# Patient Record
Sex: FEMALE | Race: WHITE | Employment: FULL TIME | URBAN - METROPOLITAN AREA
[De-identification: names, ages, dates, MRNs, and addresses within clinical notes are randomized per-mention and may not be internally consistent; named-entity substitution may affect disease eponyms.]

---

## 2017-02-24 ENCOUNTER — OFFICE VISIT (OUTPATIENT)
Dept: FAMILY MEDICINE | Facility: CLINIC | Age: 62
End: 2017-02-24
Payer: COMMERCIAL

## 2017-02-24 VITALS
HEART RATE: 98 BPM | DIASTOLIC BLOOD PRESSURE: 90 MMHG | HEIGHT: 66 IN | BODY MASS INDEX: 29.23 KG/M2 | OXYGEN SATURATION: 99 % | SYSTOLIC BLOOD PRESSURE: 156 MMHG | WEIGHT: 181.9 LBS | TEMPERATURE: 98.5 F

## 2017-02-24 DIAGNOSIS — I10 HYPERTENSION GOAL BP (BLOOD PRESSURE) < 140/90: ICD-10-CM

## 2017-02-24 DIAGNOSIS — M70.21 OLECRANON BURSITIS OF RIGHT ELBOW: Primary | ICD-10-CM

## 2017-02-24 PROCEDURE — 99214 OFFICE O/P EST MOD 30 MIN: CPT | Performed by: FAMILY MEDICINE

## 2017-02-24 RX ORDER — PREDNISONE 20 MG/1
TABLET ORAL
Qty: 15 TABLET | Refills: 0 | Status: SHIPPED
Start: 2017-02-24 | End: 2017-03-17

## 2017-02-24 NOTE — MR AVS SNAPSHOT
"              After Visit Summary   2/24/2017    Roslyn Palmer    MRN: [id number]           Patient Information     Date Of Birth          1955        Visit Information        Provider Department      2/24/2017 11:20 AM Nette Macias MD [facility]        Today's Diagnoses     Olecranon bursitis of right elbow    -  1       Follow-ups after your visit        Who to contact     If you have questions or need follow up information about today's clinic visit or your schedule please contact [facility] directly at [phone].  Normal or non-critical lab and imaging results will be communicated to you by [employer], letter or phone within 4 business days after the clinic has received the results. If you do not hear from us within 7 days, please contact the clinic through [employer] or phone. If you have a critical or abnormal lab result, we will notify you by phone as soon as possible.  Submit refill requests through [employer] or call your pharmacy and they will forward the refill request to us. Please allow 3 business days for your refill to be completed.          Additional Information About Your Visit        MyChart Information     [employer] gives you secure access to your electronic health record. If you see a primary care provider, you can also send messages to your care team and make appointments. If you have questions, please call your primary care clinic.  If you do not have a primary care provider, please call [phone] and they will assist you.        Care EveryWhere ID     This is your Care EveryWhere ID. This could be used by other organizations to access your [place] medical records  [id number]        Your Vitals Were     Pulse Temperature Height Pulse Oximetry BMI (Body Mass Index)       98 98.5  F (36.9  C) (Oral) 1.666 m (5' 5.59\") 99% 29.73 kg/m2        Blood Pressure from Last 3 Encounters:   02/24/17 156/90   04/14/16 132/78   04/11/16 146/88    Weight " from Last 3 Encounters:   02/24/17 82.5 kg (181 lb 14.4 oz)   04/14/16 84.2 kg (185 lb 11.2 oz)   04/11/16 83.9 kg (185 lb)              Today, you had the following     No orders found for display         Today's Medication Changes          These changes are accurate as of: 2/24/17 12:25 PM.  If you have any questions, ask your nurse or doctor.               Start taking these medicines.        Dose/Directions    predniSONE 20 MG tablet   Commonly known as:  DELTASONE   Used for:  Olecranon bursitis of right elbow   Started by:  Nette Macias MD        3 tabs daily for 3 days, 2 tabs x 2 days, 1 tab x 2 days   Quantity:  15 tablet   Refills:  0            Where to get your medicines      These medications were sent to [facility]/pharmacy #[id number] - [place], [place] - [address] [place] [address] [facility]  [address] [place] [address], [facility] [address]     Phone:  [phone]     predniSONE 20 MG tablet                Primary Care Provider Office Phone # Fax #    Maria Luz Bright PA-C [phone]       [facility] [address]  [facility] [address]        Thank you!     Thank you for choosing [facility]  for your care. Our goal is always to provide you with excellent care. Hearing back from our patients is one way we can continue to improve our services. Please take a few minutes to complete the written survey that you may receive in the mail after your visit with us. Thank you!             Your Updated Medication List - Protect others around you: Learn how to safely use, store and throw away your medicines at www.disposemymeds.org.          This list is accurate as of: 2/24/17 12:25 PM.  Always use your most recent med list.                   Brand Name Dispense Instructions for use    cephALEXin 500 MG capsule    KEFLEX    60 capsule    Take 1 capsule by mouth 2 times daily.       DEMADEX 20 MG tablet   Generic drug:  torsemide      Take 10 mg  by mouth 2 times daily       IMITREX 100 MG tablet   Generic drug:  SUMAtriptan      take 100 mg by mouth at onset of headache. May repeat in 2 hours if needed: max 2/day;       nadolol 20 MG tablet    CORGARD     Take 20 mg by mouth daily.       pantoprazole 40 MG EC tablet    PROTONIX     Take  by mouth daily.       predniSONE 20 MG tablet    DELTASONE    15 tablet    3 tabs daily for 3 days, 2 tabs x 2 days, 1 tab x 2 days

## 2017-02-24 NOTE — NURSING NOTE
"Chief Complaint   Patient presents with     Musculoskeletal Problem     right elbow       Initial /90 (BP Location: Left arm, Patient Position: Chair, Cuff Size: Adult Regular)  Pulse 98  Temp 98.5  F (36.9  C) (Oral)  Ht 1.666 m (5' 5.59\")  Wt 82.5 kg (181 lb 14.4 oz)  SpO2 99%  BMI 29.73 kg/m2 Estimated body mass index is 29.73 kg/(m^2) as calculated from the following:    Height as of this encounter: 1.666 m (5' 5.59\").    Weight as of this encounter: 82.5 kg (181 lb 14.4 oz).  Medication Reconciliation: complete   Kalli Stewart CMA      "

## 2017-03-05 ENCOUNTER — MYC MEDICAL ADVICE (OUTPATIENT)
Dept: FAMILY MEDICINE | Facility: CLINIC | Age: 62
End: 2017-03-05

## 2017-03-05 DIAGNOSIS — M70.21 OLECRANON BURSITIS OF RIGHT ELBOW: ICD-10-CM

## 2017-03-06 RX ORDER — PREDNISONE 20 MG/1
TABLET ORAL
Qty: 21 TABLET | Refills: 0 | Status: SHIPPED | OUTPATIENT
Start: 2017-03-06 | End: 2017-03-17

## 2017-03-17 ENCOUNTER — TELEPHONE (OUTPATIENT)
Dept: FAMILY MEDICINE | Facility: CLINIC | Age: 62
End: 2017-03-17

## 2017-03-17 ENCOUNTER — OFFICE VISIT (OUTPATIENT)
Dept: FAMILY MEDICINE | Facility: CLINIC | Age: 62
End: 2017-03-17
Payer: COMMERCIAL

## 2017-03-17 VITALS
HEIGHT: 66 IN | SYSTOLIC BLOOD PRESSURE: 148 MMHG | BODY MASS INDEX: 28.67 KG/M2 | WEIGHT: 178.4 LBS | TEMPERATURE: 98.5 F | HEART RATE: 98 BPM | DIASTOLIC BLOOD PRESSURE: 86 MMHG | OXYGEN SATURATION: 95 %

## 2017-03-17 DIAGNOSIS — Z00.01 ENCOUNTER FOR ROUTINE ADULT HEALTH EXAMINATION WITH ABNORMAL FINDINGS: Primary | ICD-10-CM

## 2017-03-17 DIAGNOSIS — M79.671 PAIN IN BOTH FEET: ICD-10-CM

## 2017-03-17 DIAGNOSIS — F10.10 ALCOHOL ABUSE: ICD-10-CM

## 2017-03-17 DIAGNOSIS — Z12.4 SCREENING FOR MALIGNANT NEOPLASM OF CERVIX: ICD-10-CM

## 2017-03-17 DIAGNOSIS — G43.109 MIGRAINE WITH AURA AND WITHOUT STATUS MIGRAINOSUS, NOT INTRACTABLE: ICD-10-CM

## 2017-03-17 DIAGNOSIS — F17.200 NEEDS SMOKING CESSATION EDUCATION: ICD-10-CM

## 2017-03-17 DIAGNOSIS — Z23 NEED FOR PROPHYLACTIC VACCINATION AND INOCULATION AGAINST INFLUENZA: ICD-10-CM

## 2017-03-17 DIAGNOSIS — D64.9 ANEMIA, UNSPECIFIED TYPE: ICD-10-CM

## 2017-03-17 DIAGNOSIS — Z78.0 MENOPAUSE: ICD-10-CM

## 2017-03-17 DIAGNOSIS — K70.9 ALCOHOLIC LIVER DISEASE (H): ICD-10-CM

## 2017-03-17 DIAGNOSIS — E87.6 HYPOKALEMIA: Primary | ICD-10-CM

## 2017-03-17 DIAGNOSIS — B19.20 HEPATITIS C VIRUS INFECTION, UNSPECIFIED CHRONICITY: ICD-10-CM

## 2017-03-17 DIAGNOSIS — I10 BENIGN ESSENTIAL HYPERTENSION: ICD-10-CM

## 2017-03-17 DIAGNOSIS — M79.672 PAIN IN BOTH FEET: ICD-10-CM

## 2017-03-17 LAB
ALBUMIN SERPL-MCNC: 3.3 G/DL (ref 3.4–5)
ALP SERPL-CCNC: 177 U/L (ref 40–150)
ALT SERPL W P-5'-P-CCNC: 30 U/L (ref 0–50)
ANION GAP SERPL CALCULATED.3IONS-SCNC: 8 MMOL/L (ref 3–14)
AST SERPL W P-5'-P-CCNC: 45 U/L (ref 0–45)
BASOPHILS # BLD AUTO: 0 10E9/L (ref 0–0.2)
BASOPHILS NFR BLD AUTO: 0.6 %
BILIRUB SERPL-MCNC: 0.8 MG/DL (ref 0.2–1.3)
BUN SERPL-MCNC: 11 MG/DL (ref 7–30)
CALCIUM SERPL-MCNC: 8.8 MG/DL (ref 8.5–10.1)
CHLORIDE SERPL-SCNC: 95 MMOL/L (ref 94–109)
CO2 SERPL-SCNC: 35 MMOL/L (ref 20–32)
CREAT SERPL-MCNC: 0.72 MG/DL (ref 0.52–1.04)
DIFFERENTIAL METHOD BLD: ABNORMAL
EOSINOPHIL # BLD AUTO: 0.2 10E9/L (ref 0–0.7)
EOSINOPHIL NFR BLD AUTO: 3.1 %
ERYTHROCYTE [DISTWIDTH] IN BLOOD BY AUTOMATED COUNT: 17.8 % (ref 10–15)
FOLATE SERPL-MCNC: 3.4 NG/ML
GFR SERPL CREATININE-BSD FRML MDRD: 83 ML/MIN/1.7M2
GLUCOSE SERPL-MCNC: 105 MG/DL (ref 70–99)
HCT VFR BLD AUTO: 37.9 % (ref 35–47)
HGB BLD-MCNC: 12 G/DL (ref 11.7–15.7)
INR PPP: 1.14 (ref 0.86–1.14)
LYMPHOCYTES # BLD AUTO: 1.9 10E9/L (ref 0.8–5.3)
LYMPHOCYTES NFR BLD AUTO: 38.5 %
MCH RBC QN AUTO: 27.7 PG (ref 26.5–33)
MCHC RBC AUTO-ENTMCNC: 31.7 G/DL (ref 31.5–36.5)
MCV RBC AUTO: 88 FL (ref 78–100)
MONOCYTES # BLD AUTO: 0.7 10E9/L (ref 0–1.3)
MONOCYTES NFR BLD AUTO: 14.5 %
NEUTROPHILS # BLD AUTO: 2.1 10E9/L (ref 1.6–8.3)
NEUTROPHILS NFR BLD AUTO: 43.3 %
PLATELET # BLD AUTO: 247 10E9/L (ref 150–450)
POTASSIUM SERPL-SCNC: 2.7 MMOL/L (ref 3.4–5.3)
PROT SERPL-MCNC: 8.6 G/DL (ref 6.8–8.8)
RBC # BLD AUTO: 4.33 10E12/L (ref 3.8–5.2)
SODIUM SERPL-SCNC: 138 MMOL/L (ref 133–144)
URATE SERPL-MCNC: 9.5 MG/DL (ref 2.6–6)
VIT B12 SERPL-MCNC: 1911 PG/ML (ref 193–986)
WBC # BLD AUTO: 4.9 10E9/L (ref 4–11)

## 2017-03-17 PROCEDURE — 87624 HPV HI-RISK TYP POOLED RSLT: CPT | Performed by: FAMILY MEDICINE

## 2017-03-17 PROCEDURE — 85025 COMPLETE CBC W/AUTO DIFF WBC: CPT | Performed by: FAMILY MEDICINE

## 2017-03-17 PROCEDURE — 82607 VITAMIN B-12: CPT | Performed by: FAMILY MEDICINE

## 2017-03-17 PROCEDURE — 90471 IMMUNIZATION ADMIN: CPT | Performed by: FAMILY MEDICINE

## 2017-03-17 PROCEDURE — 86706 HEP B SURFACE ANTIBODY: CPT | Performed by: FAMILY MEDICINE

## 2017-03-17 PROCEDURE — 85610 PROTHROMBIN TIME: CPT | Performed by: FAMILY MEDICINE

## 2017-03-17 PROCEDURE — 82746 ASSAY OF FOLIC ACID SERUM: CPT | Performed by: FAMILY MEDICINE

## 2017-03-17 PROCEDURE — 84550 ASSAY OF BLOOD/URIC ACID: CPT | Performed by: FAMILY MEDICINE

## 2017-03-17 PROCEDURE — 80053 COMPREHEN METABOLIC PANEL: CPT | Performed by: FAMILY MEDICINE

## 2017-03-17 PROCEDURE — 87522 HEPATITIS C REVRS TRNSCRPJ: CPT | Performed by: FAMILY MEDICINE

## 2017-03-17 PROCEDURE — 36415 COLL VENOUS BLD VENIPUNCTURE: CPT | Performed by: FAMILY MEDICINE

## 2017-03-17 PROCEDURE — 87340 HEPATITIS B SURFACE AG IA: CPT | Performed by: FAMILY MEDICINE

## 2017-03-17 PROCEDURE — 99213 OFFICE O/P EST LOW 20 MIN: CPT | Mod: 25 | Performed by: FAMILY MEDICINE

## 2017-03-17 PROCEDURE — 99396 PREV VISIT EST AGE 40-64: CPT | Mod: 25 | Performed by: FAMILY MEDICINE

## 2017-03-17 PROCEDURE — 86708 HEPATITIS A ANTIBODY: CPT | Performed by: FAMILY MEDICINE

## 2017-03-17 PROCEDURE — G0145 SCR C/V CYTO,THINLAYER,RESCR: HCPCS | Performed by: FAMILY MEDICINE

## 2017-03-17 PROCEDURE — 90686 IIV4 VACC NO PRSV 0.5 ML IM: CPT | Performed by: FAMILY MEDICINE

## 2017-03-17 RX ORDER — TORSEMIDE 20 MG/1
10 TABLET ORAL 2 TIMES DAILY
Qty: 30 TABLET | Status: CANCELLED | OUTPATIENT
Start: 2017-03-17

## 2017-03-17 RX ORDER — TORSEMIDE 10 MG/1
10 TABLET ORAL 2 TIMES DAILY
Qty: 180 TABLET | Refills: 1 | Status: SHIPPED | OUTPATIENT
Start: 2017-03-17 | End: 2017-12-14

## 2017-03-17 RX ORDER — PANTOPRAZOLE SODIUM 40 MG/1
40 TABLET, DELAYED RELEASE ORAL DAILY
Qty: 90 TABLET | Refills: 3 | Status: SHIPPED | OUTPATIENT
Start: 2017-03-17 | End: 2018-04-02

## 2017-03-17 RX ORDER — POTASSIUM CHLORIDE 1500 MG/1
20 TABLET, EXTENDED RELEASE ORAL
Qty: 37 TABLET | Refills: 0 | Status: SHIPPED | OUTPATIENT
Start: 2017-03-17 | End: 2017-10-27

## 2017-03-17 RX ORDER — NADOLOL 20 MG/1
20 TABLET ORAL DAILY
Qty: 30 TABLET | Refills: 0 | Status: CANCELLED | OUTPATIENT
Start: 2017-03-17

## 2017-03-17 RX ORDER — NADOLOL 40 MG/1
40 TABLET ORAL DAILY
Qty: 30 TABLET | Refills: 0 | Status: SHIPPED | OUTPATIENT
Start: 2017-03-17 | End: 2017-04-12

## 2017-03-17 NOTE — NURSING NOTE
"Chief Complaint   Patient presents with     Physical       Initial /86 (BP Location: Right arm, Patient Position: Chair, Cuff Size: Adult Large)  Pulse 98  Temp 98.5  F (36.9  C) (Oral)  Ht 1.664 m (5' 5.5\")  Wt 80.9 kg (178 lb 6.4 oz)  SpO2 95%  Breastfeeding? No  BMI 29.24 kg/m2 Estimated body mass index is 29.24 kg/(m^2) as calculated from the following:    Height as of this encounter: 1.664 m (5' 5.5\").    Weight as of this encounter: 80.9 kg (178 lb 6.4 oz).  Medication Reconciliation: complete     ANGEL Christian MA         "

## 2017-03-17 NOTE — PROGRESS NOTES
SUBJECTIVE:     CC: Roslyn Palmer is an 61 year old woman who presents for preventive health visit.     Healthy Habits:    Do you get at least three servings of calcium containing foods daily (dairy, green leafy vegetables, etc.)? no    Amount of exercise or daily activities, outside of work: 0 day(s) per week    Problems taking medications regularly No    Medication side effects: No    Have you had an eye exam in the past two years? no    Do you see a dentist twice per year? no    Do you have sleep apnea, excessive snoring or daytime drowsiness?no            Today's PHQ-2 Score:   PHQ-2 ( 1999 Pf[employer]) 3/16/2017 4/8/2016   Q1: Little interest or pleasure in doing things - 0   Q2: Feeling down, depressed or hopeless - 0   PHQ-2 Score - 0   Little interest or pleasure in doing things Not at all -   Feeling down, depressed or hopeless Not at all -   PHQ-2 Score 0 -       Abuse: Current or Past(Physical, Sexual or Emotional)- No  Do you feel safe in your environment - Yes    Social History   Substance Use Topics     Smoking status: Current Every Day Smoker     Packs/day: 0.50     Types: Cigarettes     Smokeless tobacco: Not on file     Alcohol use Yes      Comment: 3 to 4 drinks a day          Standardized Alcohol Screening Questionnaire  AUDIT   Questions 0 1 2 3 4 Score   1. How often do you have a drink  containing alcohol? Never Monthly or less 2 to 4  times a  month 2 to 3  times a  week 4 or more  times a  week  4   2. How many drinks containing alcohol  do you have on a typical day when you are drinking? 1 or 2 3 or 4 5 or 6 7 to 9 10 or more  1   3. How often do you have more than five  or more drinks on one occasion? Never Less  than  monthly Monthly Weekly Daily or  almost  daily  1   4. How often during the last year have  you found that you were not able to stop drinking once you had started? Never Less  than  monthly Monthly Weekly Daily or  almost  daily  0   5. How often during the last year  have  you failed to do what was normally expected of you because of drinking? Never Less  than  monthly Monthly Weekly Daily or  almost  daily  0   6. How often during the last year have  you needed a first drink in the morning to get yourself going after a heavy drinking session? Never Less  than  monthly Monthly Weekly Daily or  almost  daily  0   7. How often during the last year have you had a feeling of guilt or remorse after drinking? Never Less  than  monthly Monthly Weekly Daily or  almost  daily  0   8. How often during the last year have  you been unable to remember what happened the night before because of your drinking? Never Less  than  monthly Monthly Weekly Daily or  almost  daily  0   9. Have you or someone else been  injured because of your drinking? No  Yes, but not in the last year  Yes,  during the  last year  0   10. Has a relative, friend, doctor or other health care worker been concerned about your drinking or suggested you cut down? No  Yes, but not in the last year  Yes,  during the  last year  0   Total  6   Scoring: A score of 7 for adult men is an indication of hazardous drinking (risk for physical or physiological harm); a score of 8 or more is an indication of an alcohol use disorder. A score of 5 or more for adult women  is an indication of hazardous drinking or an alcohol use disorder.       Recent Labs   Lab Test 02/01/16 03/17/14   CHOL  134  125   HDL  46  42   LDL  65  62   TRIG  116  107       Reviewed orders with patient.  Reviewed health maintenance and updated orders accordingly - Yes    She was originally following with [facility] but has decided to transfer her primary care here.     -She went to see Dr. Canela for a staph infection in her hip following a hip replacement, had an ultrasound which was clear so he took off Penicillin. She however, is on chronic cephalexin. She see's Dr. Canela once yearly for this. He had also recommended that she be seen by GI for her  "hep c but she has not done this yet.   -She was started on Nadolol for bp and headaches in distant past. Notes her heart will flutter when she does not take Nadolol.   -The last time she saw a nephrologist was in 2010-Dr. Oliver. He recommended long-term demadex but ok to be rx'd by her pcp   -unsure when her last HepC level was  -had a mammogram in November 2016, she will provide records  - unsure when her last pap was, believes it was about 10 years ago.   - unsure if she has had full course of hepA and hepB vaccines.   -will have 6 month periods where she does not drink etoh and then will start again due to stressors. Currently having 3 drinks when she gets home. She has been in treatment for drugs and alcohol when she was 19. Declines treatment  -she is having approx. 2-3 migraines a month. She will have \"saran wrap in her vision as an aura. She is managing with nadolol and Imitrex. Migraines are largely unchanged in character but they are not as long-lasting as in the past.   -was ill last couple of weeks, fever lasted for 1 weeks, has not had fever for 2 days. She was congested and had rhinorrhea. This has largely resolved and her lungs have returned back to normal.   -reports multiple keratosis all over her skin  -she will have occasional instances where her feet are really painful and is unable to walk, she has to keep cane in the car. Pain has been present the past 2-3 days.   -she is not currently sexually active.     Mammo Decision Support:  Patient over age 50, mutual decision to screen reflected in health maintenance.    Pertinent mammograms are reviewed under the imaging tab.  History of abnormal Pap smear: Last 3 Pap Results: No results found for: PAP    Reviewed and updated as needed this visit by clinical staff  Tobacco  Allergies  Meds  Med Hx  Surg Hx  Fam Hx  Soc Hx        Reviewed and updated as needed this visit by Provider        Past Medical History   Diagnosis Date     DJD " (degenerative joint disease)      Essential hypertension, malignant      ETOH abuse      Hepatitis C       Obstetric History     No data available        Patient Active Problem List   Diagnosis     Joint Prosthesis Infection or Inflammation     Alcoholic liver disease (H)     Tobacco use disorder     CARDIOVASCULAR SCREENING; LDL GOAL LESS THAN 160     Anemia     Advanced directives, counseling/discussion     Benign essential hypertension     Hepatitis C virus infection, unspecified chronicity     Upper GI bleed     Alcohol abuse     Past Surgical History   Procedure Laterality Date     Orthopedic surgery  ,     left and right hip replacement     Gyn surgery              Social History   Substance Use Topics     Smoking status: Current Every Day Smoker     Packs/day: 0.50     Types: Cigarettes     Smokeless tobacco: Not on file     Alcohol use Yes      Comment: 3 to 4 drinks a day     Family History   Problem Relation Age of Onset     Breast Cancer Mother      CANCER Maternal Grandmother      CANCER Maternal Grandfather      CANCER Paternal Grandmother      CANCER Paternal Grandfather      Arthritis Sister          Current Outpatient Prescriptions   Medication Sig Dispense Refill     nadolol (CORGARD) 20 MG tablet Take 1 tablet (20 mg) by mouth daily 30 tablet 0     pantoprazole (PROTONIX) 40 MG EC tablet Take 1 tablet (40 mg) by mouth daily 30 tablet 0     cephALEXin (KEFLEX) 500 MG capsule Take 1 capsule by mouth 2 times daily. 60 capsule 6     torsemide (DEMADEX) 20 MG tablet Take 10 mg by mouth 2 times daily        sumatriptan (IMITREX) 100 MG tablet take 100 mg by mouth at onset of headache. May repeat in 2 hours if needed: max 2/day;       predniSONE (DELTASONE) 20 MG tablet 3 tabs daily for 4 days,  2 tabs x 3 days,  1 tab x 3 days (Patient not taking: Reported on 3/17/2017) 21 tablet 0     predniSONE (DELTASONE) 20 MG tablet 3 tabs daily for 3 days,  2 tabs x 2 days,  1 tab x 2 days  "(Patient not taking: Reported on 3/17/2017) 15 tablet 0     Allergies   Allergen Reactions     Sulfa Drugs        ROS:   ROS: 10 point ROS neg other than the symptoms noted above in the HPI.    OBJECTIVE:     /86 (BP Location: Right arm, Patient Position: Chair, Cuff Size: Adult Large)  Pulse 98  Temp 98.5  F (36.9  C) (Oral)  Ht 1.664 m (5' 5.5\")  Wt 80.9 kg (178 lb 6.4 oz)  SpO2 95%  Breastfeeding? No  BMI 29.24 kg/m2  EXAM:  GENERAL APPEARANCE: alert and no distress  EYES: Eyes grossly normal to inspection, PERRL and conjunctivae and sclerae normal  HENT: ear canals and TM's normal, nose and mouth without ulcers or lesions, oropharynx clear and oral mucous membranes moist  NECK: no adenopathy, no asymmetry, masses, or scars and thyroid normal to palpation  RESP: lungs clear to auscultation - no rales, rhonchi or wheezes  BREAST: normal without masses, tenderness or nipple discharge and no palpable axillary masses or adenopathy  CV: regular rate and rhythm, normal S1 S2, no S3 or S4, no murmur, click or rub, no peripheral edema and peripheral pulses strong  ABDOMEN: soft, nontender,  Hepatomegaly with liver edge palpable, approximately 2 finger breaths below ribs. no splenomegaly, no masses and bowel sounds normal   (female): normal female external genitalia, normal urethral meatus, vaginal mucosal atrophy noted, normal cervix, adnexae, and uterus without masses or abnormal discharge  MS: no musculoskeletal defects are noted and gait is age appropriate without ataxia. Multiple keratosis on thighs.   SKIN: no suspicious lesions or rashes  NEURO: Normal strength and tone, sensory exam grossly normal, mentation intact and speech normal  PSYCH: mentation appears normal and affect normal/bright        ASSESSMENT/PLAN:     1. Encounter for routine adult health examination with abnormal findings  Will get [facility] for Mammogram records.     2. Benign essential hypertension  Not at goal. Ongoing migraines. increase " Nadolol dose to 40 mg daily. MA only bp check in few weeks. If at goal, can send 3 month refill to mail order.   - nadolol (CORGARD) 40 MG tablet; Take 1 tablet (40 mg) by mouth daily  Dispense: 30 tablet; Refill: 0  - torsemide (DEMADEX) 10 MG tablet; Take 1 tablet (10 mg) by mouth 2 times daily  Dispense: 180 tablet; Refill: 1    3. Alcohol abuse  4. Alcoholic liver disease (H)  5. Hepatitis C virus infection, unspecified chronicity  Reviewed importance of alcohol cessation. Declines treatment. Will screen for Hep C levels. F/U for abdominal US for HCC screening and f/u with GI for consideration of hep C tx. .   - pantoprazole (PROTONIX) 40 MG EC tablet; Take 1 tablet (40 mg) by mouth daily  Dispense: 90 tablet; Refill: 3  - torsemide (DEMADEX) 10 MG tablet; Take 1 tablet (10 mg) by mouth 2 times daily  Dispense: 180 tablet; Refill: 1  - Comprehensive metabolic panel (BMP + Alb, Alk Phos, ALT, AST, Total. Bili, TP)  - Hepatitis C RNA, quantitative  - Folate  - Vitamin B12  - INR  - Hepatitis B Surface Antibody  - Hepatitis A Antibody IgG  - US Abdomen Complete; Future  - GASTROENTEROLOGY ADULT REF CONSULT ONLY    5. Screening for malignant neoplasm of cervix  - Pap imaged thin layer screen with HPV - recommended age 30 - 65 years (select HPV order below)  - HPV High Risk Types DNA Cervical       6. Anemia, unspecified type  Hx of, I suspect due to her liver disease. Will check with labs.   - Comprehensive metabolic panel (BMP + Alb, Alk Phos, ALT, AST, Total. Bili, TP)  - CBC with platelets differential    7. Migraine with aura and without status migrainosus, not intractable  Controlled. Continue current medications. Patient brings in FMLA forms for this. Reports her past pcp had filled this out for her, allowing absence of 4-8 hours, up to 2-3 days per month for migraines.     8. Pain in both feet  Unclear etiology, ? if due to gout due based on alcohol use. Continue to monitor and f/u when flared.   - Uric  acid    9. Menopause  F/U for Dexa scan.   - DX Hip/Pelvis/Spine; Future    10. Needs smoking cessation education  Pt would like to defer smoking cessation  and focus on alcohol cessation for now. Declines treatment.     12. Need for prophylactic vaccination and inoculation against influenza  Vaccine given today.   - FLU VAC, SPLIT VIRUS IM > 3 YO (QUADRIVALENT) [50544]        Patient Instructions   Please call [facility] ([facility] at [phone] to schedule ultrasound and bone density scan.     Start  Taking 40mg of Nadolol and after 2 weeks, schedule MA only blood pressure check.     Schedule follow up with GI.     Preventive Health Recommendations  Female Ages 50 - 64    Yearly exam: See your health care provider every year in order to  o Review health changes.   o Discuss preventive care.    o Review your medicines if your doctor has prescribed any.      Get a Pap test every three years (unless you have an abnormal result and your provider advises testing more often).    If you get Pap tests with HPV test, you only need to test every 5 years, unless you have an abnormal result.     You do not need a Pap test if your uterus was removed (hysterectomy) and you have not had cancer.    You should be tested each year for STDs (sexually transmitted diseases) if you're at risk.     Have a mammogram every 1 to 2 years.    Have a colonoscopy at age 50, or have a yearly FIT test (stool test). These exams screen for colon cancer.      Have a cholesterol test every 5 years, or more often if advised.    Have a diabetes test (fasting glucose) every three years. If you are at risk for diabetes, you should have this test more often.     If you are at risk for osteoporosis (brittle bone disease), think about having a bone density scan (DEXA).    Shots: Get a flu shot each year. Get a tetanus shot every 10 years.    Nutrition:     Eat at least 5 servings of fruits  "and vegetables each day.    Eat whole-grain bread, whole-wheat pasta and brown rice instead of white grains and rice.    Talk to your provider about Calcium and Vitamin D.     Lifestyle    Exercise at least 150 minutes a week (30 minutes a day, 5 days a week). This will help you control your weight and prevent disease.    Limit alcohol to one drink per day.    No smoking.     Wear sunscreen to prevent skin cancer.     See your dentist every six months for an exam and cleaning.    See your eye doctor every 1 to 2 years.          COUNSELING:   Reviewed preventive health counseling, as reflected in patient instructions       Vision screening       Hearing screening       Vaccinated for: Influenza       Alcohol Use       Osteoporosis Prevention/Bone Health       Advance Care Planning        reports that she has been smoking Cigarettes.  She has been smoking about 0.50 packs per day. She does not have any smokeless tobacco history on file.  Tobacco Cessation Action Plan: Information offered: Patient not interested at this time  She would like to work on alcohol cessation first.   Estimated body mass index is 29.24 kg/(m^2) as calculated from the following:    Height as of this encounter: 1.664 m (5' 5.5\").    Weight as of this encounter: 80.9 kg (178 lb 6.4 oz).   Weight management plan: Discussed alcohol cessation for weight management.     Counseling Resources:  ATP IV Guidelines  Pooled Cohorts Equation Calculator  Breast Cancer Risk Calculator  FRAX Risk Assessment  ICSI Preventive Guidelines  Dietary Guidelines for Americans, 2010  USDA's MyPlate  ASA Prophylaxis  Lung CA Screening    This document serves as a record of the services and decisions personally performed and made by Monica Tirado MD. It was created on her behalf by Carrie Blankenship,a trained medical scribe. The creation of this document is based the provider's statements to the medical scribe.  Carrie Blankenship March 17, 2017 2:05 PM     The " information in this document, created by a scribe for me, accurately reflects the services I personally performed and the decisions made by me. I have reviewed and approved this document for accuracy.    MD Monica Kulkarni MD  [facility]

## 2017-03-17 NOTE — PROGRESS NOTES
Injectable Influenza Immunization Documentation    1.  Is the person to be vaccinated sick today?  No    2. Does the person to be vaccinated have an allergy to eggs or to a component of the vaccine?  No    3. Has the person to be vaccinated today ever had a serious reaction to influenza vaccine in the past?  No    4. Has the person to be vaccinated ever had Guillain-[place] syndrome?  No     Form completed by Doris

## 2017-03-17 NOTE — TELEPHONE ENCOUNTER
Please let patient know her labs show fairly low potassium.   This is likely due to her demadex. She needs to start potassium supplement and recheck potassium in one week  rx sent to her pharmacy. i'll let her know of other lab results via [employer].

## 2017-03-17 NOTE — MR AVS SNAPSHOT
After Visit Summary   3/17/2017    Roslyn Palmer    MRN: [id number]           Patient Information     Date Of Birth          1955        Visit Information        Provider Department      3/17/2017 1:20 PM Monica Tirado MD [facility]        Today's Diagnoses     Encounter for routine adult health examination with abnormal findings    -  1    Benign essential hypertension        Alcohol abuse        Screening for malignant neoplasm of cervix        Alcoholic liver disease (H)        Anemia, unspecified type        Hepatitis C virus infection, unspecified chronicity        Migraine with aura and without status migrainosus, not intractable        Pain in both feet        Menopause        Needs smoking cessation education        Need for prophylactic vaccination and inoculation against influenza          Care Instructions    Please call [facility] at [phone] to schedule ultrasound and bone density scan.     Start  Taking 40mg of Nadolol and after 2 weeks, schedule MA only blood pressure check.     Schedule follow up with GI.     Preventive Health Recommendations  Female Ages 50 - 64    Yearly exam: See your health care provider every year in order to  o Review health changes.   o Discuss preventive care.    o Review your medicines if your doctor has prescribed any.      Get a Pap test every three years (unless you have an abnormal result and your provider advises testing more often).    If you get Pap tests with HPV test, you only need to test every 5 years, unless you have an abnormal result.     You do not need a Pap test if your uterus was removed (hysterectomy) and you have not had cancer.    You should be tested each year for STDs (sexually transmitted diseases) if you're at risk.     Have a mammogram every 1 to 2 years.    Have a colonoscopy at age 50, or have a yearly FIT test  (stool test). These exams screen for colon cancer.      Have a cholesterol test every 5 years, or more often if advised.    Have a diabetes test (fasting glucose) every three years. If you are at risk for diabetes, you should have this test more often.     If you are at risk for osteoporosis (brittle bone disease), think about having a bone density scan (DEXA).    Shots: Get a flu shot each year. Get a tetanus shot every 10 years.    Nutrition:     Eat at least 5 servings of fruits and vegetables each day.    Eat whole-grain bread, whole-wheat pasta and brown rice instead of white grains and rice.    Talk to your provider about Calcium and Vitamin D.     Lifestyle    Exercise at least 150 minutes a week (30 minutes a day, 5 days a week). This will help you control your weight and prevent disease.    Limit alcohol to one drink per day.    No smoking.     Wear sunscreen to prevent skin cancer.     See your dentist every six months for an exam and cleaning.    See your eye doctor every 1 to 2 years.          Follow-ups after your visit        Additional Services     GASTROENTEROLOGY ADULT REF CONSULT ONLY       Preferred Location: [facility] [phone]      Please be aware that coverage of these services is subject to the terms and limitations of your health insurance plan.  Call member services at your health plan with any benefit or coverage questions.  Any procedures must be performed at a [place] facility OR coordinated by your clinic's referral office.    Please bring the following with you to your appointment:    (1) Any X-Rays, CTs or MRIs which have been performed.  Contact the facility where they were done to arrange for  prior to your scheduled appointment.    (2) List of current medications   (3) This referral request   (4) Any documents/labs given to you for this referral            NOVU Referral Smoking Cessation       [place] online at [email or web address]                  Your next 10  appointments already scheduled     Mar 27, 2017  8:20 AM CDT   LAB with [facility] ONLY   [facility]    [address] [address] [phone]           Patient must bring picture ID.  Patient should be prepared to give a urine specimen  Please do not eat 10-12 hours before your appointment if you are coming in fasting for labs on lipids, cholesterol, or glucose (sugar).  Pregnant women should follow their Care Team instructions. Water with medications is okay. Do not drink coffee or other fluids.   If you have concerns about taking  your medications, please ask at office or if scheduling via orangutrans, send a message by clicking on Secure Messaging, Message Your Care Team.            Mar 27, 2017  8:40 AM CDT   Nurse Only with BA ANCILLARY   [facility]    [address] [id number] [phone]              Future tests that were ordered for you today     Open Future Orders        Priority Expected Expires Ordered    DX Hip/Pelvis/Spine Routine  3/17/2018 3/17/2017    US Abdomen Complete Routine 6/15/2017 3/17/2018 3/17/2017            Who to contact     If you have questions or need follow up information about today's clinic visit or your schedule please contact [facility] directly at [phone].  Normal or non-critical lab and imaging results will be communicated to you by Spireonhart, letter or phone within 4 business days after the clinic has received the results. If you do not hear from us within 7 days, please contact the clinic through Spireonhart or phone. If you have a critical or abnormal lab result, we will notify you by phone as soon as possible.  Submit refill requests through orangutrans or call your pharmacy and they will forward the refill request to us. Please allow 3 business days for your refill to be completed.          Additional Information About  "Your Visit        MyChart Information     [employer] gives you secure access to your electronic health record. If you see a primary care provider, you can also send messages to your care team and make appointments. If you have questions, please call your primary care clinic.  If you do not have a primary care provider, please call [phone] and they will assist you.        Care EveryWhere ID     This is your Care EveryWhere ID. This could be used by other organizations to access your [place] medical records  [id number]        Your Vitals Were     Pulse Temperature Height Pulse Oximetry Breastfeeding? BMI (Body Mass Index)    98 98.5  F (36.9  C) (Oral) 1.664 m (5' 5.5\") 95% No 29.24 kg/m2       Blood Pressure from Last 3 Encounters:   03/17/17 148/86   02/24/17 156/90   04/14/16 132/78    Weight from Last 3 Encounters:   03/17/17 80.9 kg (178 lb 6.4 oz)   02/24/17 82.5 kg (181 lb 14.4 oz)   04/14/16 84.2 kg (185 lb 11.2 oz)              We Performed the Following     CBC with platelets differential     Comprehensive metabolic panel (BMP + Alb, Alk Phos, ALT, AST, Total. Bili, TP)     FLU VAC, SPLIT VIRUS IM > 3 YO (QUADRIVALENT) [75679]     Folate     GASTROENTEROLOGY ADULT REF CONSULT ONLY     Hepatitis A Antibody IgG     Hepatitis B Surface Antibody     Hepatitis C RNA, quantitative     HPV High Risk Types DNA Cervical     INR     NOVU Referral Smoking Cessation     Pap imaged thin layer screen with HPV - recommended age 30 - 65 years (select HPV order below)     Uric acid     Vaccine Administration, Initial [99909]     Vitamin B12          Today's Medication Changes          These changes are accurate as of: 3/17/17  2:32 PM.  If you have any questions, ask your nurse or doctor.               These medicines have changed or have updated prescriptions.        Dose/Directions    * DEMADEX 20 MG tablet   This may have changed:  Another medication with the same name was added. Make sure you understand how and when " to take each.   Generic drug:  torsemide   Changed by:  Manpreet Canela MD        Dose:  10 mg   Take 10 mg by mouth 2 times daily   Refills:  0       * torsemide 10 MG tablet   Commonly known as:  DEMADEX   This may have changed:  You were already taking a medication with the same name, and this prescription was added. Make sure you understand how and when to take each.   Used for:  Benign essential hypertension, Alcoholic liver disease (H)   Changed by:  Monica Tirado MD        Dose:  10 mg   Take 1 tablet (10 mg) by mouth 2 times daily   Quantity:  180 tablet   Refills:  1       * nadolol 20 MG tablet   Commonly known as:  CORGARD   This may have changed:  Another medication with the same name was added. Make sure you understand how and when to take each.   Used for:  Benign essential hypertension   Changed by:  Maria Luz Bright PA-C        Dose:  20 mg   Take 1 tablet (20 mg) by mouth daily   Quantity:  30 tablet   Refills:  0       * nadolol 40 MG tablet   Commonly known as:  CORGARD   This may have changed:  You were already taking a medication with the same name, and this prescription was added. Make sure you understand how and when to take each.   Used for:  Benign essential hypertension   Changed by:  Monica Tirado MD        Dose:  40 mg   Take 1 tablet (40 mg) by mouth daily   Quantity:  30 tablet   Refills:  0       * Notice:  This list has 4 medication(s) that are the same as other medications prescribed for you. Read the directions carefully, and ask your doctor or other care provider to review them with you.      Stop taking these medicines if you haven't already. Please contact your care team if you have questions.     predniSONE 20 MG tablet   Commonly known as:  DELTASONE   Stopped by:  Monica Tirado MD                Where to get your medicines      These medications were sent to [facility] - [place], [place] - [address] [place] [address]     Phone:  [phone]     pantoprazole 40 MG EC tablet    torsemide 10 MG tablet         These medications were sent to [facility]/pharmacy #[id number] - [place], [place] - [address] [place] [facility]  [address] [facility] [address]     Phone:  [phone]     nadolol 40 MG tablet                Primary Care Provider Office Phone # Fax #    Maria Luz Bright PA-C [phone]       [facility] [address]  [facility] [address]        Thank you!     Thank you for choosing [facility]  for your care. Our goal is always to provide you with excellent care. Hearing back from our patients is one way we can continue to improve our services. Please take a few minutes to complete the written survey that you may receive in the mail after your visit with us. Thank you!             Your Updated Medication List - Protect others around you: Learn how to safely use, store and throw away your medicines at www.disposemymeds.org.          This list is accurate as of: 3/17/17  2:32 PM.  Always use your most recent med list.                   Brand Name Dispense Instructions for use    cephALEXin 500 MG capsule    KEFLEX    60 capsule    Take 1 capsule by mouth 2 times daily.       * DEMADEX 20 MG tablet   Generic drug:  torsemide      Take 10 mg by mouth 2 times daily       * torsemide 10 MG tablet    DEMADEX    180 tablet    Take 1 tablet (10 mg) by mouth 2 times daily       IMITREX 100 MG tablet   Generic drug:  SUMAtriptan      take 100 mg by mouth at onset of headache. May repeat in 2 hours if needed: max 2/day;       * nadolol 20 MG tablet    CORGARD    30 tablet    Take 1 tablet (20 mg) by mouth daily       * nadolol 40 MG tablet    CORGARD    30 tablet    Take 1 tablet (40 mg) by mouth daily       pantoprazole 40 MG EC tablet    PROTONIX    90 tablet    Take 1 tablet (40 mg) by mouth daily       * Notice:  This  list has 4 medication(s) that are the same as other medications prescribed for you. Read the directions carefully, and ask your doctor or other care provider to review them with you.

## 2017-03-17 NOTE — PATIENT INSTRUCTIONS
Please call [facility]) at [phone] to schedule ultrasound and bone density scan.     Start  Taking 40mg of Nadolol and after 2 weeks, schedule MA only blood pressure check.     Schedule follow up with GI.     Preventive Health Recommendations  Female Ages 50 - 64    Yearly exam: See your health care provider every year in order to  o Review health changes.   o Discuss preventive care.    o Review your medicines if your doctor has prescribed any.      Get a Pap test every three years (unless you have an abnormal result and your provider advises testing more often).    If you get Pap tests with HPV test, you only need to test every 5 years, unless you have an abnormal result.     You do not need a Pap test if your uterus was removed (hysterectomy) and you have not had cancer.    You should be tested each year for STDs (sexually transmitted diseases) if you're at risk.     Have a mammogram every 1 to 2 years.    Have a colonoscopy at age 50, or have a yearly FIT test (stool test). These exams screen for colon cancer.      Have a cholesterol test every 5 years, or more often if advised.    Have a diabetes test (fasting glucose) every three years. If you are at risk for diabetes, you should have this test more often.     If you are at risk for osteoporosis (brittle bone disease), think about having a bone density scan (DEXA).    Shots: Get a flu shot each year. Get a tetanus shot every 10 years.    Nutrition:     Eat at least 5 servings of fruits and vegetables each day.    Eat whole-grain bread, whole-wheat pasta and brown rice instead of white grains and rice.    Talk to your provider about Calcium and Vitamin D.     Lifestyle    Exercise at least 150 minutes a week (30 minutes a day, 5 days a week). This will help you control your weight and prevent disease.    Limit alcohol to one drink per day.    No smoking.     Wear sunscreen to  prevent skin cancer.     See your dentist every six months for an exam and cleaning.    See your eye doctor every 1 to 2 years.

## 2017-03-18 PROBLEM — E53.8 LOW FOLATE: Status: ACTIVE | Noted: 2017-03-18

## 2017-03-20 LAB
HAV IGG SER QL IA: ABNORMAL
HBV SURFACE AB SERPL IA-ACNC: 0.13 M[IU]/ML
HCV RNA SERPL NAA+PROBE-ACNC: ABNORMAL [IU]/ML
HCV RNA SERPL NAA+PROBE-LOG IU: 6.6 LOG IU/ML

## 2017-03-20 NOTE — TELEPHONE ENCOUNTER
Appears patient read [employer] message with this info over the weekend and has f/u lab appt scheduled.

## 2017-03-21 LAB
COPATH REPORT: NORMAL
PAP: NORMAL

## 2017-03-22 LAB — HBV SURFACE AG SERPL QL IA: NONREACTIVE

## 2017-03-23 ENCOUNTER — DOCUMENTATION ONLY (OUTPATIENT)
Dept: LAB | Facility: CLINIC | Age: 62
End: 2017-03-23

## 2017-03-23 DIAGNOSIS — Z00.01 ENCOUNTER FOR ROUTINE ADULT HEALTH EXAMINATION WITH ABNORMAL FINDINGS: ICD-10-CM

## 2017-03-23 DIAGNOSIS — B19.20 HEPATITIS C VIRUS INFECTION, UNSPECIFIED CHRONICITY: Primary | ICD-10-CM

## 2017-03-23 LAB
FINAL DIAGNOSIS: NORMAL
HPV HR 12 DNA CVX QL NAA+PROBE: NEGATIVE
HPV16 DNA SPEC QL NAA+PROBE: NEGATIVE
HPV18 DNA SPEC QL NAA+PROBE: NEGATIVE
SPECIMEN DESCRIPTION: NORMAL

## 2017-03-23 NOTE — PROGRESS NOTES
Please place or confirm orders for upcoming lab appointment on 03/27/2017 patient coming in for fasting labs    Thank You  Dari PAREDES CMA.

## 2017-03-24 PROBLEM — Z12.4 CERVICAL CANCER SCREENING: Status: ACTIVE | Noted: 2017-03-24

## 2017-03-27 ENCOUNTER — ALLIED HEALTH/NURSE VISIT (OUTPATIENT)
Dept: NURSING | Facility: CLINIC | Age: 62
End: 2017-03-27
Payer: COMMERCIAL

## 2017-03-27 VITALS — RESPIRATION RATE: 14 BRPM | DIASTOLIC BLOOD PRESSURE: 80 MMHG | SYSTOLIC BLOOD PRESSURE: 118 MMHG | HEART RATE: 84 BPM

## 2017-03-27 DIAGNOSIS — Z00.01 ENCOUNTER FOR ROUTINE ADULT HEALTH EXAMINATION WITH ABNORMAL FINDINGS: ICD-10-CM

## 2017-03-27 DIAGNOSIS — B19.20 HEPATITIS C VIRUS INFECTION, UNSPECIFIED CHRONICITY: ICD-10-CM

## 2017-03-27 DIAGNOSIS — I10 BENIGN ESSENTIAL HYPERTENSION: Primary | ICD-10-CM

## 2017-03-27 DIAGNOSIS — E87.6 HYPOKALEMIA: ICD-10-CM

## 2017-03-27 LAB
ANION GAP SERPL CALCULATED.3IONS-SCNC: 7 MMOL/L (ref 3–14)
BUN SERPL-MCNC: 8 MG/DL (ref 7–30)
CALCIUM SERPL-MCNC: 8.9 MG/DL (ref 8.5–10.1)
CHLORIDE SERPL-SCNC: 99 MMOL/L (ref 94–109)
CHOLEST SERPL-MCNC: 114 MG/DL
CO2 SERPL-SCNC: 33 MMOL/L (ref 20–32)
CREAT SERPL-MCNC: 0.64 MG/DL (ref 0.52–1.04)
GFR SERPL CREATININE-BSD FRML MDRD: ABNORMAL ML/MIN/1.7M2
GLUCOSE SERPL-MCNC: 121 MG/DL (ref 70–99)
HDLC SERPL-MCNC: 34 MG/DL
LDLC SERPL CALC-MCNC: 57 MG/DL
NONHDLC SERPL-MCNC: 80 MG/DL
POTASSIUM SERPL-SCNC: 3.7 MMOL/L (ref 3.4–5.3)
SODIUM SERPL-SCNC: 139 MMOL/L (ref 133–144)
TRIGL SERPL-MCNC: 117 MG/DL

## 2017-03-27 PROCEDURE — 87902 NFCT AGT GNTYP ALYS HEP C: CPT | Mod: 90 | Performed by: FAMILY MEDICINE

## 2017-03-27 PROCEDURE — 99207 ZZC NO CHARGE NURSE ONLY: CPT

## 2017-03-27 PROCEDURE — 99000 SPECIMEN HANDLING OFFICE-LAB: CPT | Performed by: FAMILY MEDICINE

## 2017-03-27 PROCEDURE — 80048 BASIC METABOLIC PNL TOTAL CA: CPT | Performed by: FAMILY MEDICINE

## 2017-03-27 PROCEDURE — 36415 COLL VENOUS BLD VENIPUNCTURE: CPT | Performed by: FAMILY MEDICINE

## 2017-03-27 PROCEDURE — 80061 LIPID PANEL: CPT | Performed by: FAMILY MEDICINE

## 2017-03-27 NOTE — MR AVS SNAPSHOT
After Visit Summary   3/27/2017    Roslyn Palmer    MRN: [id number]           Patient Information     Date Of Birth          1955        Visit Information        Provider Department      3/27/2017 8:40 AM BA ANCILLARY [facility]        Today's Diagnoses     Benign essential hypertension    -  1       Follow-ups after your visit        Who to contact     If you have questions or need follow up information about today's clinic visit or your schedule please contact [facility] directly at [phone].  Normal or non-critical lab and imaging results will be communicated to you by [employer], letter or phone within 4 business days after the clinic has received the results. If you do not hear from us within 7 days, please contact the clinic through Panacela Labshart or phone. If you have a critical or abnormal lab result, we will notify you by phone as soon as possible.  Submit refill requests through DirectPointe or call your pharmacy and they will forward the refill request to us. Please allow 3 business days for your refill to be completed.          Additional Information About Your Visit        MyChart Information     [employer] gives you secure access to your electronic health record. If you see a primary care provider, you can also send messages to your care team and make appointments. If you have questions, please call your primary care clinic.  If you do not have a primary care provider, please call [phone] and they will assist you.        Care EveryWhere ID     This is your Care EveryWhere ID. This could be used by other organizations to access your [place] medical records  [id number]        Your Vitals Were     Pulse Respirations                84 14           Blood Pressure from Last 3 Encounters:   03/27/17 118/80   03/17/17 148/86   02/24/17 156/90    Weight from Last 3 Encounters:   03/17/17 80.9 kg (178 lb 6.4 oz)   02/24/17 82.5 kg (181 lb 14.4 oz)   04/14/16  84.2 kg (185 lb 11.2 oz)              Today, you had the following     No orders found for display       Primary Care Provider Office Phone # Fax #    Maria Luz Bright PA-C [phone]       [facility] [address] [place] [address] N  [facility] [address]        Thank you!     Thank you for choosing [facility]  for your care. Our goal is always to provide you with excellent care. Hearing back from our patients is one way we can continue to improve our services. Please take a few minutes to complete the written survey that you may receive in the mail after your visit with us. Thank you!             Your Updated Medication List - Protect others around you: Learn how to safely use, store and throw away your medicines at www.disposemymeds.org.          This list is accurate as of: 3/27/17  8:40 AM.  Always use your most recent med list.                   Brand Name Dispense Instructions for use    cephALEXin 500 MG capsule    KEFLEX    60 capsule    Take 1 capsule by mouth 2 times daily.       * DEMADEX 20 MG tablet   Generic drug:  torsemide      Take 10 mg by mouth 2 times daily       * torsemide 10 MG tablet    DEMADEX    180 tablet    Take 1 tablet (10 mg) by mouth 2 times daily       IMITREX 100 MG tablet   Generic drug:  SUMAtriptan      take 100 mg by mouth at onset of headache. May repeat in 2 hours if needed: max 2/day;       * nadolol 20 MG tablet    CORGARD    30 tablet    Take 1 tablet (20 mg) by mouth daily       * nadolol 40 MG tablet    CORGARD    30 tablet    Take 1 tablet (40 mg) by mouth daily       pantoprazole 40 MG EC tablet    PROTONIX    90 tablet    Take 1 tablet (40 mg) by mouth daily       Potassium Chloride ER 20 MEQ Tbcr     37 tablet    Take 1 tablet (20 mEq) by mouth 2 times daily x7 days, then once daily       * Notice:  This list has 4 medication(s) that are the same as other medications prescribed for you. Read the directions carefully, and ask your  doctor or other care provider to review them with you.

## 2017-03-27 NOTE — NURSING NOTE
Roslyn Palmer is a 61 year old female who comes in today for a Blood Pressure check because of ongoing blood pressure monitoring.    *Document pulse and BP  *Use new set of vitals button for multiple readings.  *Use extended vitals for orthostatic    Vitals as recorded, a regular cuff was used.    Patient is taking medication as prescribed  Patient is tolerating medications well.  Patient is not monitoring Blood Pressure at home.  Average readings if yes are NA    Current complaints: none    Disposition: results routed to MD/OFELIA Newby MA

## 2017-04-01 LAB — HCV GENTYP SERPL NAA+PROBE: NORMAL

## 2017-04-06 ENCOUNTER — TELEPHONE (OUTPATIENT)
Dept: FAMILY MEDICINE | Facility: CLINIC | Age: 62
End: 2017-04-06

## 2017-04-06 NOTE — TELEPHONE ENCOUNTER
[facility] forms placed on Dr. Giovanna damon for completion.    Stephy PERES, Patient Care

## 2017-04-06 NOTE — TELEPHONE ENCOUNTER
What type of form? FMLA paperwork  What day did you drop off your forms? Thursday, Apri 6, 2017  Is there a due date? ASAP  How would you like to receive these forms? Please fax to [phone]    What is the best number to contact you? Cell [phone]  What time works best to contact you with in 4 hrs? any  Is it okay to leave a message? Yes    Sebas Avilez    Put in TC Bin.

## 2017-04-06 NOTE — TELEPHONE ENCOUNTER
Stephy Steiner contacted [facility] on 04/06/17 and left a message. If patient calls back please relay message below.    Stephy PERES, Patient Care

## 2017-04-06 NOTE — TELEPHONE ENCOUNTER
FMLA forms will need to be completed by Dr. Tirado since she saw patient last.  She is in the office next week.  Please call and advise and route back to Dr. Tirado

## 2017-04-07 NOTE — TELEPHONE ENCOUNTER
Stephy Steiner contacted [facility] on 04/07/17 and left a message. If patient calls back please really message below.        Stephy PERES, Patient Care

## 2017-04-10 NOTE — TELEPHONE ENCOUNTER
Forms re-faxed.  Spoke with Linda, they did not receive section A from original fax.  Re-faxed all info to Lisa.    Stephy PERES, Patient Care

## 2017-04-10 NOTE — TELEPHONE ENCOUNTER
LM for pt to call clinic.  3rd attempt, with no response.  Please advise.      Stephy PERES, Patient Care

## 2017-04-12 DIAGNOSIS — I10 BENIGN ESSENTIAL HYPERTENSION: ICD-10-CM

## 2017-04-13 RX ORDER — NADOLOL 40 MG/1
TABLET ORAL
Qty: 90 TABLET | Refills: 0 | Status: SHIPPED | OUTPATIENT
Start: 2017-04-13 | End: 2017-05-04

## 2017-04-13 NOTE — TELEPHONE ENCOUNTER
nadolol (CORGARD) 40 MG tablet      Last Written Prescription Date: 3/17/17  Last Fill Quantity: 30, # refills: 0    Last Office Visit with G, P or [facility] prescribing provider:  3/17/17 Dr. Tirado     Future Office Visit:        BP Readings from Last 3 Encounters:   03/27/17 118/80   03/17/17 148/86   02/24/17 156/90     Nereyda Corea

## 2017-04-28 ENCOUNTER — TELEPHONE (OUTPATIENT)
Dept: FAMILY MEDICINE | Facility: CLINIC | Age: 62
End: 2017-04-28

## 2017-04-28 NOTE — TELEPHONE ENCOUNTER
Call Regarding Preventive Health Screening Mammogram    Attempt 1    Message on voicemail     Comments:       Outreach   Madhuri Milner

## 2017-05-04 DIAGNOSIS — I10 BENIGN ESSENTIAL HYPERTENSION: ICD-10-CM

## 2017-05-04 NOTE — TELEPHONE ENCOUNTER
nadolol (CORGARD) 40 MG tablet      Last Written Prescription Date: 4/13/17  Last Fill Quantity: 90, # refills: 0    Last Office Visit with G, [facility] or [facility] prescribing provider:  3/17/17 Maria Luz Bright     Future Office Visit:        BP Readings from Last 3 Encounters:   03/27/17 118/80   03/17/17 148/86   02/24/17 156/90     Nereyda Corea

## 2017-05-08 RX ORDER — NADOLOL 40 MG/1
TABLET ORAL
Qty: 90 TABLET | Refills: 0 | Status: SHIPPED | OUTPATIENT
Start: 2017-05-08 | End: 2017-07-25

## 2017-06-17 NOTE — TELEPHONE ENCOUNTER
6/17/2017    Call Regarding Preventive Health Screening Mammogram    Attempt 2    Message on voicemail     Comments:       Outreach   KV

## 2017-07-15 NOTE — TELEPHONE ENCOUNTER
7/15/2017    Attempt 3    Contacted patient in regards to scheduling VIP mammogram  Message on voicemail     Patient is also due for - Preventive Health Screening Mammogram    Comments:       Outreach   Madhuri Milner

## 2017-07-25 ENCOUNTER — TELEPHONE (OUTPATIENT)
Dept: FAMILY MEDICINE | Facility: CLINIC | Age: 62
End: 2017-07-25

## 2017-07-25 DIAGNOSIS — I10 BENIGN ESSENTIAL HYPERTENSION: ICD-10-CM

## 2017-07-25 NOTE — TELEPHONE ENCOUNTER
nadolol (CORGARD) 40 MG tablet      Last Written Prescription Date: 05/08/17   Last Fill Quantity: 90, # refills: 0    Last Office Visit with G, P or [facility] prescribing provider:  03/17/17 Dr. Tirado   Future Office Visit:        BP Readings from Last 3 Encounters:   03/27/17 118/80   03/17/17 148/86   02/24/17 156/90

## 2017-07-25 NOTE — TELEPHONE ENCOUNTER
What type of form? FMLA   What day did you drop off your forms? Tuesday,   Is there a due date? ASAP (7-10 business days to compete forms)   How would you like to receive these forms? Please fax to: [phone]  What is the best number to contact you? [phone]   What time works best to contact you with in 4 hrs? ANYTIME  Is it okay to leave a message? Yes   Sherin Christopher (Auto signs name of person logged into Epic)

## 2017-07-25 NOTE — TELEPHONE ENCOUNTER
nadolol (CORGARD) 40 MG tablet      Last Written Prescription Date: 05/08/17  Last Fill Quantity: 90, # refills: 0    Last Office Visit with G, P or [facility] prescribing provider:  03/17/17 Dr. Tirado   Future Office Visit:        BP Readings from Last 3 Encounters:   03/27/17 118/80   03/17/17 148/86   02/24/17 156/90

## 2017-07-28 RX ORDER — NADOLOL 40 MG/1
TABLET ORAL
Qty: 90 TABLET | Refills: 0 | Status: SHIPPED | OUTPATIENT
Start: 2017-07-28 | End: 2017-10-09

## 2017-07-28 NOTE — TELEPHONE ENCOUNTER
Routing refill request to provider for review/approval because:  RN unsure of plan / when due for follow up.  Per 3/17/17 OV notes copied below    2. Benign essential hypertension  Not at goal. Ongoing migraines. increase Nadolol dose to 40 mg daily. MA only bp check in few weeks. If at goal, can send 3 month refill to mail order.   - nadolol (CORGARD) 40 MG tablet; Take 1 tablet (40 mg) by mouth daily  Dispense: 30 tablet; Refill: 0  - torsemide (DEMADEX) 10 MG tablet; Take 1 tablet (10 mg) by mouth 2 times daily  Dispense: 180 tablet; Refill: 1     Patient was seen for BP check 3/27, 118/80.  Pt was then given 90 day supply to mail away pharmacy 5/8/17.

## 2017-07-31 RX ORDER — NADOLOL 40 MG/1
TABLET ORAL
Qty: 90 TABLET | Refills: 0 | Status: SHIPPED | OUTPATIENT
Start: 2017-07-31 | End: 2017-08-02

## 2017-07-31 NOTE — TELEPHONE ENCOUNTER
Prescription approved per [facility] Refill Protocol.  Modesta Thurman RN

## 2017-08-02 RX ORDER — NADOLOL 40 MG/1
TABLET ORAL
Qty: 30 TABLET | Refills: 0 | Status: SHIPPED | OUTPATIENT
Start: 2017-08-02 | End: 2017-10-27

## 2017-08-02 NOTE — TELEPHONE ENCOUNTER
Reason for Call:  Other prescription    Detailed comments: please put a rush on this - been out 2 weeks and really need it - sending high priority message    Phone Number Patient can be reached at: Cell number on file:    Telephone Information:   Mobile [phone]       Best Time: any    Can we leave a detailed message on this number? YES    Call taken on 8/2/2017 at 10:12 AM by Naye Owen

## 2017-08-02 NOTE — TELEPHONE ENCOUNTER
Called patient and informed that RX was sent to [facility] home delivery pharmacy on 7/28/17 and to [facility] on 7/31/17, [facility] didn't receive. Patient requesting a weeks worth sent to [facility] until her RX from [facility] delivery comes.  Routing to provider to review and advise.  Shelley Paris RN.

## 2017-10-09 DIAGNOSIS — I10 BENIGN ESSENTIAL HYPERTENSION: ICD-10-CM

## 2017-10-09 NOTE — TELEPHONE ENCOUNTER
nadolol (CORGARD) 40 MG tablet      Last Written Prescription Date: 8/2/17  Last Fill Quantity: 30, # refills: 0    Last Office Visit with [facility], [facility] or [facility] prescribing provider:  3/17/17   Future Office Visit:        BP Readings from Last 3 Encounters:   03/27/17 118/80   03/17/17 148/86   02/24/17 156/90

## 2017-10-12 RX ORDER — NADOLOL 40 MG/1
TABLET ORAL
Qty: 30 TABLET | Refills: 0 | Status: SHIPPED | OUTPATIENT
Start: 2017-10-12 | End: 2017-11-08

## 2017-10-12 NOTE — TELEPHONE ENCOUNTER
Medication is being filled for 1 time refill only due to:  Patient needs to be seen because due to be seen for med check 9/2017 per last refill notes from provider..     Noted in pharmacy comments that patients needs office visit before further refills.

## 2017-10-27 ENCOUNTER — RADIANT APPOINTMENT (OUTPATIENT)
Dept: GENERAL RADIOLOGY | Facility: CLINIC | Age: 62
End: 2017-10-27
Attending: PHYSICIAN ASSISTANT
Payer: COMMERCIAL

## 2017-10-27 ENCOUNTER — OFFICE VISIT (OUTPATIENT)
Dept: FAMILY MEDICINE | Facility: CLINIC | Age: 62
End: 2017-10-27
Payer: COMMERCIAL

## 2017-10-27 VITALS
RESPIRATION RATE: 18 BRPM | OXYGEN SATURATION: 98 % | BODY MASS INDEX: 28.12 KG/M2 | WEIGHT: 175 LBS | DIASTOLIC BLOOD PRESSURE: 80 MMHG | HEART RATE: 74 BPM | TEMPERATURE: 98.3 F | SYSTOLIC BLOOD PRESSURE: 126 MMHG | HEIGHT: 66 IN

## 2017-10-27 DIAGNOSIS — B19.20 HEPATITIS C VIRUS INFECTION, UNSPECIFIED CHRONICITY: ICD-10-CM

## 2017-10-27 DIAGNOSIS — M25.561 ACUTE PAIN OF RIGHT KNEE: ICD-10-CM

## 2017-10-27 DIAGNOSIS — M10.9 ACUTE GOUTY ARTHRITIS: Primary | ICD-10-CM

## 2017-10-27 DIAGNOSIS — M79.605 PAIN IN BOTH LOWER EXTREMITIES: ICD-10-CM

## 2017-10-27 DIAGNOSIS — Z12.11 SCREENING FOR COLON CANCER: ICD-10-CM

## 2017-10-27 DIAGNOSIS — M79.604 PAIN IN BOTH LOWER EXTREMITIES: ICD-10-CM

## 2017-10-27 DIAGNOSIS — D64.9 ANEMIA, UNSPECIFIED TYPE: ICD-10-CM

## 2017-10-27 LAB
BASOPHILS # BLD AUTO: 0.1 10E9/L (ref 0–0.2)
BASOPHILS NFR BLD AUTO: 0.8 %
DIFFERENTIAL METHOD BLD: ABNORMAL
EOSINOPHIL # BLD AUTO: 0.3 10E9/L (ref 0–0.7)
EOSINOPHIL NFR BLD AUTO: 4.2 %
ERYTHROCYTE [DISTWIDTH] IN BLOOD BY AUTOMATED COUNT: 18 % (ref 10–15)
ERYTHROCYTE [SEDIMENTATION RATE] IN BLOOD BY WESTERGREN METHOD: 67 MM/H (ref 0–30)
FERRITIN SERPL-MCNC: 15 NG/ML (ref 8–252)
HCT VFR BLD AUTO: 34.2 % (ref 35–47)
HGB BLD-MCNC: 10.8 G/DL (ref 11.7–15.7)
IRON SATN MFR SERPL: 9 % (ref 15–46)
IRON SERPL-MCNC: 32 UG/DL (ref 35–180)
LYMPHOCYTES # BLD AUTO: 2.1 10E9/L (ref 0.8–5.3)
LYMPHOCYTES NFR BLD AUTO: 35.6 %
MCH RBC QN AUTO: 27.5 PG (ref 26.5–33)
MCHC RBC AUTO-ENTMCNC: 31.6 G/DL (ref 31.5–36.5)
MCV RBC AUTO: 87 FL (ref 78–100)
MONOCYTES # BLD AUTO: 1 10E9/L (ref 0–1.3)
MONOCYTES NFR BLD AUTO: 16.3 %
NEUTROPHILS # BLD AUTO: 2.6 10E9/L (ref 1.6–8.3)
NEUTROPHILS NFR BLD AUTO: 43.1 %
PLATELET # BLD AUTO: 195 10E9/L (ref 150–450)
RBC # BLD AUTO: 3.93 10E12/L (ref 3.8–5.2)
TIBC SERPL-MCNC: 370 UG/DL (ref 240–430)
URATE SERPL-MCNC: 10 MG/DL (ref 2.6–6)
WBC # BLD AUTO: 6 10E9/L (ref 4–11)

## 2017-10-27 PROCEDURE — 85025 COMPLETE CBC W/AUTO DIFF WBC: CPT | Performed by: PHYSICIAN ASSISTANT

## 2017-10-27 PROCEDURE — 84550 ASSAY OF BLOOD/URIC ACID: CPT | Performed by: PHYSICIAN ASSISTANT

## 2017-10-27 PROCEDURE — 83540 ASSAY OF IRON: CPT | Performed by: PHYSICIAN ASSISTANT

## 2017-10-27 PROCEDURE — 73562 X-RAY EXAM OF KNEE 3: CPT | Mod: RT

## 2017-10-27 PROCEDURE — 99214 OFFICE O/P EST MOD 30 MIN: CPT | Performed by: PHYSICIAN ASSISTANT

## 2017-10-27 PROCEDURE — 83550 IRON BINDING TEST: CPT | Performed by: PHYSICIAN ASSISTANT

## 2017-10-27 PROCEDURE — 73630 X-RAY EXAM OF FOOT: CPT

## 2017-10-27 PROCEDURE — 82728 ASSAY OF FERRITIN: CPT | Performed by: PHYSICIAN ASSISTANT

## 2017-10-27 PROCEDURE — 36415 COLL VENOUS BLD VENIPUNCTURE: CPT | Performed by: PHYSICIAN ASSISTANT

## 2017-10-27 PROCEDURE — 85652 RBC SED RATE AUTOMATED: CPT | Performed by: PHYSICIAN ASSISTANT

## 2017-10-27 RX ORDER — PREDNISONE 20 MG/1
TABLET ORAL
Qty: 20 TABLET | Refills: 0 | Status: SHIPPED | OUTPATIENT
Start: 2017-10-27 | End: 2017-11-27

## 2017-10-27 ASSESSMENT — PAIN SCALES - GENERAL: PAINLEVEL: MODERATE PAIN (5)

## 2017-10-27 NOTE — PATIENT INSTRUCTIONS
Bring in FIT card for colon cancer screening   Schedule mammogram 11/21/2017  Follow up with gastroenterology as recommended by Dr. Tirado  Take prednisone burst and taper.   Follow up with rheumatology to discuss medications to prevent gout and destruction of joints

## 2017-10-27 NOTE — MR AVS SNAPSHOT
After Visit Summary   10/27/2017    Roslyn Palmer    MRN: [id number]           Patient Information     Date Of Birth          1955        Visit Information        Provider Department      10/27/2017 1:20 PM Maria Luz Bright PA-C [facility]        Today's Diagnoses     Acute pain of right knee    -  1    Pain in both lower extremities        Anemia, unspecified type        Acute gouty arthritis          Care Instructions    Bring in FIT card for colon cancer screening   Schedule mammogram 11/21/2017  Follow up with gastroenterology as recommended by Dr. Tirado  Take prednisone burst and taper.   Follow up with rheumatology to discuss medications to prevent gout and destruction of joints            Follow-ups after your visit        Additional Services     RHEUMATOLOGY REFERRAL       Your provider has referred you to: FM: [facility] [place] [phone]   [email or web address]  UMP: [facility] [place] [phone]   [email or web address]    Please be aware that coverage of these services is subject to the terms and limitations of your health insurance plan.  Call member services at your health plan with any benefit or coverage questions.      Please bring the following with you to your appointment:    (1) Any X-Rays, CTs or MRIs which have been performed.  Contact the facility where they were done to arrange for  prior to your scheduled appointment.    (2) List of current medications   (3) This referral request   (4) Any documents/labs given to you for this referral                  Future tests that were ordered for you today     Open Future Orders        Priority Expected Expires Ordered    Fecal colorectal cancer screen (FIT) Routine 11/17/2017 1/19/2018 10/27/2017            Who to contact     If you have questions or need follow up information  "about today's clinic visit or your schedule please contact [facility] directly at [phone].  Normal or non-critical lab and imaging results will be communicated to you by MyChart, letter or phone within 4 business days after the clinic has received the results. If you do not hear from us within 7 days, please contact the clinic through [employer] or phone. If you have a critical or abnormal lab result, we will notify you by phone as soon as possible.  Submit refill requests through [employer] or call your pharmacy and they will forward the refill request to us. Please allow 3 business days for your refill to be completed.          Additional Information About Your Visit        [employer] Information     [employer] gives you secure access to your electronic health record. If you see a primary care provider, you can also send messages to your care team and make appointments. If you have questions, please call your primary care clinic.  If you do not have a primary care provider, please call [phone] and they will assist you.        Care EveryWhere ID     This is your Care EveryWhere ID. This could be used by other organizations to access your [place] medical records  [id number]        Your Vitals Were     Pulse Temperature Respirations Height Pulse Oximetry Breastfeeding?    74 98.3  F (36.8  C) (Oral) 18 1.664 m (5' 5.5\") 98% No    BMI (Body Mass Index)                   28.68 kg/m2            Blood Pressure from Last 3 Encounters:   10/27/17 126/80   03/27/17 118/80   03/17/17 148/86    Weight from Last 3 Encounters:   10/27/17 79.4 kg (175 lb)   03/17/17 80.9 kg (178 lb 6.4 oz)   02/24/17 82.5 kg (181 lb 14.4 oz)              We Performed the Following     CBC with platelets differential     ESR: Erythrocyte sedimentation rate     Ferritin     Iron and iron binding capacity     RHEUMATOLOGY REFERRAL     Uric acid          Today's Medication Changes          These changes are accurate as of: 10/27/17  " 2:18 PM.  If you have any questions, ask your nurse or doctor.               Start taking these medicines.        Dose/Directions    predniSONE 20 MG tablet   Commonly known as:  DELTASONE   Used for:  Acute gouty arthritis   Started by:  Maria Luz Bright PA-C        Take 3 tabs (60 mg) by mouth daily x 3 days, 2 tabs (40 mg) daily x 3 days, 1 tab (20 mg) daily x 3 days, then 1/2 tab (10 mg) x 3 days.   Quantity:  20 tablet   Refills:  0         These medicines have changed or have updated prescriptions.        Dose/Directions    nadolol 40 MG tablet   Commonly known as:  CORGARD   This may have changed:  Another medication with the same name was removed. Continue taking this medication, and follow the directions you see here.   Used for:  Benign essential hypertension   Changed by:  Monica Tirado MD        TAKE 1 TABLET BY MOUTH DAILY   Quantity:  30 tablet   Refills:  0            Where to get your medicines      These medications were sent to [facility]/pharmacy #[id number] - [facility] [address] [address]     Phone:  [phone]     predniSONE 20 MG tablet                Primary Care Provider Office Phone # Fax #    Monica Tirado [phone] [address] [facility] [address]        Equal Access to Services     [facility] AH: Hadii leslee frost hadasho Soomaali, waaxda luqadaha, qaybta kaalmada adeegyada, kim krause. So [facility] [phone].    ATENCIÓN: Si habla español, tiene a stokes disposición servicios gratuitos de asistencia lingüística. Flory al [phone].    We comply with applicable federal civil rights laws and [place] laws. We do not discriminate on the basis of race, color, national origin, age, disability, sex, sexual orientation, or gender identity.            Thank you!     Thank you for choosing [facility]  for your  care. Our goal is always to provide you with excellent care. Hearing back from our patients is one way we can continue to improve our services. Please take a few minutes to complete the written survey that you may receive in the mail after your visit with us. Thank you!             Your Updated Medication List - Protect others around you: Learn how to safely use, store and throw away your medicines at www.disposemymeds.org.          This list is accurate as of: 10/27/17  2:18 PM.  Always use your most recent med list.                   Brand Name Dispense Instructions for use Diagnosis    cephALEXin 500 MG capsule    KEFLEX    60 capsule    Take 1 capsule by mouth 2 times daily.    Joint prosthesis infection or inflammation (H)       IMITREX 100 MG tablet   Generic drug:  SUMAtriptan      take 100 mg by mouth at onset of headache. May repeat in 2 hours if needed: max 2/day;        nadolol 40 MG tablet    CORGARD    30 tablet    TAKE 1 TABLET BY MOUTH DAILY    Benign essential hypertension       pantoprazole 40 MG EC tablet    PROTONIX    90 tablet    Take 1 tablet (40 mg) by mouth daily    Alcohol abuse       predniSONE 20 MG tablet    DELTASONE    20 tablet    Take 3 tabs (60 mg) by mouth daily x 3 days, 2 tabs (40 mg) daily x 3 days, 1 tab (20 mg) daily x 3 days, then 1/2 tab (10 mg) x 3 days.    Acute gouty arthritis       torsemide 10 MG tablet    DEMADEX    180 tablet    Take 1 tablet (10 mg) by mouth 2 times daily    Benign essential hypertension, Alcoholic liver disease (H)

## 2017-10-27 NOTE — PROGRESS NOTES
SUBJECTIVE:   Roslyn Palmer is a 62 year old female who presents to clinic today for the following health issues:      Gout/ single inflamed joint   Onset: 1 month    Description:   Location: foot and knee - right  Joint Swelling: YES  Redness: no   Pain: YES    Intensity: severe    Progression of Symptoms:  improving    Accompanying Signs & Symptoms:  Fevers: no     History:   Trauma to the area: no , but did recently dislocate the hip and had to have it put back into place, wondering of they pulled on the leg too hard  Previous history of gout: YES   Recent illness:  no     Precipitating factors:   Diet-rich in purine: seafood, red meat and alcohol  Alcohol use: YES  Diuretic use: no     Alleviating factors:  none    Therapies Tried and outcome: aleve        Drinks 2-3 vodka drinks a day.  Occasional red meat and seafood.  Had physical with Dr. Tirado in March and advised had gout.  Reports getting an attack every 6-8 weeks.  Symptoms will improve next day or so but last 6 weeks getting attack weekly or every other week and lasting longer. Right knee swelling for 4 days.  Feet hurt.   Has quit alcohol for months in past and knows that she needs to quit drinking.   Has not followed up with gastroenterology regarding hepatitis C     Problem list and histories reviewed & adjusted, as indicated.  Additional history: as documented    Patient Active Problem List   Diagnosis     Joint Prosthesis Infection or Inflammation     Alcoholic liver disease (H)     Tobacco use disorder     CARDIOVASCULAR SCREENING; LDL GOAL LESS THAN 160     Anemia     Advanced directives, counseling/discussion     Benign essential hypertension     Hepatitis C virus infection, unspecified chronicity     Upper GI bleed     Alcohol abuse     Migraine with aura and without status migrainosus, not intractable     Low folate     Cervical cancer screening     Past Surgical History:   Procedure Laterality Date     GYN SURGERY  1992     "     ORTHOPEDIC SURGERY  ,    left and right hip replacement       Social History   Substance Use Topics     Smoking status: Current Every Day Smoker     Packs/day: 0.50     Types: Cigarettes     Smokeless tobacco: Never Used     Alcohol use Yes      Comment: 3 to 4 drinks a day     Family History   Problem Relation Age of Onset     Breast Cancer Mother      CANCER Maternal Grandmother      CEREBROVASCULAR DISEASE Maternal Grandmother      stroke     CANCER Maternal Grandfather      pancreatic cancer     CANCER Paternal Grandmother      CANCER Paternal Grandfather      brain cancer     Arthritis Sister          Current Outpatient Prescriptions   Medication Sig Dispense Refill            nadolol (CORGARD) 40 MG tablet TAKE 1 TABLET BY MOUTH DAILY 30 tablet 0     pantoprazole (PROTONIX) 40 MG EC tablet Take 1 tablet (40 mg) by mouth daily 90 tablet 3     torsemide (DEMADEX) 10 MG tablet Take 1 tablet (10 mg) by mouth 2 times daily 180 tablet 1     cephALEXin (KEFLEX) 500 MG capsule Take 1 capsule by mouth 2 times daily. 60 capsule 6     sumatriptan (IMITREX) 100 MG tablet take 100 mg by mouth at onset of headache. May repeat in 2 hours if needed: max 2/day;           Reviewed and updated as needed this visit by clinical staff       Reviewed and updated as needed this visit by Provider         ROS:  Constitutional, HEENT, cardiovascular, pulmonary, gi and gu systems are negative, except as otherwise noted.      OBJECTIVE:   /80 (BP Location: Right arm, Patient Position: Chair, Cuff Size: Adult Regular)  Pulse 74  Temp 98.3  F (36.8  C) (Oral)  Resp 18  Ht 1.664 m (5' 5.5\")  Wt 79.4 kg (175 lb)  SpO2 98%  Breastfeeding? No  BMI 28.68 kg/m2  Body mass index is 28.68 kg/(m^2).  GENERAL: healthy, alert and no distress  NECK: no adenopathy, no asymmetry, masses, or scars and thyroid normal to palpation  RESP: lungs clear to auscultation - no rales, rhonchi or wheezes  CV: regular rate and " rhythm, normal S1 S2, no S3 or S4, no murmur, click or rub, no peripheral edema and peripheral pulses strong  ABDOMEN: soft, nontender, no hepatosplenomegaly, no masses and bowel sounds normal  MS: right knee with effusion without erythema or warmth.  Tenderness of knee with any range of motion but normal range of motion.  Both feet tender at MTP of great toe without erythema or warmth    Diagnostic Test Results:  Results for orders placed or performed in visit on 10/27/17   Uric acid   Result Value Ref Range    Uric Acid 10.0 (H) 2.6 - 6.0 mg/dL   CBC with platelets differential   Result Value Ref Range    WBC 6.0 4.0 - 11.0 10e9/L    RBC Count 3.93 3.8 - 5.2 10e12/L    Hemoglobin 10.8 (L) 11.7 - 15.7 g/dL    Hematocrit 34.2 (L) 35.0 - 47.0 %    MCV 87 78 - 100 fl    MCH 27.5 26.5 - 33.0 pg    MCHC 31.6 31.5 - 36.5 g/dL    RDW 18.0 (H) 10.0 - 15.0 %    Platelet Count 195 150 - 450 10e9/L    Diff Method Automated Method     % Neutrophils 43.1 %    % Lymphocytes 35.6 %    % Monocytes 16.3 %    % Eosinophils 4.2 %    % Basophils 0.8 %    Absolute Neutrophil 2.6 1.6 - 8.3 10e9/L    Absolute Lymphocytes 2.1 0.8 - 5.3 10e9/L    Absolute Monocytes 1.0 0.0 - 1.3 10e9/L    Absolute Eosinophils 0.3 0.0 - 0.7 10e9/L    Absolute Basophils 0.1 0.0 - 0.2 10e9/L   ESR: Erythrocyte sedimentation rate   Result Value Ref Range    Sed Rate 67 (H) 0 - 30 mm/h   Iron and iron binding capacity   Result Value Ref Range    Iron 32 (L) 35 - 180 ug/dL    Iron Binding Cap 370 240 - 430 ug/dL    Iron Saturation Index 9 (L) 15 - 46 %   Ferritin   Result Value Ref Range    Ferritin 15 8 - 252 ng/mL       ASSESSMENT/PLAN:             1. Acute gouty arthritis  Will treat with burst and taper of prednisone.  Follow up with rheumatology for further treatment    - predniSONE (DELTASONE) 20 MG tablet; Take 3 tabs (60 mg) by mouth daily x 3 days, 2 tabs (40 mg) daily x 3 days, 1 tab (20 mg) daily x 3 days, then 1/2 tab (10 mg) x 3 days.  Dispense: 20  tablet; Refill: 0  - RHEUMATOLOGY REFERRAL    2. Acute pain of right knee  As above but also significant degenerative changes of knee   - Uric acid  - CBC with platelets differential  - ESR: Erythrocyte sedimentation rate  - XR Knee Right 3 Views; Future  - XR Foot Bilateral G/E 3 Views; Future    3. Pain in both lower extremities  As above   - Uric acid  - CBC with platelets differential  - ESR: Erythrocyte sedimentation rate  - XR Knee Right 3 Views; Future  - XR Foot Bilateral G/E 3 Views; Future    4. Anemia, unspecified type  Will rule out iron deficiency anemia  - Iron and iron binding capacity  - Ferritin    5. Screening for colon cancer    - Fecal colorectal cancer screen FIT; Future    6. Hepatitis C virus infection  Advised to follow up with gastroenterology     Patient Instructions   Bring in FIT card for colon cancer screening   Schedule mammogram 11/21/2017  Follow up with gastroenterology as recommended by Dr. Tirado  Take prednisone burst and taper.   Follow up with rheumatology to discuss medications to prevent gout and destruction of joints     CC chart to PCP for lindsay Bright PA-C  [facility]

## 2017-10-27 NOTE — NURSING NOTE
"Chief Complaint   Patient presents with     Arthritis       Initial /80 (BP Location: Right arm, Patient Position: Chair, Cuff Size: Adult Regular)  Pulse 74  Temp 98.3  F (36.8  C) (Oral)  Resp 18  Ht 1.664 m (5' 5.5\")  Wt 79.4 kg (175 lb)  SpO2 98%  Breastfeeding? No  BMI 28.68 kg/m2 Estimated body mass index is 28.68 kg/(m^2) as calculated from the following:    Height as of this encounter: 1.664 m (5' 5.5\").    Weight as of this encounter: 79.4 kg (175 lb).  Medication Reconciliation: complete     Barbara Taylor MA       "

## 2017-10-29 ENCOUNTER — TELEPHONE (OUTPATIENT)
Dept: FAMILY MEDICINE | Facility: CLINIC | Age: 62
End: 2017-10-29

## 2017-10-29 NOTE — TELEPHONE ENCOUNTER
Please abstract the following data from this visit with this patient into the appropriate field in Epic:    Mammogram done on this date: 11/20/2016 (approximately), by this group: [facility] , results were normal.

## 2017-10-31 ENCOUNTER — TELEPHONE (OUTPATIENT)
Dept: FAMILY MEDICINE | Facility: CLINIC | Age: 62
End: 2017-10-31

## 2017-10-31 NOTE — TELEPHONE ENCOUNTER
Notes Recorded by Maria Luz Bright PA-C on 10/31/2017 at 1:06 PM  Please call and advise that hemoglobin was quite low at office visit.  I advise follow up with Dr. Tirado in approximately 2-3 weeks to recheck and see where she is at.  Strongly encourage her to bring in FIT card to make sure not a colon cancer or other source of low hgb.   Also sent [employer] message.    This writer attempted to contact Roslyn on 10/31/17      Reason for call results and left detailed message and results released via [employer] per provider      If patient calls back:   Ok to leave a detailed message. No call back requested .        Shelley Paris, RN

## 2017-11-04 LAB — HEMOCCULT STL QL IA: POSITIVE

## 2017-11-04 PROCEDURE — 82274 ASSAY TEST FOR BLOOD FECAL: CPT | Performed by: PHYSICIAN ASSISTANT

## 2017-11-05 DIAGNOSIS — R19.5 POSITIVE FIT (FECAL IMMUNOCHEMICAL TEST): Primary | ICD-10-CM

## 2017-11-05 DIAGNOSIS — D64.9 ANEMIA, UNSPECIFIED TYPE: ICD-10-CM

## 2017-11-06 ENCOUNTER — TELEPHONE (OUTPATIENT)
Dept: FAMILY MEDICINE | Facility: CLINIC | Age: 62
End: 2017-11-06

## 2017-11-06 DIAGNOSIS — Z12.11 SCREENING FOR COLON CANCER: ICD-10-CM

## 2017-11-06 NOTE — TELEPHONE ENCOUNTER
Notes Recorded by Maria Luz Bright PA-C on 11/5/2017 at 8:43 AM  Please call and advise that screening test for colon cancer was positive.  I recommend scheduling colonoscopy at [facility]   As soon as possible. They have several locations.   [phone]      This writer attempted to contact Roslyn on 11/06/17      Reason for call results and left detailed message.      If patient calls back:   Left detailed message do not lync'd unless patient has a question.        Shelley Paris, RN

## 2017-11-08 ENCOUNTER — MYC MEDICAL ADVICE (OUTPATIENT)
Dept: FAMILY MEDICINE | Facility: CLINIC | Age: 62
End: 2017-11-08

## 2017-11-08 DIAGNOSIS — I10 BENIGN ESSENTIAL HYPERTENSION: ICD-10-CM

## 2017-11-08 RX ORDER — NADOLOL 40 MG/1
40 TABLET ORAL DAILY
Qty: 90 TABLET | Refills: 0 | Status: SHIPPED | OUTPATIENT
Start: 2017-11-08 | End: 2017-11-09

## 2017-11-09 RX ORDER — NADOLOL 40 MG/1
40 TABLET ORAL DAILY
Qty: 90 TABLET | Refills: 0 | Status: SHIPPED | OUTPATIENT
Start: 2017-11-09 | End: 2018-01-25

## 2017-11-27 ENCOUNTER — OFFICE VISIT (OUTPATIENT)
Dept: FAMILY MEDICINE | Facility: CLINIC | Age: 62
End: 2017-11-27
Payer: COMMERCIAL

## 2017-11-27 VITALS
HEART RATE: 82 BPM | RESPIRATION RATE: 18 BRPM | TEMPERATURE: 98.9 F | OXYGEN SATURATION: 98 % | WEIGHT: 171.8 LBS | DIASTOLIC BLOOD PRESSURE: 76 MMHG | SYSTOLIC BLOOD PRESSURE: 138 MMHG | HEIGHT: 65 IN | BODY MASS INDEX: 28.62 KG/M2

## 2017-11-27 DIAGNOSIS — M10.9 GOUT, UNSPECIFIED CAUSE, UNSPECIFIED CHRONICITY, UNSPECIFIED SITE: Primary | ICD-10-CM

## 2017-11-27 DIAGNOSIS — E87.6 HYPOKALEMIA: ICD-10-CM

## 2017-11-27 DIAGNOSIS — I10 BENIGN ESSENTIAL HYPERTENSION: ICD-10-CM

## 2017-11-27 DIAGNOSIS — F10.10 ALCOHOL ABUSE: ICD-10-CM

## 2017-11-27 DIAGNOSIS — D64.9 ANEMIA, UNSPECIFIED TYPE: ICD-10-CM

## 2017-11-27 DIAGNOSIS — K70.9 ALCOHOLIC LIVER DISEASE (H): ICD-10-CM

## 2017-11-27 DIAGNOSIS — R19.5 POSITIVE FIT (FECAL IMMUNOCHEMICAL TEST): ICD-10-CM

## 2017-11-27 DIAGNOSIS — G43.109 MIGRAINE WITH AURA AND WITHOUT STATUS MIGRAINOSUS, NOT INTRACTABLE: ICD-10-CM

## 2017-11-27 LAB
ALBUMIN SERPL-MCNC: 3.1 G/DL (ref 3.4–5)
ALP SERPL-CCNC: 228 U/L (ref 40–150)
ALT SERPL W P-5'-P-CCNC: 34 U/L (ref 0–50)
ANION GAP SERPL CALCULATED.3IONS-SCNC: 6 MMOL/L (ref 3–14)
AST SERPL W P-5'-P-CCNC: 46 U/L (ref 0–45)
BASOPHILS # BLD AUTO: 0 10E9/L (ref 0–0.2)
BASOPHILS NFR BLD AUTO: 0.8 %
BILIRUB SERPL-MCNC: 1 MG/DL (ref 0.2–1.3)
BUN SERPL-MCNC: 9 MG/DL (ref 7–30)
CALCIUM SERPL-MCNC: 8.8 MG/DL (ref 8.5–10.1)
CHLORIDE SERPL-SCNC: 101 MMOL/L (ref 94–109)
CO2 SERPL-SCNC: 34 MMOL/L (ref 20–32)
CREAT SERPL-MCNC: 0.71 MG/DL (ref 0.52–1.04)
DIFFERENTIAL METHOD BLD: ABNORMAL
EOSINOPHIL # BLD AUTO: 0.2 10E9/L (ref 0–0.7)
EOSINOPHIL NFR BLD AUTO: 3.5 %
ERYTHROCYTE [DISTWIDTH] IN BLOOD BY AUTOMATED COUNT: 19 % (ref 10–15)
FERRITIN SERPL-MCNC: 20 NG/ML (ref 8–252)
FOLATE SERPL-MCNC: 19.9 NG/ML
GFR SERPL CREATININE-BSD FRML MDRD: 84 ML/MIN/1.7M2
GLUCOSE SERPL-MCNC: 97 MG/DL (ref 70–99)
HCT VFR BLD AUTO: 35.4 % (ref 35–47)
HGB BLD-MCNC: 11.1 G/DL (ref 11.7–15.7)
INR PPP: 1.03 (ref 0.86–1.14)
IRON SATN MFR SERPL: 7 % (ref 15–46)
IRON SERPL-MCNC: 26 UG/DL (ref 35–180)
LYMPHOCYTES # BLD AUTO: 1.4 10E9/L (ref 0.8–5.3)
LYMPHOCYTES NFR BLD AUTO: 27.4 %
MCH RBC QN AUTO: 27.1 PG (ref 26.5–33)
MCHC RBC AUTO-ENTMCNC: 31.4 G/DL (ref 31.5–36.5)
MCV RBC AUTO: 87 FL (ref 78–100)
MONOCYTES # BLD AUTO: 0.9 10E9/L (ref 0–1.3)
MONOCYTES NFR BLD AUTO: 17.3 %
NEUTROPHILS # BLD AUTO: 2.6 10E9/L (ref 1.6–8.3)
NEUTROPHILS NFR BLD AUTO: 51 %
PLATELET # BLD AUTO: 253 10E9/L (ref 150–450)
POTASSIUM SERPL-SCNC: 3.2 MMOL/L (ref 3.4–5.3)
PROT SERPL-MCNC: 8 G/DL (ref 6.8–8.8)
RBC # BLD AUTO: 4.09 10E12/L (ref 3.8–5.2)
SODIUM SERPL-SCNC: 141 MMOL/L (ref 133–144)
TIBC SERPL-MCNC: 377 UG/DL (ref 240–430)
TSH SERPL DL<=0.005 MIU/L-ACNC: 1.74 MU/L (ref 0.4–4)
URATE SERPL-MCNC: 10.7 MG/DL (ref 2.6–6)
VIT B12 SERPL-MCNC: 1198 PG/ML (ref 193–986)
WBC # BLD AUTO: 5.2 10E9/L (ref 4–11)

## 2017-11-27 PROCEDURE — 99214 OFFICE O/P EST MOD 30 MIN: CPT | Performed by: FAMILY MEDICINE

## 2017-11-27 PROCEDURE — 85610 PROTHROMBIN TIME: CPT | Performed by: FAMILY MEDICINE

## 2017-11-27 PROCEDURE — 36415 COLL VENOUS BLD VENIPUNCTURE: CPT | Performed by: FAMILY MEDICINE

## 2017-11-27 PROCEDURE — 82607 VITAMIN B-12: CPT | Performed by: FAMILY MEDICINE

## 2017-11-27 PROCEDURE — 83550 IRON BINDING TEST: CPT | Performed by: FAMILY MEDICINE

## 2017-11-27 PROCEDURE — 84550 ASSAY OF BLOOD/URIC ACID: CPT | Performed by: FAMILY MEDICINE

## 2017-11-27 PROCEDURE — 82746 ASSAY OF FOLIC ACID SERUM: CPT | Performed by: FAMILY MEDICINE

## 2017-11-27 PROCEDURE — 83540 ASSAY OF IRON: CPT | Performed by: FAMILY MEDICINE

## 2017-11-27 PROCEDURE — 80050 GENERAL HEALTH PANEL: CPT | Performed by: FAMILY MEDICINE

## 2017-11-27 PROCEDURE — 82728 ASSAY OF FERRITIN: CPT | Performed by: FAMILY MEDICINE

## 2017-11-27 RX ORDER — SUMATRIPTAN 100 MG/1
100 TABLET, FILM COATED ORAL
Qty: 18 TABLET | Refills: 0 | Status: SHIPPED | OUTPATIENT
Start: 2017-11-27 | End: 2019-01-01

## 2017-11-27 RX ORDER — COLCHICINE 0.6 MG/1
0.6 TABLET ORAL 2 TIMES DAILY
Qty: 60 TABLET | Refills: 1 | Status: SHIPPED | OUTPATIENT
Start: 2017-11-27 | End: 2018-01-25

## 2017-11-27 RX ORDER — PREDNISONE 20 MG/1
TABLET ORAL
Qty: 20 TABLET | Refills: 0 | Status: SHIPPED | OUTPATIENT
Start: 2017-11-27 | End: 2018-06-04

## 2017-11-27 ASSESSMENT — PAIN SCALES - GENERAL: PAINLEVEL: NO PAIN (1)

## 2017-11-27 NOTE — MR AVS SNAPSHOT
After Visit Summary   11/27/2017    Roslyn Palmer    MRN: [id number]           Patient Information     Date Of Birth          1955        Visit Information        Provider Department      11/27/2017 8:20 AM Monica Tirado MD [facility]        Today's Diagnoses     Gout, unspecified cause, unspecified chronicity, unspecified site    -  1    Alcohol abuse        Alcoholic liver disease (H)        Benign essential hypertension        Migraine with aura and without status migrainosus, not intractable        Positive FIT (fecal immunochemical test)        Anemia, unspecified type          Care Instructions    Schedule Follow up with rheumatologist.     Start colcrys 0.6 mg twice daily if liver tests are okay  Start prednisone if another gout flare occurs.     Be seen urgently if blood in stool or vomit    Please call [facility] (formerly called [facility]) at [phone] to schedule upper endoscopy and colonoscopy soon!             Follow-ups after your visit        Additional Services     GASTROENTEROLOGY ADULT REF PROCEDURE ONLY       Last Lab Result: Creatinine (mg/dL)       Date                     Value                 03/27/2017               0.64             ----------  Body mass index is 28.59 kg/(m^2).     Needed:  No  Language:  English    Patient will be contacted to schedule procedure.     Please be aware that coverage of these services is subject to the terms and limitations of your health insurance plan.  Call member services at your health plan with any benefit or coverage questions.  Any procedures must be performed at a [facility] facility OR coordinated by your clinic's referral office.    Please bring the following with you to your appointment:    (1) Any X-Rays, CTs or MRIs which have been performed.  Contact the facility where they were done to arrange for  prior to your scheduled  "appointment.    (2) List of current medications   (3) This referral request   (4) Any documents/labs given to you for this referral                  Who to contact     If you have questions or need follow up information about today's clinic visit or your schedule please contact [facility] directly at [phone].  Normal or non-critical lab and imaging results will be communicated to you by MyChart, letter or phone within 4 business days after the clinic has received the results. If you do not hear from us within 7 days, please contact the clinic through [employer] or phone. If you have a critical or abnormal lab result, we will notify you by phone as soon as possible.  Submit refill requests through [employer] or call your pharmacy and they will forward the refill request to us. Please allow 3 business days for your refill to be completed.          Additional Information About Your Visit        MyChart Information     [employer] gives you secure access to your electronic health record. If you see a primary care provider, you can also send messages to your care team and make appointments. If you have questions, please call your primary care clinic.  If you do not have a primary care provider, please call [phone] and they will assist you.        Care EveryWhere ID     This is your Care EveryWhere ID. This could be used by other organizations to access your [facility] medical records  [id number]        Your Vitals Were     Pulse Temperature Respirations Height Pulse Oximetry Breastfeeding?    82 98.9  F (37.2  C) (Oral) 18 1.651 m (5' 5\") 98% No    BMI (Body Mass Index)                   28.59 kg/m2            Blood Pressure from Last 3 Encounters:   11/27/17 138/76   10/27/17 126/80   03/27/17 118/80    Weight from Last 3 Encounters:   11/27/17 77.9 kg (171 lb 12.8 oz)   10/27/17 79.4 kg (175 lb)   03/17/17 80.9 kg (178 lb 6.4 oz)              We Performed the Following     CBC with platelets " differential     Comprehensive metabolic panel     Ferritin     Folate     GASTROENTEROLOGY ADULT REF PROCEDURE ONLY     INR     Iron and iron binding capacity     TSH with free T4 reflex     Uric acid     Vitamin B12          Today's Medication Changes          These changes are accurate as of: 11/27/17  9:05 AM.  If you have any questions, ask your nurse or doctor.               Start taking these medicines.        Dose/Directions    colchicine 0.6 MG tablet   Commonly known as:  COLCRYS   Used for:  Gout, unspecified cause, unspecified chronicity, unspecified site   Started by:  Monica Tirado MD        Dose:  0.6 mg   Take 1 tablet (0.6 mg) by mouth 2 times daily   Quantity:  60 tablet   Refills:  1       predniSONE 20 MG tablet   Commonly known as:  DELTASONE   Used for:  Gout, unspecified cause, unspecified chronicity, unspecified site   Started by:  Monica Tirado MD        Take 3 tabs (60 mg) by mouth daily x 3 days, 2 tabs (40 mg) daily x 3 days, 1 tab (20 mg) daily x 3 days, then 1/2 tab (10 mg) x 3 days.   Quantity:  20 tablet   Refills:  0            Where to get your medicines      These medications were sent to [facility]/pharmacy #[id number] - [facility] [address] [place] [address], [place] AT [address], [place] [address]     Phone:  [phone]     colchicine 0.6 MG tablet    predniSONE 20 MG tablet    SUMAtriptan 100 MG tablet                Primary Care Provider Office Phone # Fax #    Monica Tirado [phone] [address] [facility] [address]        Equal Access to Services     [facility]: Hadii leslee mar Somai, waaxda luqadaha, qaybta kaalmada kim hines. So [facility] [phone].    ATENCIÓN: Si habla español, tiene a stokes disposición servicios gratuitos de asistencia lingüística. Jovaname al [phone].    We comply with applicable federal civil rights  laws and [place] laws. We do not discriminate on the basis of race, color, national origin, age, disability, sex, sexual orientation, or gender identity.            Thank you!     Thank you for choosing [facility]  for your care. Our goal is always to provide you with excellent care. Hearing back from our patients is one way we can continue to improve our services. Please take a few minutes to complete the written survey that you may receive in the mail after your visit with us. Thank you!             Your Updated Medication List - Protect others around you: Learn how to safely use, store and throw away your medicines at www.disposemymeds.org.          This list is accurate as of: 11/27/17  9:05 AM.  Always use your most recent med list.                   Brand Name Dispense Instructions for use Diagnosis    cephALEXin 500 MG capsule    KEFLEX    60 capsule    Take 1 capsule by mouth 2 times daily.    Joint prosthesis infection or inflammation (H)       colchicine 0.6 MG tablet    COLCRYS    60 tablet    Take 1 tablet (0.6 mg) by mouth 2 times daily    Gout, unspecified cause, unspecified chronicity, unspecified site       nadolol 40 MG tablet    CORGARD    90 tablet    Take 1 tablet (40 mg) by mouth daily    Benign essential hypertension       pantoprazole 40 MG EC tablet    PROTONIX    90 tablet    Take 1 tablet (40 mg) by mouth daily    Alcohol abuse       predniSONE 20 MG tablet    DELTASONE    20 tablet    Take 3 tabs (60 mg) by mouth daily x 3 days, 2 tabs (40 mg) daily x 3 days, 1 tab (20 mg) daily x 3 days, then 1/2 tab (10 mg) x 3 days.    Gout, unspecified cause, unspecified chronicity, unspecified site       SUMAtriptan 100 MG tablet    IMITREX    18 tablet    Take 1 tablet (100 mg) by mouth at onset of headache May repeat in 2 hours if needed: max 2/day;    Migraine with aura and without status migrainosus, not intractable       torsemide 10 MG tablet    DEMADEX    180 tablet    Take 1  tablet (10 mg) by mouth 2 times daily    Benign essential hypertension, Alcoholic liver disease (H)

## 2017-11-27 NOTE — NURSING NOTE
"Chief Complaint   Patient presents with     Arthritis       Initial /76 (BP Location: Right arm, Patient Position: Sitting, Cuff Size: Adult Regular)  Pulse 82  Temp 98.9  F (37.2  C) (Oral)  Resp 18  Ht 1.651 m (5' 5\")  Wt 77.9 kg (171 lb 12.8 oz)  SpO2 98%  Breastfeeding? No  BMI 28.59 kg/m2 Estimated body mass index is 28.59 kg/(m^2) as calculated from the following:    Height as of this encounter: 1.651 m (5' 5\").    Weight as of this encounter: 77.9 kg (171 lb 12.8 oz).  Medication Reconciliation: complete     Vesna Garcia      "

## 2017-11-27 NOTE — PROGRESS NOTES
"  SUBJECTIVE:   Roslyn Palmer is a 62 year old female who presents to clinic today for the following health issues:      Gout/ single inflamed joint   Onset: a year ago, just getting worse    Description:   Location:foot, knee and shoulder  Joint Swelling: YES  Redness: no   Pain: YES    Intensity: moderate    Progression of Symptoms:  same    Accompanying Signs & Symptoms:  Fevers: no     History:   Trauma to the area: no   Previous history of gout: no    Recent illness:  no     Precipitating factors:   Diet-rich in purine: seafood, red meat and alcohol  Alcohol use: YES  Diuretic use: no     Alleviating factors:  none    Therapies Tried and outcome: none, ice and narcotic pain meds    At last physical (March of 2017) had blood test for gout completed after reported foot pain swelling episodes. Uric acid was elevated. Sx's originally started 1 year ago and only occurred a few times a month.  Pt reported at visit with Farheen (10/27) she was having gout attack weekly or every other week and lasting longer than one day. She was put on Prednisone for gout by farheen and was told to f/u with rheumatology. She did not f/u. The prednisone was helpful but then sx's returned.   Since she finished the prednisone she has had increased pain in her right knee, right foot and right shoulder. She is in \"severe pain\" and used an old morphine tablet which helped with pain.   Right knee swelling has worsened for the last 4 days and seems gout-like. She started out prednisone taper she had at home and is now relatively pain free again.   -pt says she had previously been drinking 2-3 vodka drinks a day d/c'ed alcohol completely for first week of prednisone, but now she is drinking 3-4 x's a week again.     She is unsure if she has had chills, headache associated to gout.    Denies: anterior head pain, digestive issues, abdominal pain, blood in stool, eating seafood, eating large amounts of red meat, black/bloody " stools,    Has had recent + fit test and new anemia on labs.       Problem list and histories reviewed & adjusted, as indicated.  Additional history: as documented    Patient Active Problem List   Diagnosis     Joint Prosthesis Infection or Inflammation     Alcoholic liver disease (H)     Tobacco use disorder     CARDIOVASCULAR SCREENING; LDL GOAL LESS THAN 160     Anemia     Advanced directives, counseling/discussion     Benign essential hypertension     Hepatitis C virus infection, unspecified chronicity     Upper GI bleed     Alcohol abuse     Migraine with aura and without status migrainosus, not intractable     Low folate     Cervical cancer screening     Anemia, unspecified type     Past Surgical History:   Procedure Laterality Date     GYN SURGERY           ORTHOPEDIC SURGERY  ,    left and right hip replacement       Social History   Substance Use Topics     Smoking status: Current Every Day Smoker     Packs/day: 0.50     Types: Cigarettes     Smokeless tobacco: Never Used     Alcohol use Yes      Comment: 3 to 4 drinks a day     Family History   Problem Relation Age of Onset     Breast Cancer Mother      CANCER Maternal Grandmother      CEREBROVASCULAR DISEASE Maternal Grandmother      stroke     CANCER Maternal Grandfather      pancreatic cancer     CANCER Paternal Grandmother      CANCER Paternal Grandfather      brain cancer     Arthritis Sister          Current Outpatient Prescriptions   Medication Sig Dispense Refill     SUMAtriptan (IMITREX) 100 MG tablet Take 1 tablet (100 mg) by mouth at onset of headache May repeat in 2 hours if needed: max 2/day; 18 tablet 0     colchicine (COLCRYS) 0.6 MG tablet Take 1 tablet (0.6 mg) by mouth 2 times daily 60 tablet 1     predniSONE (DELTASONE) 20 MG tablet Take 3 tabs (60 mg) by mouth daily x 3 days, 2 tabs (40 mg) daily x 3 days, 1 tab (20 mg) daily x 3 days, then 1/2 tab (10 mg) x 3 days. 20 tablet 0     nadolol (CORGARD) 40 MG tablet  "Take 1 tablet (40 mg) by mouth daily 90 tablet 0     pantoprazole (PROTONIX) 40 MG EC tablet Take 1 tablet (40 mg) by mouth daily 90 tablet 3     torsemide (DEMADEX) 10 MG tablet Take 1 tablet (10 mg) by mouth 2 times daily 180 tablet 1     cephALEXin (KEFLEX) 500 MG capsule Take 1 capsule by mouth 2 times daily. 60 capsule 6     Allergies   Allergen Reactions     Sulfa Drugs          Reviewed and updated as needed this visit by clinical staffTobacco  Allergies  Meds  Med Hx  Surg Hx  Fam Hx  Soc Hx      Reviewed and updated as needed this visit by Provider         ROS:  Constitutional, HEENT, cardiovascular, pulmonary, gi and gu systems are negative, except as otherwise noted.    This document serves as a record of the services and decisions personally performed and made by Monica Tirado MD. It was created on her behalf by Ana Frazier, a trained medical scribe. The creation of this document is based the provider's statements to the medical scribe.  Ana Frazier November 27, 2017 8:38 AM      OBJECTIVE:   /76 (BP Location: Right arm, Patient Position: Sitting, Cuff Size: Adult Regular)  Pulse 82  Temp 98.9  F (37.2  C) (Oral)  Resp 18  Ht 1.651 m (5' 5\")  Wt 77.9 kg (171 lb 12.8 oz)  SpO2 98%  Breastfeeding? No  BMI 28.59 kg/m2  Body mass index is 28.59 kg/(m^2).  GENERAL: healthy, alert and no distress, overweight  RESP: lungs clear to auscultation - no rales, rhonchi or wheezes  CV: regular rate and rhythm, normal S1 S2, no S3 or S4, no murmur, click or rub, no peripheral edema and peripheral pulses strong  MS: trace LE edema. No current joint swelling or loss of rom   SKIN: no suspicious lesions or rashes to visible skin  NEURO: Normal strength and tone, mentation intact and speech normal  PSYCH: mentation appears normal, affect normal/bright    Diagnostic Test Results:  No results found for this or any previous visit (from the past 24 hour(s)).      Component      Latest Ref " Rng & Units 3/17/2017   WBC      4.0 - 11.0 10e9/L 4.9   RBC Count      3.8 - 5.2 10e12/L 4.33   Hemoglobin      11.7 - 15.7 g/dL 12.0   Hematocrit      35.0 - 47.0 % 37.9   MCV      78 - 100 fl 88   MCH      26.5 - 33.0 pg 27.7   MCHC      31.5 - 36.5 g/dL 31.7   RDW      10.0 - 15.0 % 17.8 (H)   Platelet Count      150 - 450 10e9/L 247   Diff Method       Automated Method   % Neutrophils      % 43.3   % Lymphocytes      % 38.5   % Monocytes      % 14.5   % Eosinophils      % 3.1   % Basophils      % 0.6   Absolute Neutrophil      1.6 - 8.3 10e9/L 2.1   Absolute Lymphocytes      0.8 - 5.3 10e9/L 1.9   Absolute Monocytes      0.0 - 1.3 10e9/L 0.7   Absolute Eosinophils      0.0 - 0.7 10e9/L 0.2   Absolute Basophils      0.0 - 0.2 10e9/L 0.0   Sodium      133 - 144 mmol/L 138   Potassium      3.4 - 5.3 mmol/L 2.7 (L)   Chloride      94 - 109 mmol/L 95   Carbon Dioxide      20 - 32 mmol/L 35 (H)   Anion Gap      3 - 14 mmol/L 8   Glucose      70 - 99 mg/dL 105 (H)   Urea Nitrogen      7 - 30 mg/dL 11   Creatinine      0.52 - 1.04 mg/dL 0.72   GFR Estimate      >60 mL/min/1.7m2 83   GFR Estimate If Black      >60 mL/min/1.7m2 >90 . . .   Calcium      8.5 - 10.1 mg/dL 8.8   Bilirubin Total      0.2 - 1.3 mg/dL 0.8   Albumin      3.4 - 5.0 g/dL 3.3 (L)   Protein Total      6.8 - 8.8 g/dL 8.6   Alkaline Phosphatase      40 - 150 U/L 177 (H)   ALT      0 - 50 U/L 30   AST      0 - 45 U/L 45   HPV 16 DNA      NEG Negative   HPV 18 DNA      NEG Negative   Other HR HPV      NEG Negative   Final Diagnosis       This patient's sample is negative for HPV DNA. . . .   Specimen Description       Cervical Cells . . .   Cholesterol      <200 mg/dL    Triglycerides      <150 mg/dL    HDL Cholesterol      >49 mg/dL    LDL Cholesterol Calculated      <100 mg/dL    Non HDL Cholesterol      <130 mg/dL    Iron      35 - 180 ug/dL    Iron Binding Cap      240 - 430 ug/dL    Iron Saturation Index      15 - 46 %    HCV RNA Quant IU/ml       HCVND [IU]/mL 7300258 (A)   Log of HCV RNA Qt      <1.2 Log IU/mL 6.6 (H)   Folate      >5.4 ng/mL 3.4 (L)   Vitamin B12      193 - 986 pg/mL 1911 (H)   INR      0.86 - 1.14 1.14   Hepatitis B Surface Antibody      <8.00 m[IU]/mL 0.13   Hepatitis A Antibody IgG      NR Reactive (A) . . .   Uric Acid      2.6 - 6.0 mg/dL 9.5 (H)   Hep B Surface Agn      NR Nonreactive   Hepatitis C High Resolution          Sed Rate      0 - 30 mm/h    Ferritin      8 - 252 ng/mL      Component      Latest Ref Rng & Units 3/27/2017 10/27/2017   WBC      4.0 - 11.0 10e9/L  6.0   RBC Count      3.8 - 5.2 10e12/L  3.93   Hemoglobin      11.7 - 15.7 g/dL  10.8 (L)   Hematocrit      35.0 - 47.0 %  34.2 (L)   MCV      78 - 100 fl  87   MCH      26.5 - 33.0 pg  27.5   MCHC      31.5 - 36.5 g/dL  31.6   RDW      10.0 - 15.0 %  18.0 (H)   Platelet Count      150 - 450 10e9/L  195   Diff Method        Automated Method   % Neutrophils      %  43.1   % Lymphocytes      %  35.6   % Monocytes      %  16.3   % Eosinophils      %  4.2   % Basophils      %  0.8   Absolute Neutrophil      1.6 - 8.3 10e9/L  2.6   Absolute Lymphocytes      0.8 - 5.3 10e9/L  2.1   Absolute Monocytes      0.0 - 1.3 10e9/L  1.0   Absolute Eosinophils      0.0 - 0.7 10e9/L  0.3   Absolute Basophils      0.0 - 0.2 10e9/L  0.1   Sodium      133 - 144 mmol/L 139    Potassium      3.4 - 5.3 mmol/L 3.7    Chloride      94 - 109 mmol/L 99    Carbon Dioxide      20 - 32 mmol/L 33 (H)    Anion Gap      3 - 14 mmol/L 7    Glucose      70 - 99 mg/dL 121 (H)    Urea Nitrogen      7 - 30 mg/dL 8    Creatinine      0.52 - 1.04 mg/dL 0.64    GFR Estimate      >60 mL/min/1.7m2 >90 . . .    GFR Estimate If Black      >60 mL/min/1.7m2 >90 . . .    Calcium      8.5 - 10.1 mg/dL 8.9    Bilirubin Total      0.2 - 1.3 mg/dL     Albumin      3.4 - 5.0 g/dL     Protein Total      6.8 - 8.8 g/dL     Alkaline Phosphatase      40 - 150 U/L     ALT      0 - 50 U/L     AST      0 - 45 U/L     HPV 16  DNA      NEG     HPV 18 DNA      NEG     Other HR HPV      NEG     Final Diagnosis           Specimen Description           Cholesterol      <200 mg/dL 114    Triglycerides      <150 mg/dL 117    HDL Cholesterol      >49 mg/dL 34 (L)    LDL Cholesterol Calculated      <100 mg/dL 57    Non HDL Cholesterol      <130 mg/dL 80    Iron      35 - 180 ug/dL  32 (L)   Iron Binding Cap      240 - 430 ug/dL  370   Iron Saturation Index      15 - 46 %  9 (L)   HCV RNA Quant IU/ml      HCVND [IU]/mL     Log of HCV RNA Qt      <1.2 Log IU/mL     Folate      >5.4 ng/mL     Vitamin B12      193 - 986 pg/mL     INR      0.86 - 1.14     Hepatitis B Surface Antibody      <8.00 m[IU]/mL     Hepatitis A Antibody IgG      NR     Uric Acid      2.6 - 6.0 mg/dL  10.0 (H)   Hep B Surface Agn      NR     Hepatitis C High Resolution       2b . . .    Sed Rate      0 - 30 mm/h  67 (H)   Ferritin      8 - 252 ng/mL  15     ASSESSMENT/PLAN:       1. Gout, unspecified cause, unspecified chronicity, unspecified site   Px is to take colcrys 0.6 mg BID for prophylaxis if LFT's are relatively stable. She is to also take prednisone as directed for next onset of gout flare. Hesitant to start allopurinol as recent recurrent flares. Consider in the future. Reviewed red flag symptoms that would precipitate the need for routine, urgent or emergent f/u  She is to schedule f/u with rheumatology. Discussed importance of d/c of etoh for tx too.  - Uric acid  - Comprehensive metabolic panel  - CBC with platelets differential  - colchicine (COLCRYS) 0.6 MG tablet; Take 1 tablet (0.6 mg) by mouth 2 times daily  Dispense: 60 tablet; Refill: 1  - predniSONE (DELTASONE) 20 MG tablet; Take 3 tabs (60 mg) by mouth daily x 3 days, 2 tabs (40 mg) daily x 3 days, 1 tab (20 mg) daily x 3 days, then 1/2 tab (10 mg) x 3 days.  Dispense: 20 tablet; Refill: 0    2. Alcohol abuse  3. Alcoholic liver disease (H)  discussed d/c of alcohol and pt agrees but declines any  help/interventions. She reports confident she can quit on her own.   - Comprehensive metabolic panel  - CBC with platelets differential  - INR  - Ferritin  - Iron and iron binding capacity  - Folate  - Vitamin B12    4. Benign essential hypertension  Controlled. Continue same medication.     5. Migraine with aura and without status migrainosus, not intractable  Controlled. Refill requested. Continue same medication.   - SUMAtriptan (IMITREX) 100 MG tablet; Take 1 tablet (100 mg) by mouth at onset of headache May repeat in 2 hours if needed: max 2/day;  Dispense: 18 tablet; Refill: 0    6. Positive FIT (fecal immunochemical test)  Suspect her anemia is from etoh/liver disease but with positive FIT and hx of UGI bleed pt is to schedule upper endoscopy and colonoscopy. If hgb dropping (checking today) will need urgently  - Ferritin  - Iron and iron binding capacity  - TSH with free T4 reflex    Patient Instructions   Schedule Follow up with rheumatologist.     Start colcrys 0.6 mg twice daily if liver tests are okay  Start prednisone if another gout flare occurs.     Be seen emergently if blood in stool or vomit seen    Please call [facility] ([facility] at [phone] to schedule upper endoscopy and colonoscopy soon!       Length of visit was 30 minutes with more than 50 percent of that time used for discussing medical concerns and education    The information in this document, created by the medical scribe for me, accurately reflects the services I personally performed and the decisions made by me. I have reviewed and approved this document for accuracy.   MD Monica Nagy MD  [facility]

## 2017-11-28 RX ORDER — POTASSIUM CHLORIDE 1500 MG/1
20 TABLET, EXTENDED RELEASE ORAL
Qty: 37 TABLET | Refills: 1 | Status: SHIPPED | OUTPATIENT
Start: 2017-11-28 | End: 2018-06-04

## 2017-12-14 DIAGNOSIS — K70.9 ALCOHOLIC LIVER DISEASE (H): ICD-10-CM

## 2017-12-14 DIAGNOSIS — I10 BENIGN ESSENTIAL HYPERTENSION: ICD-10-CM

## 2017-12-14 NOTE — TELEPHONE ENCOUNTER
torsemide (DEMADEX) 10 MG tablet      Last Written Prescription Date: 3/17/17  Last Fill Quantity: 180, # refills: 1  Last Office Visit with G, P or [facility] prescribing provider: 11/27/17       Potassium   Date Value Ref Range Status   11/27/2017 3.2 (L) 3.4 - 5.3 mmol/L Final     Creatinine   Date Value Ref Range Status   11/27/2017 0.71 0.52 - 1.04 mg/dL Final     BP Readings from Last 3 Encounters:   11/27/17 138/76   10/27/17 126/80   03/27/17 118/80

## 2017-12-20 NOTE — TELEPHONE ENCOUNTER
Routing refill request to provider for review/approval because:  Labs out of range:  K    Hawa Solorzano RN   [facility]

## 2017-12-21 RX ORDER — TORSEMIDE 10 MG/1
10 TABLET ORAL 2 TIMES DAILY
Qty: 180 TABLET | Refills: 0 | Status: SHIPPED | OUTPATIENT
Start: 2017-12-21 | End: 2018-04-02

## 2017-12-21 NOTE — TELEPHONE ENCOUNTER
Patient has not followed up on recheck labs as directed on results note from 11/28.    Please call to see if she is taking the potassium supplement and schedule the f/u labs  - please also remind her to schedule with gi if she has not already done so.       Once contacted and lab appt set up, can refill one month of the torsemide

## 2017-12-21 NOTE — TELEPHONE ENCOUNTER
Script going to mail order pharmacy.  Has appointment for lab tomorrow.   Will fill for 3 months due to pharmacy site.  Follow up with Dr Tirado as planned.  Follow up with GI as planned.  PSK

## 2017-12-21 NOTE — TELEPHONE ENCOUNTER
Spoke with patient she is scheduled for a lab appointment tomorrow.    Patient states she is taking the the potassium sporadically because of some nausea with some other medications she is taking.

## 2017-12-22 DIAGNOSIS — K70.9 ALCOHOLIC LIVER DISEASE (H): ICD-10-CM

## 2017-12-22 LAB
ALBUMIN SERPL-MCNC: 3.3 G/DL (ref 3.4–5)
ALP SERPL-CCNC: 199 U/L (ref 40–150)
ALT SERPL W P-5'-P-CCNC: 32 U/L (ref 0–50)
ANION GAP SERPL CALCULATED.3IONS-SCNC: 6 MMOL/L (ref 3–14)
AST SERPL W P-5'-P-CCNC: 65 U/L (ref 0–45)
BILIRUB SERPL-MCNC: 0.8 MG/DL (ref 0.2–1.3)
BUN SERPL-MCNC: 5 MG/DL (ref 7–30)
CALCIUM SERPL-MCNC: 9 MG/DL (ref 8.5–10.1)
CHLORIDE SERPL-SCNC: 107 MMOL/L (ref 94–109)
CO2 SERPL-SCNC: 28 MMOL/L (ref 20–32)
CREAT SERPL-MCNC: 0.54 MG/DL (ref 0.52–1.04)
GFR SERPL CREATININE-BSD FRML MDRD: >90 ML/MIN/1.7M2
GLUCOSE SERPL-MCNC: 95 MG/DL (ref 70–99)
POTASSIUM SERPL-SCNC: 4 MMOL/L (ref 3.4–5.3)
PROT SERPL-MCNC: 8 G/DL (ref 6.8–8.8)
SODIUM SERPL-SCNC: 141 MMOL/L (ref 133–144)

## 2017-12-22 PROCEDURE — 80053 COMPREHEN METABOLIC PANEL: CPT | Performed by: FAMILY MEDICINE

## 2017-12-22 PROCEDURE — 36415 COLL VENOUS BLD VENIPUNCTURE: CPT | Performed by: FAMILY MEDICINE

## 2017-12-26 ENCOUNTER — TELEPHONE (OUTPATIENT)
Dept: FAMILY MEDICINE | Facility: CLINIC | Age: 62
End: 2017-12-26

## 2017-12-26 NOTE — TELEPHONE ENCOUNTER
Notes Recorded by Maria Luz Bright PA-C on 12/25/2017 at 7:05 PM  Labs routed to PCP for review as well as pool.   Potassium in normal range but LFTs relatively unchanged.   Does she have follow up with GI scheduled?  If not please stress importance of scheduling.    Entered by Monica Tirado MD at 12/26/2017  8:00 AM   Read by Roslyn Palmer at 12/26/2017  9:48 AM   Roslyn,   Your potassium level looks much better! Please continue your current potassium dosing.   The liver tests are still elevated. Please make sure to follow-up with the GI specialist as we have discussed. Let me know if you need the referral info again?   Please MyChart or call if you have any concerns or questions.   Sincerely,   Monica Tirado MD      This writer attempted to contact Pt on 12/26/17      Reason for call results and left message to return call.      If patient calls back:   Looks like pt read the message, Route to RN if has questions.        Modesta Thurman RN

## 2018-01-01 DIAGNOSIS — I10 BENIGN ESSENTIAL HYPERTENSION: ICD-10-CM

## 2018-01-01 RX ORDER — NADOLOL 40 MG/1
TABLET ORAL
Qty: 90 TABLET | Refills: 0 | OUTPATIENT
Start: 2018-01-01

## 2018-01-01 RX ORDER — CYCLOBENZAPRINE HCL 10 MG
5-10 TABLET ORAL 3 TIMES DAILY PRN
Qty: 30 TABLET | Refills: 0 | Status: SHIPPED | OUTPATIENT
Start: 2018-01-01 | End: 2019-01-01

## 2018-01-01 RX ORDER — COLCHICINE 0.6 MG/1
0.6 TABLET ORAL 2 TIMES DAILY
Qty: 180 TABLET | Refills: 0 | Status: SHIPPED | OUTPATIENT
Start: 2018-01-01 | End: 2019-01-01

## 2018-01-24 ENCOUNTER — TELEPHONE (OUTPATIENT)
Dept: FAMILY MEDICINE | Facility: CLINIC | Age: 63
End: 2018-01-24

## 2018-01-24 DIAGNOSIS — M10.9 GOUT, UNSPECIFIED CAUSE, UNSPECIFIED CHRONICITY, UNSPECIFIED SITE: ICD-10-CM

## 2018-01-24 DIAGNOSIS — I10 BENIGN ESSENTIAL HYPERTENSION: ICD-10-CM

## 2018-01-24 NOTE — TELEPHONE ENCOUNTER
What type of form?FMLA  What day did you drop off your forms? 01/24  Is there a due date? ASAP (7-10 business days to compete forms)   How would you like to receive these forms? Please fax to [phone]    What is the best number to contact you? Home [phone]  What time works best to contact you with in 4 hrs? ANY  Is it okay to leave a message? Yes    Swapnil Everett (Auto signs name of person logged into Epic)

## 2018-01-25 RX ORDER — COLCHICINE 0.6 MG/1
0.6 TABLET ORAL 2 TIMES DAILY
Qty: 180 TABLET | Refills: 0 | Status: SHIPPED | OUTPATIENT
Start: 2018-01-25 | End: 2018-06-28

## 2018-01-25 RX ORDER — NADOLOL 40 MG/1
40 TABLET ORAL DAILY
Qty: 90 TABLET | Refills: 1 | Status: SHIPPED | OUTPATIENT
Start: 2018-01-25 | End: 2018-06-28

## 2018-01-25 NOTE — TELEPHONE ENCOUNTER
Please fax forms      Patient's letter with form also requested colchicine and nadalol refill through [employer] . rx's were sent.

## 2018-04-02 ENCOUNTER — MYC REFILL (OUTPATIENT)
Dept: FAMILY MEDICINE | Facility: CLINIC | Age: 63
End: 2018-04-02

## 2018-04-02 DIAGNOSIS — F10.10 ALCOHOL ABUSE: ICD-10-CM

## 2018-04-02 DIAGNOSIS — I10 BENIGN ESSENTIAL HYPERTENSION: ICD-10-CM

## 2018-04-02 DIAGNOSIS — K70.9 ALCOHOLIC LIVER DISEASE (H): ICD-10-CM

## 2018-04-03 NOTE — TELEPHONE ENCOUNTER
Message from [employer]:  Original authorizing provider: Evangelina Joyner MD    DORENE Palmer would like a refill of the following medications:  torsemide (DEMADEX) 10 MG tablet [Evangelina Joyenr MD]    Preferred pharmacy: [facility] - [place], [place] - [address]    Comment:      Medication renewals requested in this message routed to other providers:  pantoprazole (PROTONIX) 40 MG EC tablet [Monica Tirado MD]  nadolol (CORGARD) 40 MG tablet [Monica Tirado MD]

## 2018-04-03 NOTE — TELEPHONE ENCOUNTER
Message from [employer]:  Original authorizing provider: Monica Tirado MD    DORENE Palmer would like a refill of the following medications:  pantoprazole (PROTONIX) 40 MG EC tablet [Monica Tirado MD]  nadolol (CORGARD) 40 MG tablet [Monica Tirado MD]    Preferred pharmacy: [facility] - [place], [place] - [address]    Comment:      Medication renewals requested in this message routed to other providers:  torsemide (DEMADEX) 10 MG tablet [Evangelina Joyner MD]

## 2018-04-04 RX ORDER — NADOLOL 40 MG/1
40 TABLET ORAL DAILY
Qty: 90 TABLET | Refills: 1
Start: 2018-04-04

## 2018-04-04 RX ORDER — TORSEMIDE 10 MG/1
10 TABLET ORAL 2 TIMES DAILY
Qty: 180 TABLET | Refills: 1 | Status: SHIPPED | OUTPATIENT
Start: 2018-04-04 | End: 2018-09-26

## 2018-04-04 RX ORDER — PANTOPRAZOLE SODIUM 40 MG/1
40 TABLET, DELAYED RELEASE ORAL DAILY
Qty: 90 TABLET | Refills: 1 | Status: SHIPPED | OUTPATIENT
Start: 2018-04-04 | End: 2018-09-26

## 2018-04-04 NOTE — TELEPHONE ENCOUNTER
Prescription approved per [facility] Refill Protocol for Pantoprazole.    90 day supply with 1 refills sent on 1/25/18. Should have refills on file at pharmacy for Nadolol.     Jose David Hendrix RN, BSN

## 2018-04-04 NOTE — TELEPHONE ENCOUNTER
Prescription approved per [facility] Refill Protocol.    Jose David Hendrix RN, BSN

## 2018-06-04 ENCOUNTER — OFFICE VISIT (OUTPATIENT)
Dept: FAMILY MEDICINE | Facility: CLINIC | Age: 63
End: 2018-06-04
Payer: COMMERCIAL

## 2018-06-04 ENCOUNTER — RADIANT APPOINTMENT (OUTPATIENT)
Dept: GENERAL RADIOLOGY | Facility: CLINIC | Age: 63
End: 2018-06-04
Attending: FAMILY MEDICINE
Payer: COMMERCIAL

## 2018-06-04 VITALS
OXYGEN SATURATION: 99 % | WEIGHT: 163 LBS | BODY MASS INDEX: 27.16 KG/M2 | SYSTOLIC BLOOD PRESSURE: 132 MMHG | TEMPERATURE: 99.3 F | HEART RATE: 72 BPM | DIASTOLIC BLOOD PRESSURE: 78 MMHG | RESPIRATION RATE: 18 BRPM | HEIGHT: 65 IN

## 2018-06-04 DIAGNOSIS — I10 BENIGN ESSENTIAL HYPERTENSION: ICD-10-CM

## 2018-06-04 DIAGNOSIS — E87.6 HYPOKALEMIA: ICD-10-CM

## 2018-06-04 DIAGNOSIS — M54.16 LUMBAR RADICULOPATHY: Primary | ICD-10-CM

## 2018-06-04 DIAGNOSIS — M54.16 LUMBAR RADICULOPATHY: ICD-10-CM

## 2018-06-04 DIAGNOSIS — D64.9 ANEMIA, UNSPECIFIED TYPE: ICD-10-CM

## 2018-06-04 DIAGNOSIS — K70.9 ALCOHOLIC LIVER DISEASE (H): ICD-10-CM

## 2018-06-04 DIAGNOSIS — F10.10 ALCOHOL ABUSE: ICD-10-CM

## 2018-06-04 DIAGNOSIS — K92.2 UPPER GI BLEED: ICD-10-CM

## 2018-06-04 DIAGNOSIS — B19.20 HEPATITIS C VIRUS INFECTION, UNSPECIFIED CHRONICITY: ICD-10-CM

## 2018-06-04 LAB
ALBUMIN SERPL-MCNC: 3.6 G/DL (ref 3.4–5)
ALP SERPL-CCNC: 262 U/L (ref 40–150)
ALT SERPL W P-5'-P-CCNC: 42 U/L (ref 0–50)
ANION GAP SERPL CALCULATED.3IONS-SCNC: 9 MMOL/L (ref 3–14)
AST SERPL W P-5'-P-CCNC: 91 U/L (ref 0–45)
BASOPHILS # BLD AUTO: 0.1 10E9/L (ref 0–0.2)
BASOPHILS NFR BLD AUTO: 1.5 %
BILIRUB SERPL-MCNC: 1.3 MG/DL (ref 0.2–1.3)
BUN SERPL-MCNC: 13 MG/DL (ref 7–30)
CALCIUM SERPL-MCNC: 9.1 MG/DL (ref 8.5–10.1)
CHLORIDE SERPL-SCNC: 99 MMOL/L (ref 94–109)
CO2 SERPL-SCNC: 33 MMOL/L (ref 20–32)
CREAT SERPL-MCNC: 0.73 MG/DL (ref 0.52–1.04)
DIFFERENTIAL METHOD BLD: ABNORMAL
EOSINOPHIL # BLD AUTO: 0.2 10E9/L (ref 0–0.7)
EOSINOPHIL NFR BLD AUTO: 3.9 %
ERYTHROCYTE [DISTWIDTH] IN BLOOD BY AUTOMATED COUNT: 19.4 % (ref 10–15)
GFR SERPL CREATININE-BSD FRML MDRD: 80 ML/MIN/1.7M2
GLUCOSE SERPL-MCNC: 104 MG/DL (ref 70–99)
HCT VFR BLD AUTO: 35.2 % (ref 35–47)
HGB BLD-MCNC: 10.8 G/DL (ref 11.7–15.7)
INR PPP: 1.16 (ref 0.86–1.14)
LYMPHOCYTES # BLD AUTO: 1.3 10E9/L (ref 0.8–5.3)
LYMPHOCYTES NFR BLD AUTO: 27.2 %
MCH RBC QN AUTO: 25.8 PG (ref 26.5–33)
MCHC RBC AUTO-ENTMCNC: 30.7 G/DL (ref 31.5–36.5)
MCV RBC AUTO: 84 FL (ref 78–100)
MONOCYTES # BLD AUTO: 0.7 10E9/L (ref 0–1.3)
MONOCYTES NFR BLD AUTO: 13.7 %
NEUTROPHILS # BLD AUTO: 2.6 10E9/L (ref 1.6–8.3)
NEUTROPHILS NFR BLD AUTO: 53.7 %
PLATELET # BLD AUTO: 174 10E9/L (ref 150–450)
POTASSIUM SERPL-SCNC: 2.9 MMOL/L (ref 3.4–5.3)
PROT SERPL-MCNC: 8.9 G/DL (ref 6.8–8.8)
RBC # BLD AUTO: 4.18 10E12/L (ref 3.8–5.2)
SODIUM SERPL-SCNC: 141 MMOL/L (ref 133–144)
TSH SERPL DL<=0.005 MIU/L-ACNC: 2.45 MU/L (ref 0.4–4)
URATE SERPL-MCNC: 11.2 MG/DL (ref 2.6–6)
WBC # BLD AUTO: 4.8 10E9/L (ref 4–11)

## 2018-06-04 PROCEDURE — 72100 X-RAY EXAM L-S SPINE 2/3 VWS: CPT | Mod: FY

## 2018-06-04 PROCEDURE — 80053 COMPREHEN METABOLIC PANEL: CPT | Performed by: FAMILY MEDICINE

## 2018-06-04 PROCEDURE — 84443 ASSAY THYROID STIM HORMONE: CPT | Performed by: FAMILY MEDICINE

## 2018-06-04 PROCEDURE — 36415 COLL VENOUS BLD VENIPUNCTURE: CPT | Performed by: FAMILY MEDICINE

## 2018-06-04 PROCEDURE — 85610 PROTHROMBIN TIME: CPT | Performed by: FAMILY MEDICINE

## 2018-06-04 PROCEDURE — 84550 ASSAY OF BLOOD/URIC ACID: CPT | Performed by: FAMILY MEDICINE

## 2018-06-04 PROCEDURE — 99214 OFFICE O/P EST MOD 30 MIN: CPT | Performed by: FAMILY MEDICINE

## 2018-06-04 PROCEDURE — 85025 COMPLETE CBC W/AUTO DIFF WBC: CPT | Performed by: FAMILY MEDICINE

## 2018-06-04 RX ORDER — POTASSIUM CHLORIDE 1500 MG/1
20 TABLET, EXTENDED RELEASE ORAL
Qty: 37 TABLET | Refills: 1 | Status: SHIPPED | OUTPATIENT
Start: 2018-06-04 | End: 2019-01-01

## 2018-06-04 RX ORDER — CYCLOBENZAPRINE HCL 10 MG
5-10 TABLET ORAL 3 TIMES DAILY PRN
Qty: 30 TABLET | Refills: 1 | Status: SHIPPED | OUTPATIENT
Start: 2018-06-04 | End: 2018-08-02

## 2018-06-04 RX ORDER — PREDNISONE 20 MG/1
40 TABLET ORAL DAILY
Qty: 10 TABLET | Refills: 0 | Status: SHIPPED | OUTPATIENT
Start: 2018-06-04 | End: 2019-01-01

## 2018-06-04 ASSESSMENT — PAIN SCALES - GENERAL: PAINLEVEL: SEVERE PAIN (7)

## 2018-06-04 NOTE — MR AVS SNAPSHOT
After Visit Summary   6/4/2018    Roslyn Palmer    MRN: [id number]           Patient Information     Date Of Birth          1955        Visit Information        Provider Department      6/4/2018 9:00 AM Monica Tirado MD [facility]        Today's Diagnoses     Lumbar radiculopathy    -  1    Upper GI bleed        Low folate        Alcoholic liver disease (H)        Hepatitis C virus infection, unspecified chronicity        Anemia, unspecified type        Alcohol abuse        Benign essential hypertension          Care Instructions    Please call [facility] at [phone] to schedule:  1. upper endoscopy and colonoscopy   2. schedule with GI doctor (hepatologist).     Take 5-10 mg Flexeril for back pain. Advise taking this at night as it will make you drowsy. Don't mix with alcohol  Start prednisone- take in the morning with food.  Follow up with physical therapy- they will call you.     Follow-up if not improving or symptoms are worsening.           Follow-ups after your visit        Additional Services     GASTROENTEROLOGY ADULT REF PROCEDURE ONLY [facility] [phone]; No Provider Preference       Last Lab Result: Creatinine (mg/dL)       Date                     Value                 12/22/2017               0.54             ----------  Body mass index is 27.55 kg/(m^2).     Needed:  No  Language:  English    Patient will be contacted to schedule procedure.     Please be aware that coverage of these services is subject to the terms and limitations of your health insurance plan.  Call member services at your health plan with any benefit or coverage questions.  Any procedures must be performed at a [place] facility OR coordinated by your clinic's referral office.    Please bring the following with you to your appointment:    (1) Any X-Rays, CTs or MRIs which have  been performed.  Contact the facility where they were done to arrange for  prior to your scheduled appointment.    (2) List of current medications   (3) This referral request   (4) Any documents/labs given to you for this referral            [facility] PT, HAND, AND CHIROPRACTIC REFERRAL       **This order will print in the [facility] Scheduling Office**    Physical Therapy, Hand Therapy and Chiropractic Care are available through:    *[facility]  *[facility]  *[facility]    Call one number to schedule at any of the above locations: [phone].    Your provider has referred you to: Physical Therapy at [facility] or [facility]    Indication/Reason for Referral: Low Back Pain  Onset of Illness: months  Therapy Orders: Evaluate and Treat  Special Programs: None  Special Request: None    Rosa Lee      Additional Comments for the Therapist or Chiropractor:     Please be aware that coverage of these services is subject to the terms and limitations of your health insurance plan.  Call member services at your health plan with any benefit or coverage questions.      Please bring the following to your appointment:    *Your personal calendar for scheduling future appointments  *Comfortable clothing                  Future tests that were ordered for you today     Open Future Orders        Priority Expected Expires Ordered    XR Lumbar Spine 2/3 Views Routine 6/4/2018 6/4/2019 6/4/2018            Who to contact     If you have questions or need follow up information about today's clinic visit or your schedule please contact [facility] directly at [phone].  Normal or non-critical lab and imaging results will be communicated to you by MyChart, letter or phone within 4 business days after the clinic has received the results. If you do not hear from us within 7 days, please contact the clinic through MyChart or phone. If you have a critical or abnormal lab result,  "we will notify you by phone as soon as possible.  Submit refill requests through [employer] or call your pharmacy and they will forward the refill request to us. Please allow 3 business days for your refill to be completed.          Additional Information About Your Visit        [employer] Information     [employer] gives you secure access to your electronic health record. If you see a primary care provider, you can also send messages to your care team and make appointments. If you have questions, please call your primary care clinic.  If you do not have a primary care provider, please call [phone] and they will assist you.        Care EveryWhere ID     This is your Care EveryWhere ID. This could be used by other organizations to access your [place] medical records  [id number]        Your Vitals Were     Pulse Temperature Respirations Height Pulse Oximetry Breastfeeding?    72 99.3  F (37.4  C) (Oral) 18 5' 4.5\" (1.638 m) 99% No    BMI (Body Mass Index)                   27.55 kg/m2            Blood Pressure from Last 3 Encounters:   06/04/18 132/78   11/27/17 138/76   10/27/17 126/80    Weight from Last 3 Encounters:   06/04/18 163 lb (73.9 kg)   11/27/17 171 lb 12.8 oz (77.9 kg)   10/27/17 175 lb (79.4 kg)              We Performed the Following     CBC with platelets differential     Comprehensive metabolic panel     GASTROENTEROLOGY ADULT REF PROCEDURE ONLY [facility] [phone]; No Provider Preference     [facility] PT, HAND, AND CHIROPRACTIC REFERRAL     INR     TSH with free T4 reflex     Uric acid          Today's Medication Changes          These changes are accurate as of 6/4/18 10:02 AM.  If you have any questions, ask your nurse or doctor.               Start taking these medicines.        Dose/Directions    cyclobenzaprine 10 MG tablet   Commonly known as:  FLEXERIL   Used for:  Lumbar radiculopathy   Started by:  Monica Tirado MD        Dose:  5-10 mg   Take 0.5-1 tablets (5-10 mg) by " mouth 3 times daily as needed for muscle spasms   Quantity:  30 tablet   Refills:  1       predniSONE 20 MG tablet   Commonly known as:  DELTASONE   Used for:  Lumbar radiculopathy   Started by:  Monica Tirado MD        Dose:  40 mg   Take 2 tablets (40 mg) by mouth daily for 5 days   Quantity:  10 tablet   Refills:  0            Where to get your medicines      These medications were sent to [facility]/pharmacy #[id number] - [place], [place] - [address] [place] [facility]  [address] [facility] [address]     Phone:  [phone]     cyclobenzaprine 10 MG tablet    predniSONE 20 MG tablet                Primary Care Provider Office Phone # Fax #    Monica Tirado [phone] [address] [address]        Equal Access to Services     [facility]: Hadii leslee frost hadasho Soomaali, waaxda luqadaha, qaybta kaalmada adeegyada, kim benedict haynav paiz . So [facility] [phone].    ATENCIÓN: Si habla español, tiene a stokes disposición servicios gratuitos de asistencia lingüística. Llame al [phone].    We comply with applicable federal civil rights laws and [place] laws. We do not discriminate on the basis of race, color, national origin, age, disability, sex, sexual orientation, or gender identity.            Thank you!     Thank you for choosing [facility]  for your care. Our goal is always to provide you with excellent care. Hearing back from our patients is one way we can continue to improve our services. Please take a few minutes to complete the written survey that you may receive in the mail after your visit with us. Thank you!             Your Updated Medication List - Protect others around you: Learn how to safely use, store and throw away your medicines at www.disposemymeds.org.          This list is accurate as of 6/4/18 10:02 AM.  Always use your most recent med list.                    Brand Name Dispense Instructions for use Diagnosis    cephALEXin 500 MG capsule    KEFLEX    60 capsule    Take 1 capsule by mouth 2 times daily.    Joint prosthesis infection or inflammation (H)       colchicine 0.6 MG tablet    COLCRYS    180 tablet    Take 1 tablet (0.6 mg) by mouth 2 times daily    Gout, unspecified cause, unspecified chronicity, unspecified site       cyclobenzaprine 10 MG tablet    FLEXERIL    30 tablet    Take 0.5-1 tablets (5-10 mg) by mouth 3 times daily as needed for muscle spasms    Lumbar radiculopathy       nadolol 40 MG tablet    CORGARD    90 tablet    Take 1 tablet (40 mg) by mouth daily    Benign essential hypertension       pantoprazole 40 MG EC tablet    PROTONIX    90 tablet    Take 1 tablet (40 mg) by mouth daily    Alcohol abuse       Potassium Chloride ER 20 MEQ Tbcr     37 tablet    Take 1 tablet (20 mEq) by mouth 2 times daily x7 days, then once daily    Hypokalemia       predniSONE 20 MG tablet    DELTASONE    10 tablet    Take 2 tablets (40 mg) by mouth daily for 5 days    Lumbar radiculopathy       SUMAtriptan 100 MG tablet    IMITREX    18 tablet    Take 1 tablet (100 mg) by mouth at onset of headache May repeat in 2 hours if needed: max 2/day;    Migraine with aura and without status migrainosus, not intractable       torsemide 10 MG tablet    DEMADEX    180 tablet    Take 1 tablet (10 mg) by mouth 2 times daily    Benign essential hypertension, Alcoholic liver disease (H)

## 2018-06-04 NOTE — PROGRESS NOTES
"  SUBJECTIVE:   Roslyn Palmer is a 62 year old female who presents to clinic today for the following health issues:    Back Pain       Duration: Started about two months ago on left side, now has begun on right side, feels like spasms        Specific cause: none    Description:   Location of pain: low back both  Character of pain: sharp and dull ache, feels \"tight\"  Pain radiation:radiates into the left buttocks, radiates into the left leg   New numbness or weakness in legs, not attributed to pain:  no     Intensity: Currently 7/10    History:   Pain interferes with job: YES  History of back problems: previous herniated disc in her 30's  Any previous MRI or X-rays: Yes--at [facility] (possibly)  Sees a specialist for back pain:  No  Therapies tried without relief: heat    Alleviating factors:   Improved by: none      Precipitating factors:  Worsened by: Sitting and Standing for long periods of dawna        Back pain: Left low back pain radiating into left low back and legs for the past few months. About 4 days ago pain onset in right low back and caused spasms. She felt pain was so bad she could fall over. Her left foot seems to fall asleep/tingle in certain positions, more than her right although changing positions resolves this.   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 She did not have radiating pain into left leg at this time.    -while sitting she has no pain, but when she walks she has a limp due to pain and \"tightness\" of muscles in left low back  Denies: weakness, numbness, recent falls, trauma, urine or BM changes    Gout: pt will have sporadic gout pain in her toes but she is no longer having 3-5 days bouts of gout pain. She had intolerance to bid dosing of colchicine and so She is taking 1/2 tablet colchicine once daily and occasionally adds in a full tablet later in the day when she feels onset of gout pain.     GI:  Pt is motivated " to f/u on GI concerns and other health issues as she recently lost a brother to cancer and her sister has urged her to f/u with healthcare concerns.  Pt mentions hx of varices in her upper GI tract 7-8 years ago. She has not followed with this.  Pt admits to occasional abdominal pain. Is taking her PPI. Hx of +FIT test few months ago.   Denies: no visual blood in the stool, vomiting, nausea,     etoh use: pt had been going a few weeks at a time with no etoh drinks. Since her brother's recent passing she has been drinking more regularly. As of the last 2-3 days she has reduced etoh use and is motivated to quit given it affects other health issues. Again denies assistance with this.     Pt is not supplementing potassium as she does not like size of the pill.     Problem list and histories reviewed & adjusted, as indicated.  Additional history: as documented    Patient Active Problem List   Diagnosis     Joint Prosthesis Infection or Inflammation     Alcoholic liver disease (H)     Tobacco use disorder     CARDIOVASCULAR SCREENING; LDL GOAL LESS THAN 160     Anemia     Advanced directives, counseling/discussion     Benign essential hypertension     Hepatitis C virus infection, unspecified chronicity     Upper GI bleed     Alcohol abuse     Migraine with aura and without status migrainosus, not intractable     Low folate     Cervical cancer screening     Anemia, unspecified type     Past Surgical History:   Procedure Laterality Date     GYN SURGERY           ORTHOPEDIC SURGERY  ,    left and right hip replacement       Social History   Substance Use Topics     Smoking status: Current Every Day Smoker     Packs/day: 0.50     Types: Cigarettes     Smokeless tobacco: Never Used     Alcohol use Yes      Comment: 3 to 4 drinks a day     Family History   Problem Relation Age of Onset     Breast Cancer Mother      CANCER Maternal Grandmother      CEREBROVASCULAR DISEASE Maternal Grandmother      stroke      "CANCER Maternal Grandfather      pancreatic cancer     CANCER Paternal Grandmother      CANCER Paternal Grandfather      brain cancer     Arthritis Sister          Current Outpatient Prescriptions   Medication Sig Dispense Refill     cephALEXin (KEFLEX) 500 MG capsule Take 1 capsule by mouth 2 times daily. 60 capsule 6     colchicine (COLCRYS) 0.6 MG tablet Take 1 tablet (0.6 mg) by mouth 2 times daily 180 tablet 0     nadolol (CORGARD) 40 MG tablet Take 1 tablet (40 mg) by mouth daily 90 tablet 1     pantoprazole (PROTONIX) 40 MG EC tablet Take 1 tablet (40 mg) by mouth daily 90 tablet 1     Potassium Chloride ER 20 MEQ TBCR Take 1 tablet (20 mEq) by mouth 2 times daily x7 days, then once daily (Patient not taking: Reported on 6/4/2018) 37 tablet 1     SUMAtriptan (IMITREX) 100 MG tablet Take 1 tablet (100 mg) by mouth at onset of headache May repeat in 2 hours if needed: max 2/day; 18 tablet 0     torsemide (DEMADEX) 10 MG tablet Take 1 tablet (10 mg) by mouth 2 times daily 180 tablet 1     Allergies   Allergen Reactions     Sulfa Drugs        Reviewed and updated as needed this visit by clinical staff  Tobacco  Allergies  Meds  Med Hx  Surg Hx  Fam Hx  Soc Hx      Reviewed and updated as needed this visit by Provider Tobacco  Allergies  Meds  Med Hx  Surg Hx  Fam Hx  Soc Hx            ROS:  Constitutional, HEENT, cardiovascular, pulmonary, gi and gu systems are negative, except as otherwise noted.    This document serves as a record of the services and decisions personally performed and made by Monica Tirado MD. It was created on her behalf by Ana Frazier, a trained medical scribe. The creation of this document is based the provider's statements to the medical scribe.  Ana Frazier June 4, 2018 9:34 AM      OBJECTIVE:     /78 (BP Location: Right arm, Patient Position: Chair, Cuff Size: Adult Regular)  Pulse 72  Temp 99.3  F (37.4  C) (Oral)  Resp 18  Ht 1.638 m (5' 4.5\")  " Wt 73.9 kg (163 lb)  SpO2 99%  Breastfeeding? No  BMI 27.55 kg/m2  Body mass index is 27.55 kg/(m^2).  GENERAL: healthy, alert and no distress, overweight   RESP: lungs clear to auscultation - no rales, rhonchi or wheezes  CV: regular rate and rhythm, normal S1 S2, no S3 or S4, no murmur, click or rub, no peripheral edema and peripheral pulses strong  ABDOMEN: soft, nontender, no splenomegaly, no masses and bowel sounds normal, hepatomegaly present  MS: ROM back: 80 degree flexion, nl extension.  left hip resisted extension painful, straight leg test negative for right leg and positive for inducing back pain for left leg but no radicular sx's  SKIN: no suspicious lesions or rashes  NEURO: Normal strength and tone, mentation intact and speech normal, antalgic gait including heel/toe walking. Heel walking with pain. Sensation normal   PSYCH: mentation appears normal, affect normal/bright    Diagnostic Test Results:  No results found for this or any previous visit (from the past 24 hour(s)).       X-ray: spine:  mild loss of normal lumbar lordosis moderate degenerative changes throughout lumbar spine. Stool and bowel gas overlying seen.   Xray personally reviewed and evaluated by me      ASSESSMENT/PLAN:     1. Lumbar radiculopathy   Trial flexeril and prednisone to get better control. Patient also willing to start PHYSICAL THERAPY. Plan for MRI spine if pain not resolving or sx's worsening.   - cyclobenzaprine (FLEXERIL) 10 MG tablet; Take 0.5-1 tablets (5-10 mg) by mouth 3 times daily as needed for muscle spasms  Dispense: 30 tablet; Refill: 1  - XR Lumbar Spine 2/3 Views; Future  - [facility] PT, HAND, AND CHIROPRACTIC REFERRAL  - predniSONE (DELTASONE) 20 MG tablet; Take 2 tablets (40 mg) by mouth daily for 5 days  Dispense: 10 tablet; Refill: 0    2. Hx of Upper GI bleed   pt denies recent blood in the stool although due to hx of + FIT, she is to schedule colonoscopy, endoscopy and GI consult.  - GASTROENTEROLOGY  ADULT REF PROCEDURE ONLY [facility] [phone]; No Provider Preference    4. Alcoholic liver disease (H)  Reviewed importance of alcohol cessation and follow up with GI/hepatology. Pt is motivated to quit on her own. She was referred to GI last visit but has not yet gone  - GASTROENTEROLOGY ADULT REF PROCEDURE ONLY [facility] [phone]; No Provider Preference    5. Hepatitis C virus infection, unspecified chronicity  hx of alcohol abuse and IV drug use many years ago. Monitoring labs below. Abdominal US recommended for HCC screening- pt decline right now but will discuss further with hepatologist. Reviewed importance of f/u with GI/hepatology  - TSH with free T4 reflex  - Comprehensive metabolic panel  - CBC with platelets differential  - Uric acid  - INR    6. Anemia, unspecified type  Hx of, I suspect due to her liver disease, but very slow GI bleeding needs to be f/o. No recent gross blood in the stool. As above #2.  - TSH with free T4 reflex  - GASTROENTEROLOGY ADULT REF PROCEDURE ONLY [facility] [phone]; No Provider Preference    7. Alcohol abuse  as above #4  - TSH with free T4 reflex  - Comprehensive metabolic panel  - CBC with platelets differential  - Uric acid  - INR    8. Benign essential hypertension   Controlled. Continue same medication.     9. Smoking- declined lung cancer screening, but reviewed pros/cons.       Patient Instructions   Please call [facility] at [phone] to schedule:  1. upper endoscopy and colonoscopy   2. schedule with GI doctor (hepatologist).     Take 5-10 mg Flexeril for back pain. Advise taking this at night as it will make you drowsy. Don't mix with alcohol  Start prednisone- take in the morning with food.  Follow up with physical therapy- they will call you.     Follow-up if not improving or symptoms are worsening.     Length of visit was 28 minutes with more than 50  percent of that time used for discussing medical concerns and education    The information in this document, created by the medical scribe for me, accurately reflects the services I personally performed and the decisions made by me. I have reviewed and approved this document for accuracy.   MD Monica Nagy MD  [facility]

## 2018-06-05 PROBLEM — B18.2 HEPATITIS C, CHRONIC (H): Status: ACTIVE | Noted: 2018-06-05

## 2018-06-19 ENCOUNTER — DOCUMENTATION ONLY (OUTPATIENT)
Dept: LAB | Facility: CLINIC | Age: 63
End: 2018-06-19

## 2018-06-19 DIAGNOSIS — I10 BENIGN ESSENTIAL HYPERTENSION: Primary | ICD-10-CM

## 2018-06-19 DIAGNOSIS — E87.6 HYPOKALEMIA: ICD-10-CM

## 2018-06-19 NOTE — PROGRESS NOTES
Please place or confirm lab orders for upcoming lab appointment on 6/20/18. MARIA GUADALUPE Kim CMA.

## 2018-06-20 DIAGNOSIS — E87.6 HYPOKALEMIA: ICD-10-CM

## 2018-06-20 DIAGNOSIS — I10 BENIGN ESSENTIAL HYPERTENSION: ICD-10-CM

## 2018-06-20 LAB
ANION GAP SERPL CALCULATED.3IONS-SCNC: 8 MMOL/L (ref 3–14)
BUN SERPL-MCNC: 10 MG/DL (ref 7–30)
CALCIUM SERPL-MCNC: 8.4 MG/DL (ref 8.5–10.1)
CHLORIDE SERPL-SCNC: 102 MMOL/L (ref 94–109)
CO2 SERPL-SCNC: 28 MMOL/L (ref 20–32)
CREAT SERPL-MCNC: 0.71 MG/DL (ref 0.52–1.04)
GFR SERPL CREATININE-BSD FRML MDRD: 83 ML/MIN/1.7M2
GLUCOSE SERPL-MCNC: 88 MG/DL (ref 70–99)
POTASSIUM SERPL-SCNC: 3.4 MMOL/L (ref 3.4–5.3)
SODIUM SERPL-SCNC: 138 MMOL/L (ref 133–144)

## 2018-06-20 PROCEDURE — 36415 COLL VENOUS BLD VENIPUNCTURE: CPT | Performed by: FAMILY MEDICINE

## 2018-06-20 PROCEDURE — 80048 BASIC METABOLIC PNL TOTAL CA: CPT | Performed by: FAMILY MEDICINE

## 2018-06-28 ENCOUNTER — MYC REFILL (OUTPATIENT)
Dept: FAMILY MEDICINE | Facility: CLINIC | Age: 63
End: 2018-06-28

## 2018-06-28 DIAGNOSIS — M10.9 GOUT, UNSPECIFIED CAUSE, UNSPECIFIED CHRONICITY, UNSPECIFIED SITE: ICD-10-CM

## 2018-06-28 DIAGNOSIS — I10 BENIGN ESSENTIAL HYPERTENSION: ICD-10-CM

## 2018-06-28 NOTE — TELEPHONE ENCOUNTER
Message from [employer]:  Original authorizing provider: Monica Tirado MD    DORENE Palmer would like a refill of the following medications:  colchicine (COLCRYS) 0.6 MG tablet [Monica Tirado MD]  nadolol (CORGARD) 40 MG tablet [Monica Tirado MD]    Preferred pharmacy: [facility] - [place], [place] - [address]    Comment:

## 2018-06-29 RX ORDER — NADOLOL 40 MG/1
40 TABLET ORAL DAILY
Qty: 90 TABLET | Refills: 3 | Status: SHIPPED | OUTPATIENT
Start: 2018-06-29 | End: 2019-01-01

## 2018-06-29 NOTE — TELEPHONE ENCOUNTER
"Requested Prescriptions   Pending Prescriptions Disp Refills     colchicine (COLCRYS) 0.6 MG tablet  Last Written Prescription Date:  1/25/18  Last Fill Quantity: 180 tablet,  # refills: 0   Last office visit: 6/4/2018 with prescribing provider:  Dr. Tirado   Future Office Visit:   180 tablet 0     Sig: Take 1 tablet (0.6 mg) by mouth 2 times daily    Gout Agents Protocol Failed    6/28/2018  4:52 PM       Failed - Has Uric Acid on file in past 12 months and value is less than 6    Recent Labs   Lab Test  06/04/18   1011   URIC  11.2*     If level is 6mg/dL or greater, ok to refill one time and refer to provider.          Passed - CBC on file in past 12 months    Recent Labs   Lab Test  06/04/18   1011   WBC  4.8   RBC  4.18   HGB  10.8*   HCT  35.2   PLT  174       For [facility] ONLY: [id number] = WBC, QFCP415 = RBC         Passed - ALT on file in past 12 months    Recent Labs   Lab Test  06/04/18   1011   ALT  42            Passed - Recent (12 mo) or future (30 days) visit within the authorizing provider's specialty    Patient had office visit in the last 12 months or has a visit in the next 30 days with authorizing provider or within the authorizing provider's specialty.  See \"Patient Info\" tab in inbasket, or \"Choose Columns\" in Meds & Orders section of the refill encounter.           Passed - Patient is age 18 or older       Passed - No active pregnancy on record       Passed - Normal serum creatinine on file in the past 12 months    Recent Labs   Lab Test  06/20/18   1335   CR  0.71            Passed - No positive pregnancy test in past year              nadolol (CORGARD) 40 MG tablet  Last Written Prescription Date: 1/25/18  Last Fill Quantity: 90 tablet,  # refills: 1   Last office visit: 6/4/2018 with prescribing provider:  Dr. Tirado   Future Office Visit:   90 tablet 1     Sig: Take 1 tablet (40 mg) by mouth daily    Beta-Blockers Protocol Passed    6/28/2018  4:52 PM       Passed - Blood pressure " "under 140/90 in past 12 months    BP Readings from Last 3 Encounters:   06/04/18 132/78   11/27/17 138/76   10/27/17 126/80                Passed - Patient is age 6 or older       Passed - Recent (12 mo) or future (30 days) visit within the authorizing provider's specialty    Patient had office visit in the last 12 months or has a visit in the next 30 days with authorizing provider or within the authorizing provider's specialty.  See \"Patient Info\" tab in inbasket, or \"Choose Columns\" in Meds & Orders section of the refill encounter.              "

## 2018-06-29 NOTE — TELEPHONE ENCOUNTER
Prescription approved per [facility] Refill Protocol. - Nadolol   Routing refill request to provider for review/approval because:  Labs out of range:  Uric Acid >6 - Colchicine   Alis Ramírez RN

## 2018-07-01 RX ORDER — COLCHICINE 0.6 MG/1
0.6 TABLET ORAL 2 TIMES DAILY
Qty: 180 TABLET | Refills: 0 | Status: SHIPPED | OUTPATIENT
Start: 2018-07-01 | End: 2018-09-26

## 2018-07-26 ENCOUNTER — TELEPHONE (OUTPATIENT)
Dept: FAMILY MEDICINE | Facility: CLINIC | Age: 63
End: 2018-07-26

## 2018-07-26 NOTE — TELEPHONE ENCOUNTER
What type of form? FMLA  What day did you drop off your forms? July 26, 2018  Is there a due date? ASAP (7-10 business day to compete forms)   How would you like to receive these forms? Please fax to [phone]  Which clinic was the form dropped off at? [place]    What is the best number to contact you? Home [phone]  What time works best to contact you with in 4 hrs? any  Is it okay to leave a message? Yes    Julio Mancera

## 2018-07-30 ENCOUNTER — HOSPITAL ENCOUNTER (OUTPATIENT)
Facility: AMBULATORY SURGERY CENTER | Age: 63
Discharge: HOME | End: 2018-07-30
Attending: SPECIALIST | Admitting: SPECIALIST
Payer: COMMERCIAL

## 2018-07-30 ENCOUNTER — SURGERY (OUTPATIENT)
Age: 63
End: 2018-07-30

## 2018-07-30 VITALS
RESPIRATION RATE: 16 BRPM | SYSTOLIC BLOOD PRESSURE: 126 MMHG | TEMPERATURE: 98.2 F | OXYGEN SATURATION: 94 % | DIASTOLIC BLOOD PRESSURE: 79 MMHG

## 2018-07-30 LAB — COLONOSCOPY: NORMAL

## 2018-07-30 PROCEDURE — 45380 COLONOSCOPY AND BIOPSY: CPT | Mod: XS

## 2018-07-30 PROCEDURE — G8907 PT DOC NO EVENTS ON DISCHARG: HCPCS

## 2018-07-30 PROCEDURE — G8918 PT W/O PREOP ORDER IV AB PRO: HCPCS

## 2018-07-30 PROCEDURE — 45385 COLONOSCOPY W/LESION REMOVAL: CPT

## 2018-07-30 PROCEDURE — 88305 TISSUE EXAM BY PATHOLOGIST: CPT | Performed by: SPECIALIST

## 2018-07-30 RX ORDER — FENTANYL CITRATE 50 UG/ML
INJECTION, SOLUTION INTRAMUSCULAR; INTRAVENOUS PRN
Status: DISCONTINUED | OUTPATIENT
Start: 2018-07-30 | End: 2018-07-30 | Stop reason: HOSPADM

## 2018-07-30 RX ORDER — ONDANSETRON 4 MG/1
4 TABLET, ORALLY DISINTEGRATING ORAL EVERY 6 HOURS PRN
Status: CANCELLED | OUTPATIENT
Start: 2018-07-30

## 2018-07-30 RX ORDER — LIDOCAINE 40 MG/G
CREAM TOPICAL
Status: DISCONTINUED | OUTPATIENT
Start: 2018-07-30 | End: 2018-07-31 | Stop reason: HOSPADM

## 2018-07-30 RX ORDER — ONDANSETRON 2 MG/ML
4 INJECTION INTRAMUSCULAR; INTRAVENOUS
Status: DISCONTINUED | OUTPATIENT
Start: 2018-07-30 | End: 2018-07-31 | Stop reason: HOSPADM

## 2018-07-30 RX ORDER — ONDANSETRON 2 MG/ML
4 INJECTION INTRAMUSCULAR; INTRAVENOUS EVERY 6 HOURS PRN
Status: CANCELLED | OUTPATIENT
Start: 2018-07-30

## 2018-07-30 RX ORDER — FLUMAZENIL 0.1 MG/ML
0.2 INJECTION, SOLUTION INTRAVENOUS
Status: CANCELLED | OUTPATIENT
Start: 2018-07-30 | End: 2018-07-31

## 2018-07-30 RX ORDER — NALOXONE HYDROCHLORIDE 0.4 MG/ML
.1-.4 INJECTION, SOLUTION INTRAMUSCULAR; INTRAVENOUS; SUBCUTANEOUS
Status: CANCELLED | OUTPATIENT
Start: 2018-07-30 | End: 2018-07-31

## 2018-07-30 RX ADMIN — FENTANYL CITRATE 50 MCG: 50 INJECTION, SOLUTION INTRAMUSCULAR; INTRAVENOUS at 12:25

## 2018-07-30 RX ADMIN — FENTANYL CITRATE 50 MCG: 50 INJECTION, SOLUTION INTRAMUSCULAR; INTRAVENOUS at 12:36

## 2018-07-30 RX ADMIN — FENTANYL CITRATE 50 MCG: 50 INJECTION, SOLUTION INTRAMUSCULAR; INTRAVENOUS at 12:19

## 2018-07-30 RX ADMIN — FENTANYL CITRATE 50 MCG: 50 INJECTION, SOLUTION INTRAMUSCULAR; INTRAVENOUS at 12:20

## 2018-07-30 NOTE — TELEPHONE ENCOUNTER
Add clinic stamp to forms (page 1).   Please fax forms, update patient when complete and then scan to chart. Thanks!

## 2018-07-30 NOTE — BRIEF OP NOTE
[facility] Brief Operative Note    Pre-operative diagnosis: Combined, Upper GI bleed Alcoholic liver disease (H) Anemia, unspecified type, Ref By Celestino, BMI 27.55, [phone]   Post-operative diagnosis two diminutive polyps, diverticulosis     Procedure: Procedure(s):  Colonoscopy, Egd - Wound Class: II-Clean Contaminated  Combined, Upper GI bleed Alcoholic liver disease (H) Anemia, unspecified type, Ref By Celestino, BMI 27.55, [phone] - Wound Class: II-Clean Contaminated   - Wound Class: II-Clean Contaminated   - Wound Class: II-Clean Contaminated   Surgeon(s): Surgeon(s) and Role:     * Musa Diaz MD - Primary   Estimated blood loss: * No values recorded between 7/30/2018 12:15 PM and 7/30/2018 12:51 PM *    Specimens:   ID Type Source Tests Collected by Time Destination   A : ascending polyp / bx fx Polyp Large Intestine, Right/Ascending SURGICAL PATHOLOGY EXAM Musa Diaz MD 7/30/2018 12:43 PM    B : sigmoid polyp / cold snare Polyp Large Intestine, Sigmoid SURGICAL PATHOLOGY EXAM Musa Diaz MD 7/30/2018 12:52 PM       Findings: Please see ProVation procedure note in Chart Review

## 2018-07-30 NOTE — H&P
Pre-Endoscopy History and Physical     Roslyn Palmer MRN# [id number]   YOB: 1955 Age: 62 year old     Date of Procedure: 2018  Primary care provider: Monica Tirado  Type of Endoscopy: Colonoscopy with possible biopsy, possible polypectomy, possible endoscopy  Reason for Procedure: Screening, positive Cologuard test (per patient)  Type of Anesthesia Anticipated: Conscious Sedation    HPI:    Roslyn is a 62 year old female who will be undergoing the above procedure.      A history and physical has been performed. The patient's medications and allergies have been reviewed. The risks and benefits of the procedure and the sedation options and risks were discussed with the patient.  All questions were answered and informed consent was obtained.      She denies a personal or family history of anesthesia complications or bleeding disorders.     Patient Active Problem List   Diagnosis     Joint Prosthesis Infection or Inflammation     Alcoholic liver disease (H)     Tobacco use disorder     CARDIOVASCULAR SCREENING; LDL GOAL LESS THAN 160     Anemia     Advanced directives, counseling/discussion     Benign essential hypertension     Hepatitis C virus infection, unspecified chronicity     Upper GI bleed     Alcohol abuse     Migraine with aura and without status migrainosus, not intractable     Low folate     Cervical cancer screening     Anemia, unspecified type     Hepatitis C, chronic (H)        Past Medical History:   Diagnosis Date     DJD (degenerative joint disease)      Essential hypertension, malignant      ETOH abuse      Hepatitis C         Past Surgical History:   Procedure Laterality Date     GYN SURGERY           HC ESOPHAGOSCOPY W BAND LIGATION ESOPHAGEAL VARICIES       ORTHOPEDIC SURGERY  ,    left and right hip replacement       Social History   Substance Use Topics     Smoking status: Current Every Day Smoker     Packs/day: 0.50     Types:  Cigarettes     Smokeless tobacco: Never Used     Alcohol use Yes      Comment: 3 to 4 drinks a day       Family History   Problem Relation Age of Onset     Breast Cancer Mother      Cancer Maternal Grandmother      Cerebrovascular Disease Maternal Grandmother      stroke     Cancer Maternal Grandfather      pancreatic cancer     Cancer Paternal Grandmother      Cancer Paternal Grandfather      brain cancer     Arthritis Sister        Prior to Admission medications    Medication Sig Start Date End Date Taking? Authorizing Provider   colchicine (COLCRYS) 0.6 MG tablet Take 1 tablet (0.6 mg) by mouth 2 times daily 7/1/18  Yes Nette Macias MD   nadolol (CORGARD) 40 MG tablet Take 1 tablet (40 mg) by mouth daily 6/29/18  Yes Monica Tirado MD   pantoprazole (PROTONIX) 40 MG EC tablet Take 1 tablet (40 mg) by mouth daily 4/4/18  Yes Monica Tirado MD   Potassium Chloride ER 20 MEQ TBCR Take 1 tablet (20 mEq) by mouth 2 times daily x7 days, then once daily 6/4/18  Yes Monica Tirado MD   cephALEXin (KEFLEX) 500 MG capsule Take 1 capsule by mouth 2 times daily. 9/26/12   Manpreet Canela MD   cyclobenzaprine (FLEXERIL) 10 MG tablet Take 0.5-1 tablets (5-10 mg) by mouth 3 times daily as needed for muscle spasms 6/4/18   Monica Tirado MD   SUMAtriptan (IMITREX) 100 MG tablet Take 1 tablet (100 mg) by mouth at onset of headache May repeat in 2 hours if needed: max 2/day; 11/27/17   Monica Tirado MD   torsemide (DEMADEX) 10 MG tablet Take 1 tablet (10 mg) by mouth 2 times daily 4/4/18   Evangelina Joyner MD       Allergies   Allergen Reactions     Sulfa Drugs         REVIEW OF SYSTEMS:   5 point ROS negative except as noted above in HPI, including Gen., Resp., CV, GI &  system review.    PHYSICAL EXAM:   /83  Temp 98.2  F (36.8  C) (Temporal)  Resp 18  SpO2 98% Estimated body mass index is 27.55 kg/(m^2) as calculated from the following:     "Height as of 6/4/18: 1.638 m (5' 4.5\").    Weight as of 6/4/18: 73.9 kg (163 lb).   GENERAL APPEARANCE: alert, and oriented  MENTAL STATUS: alert  AIRWAY EXAM: Mallampatti Class II (visualization of the soft palate, fauces, and uvula)  multiple missing teeth.   RESP: lungs clear to auscultation - no rales, rhonchi or wheezes  CV: regular rates and rhythm  DIAGNOSTICS:    Not indicated    IMPRESSION   ASA Class 2 - Mild systemic disease  Unclear if we will proceed with EGD; she has not tolerated conscious sedation for EGD in the past, she has had variceal band ligation in the past and banding is not available at [facility] should we find grade 2 or larger varices.     PLAN:   Plan for Colonoscopy with possible biopsy, possible polypectomy and Gastroscopy with possible biopsy, possible dilation. We discussed the risks, benefits and alternatives and the patient wished to proceed.    The above has been forwarded to the consulting provider.      Signed Electronically by: Musa Diaz  July 30, 2018          "

## 2018-07-31 ENCOUNTER — MYC MEDICAL ADVICE (OUTPATIENT)
Dept: FAMILY MEDICINE | Facility: CLINIC | Age: 63
End: 2018-07-31

## 2018-07-31 DIAGNOSIS — K70.9 ALCOHOLIC LIVER DISEASE (H): Primary | ICD-10-CM

## 2018-07-31 DIAGNOSIS — Z87.19 HISTORY OF ESOPHAGEAL VARICES: ICD-10-CM

## 2018-07-31 DIAGNOSIS — D64.9 ANEMIA, UNSPECIFIED TYPE: ICD-10-CM

## 2018-08-01 LAB — COPATH REPORT: NORMAL

## 2018-08-02 ENCOUNTER — MYC REFILL (OUTPATIENT)
Dept: FAMILY MEDICINE | Facility: CLINIC | Age: 63
End: 2018-08-02

## 2018-08-02 DIAGNOSIS — M54.16 LUMBAR RADICULOPATHY: ICD-10-CM

## 2018-08-03 RX ORDER — CYCLOBENZAPRINE HCL 10 MG
5-10 TABLET ORAL 3 TIMES DAILY PRN
Qty: 30 TABLET | Refills: 1 | Status: SHIPPED | OUTPATIENT
Start: 2018-08-03 | End: 2018-09-26

## 2018-08-03 NOTE — TELEPHONE ENCOUNTER
Message from [employer]:  Original authorizing provider: Monica Tirado MD    DORENE Palmer would like a refill of the following medications:  cyclobenzaprine (FLEXERIL) 10 MG tablet [Monica Tirado MD]    Preferred pharmacy: [id number] - [facility] [address] [facility]    Comment:  Hi doctor, was hoping to get this Rx filled again. I think the problem is stemming from my hip and the limping is causing problems with my back. I dislocated right hip July 3rd just stooping down to take a pic of my yorkie with new haircut and had to be taken to [facility] to put back in. My left hip and leg still a problem, I have appt with Dr. Clint Taveras, [facility] next week. Thanks for your help.

## 2018-08-07 ENCOUNTER — TRANSFERRED RECORDS (OUTPATIENT)
Dept: HEALTH INFORMATION MANAGEMENT | Facility: CLINIC | Age: 63
End: 2018-08-07

## 2018-08-09 DIAGNOSIS — M25.552 BILATERAL HIP PAIN: Primary | ICD-10-CM

## 2018-08-09 DIAGNOSIS — M25.551 BILATERAL HIP PAIN: Primary | ICD-10-CM

## 2018-08-09 LAB
CRP SERPL-MCNC: <2.9 MG/L (ref 0–8)
ERYTHROCYTE [SEDIMENTATION RATE] IN BLOOD BY WESTERGREN METHOD: 50 MM/H (ref 0–30)

## 2018-08-09 PROCEDURE — 86140 C-REACTIVE PROTEIN: CPT

## 2018-08-09 PROCEDURE — 85652 RBC SED RATE AUTOMATED: CPT

## 2018-08-09 PROCEDURE — 36415 COLL VENOUS BLD VENIPUNCTURE: CPT

## 2018-09-05 ENCOUNTER — TRANSFERRED RECORDS (OUTPATIENT)
Dept: HEALTH INFORMATION MANAGEMENT | Facility: CLINIC | Age: 63
End: 2018-09-05

## 2018-09-26 ENCOUNTER — MYC REFILL (OUTPATIENT)
Dept: FAMILY MEDICINE | Facility: CLINIC | Age: 63
End: 2018-09-26

## 2018-09-26 DIAGNOSIS — M54.16 LUMBAR RADICULOPATHY: ICD-10-CM

## 2018-09-26 DIAGNOSIS — M10.9 GOUT, UNSPECIFIED CAUSE, UNSPECIFIED CHRONICITY, UNSPECIFIED SITE: ICD-10-CM

## 2018-09-26 DIAGNOSIS — K70.9 ALCOHOLIC LIVER DISEASE (H): ICD-10-CM

## 2018-09-26 DIAGNOSIS — I10 BENIGN ESSENTIAL HYPERTENSION: ICD-10-CM

## 2018-09-26 DIAGNOSIS — F10.10 ALCOHOL ABUSE: ICD-10-CM

## 2018-09-27 RX ORDER — TORSEMIDE 10 MG/1
10 TABLET ORAL 2 TIMES DAILY
Qty: 180 TABLET | Refills: 1 | Status: SHIPPED | OUTPATIENT
Start: 2018-09-27 | End: 2019-01-01

## 2018-09-27 RX ORDER — PANTOPRAZOLE SODIUM 40 MG/1
40 TABLET, DELAYED RELEASE ORAL DAILY
Qty: 90 TABLET | Refills: 1 | Status: SHIPPED | OUTPATIENT
Start: 2018-09-27 | End: 2019-01-01

## 2018-09-27 NOTE — TELEPHONE ENCOUNTER
[facility] from [facility]:  Original authorizing provider: Evangelina Joyner MD    DORENE Palmer would like a refill of the following medications:  torsemide (DEMADEX) 10 MG tablet [Evangelina Joyner MD]    Preferred pharmacy: [facility] - [place], [place] - [address]    Comment:      Medication renewals requested in this message routed to other providers:  colchicine (COLCRYS) 0.6 MG tablet [Nette Macias MD]  pantoprazole (PROTONIX) 40 MG EC tablet [Monica Tirado MD]  cyclobenzaprine (FLEXERIL) 10 MG tablet [Monica Tirado MD]

## 2018-09-27 NOTE — TELEPHONE ENCOUNTER
"Requested Prescriptions   Pending Prescriptions Disp Refills     colchicine (COLCRYS) 0.6 MG tablet 180 tablet 0     Sig: Take 1 tablet (0.6 mg) by mouth 2 times daily    Gout Agents Protocol Failed    9/26/2018  7:13 PM       Failed - Has Uric Acid on file in past 12 months and value is less than 6    Recent Labs   Lab Test  06/04/18   1011   URIC  11.2*     If level is 6mg/dL or greater, ok to refill one time and refer to provider.          Passed - CBC on file in past 12 months    Recent Labs   Lab Test  06/04/18   1011   WBC  4.8   RBC  4.18   HGB  10.8*   HCT  35.2   PLT  174       For [facility] ONLY: [id number] = WBC, [id number] = RBC         Passed - ALT on file in past 12 months    Recent Labs   Lab Test  06/04/18   1011   ALT  42            Passed - Recent (12 mo) or future (30 days) visit within the authorizing provider's specialty    Patient had office visit in the last 12 months or has a visit in the next 30 days with authorizing provider or within the authorizing provider's specialty.  See \"Patient Info\" tab in inbasket, or \"Choose Columns\" in Meds & Orders section of the refill encounter.           Passed - Patient is age 18 or older       Passed - No active pregnancy on record       Passed - Normal serum creatinine on file in the past 12 months    Recent Labs   Lab Test  06/20/18   1335   CR  0.71            Passed - No positive pregnancy test in past year        colchicine (COLCRYS) 0.6 MG tablet  Last Written Prescription Date:  7/1/18  Last Fill Quantity: 180,  # refills: 0   Last office visit: 6/4/2018 with prescribing provider:  Dr. Macias   Future Office Visit:      "

## 2018-09-27 NOTE — TELEPHONE ENCOUNTER
Message from [employer]:  Original authorizing provider: Nette Macias MD    DORENE Palmer would like a refill of the following medications:  colchicine (COLCRYS) 0.6 MG tablet [Nette Macias MD]    Preferred pharmacy: [facility] - [place], [place] - [address]    Comment:      Medication renewals requested in this message routed to other providers:  torsemide (DEMADEX) 10 MG tablet [Evangelina Joyner MD]  pantoprazole (PROTONIX) 40 MG EC tablet [Monica Tirado MD]  cyclobenzaprine (FLEXERIL) 10 MG tablet [Monica Tirado MD]

## 2018-09-27 NOTE — TELEPHONE ENCOUNTER
Routing refill request to provider for review/approval because: Flexeril  Drug not on the [facility] refill protocol     Prescription approved per [facility] Refill Protocol.-Marleny Rankin RN

## 2018-09-27 NOTE — TELEPHONE ENCOUNTER
Prescription approved per [facility] Refill Protocol.    Jose David Hendrix RN, BSN

## 2018-09-27 NOTE — TELEPHONE ENCOUNTER
Routing refill request to provider for review/approval because:  Labs out of range:  Uric acid      Jose David Hendrix RN, BSN

## 2018-09-27 NOTE — TELEPHONE ENCOUNTER
"Requested Prescriptions   Pending Prescriptions Disp Refills     torsemide (DEMADEX) 10 MG tablet 180 tablet 1     Sig: Take 1 tablet (10 mg) by mouth 2 times daily    Diuretics (Including Combos) Protocol Passed    9/26/2018  7:13 PM       Passed - Blood pressure under 140/90 in past 12 months    BP Readings from Last 3 Encounters:   07/30/18 126/79   06/04/18 132/78   11/27/17 138/76                Passed - Recent (12 mo) or future (30 days) visit within the authorizing provider's specialty    Patient had office visit in the last 12 months or has a visit in the next 30 days with authorizing provider or within the authorizing provider's specialty.  See \"Patient Info\" tab in inbasket, or \"Choose Columns\" in Meds & Orders section of the refill encounter.           Passed - Patient is age 18 or older       Passed - No active pregancy on record       Passed - Normal serum creatinine on file in past 12 months    Recent Labs   Lab Test  06/20/18   1335   CR  0.71             Passed - Normal serum potassium on file in past 12 months    Recent Labs   Lab Test  06/20/18   1335   POTASSIUM  3.4                   Passed - Normal serum sodium on file in past 12 months    Recent Labs   Lab Test  06/20/18   1335   NA  138             Passed - No positive pregnancy test in past 12 months      torsemide (DEMADEX) 10 MG tablet  Last Written Prescription Date:  4/4/18  Last Fill Quantity: 180,  # refills: 1   Last office visit: 6/4/2018 with prescribing provider:  Dr. Joyner   Future Office Visit:      "

## 2018-09-27 NOTE — TELEPHONE ENCOUNTER
"Requested Prescriptions   Pending Prescriptions Disp Refills     pantoprazole (PROTONIX) 40 MG EC tablet 90 tablet 1     Sig: Take 1 tablet (40 mg) by mouth daily    PPI Protocol Passed    9/26/2018  7:13 PM       Passed - Not on Clopidogrel (unless Pantoprazole ordered)       Passed - No diagnosis of osteoporosis on record       Passed - Recent (12 mo) or future (30 days) visit within the authorizing provider's specialty    Patient had office visit in the last 12 months or has a visit in the next 30 days with authorizing provider or within the authorizing provider's specialty.  See \"Patient Info\" tab in inbasket, or \"Choose Columns\" in Meds & Orders section of the refill encounter.           Passed - Patient is age 18 or older       Passed - No active pregnacy on record       Passed - No positive pregnancy test in past 12 months        cyclobenzaprine (FLEXERIL) 10 MG tablet 30 tablet 1     Sig: Take 0.5-1 tablets (5-10 mg) by mouth 3 times daily as needed for muscle spasms    There is no refill protocol information for this order      pantoprazole (PROTONIX) 40 MG EC tablet  Last Written Prescription Date:  4/4/18  Last Fill Quantity: 90,  # refills: 1   Last office visit: 6/4/2018 with prescribing provider:  Dr. Tirado   Future Office Visit:      cyclobenzaprine (FLEXERIL) 10 MG tablet      Last Written Prescription Date:  8/3/18  Last Fill Quantity: 30,   # refills: 1  Last Office Visit: 6/4/18  Future Office visit:       Routing refill request to provider for review/approval because:  Drug not on the [facility], [facility] or [facility] refill protocol or controlled substance    "

## 2018-09-27 NOTE — TELEPHONE ENCOUNTER
Message from [employer]:  Original authorizing provider: Monica Tirado MD    DORENE Palmer would like a refill of the following medications:  pantoprazole (PROTONIX) 40 MG EC tablet [Monica Tirado MD]  cyclobenzaprine (FLEXERIL) 10 MG tablet [Monica Tirado MD]    Preferred pharmacy: [facility] - [place], [place] - [address]    Comment:      Medication renewals requested in this message routed to other providers:  colchicine (COLCRYS) 0.6 MG tablet [Nette Macias MD]  torsemide (DEMADEX) 10 MG tablet [Evangelina Joyner MD]

## 2018-11-26 NOTE — TELEPHONE ENCOUNTER
"Requested Prescriptions   Pending Prescriptions Disp Refills     nadolol (CORGARD) 40 MG tablet [Pharmacy Med Name: NADOLOL 40 MG TABLET] 90 tablet 0     Sig: TAKE 1 TABLET (40 MG) BY MOUTH DAILY    Beta-Blockers Protocol Passed    11/24/2018 10:44 AM       Passed - Blood pressure under 140/90 in past 12 months    BP Readings from Last 3 Encounters:   07/30/18 126/79   06/04/18 132/78   11/27/17 138/76                Passed - Patient is age 6 or older       Passed - Recent (12 mo) or future (30 days) visit within the authorizing provider's specialty    Patient had office visit in the last 12 months or has a visit in the next 30 days with authorizing provider or within the authorizing provider's specialty.  See \"Patient Info\" tab in inbasket, or \"Choose Columns\" in Meds & Orders section of the refill encounter.              nadolol (CORGARD) 40 MG tablet  Last Written Prescription Date:  6/29/18  Last Fill Quantity: 90,  # refills: 3   Last office visit: 6/4/2018 with prescribing provider:  Dr. Tirado   Future Office Visit:      "

## 2018-11-27 NOTE — TELEPHONE ENCOUNTER
"Requested Prescriptions   Pending Prescriptions Disp Refills     nadolol (CORGARD) 40 MG tablet [Pharmacy Med Name: NADOLOL 40 MG TABLET]  Last Written Prescription Date:  06/29/18  Last Fill Quantity: 90 tablet,  # refills: 3   Last office visit: 6/4/2018 with prescribing provider:  Dr. Tirado  Future Office Visit:     90 tablet 0     Sig: TAKE 1 TABLET (40 MG) BY MOUTH DAILY    Beta-Blockers Protocol Passed    11/27/2018  2:11 PM       Passed - Blood pressure under 140/90 in past 12 months    BP Readings from Last 3 Encounters:   07/30/18 126/79   06/04/18 132/78   11/27/17 138/76                Passed - Patient is age 6 or older       Passed - Recent (12 mo) or future (30 days) visit within the authorizing provider's specialty    Patient had office visit in the last 12 months or has a visit in the next 30 days with authorizing provider or within the authorizing provider's specialty.  See \"Patient Info\" tab in inbasket, or \"Choose Columns\" in Meds & Orders section of the refill encounter.                "

## 2018-11-29 NOTE — TELEPHONE ENCOUNTER
This writer attempted to contact 1 on 11/29/18      Reason for call Rx and left message.      If patient calls back:   Patient contacted by [facility] Team (MA/TC). Inform patient that someone from the team will contact them, document that pt called and route to care team.         LXIONG3, MEDICAL ASSISTANT

## 2018-11-29 NOTE — TELEPHONE ENCOUNTER
Team, please clarify with patient if she is needing a refill on her Nadolol. A prescription was given on 18 for #90 with 3 refills (good x 1 yr) and sent to [facility] mail order pharmacy. This request is coming from local [facility] pharmacy, they are saying prescription on file is now . Patient likely using [facility] mail order as all other recent prescriptions have been sent there. Please clarify and RN's will notify [facility] of outcome, thank you!    Cristela Badillo RN  [facility] Triage

## 2018-12-05 NOTE — TELEPHONE ENCOUNTER
This writer attempted to contact 1 on 12/05/18      Reason for call Rx and left message.      If patient calls back:   Patient contacted by [facility] Team (MA/TC). Inform patient that someone from the team will contact them, document that pt called and route to care team.         LXIONG3, MEDICAL ASSISTANT

## 2018-12-07 NOTE — TELEPHONE ENCOUNTER
Medication requested had not been sent to local [facility] pharmacy since 11/2017. Patient was given 1 year supply back in June 2018 and sent to mail order pharmacy. All other patient meds also filled by mail order pharmacy. Per Reconcile Dispenses section of encounter, patient has been filling 90 day supplies. Request denied to [facility].    Cristela Badillo RN  [facility] Triage

## 2019-01-01 ENCOUNTER — APPOINTMENT (OUTPATIENT)
Dept: CARDIOLOGY | Facility: CLINIC | Age: 64
DRG: 441 | End: 2019-01-01
Attending: INTERNAL MEDICINE
Payer: COMMERCIAL

## 2019-01-01 ENCOUNTER — APPOINTMENT (OUTPATIENT)
Dept: GENERAL RADIOLOGY | Facility: CLINIC | Age: 64
DRG: 441 | End: 2019-01-01
Attending: STUDENT IN AN ORGANIZED HEALTH CARE EDUCATION/TRAINING PROGRAM
Payer: COMMERCIAL

## 2019-01-01 ENCOUNTER — HOSPITAL ENCOUNTER (INPATIENT)
Facility: CLINIC | Age: 64
LOS: 19 days | Discharge: HOSPICE | DRG: 441 | End: 2019-09-24
Attending: INTERNAL MEDICINE | Admitting: INTERNAL MEDICINE
Payer: COMMERCIAL

## 2019-01-01 ENCOUNTER — TELEPHONE (OUTPATIENT)
Dept: GASTROENTEROLOGY | Facility: CLINIC | Age: 64
End: 2019-01-01

## 2019-01-01 ENCOUNTER — PRE VISIT (OUTPATIENT)
Dept: GASTROENTEROLOGY | Facility: CLINIC | Age: 64
End: 2019-01-01

## 2019-01-01 ENCOUNTER — APPOINTMENT (OUTPATIENT)
Dept: GENERAL RADIOLOGY | Facility: CLINIC | Age: 64
DRG: 441 | End: 2019-01-01
Attending: INTERNAL MEDICINE
Payer: COMMERCIAL

## 2019-01-01 ENCOUNTER — APPOINTMENT (OUTPATIENT)
Dept: PHYSICAL THERAPY | Facility: CLINIC | Age: 64
DRG: 441 | End: 2019-01-01
Attending: INTERNAL MEDICINE
Payer: COMMERCIAL

## 2019-01-01 ENCOUNTER — OFFICE VISIT (OUTPATIENT)
Dept: FAMILY MEDICINE | Facility: CLINIC | Age: 64
End: 2019-01-01
Payer: COMMERCIAL

## 2019-01-01 ENCOUNTER — APPOINTMENT (OUTPATIENT)
Dept: OCCUPATIONAL THERAPY | Facility: CLINIC | Age: 64
DRG: 441 | End: 2019-01-01
Attending: INTERNAL MEDICINE
Payer: COMMERCIAL

## 2019-01-01 ENCOUNTER — APPOINTMENT (OUTPATIENT)
Dept: GENERAL RADIOLOGY | Facility: CLINIC | Age: 64
DRG: 441 | End: 2019-01-01
Attending: EMERGENCY MEDICINE
Payer: COMMERCIAL

## 2019-01-01 ENCOUNTER — ANESTHESIA EVENT (OUTPATIENT)
Dept: GASTROENTEROLOGY | Facility: CLINIC | Age: 64
End: 2019-01-01

## 2019-01-01 ENCOUNTER — APPOINTMENT (OUTPATIENT)
Dept: PHYSICAL THERAPY | Facility: CLINIC | Age: 64
DRG: 441 | End: 2019-01-01
Payer: COMMERCIAL

## 2019-01-01 ENCOUNTER — DOCUMENTATION ONLY (OUTPATIENT)
Dept: CARE COORDINATION | Facility: CLINIC | Age: 64
End: 2019-01-01

## 2019-01-01 ENCOUNTER — ANESTHESIA EVENT (OUTPATIENT)
Dept: INTENSIVE CARE | Facility: CLINIC | Age: 64
DRG: 441 | End: 2019-01-01
Payer: COMMERCIAL

## 2019-01-01 ENCOUNTER — TELEPHONE (OUTPATIENT)
Dept: FAMILY MEDICINE | Facility: CLINIC | Age: 64
End: 2019-01-01

## 2019-01-01 ENCOUNTER — PATIENT OUTREACH (OUTPATIENT)
Dept: CARE COORDINATION | Facility: CLINIC | Age: 64
End: 2019-01-01

## 2019-01-01 ENCOUNTER — APPOINTMENT (OUTPATIENT)
Dept: ULTRASOUND IMAGING | Facility: CLINIC | Age: 64
DRG: 441 | End: 2019-01-01
Attending: INTERNAL MEDICINE
Payer: COMMERCIAL

## 2019-01-01 ENCOUNTER — NURSE TRIAGE (OUTPATIENT)
Dept: NURSING | Facility: CLINIC | Age: 64
End: 2019-01-01

## 2019-01-01 ENCOUNTER — APPOINTMENT (OUTPATIENT)
Dept: INTERVENTIONAL RADIOLOGY/VASCULAR | Facility: CLINIC | Age: 64
DRG: 441 | End: 2019-01-01
Attending: RADIOLOGY
Payer: COMMERCIAL

## 2019-01-01 ENCOUNTER — TELEPHONE (OUTPATIENT)
Dept: GASTROENTEROLOGY | Facility: OUTPATIENT CENTER | Age: 64
End: 2019-01-01

## 2019-01-01 ENCOUNTER — PATIENT OUTREACH (OUTPATIENT)
Dept: GASTROENTEROLOGY | Facility: CLINIC | Age: 64
End: 2019-01-01

## 2019-01-01 ENCOUNTER — ANCILLARY PROCEDURE (OUTPATIENT)
Dept: ULTRASOUND IMAGING | Facility: CLINIC | Age: 64
End: 2019-01-01
Attending: NURSE PRACTITIONER
Payer: COMMERCIAL

## 2019-01-01 ENCOUNTER — ANESTHESIA (OUTPATIENT)
Dept: INTENSIVE CARE | Facility: CLINIC | Age: 64
DRG: 441 | End: 2019-01-01
Payer: COMMERCIAL

## 2019-01-01 ENCOUNTER — OFFICE VISIT (OUTPATIENT)
Dept: GASTROENTEROLOGY | Facility: CLINIC | Age: 64
End: 2019-01-01
Attending: INTERNAL MEDICINE
Payer: COMMERCIAL

## 2019-01-01 ENCOUNTER — ANCILLARY PROCEDURE (OUTPATIENT)
Dept: ULTRASOUND IMAGING | Facility: CLINIC | Age: 64
End: 2019-01-01
Attending: FAMILY MEDICINE
Payer: COMMERCIAL

## 2019-01-01 ENCOUNTER — APPOINTMENT (OUTPATIENT)
Dept: INTERVENTIONAL RADIOLOGY/VASCULAR | Facility: CLINIC | Age: 64
DRG: 441 | End: 2019-01-01
Attending: NURSE PRACTITIONER
Payer: COMMERCIAL

## 2019-01-01 ENCOUNTER — APPOINTMENT (OUTPATIENT)
Dept: OCCUPATIONAL THERAPY | Facility: CLINIC | Age: 64
DRG: 441 | End: 2019-01-01
Payer: COMMERCIAL

## 2019-01-01 ENCOUNTER — TRANSFERRED RECORDS (OUTPATIENT)
Dept: HEALTH INFORMATION MANAGEMENT | Facility: CLINIC | Age: 64
End: 2019-01-01

## 2019-01-01 ENCOUNTER — APPOINTMENT (OUTPATIENT)
Dept: SPEECH THERAPY | Facility: CLINIC | Age: 64
DRG: 441 | End: 2019-01-01
Attending: INTERNAL MEDICINE
Payer: COMMERCIAL

## 2019-01-01 ENCOUNTER — NURSING HOME VISIT (OUTPATIENT)
Dept: GERIATRICS | Facility: CLINIC | Age: 64
End: 2019-01-01
Payer: COMMERCIAL

## 2019-01-01 ENCOUNTER — ANESTHESIA (OUTPATIENT)
Dept: GASTROENTEROLOGY | Facility: CLINIC | Age: 64
End: 2019-01-01

## 2019-01-01 ENCOUNTER — MYC MEDICAL ADVICE (OUTPATIENT)
Dept: FAMILY MEDICINE | Facility: CLINIC | Age: 64
End: 2019-01-01

## 2019-01-01 ENCOUNTER — MEDICAL CORRESPONDENCE (OUTPATIENT)
Dept: HEALTH INFORMATION MANAGEMENT | Facility: CLINIC | Age: 64
End: 2019-01-01

## 2019-01-01 ENCOUNTER — HOSPITAL ENCOUNTER (INPATIENT)
Facility: CLINIC | Age: 64
LOS: 6 days | Discharge: NURSING, REHAB OR LONG-TERM CARE | DRG: 441 | End: 2019-08-27
Attending: EMERGENCY MEDICINE | Admitting: ORTHOPAEDIC SURGERY
Payer: COMMERCIAL

## 2019-01-01 ENCOUNTER — TELEPHONE (OUTPATIENT)
Dept: ORTHOPEDICS | Facility: CLINIC | Age: 64
End: 2019-01-01

## 2019-01-01 ENCOUNTER — DISCHARGE SUMMARY NURSING HOME (OUTPATIENT)
Dept: GERIATRICS | Facility: CLINIC | Age: 64
End: 2019-01-01
Payer: COMMERCIAL

## 2019-01-01 ENCOUNTER — HOSPITAL ENCOUNTER (OUTPATIENT)
Facility: CLINIC | Age: 64
Discharge: HOME | End: 2019-08-15
Attending: INTERNAL MEDICINE | Admitting: INTERNAL MEDICINE
Payer: COMMERCIAL

## 2019-01-01 ENCOUNTER — APPOINTMENT (OUTPATIENT)
Dept: CT IMAGING | Facility: CLINIC | Age: 64
DRG: 441 | End: 2019-01-01
Attending: EMERGENCY MEDICINE
Payer: COMMERCIAL

## 2019-01-01 VITALS
DIASTOLIC BLOOD PRESSURE: 70 MMHG | WEIGHT: 154 LBS | BODY MASS INDEX: 24.75 KG/M2 | OXYGEN SATURATION: 99 % | RESPIRATION RATE: 19 BRPM | SYSTOLIC BLOOD PRESSURE: 108 MMHG | TEMPERATURE: 98.3 F | HEART RATE: 95 BPM | HEIGHT: 66 IN

## 2019-01-01 VITALS
WEIGHT: 168 LBS | RESPIRATION RATE: 20 BRPM | DIASTOLIC BLOOD PRESSURE: 76 MMHG | BODY MASS INDEX: 27 KG/M2 | OXYGEN SATURATION: 96 % | HEART RATE: 90 BPM | TEMPERATURE: 98.8 F | HEIGHT: 66 IN | SYSTOLIC BLOOD PRESSURE: 132 MMHG

## 2019-01-01 VITALS
WEIGHT: 157 LBS | DIASTOLIC BLOOD PRESSURE: 73 MMHG | SYSTOLIC BLOOD PRESSURE: 111 MMHG | RESPIRATION RATE: 16 BRPM | BODY MASS INDEX: 25.23 KG/M2 | HEIGHT: 66 IN | TEMPERATURE: 98.6 F | HEART RATE: 94 BPM | OXYGEN SATURATION: 100 %

## 2019-01-01 VITALS
DIASTOLIC BLOOD PRESSURE: 83 MMHG | OXYGEN SATURATION: 98 % | HEIGHT: 66 IN | SYSTOLIC BLOOD PRESSURE: 120 MMHG | TEMPERATURE: 98.1 F | WEIGHT: 145.9 LBS | BODY MASS INDEX: 23.45 KG/M2 | RESPIRATION RATE: 16 BRPM

## 2019-01-01 VITALS
HEART RATE: 100 BPM | BODY MASS INDEX: 24.11 KG/M2 | RESPIRATION RATE: 20 BRPM | HEIGHT: 66 IN | DIASTOLIC BLOOD PRESSURE: 73 MMHG | WEIGHT: 150 LBS | TEMPERATURE: 98.4 F | OXYGEN SATURATION: 97 % | SYSTOLIC BLOOD PRESSURE: 121 MMHG

## 2019-01-01 VITALS
RESPIRATION RATE: 16 BRPM | HEIGHT: 66 IN | OXYGEN SATURATION: 98 % | SYSTOLIC BLOOD PRESSURE: 127 MMHG | BODY MASS INDEX: 24.43 KG/M2 | DIASTOLIC BLOOD PRESSURE: 84 MMHG | WEIGHT: 152 LBS | HEART RATE: 102 BPM | TEMPERATURE: 97.9 F

## 2019-01-01 VITALS
DIASTOLIC BLOOD PRESSURE: 83 MMHG | TEMPERATURE: 98.2 F | BODY MASS INDEX: 23.53 KG/M2 | WEIGHT: 145.8 LBS | HEART RATE: 120 BPM | SYSTOLIC BLOOD PRESSURE: 122 MMHG | OXYGEN SATURATION: 97 %

## 2019-01-01 VITALS
HEIGHT: 66 IN | RESPIRATION RATE: 20 BRPM | OXYGEN SATURATION: 99 % | HEART RATE: 97 BPM | WEIGHT: 155.8 LBS | DIASTOLIC BLOOD PRESSURE: 76 MMHG | SYSTOLIC BLOOD PRESSURE: 126 MMHG | TEMPERATURE: 98.3 F | BODY MASS INDEX: 25.04 KG/M2

## 2019-01-01 VITALS
BODY MASS INDEX: 27 KG/M2 | HEART RATE: 95 BPM | OXYGEN SATURATION: 98 % | WEIGHT: 168 LBS | HEIGHT: 66 IN | RESPIRATION RATE: 19 BRPM | DIASTOLIC BLOOD PRESSURE: 78 MMHG | TEMPERATURE: 98.9 F | SYSTOLIC BLOOD PRESSURE: 150 MMHG

## 2019-01-01 VITALS
DIASTOLIC BLOOD PRESSURE: 77 MMHG | HEART RATE: 85 BPM | OXYGEN SATURATION: 98 % | SYSTOLIC BLOOD PRESSURE: 114 MMHG | WEIGHT: 149.6 LBS | BODY MASS INDEX: 24.15 KG/M2 | TEMPERATURE: 98.1 F

## 2019-01-01 VITALS
BODY MASS INDEX: 25.34 KG/M2 | DIASTOLIC BLOOD PRESSURE: 49 MMHG | RESPIRATION RATE: 16 BRPM | TEMPERATURE: 95.8 F | OXYGEN SATURATION: 91 % | SYSTOLIC BLOOD PRESSURE: 74 MMHG | HEART RATE: 69 BPM | WEIGHT: 157 LBS

## 2019-01-01 VITALS
HEART RATE: 89 BPM | DIASTOLIC BLOOD PRESSURE: 56 MMHG | WEIGHT: 150 LBS | OXYGEN SATURATION: 99 % | SYSTOLIC BLOOD PRESSURE: 99 MMHG | RESPIRATION RATE: 18 BRPM | BODY MASS INDEX: 24.21 KG/M2 | TEMPERATURE: 96 F

## 2019-01-01 VITALS
DIASTOLIC BLOOD PRESSURE: 66 MMHG | RESPIRATION RATE: 16 BRPM | OXYGEN SATURATION: 94 % | WEIGHT: 161.4 LBS | HEART RATE: 107 BPM | TEMPERATURE: 97.7 F | SYSTOLIC BLOOD PRESSURE: 94 MMHG | BODY MASS INDEX: 26.05 KG/M2

## 2019-01-01 VITALS
OXYGEN SATURATION: 99 % | WEIGHT: 160.72 LBS | RESPIRATION RATE: 18 BRPM | SYSTOLIC BLOOD PRESSURE: 106 MMHG | BODY MASS INDEX: 25.94 KG/M2 | TEMPERATURE: 97 F | DIASTOLIC BLOOD PRESSURE: 58 MMHG | HEART RATE: 82 BPM

## 2019-01-01 DIAGNOSIS — K70.9 ALCOHOLIC LIVER DISEASE (H): ICD-10-CM

## 2019-01-01 DIAGNOSIS — R45.86 MOOD CHANGE: ICD-10-CM

## 2019-01-01 DIAGNOSIS — M24.451 RECURRENT DISLOCATION OF RIGHT HIP: Primary | ICD-10-CM

## 2019-01-01 DIAGNOSIS — F17.200 TOBACCO USE DISORDER: ICD-10-CM

## 2019-01-01 DIAGNOSIS — K70.31 ALCOHOLIC CIRRHOSIS OF LIVER WITH ASCITES (H): Primary | ICD-10-CM

## 2019-01-01 DIAGNOSIS — F32.A DEPRESSION, UNSPECIFIED DEPRESSION TYPE: ICD-10-CM

## 2019-01-01 DIAGNOSIS — Z23 NEED FOR VACCINATION AGAINST HEPATITIS A: ICD-10-CM

## 2019-01-01 DIAGNOSIS — B19.20 HEPATITIS C VIRUS INFECTION, UNSPECIFIED CHRONICITY: ICD-10-CM

## 2019-01-01 DIAGNOSIS — K70.31 ALCOHOLIC CIRRHOSIS OF LIVER WITH ASCITES (H): ICD-10-CM

## 2019-01-01 DIAGNOSIS — I10 BENIGN ESSENTIAL HYPERTENSION: ICD-10-CM

## 2019-01-01 DIAGNOSIS — R11.10 VOMITING, INTRACTABILITY OF VOMITING NOT SPECIFIED, PRESENCE OF NAUSEA NOT SPECIFIED, UNSPECIFIED VOMITING TYPE: ICD-10-CM

## 2019-01-01 DIAGNOSIS — Z01.818 PREOP GENERAL PHYSICAL EXAM: Primary | ICD-10-CM

## 2019-01-01 DIAGNOSIS — R06.02 SHORTNESS OF BREATH: ICD-10-CM

## 2019-01-01 DIAGNOSIS — R11.0 NAUSEA: ICD-10-CM

## 2019-01-01 DIAGNOSIS — M62.81 GENERALIZED MUSCLE WEAKNESS: Primary | ICD-10-CM

## 2019-01-01 DIAGNOSIS — R17 JAUNDICE: ICD-10-CM

## 2019-01-01 DIAGNOSIS — I95.89 OTHER SPECIFIED HYPOTENSION: ICD-10-CM

## 2019-01-01 DIAGNOSIS — G93.40 ENCEPHALOPATHY: ICD-10-CM

## 2019-01-01 DIAGNOSIS — F10.10 ALCOHOL ABUSE: ICD-10-CM

## 2019-01-01 DIAGNOSIS — Z87.19 HISTORY OF ESOPHAGEAL VARICES: ICD-10-CM

## 2019-01-01 DIAGNOSIS — M54.41 ACUTE RIGHT-SIDED LOW BACK PAIN WITH RIGHT-SIDED SCIATICA: Primary | ICD-10-CM

## 2019-01-01 DIAGNOSIS — M10.9 GOUT, UNSPECIFIED CAUSE, UNSPECIFIED CHRONICITY, UNSPECIFIED SITE: ICD-10-CM

## 2019-01-01 DIAGNOSIS — E87.6 HYPOKALEMIA: Primary | ICD-10-CM

## 2019-01-01 DIAGNOSIS — R19.7 DIARRHEA, UNSPECIFIED TYPE: ICD-10-CM

## 2019-01-01 DIAGNOSIS — Z12.31 VISIT FOR SCREENING MAMMOGRAM: ICD-10-CM

## 2019-01-01 DIAGNOSIS — B18.2 CHRONIC HEPATITIS C WITHOUT HEPATIC COMA (H): ICD-10-CM

## 2019-01-01 DIAGNOSIS — E46 MALNUTRITION, UNSPECIFIED TYPE (H): ICD-10-CM

## 2019-01-01 DIAGNOSIS — Z01.818 PREOP GENERAL PHYSICAL EXAM: ICD-10-CM

## 2019-01-01 DIAGNOSIS — M10.9 GOUT, UNSPECIFIED CAUSE, UNSPECIFIED CHRONICITY, UNSPECIFIED SITE: Primary | ICD-10-CM

## 2019-01-01 DIAGNOSIS — D50.8 OTHER IRON DEFICIENCY ANEMIA: ICD-10-CM

## 2019-01-01 DIAGNOSIS — K76.82 HEPATIC ENCEPHALOPATHY (H): Primary | ICD-10-CM

## 2019-01-01 DIAGNOSIS — B18.2 CHRONIC HEPATITIS C WITHOUT HEPATIC COMA (H): Primary | ICD-10-CM

## 2019-01-01 DIAGNOSIS — R42 DIZZINESS: Primary | ICD-10-CM

## 2019-01-01 DIAGNOSIS — D64.9 ANEMIA, UNSPECIFIED TYPE: ICD-10-CM

## 2019-01-01 DIAGNOSIS — K70.11 ALCOHOLIC HEPATITIS WITH ASCITES (H): Primary | ICD-10-CM

## 2019-01-01 DIAGNOSIS — D69.6 THROMBOCYTOPENIA (H): ICD-10-CM

## 2019-01-01 DIAGNOSIS — F33.9 RECURRENT MAJOR DEPRESSIVE DISORDER, REMISSION STATUS UNSPECIFIED (H): ICD-10-CM

## 2019-01-01 DIAGNOSIS — K70.9 ALCOHOLIC LIVER DISEASE (H): Primary | ICD-10-CM

## 2019-01-01 DIAGNOSIS — M54.50 ACUTE RIGHT-SIDED LOW BACK PAIN WITHOUT SCIATICA: Primary | ICD-10-CM

## 2019-01-01 DIAGNOSIS — E87.6 HYPOKALEMIA: ICD-10-CM

## 2019-01-01 DIAGNOSIS — L98.9 SKIN LESION OF FACE: ICD-10-CM

## 2019-01-01 DIAGNOSIS — R01.1 HEART MURMUR: ICD-10-CM

## 2019-01-01 DIAGNOSIS — B19.20 HEPATITIS C VIRUS INFECTION, UNSPECIFIED CHRONICITY: Primary | ICD-10-CM

## 2019-01-01 DIAGNOSIS — E83.42 HYPOMAGNESEMIA: ICD-10-CM

## 2019-01-01 DIAGNOSIS — Z23 NEED FOR PROPHYLACTIC VACCINATION AGAINST HEPATITIS B VIRUS: ICD-10-CM

## 2019-01-01 DIAGNOSIS — M54.41 ACUTE RIGHT-SIDED LOW BACK PAIN WITH RIGHT-SIDED SCIATICA: ICD-10-CM

## 2019-01-01 DIAGNOSIS — F17.210 CIGARETTE SMOKER: ICD-10-CM

## 2019-01-01 DIAGNOSIS — I95.9 HYPOTENSION, UNSPECIFIED HYPOTENSION TYPE: ICD-10-CM

## 2019-01-01 DIAGNOSIS — Z12.31 BREAST CANCER SCREENING BY MAMMOGRAM: ICD-10-CM

## 2019-01-01 DIAGNOSIS — R63.4 WEIGHT LOSS: ICD-10-CM

## 2019-01-01 DIAGNOSIS — S73.005D DISLOCATION OF LEFT HIP, SUBSEQUENT ENCOUNTER: ICD-10-CM

## 2019-01-01 DIAGNOSIS — N17.9 AKI (ACUTE KIDNEY INJURY) (H): ICD-10-CM

## 2019-01-01 DIAGNOSIS — G43.109 MIGRAINE WITH AURA AND WITHOUT STATUS MIGRAINOSUS, NOT INTRACTABLE: ICD-10-CM

## 2019-01-01 DIAGNOSIS — E43 SEVERE MALNUTRITION (H): ICD-10-CM

## 2019-01-01 DIAGNOSIS — R21 RASH AND NONSPECIFIC SKIN ERUPTION: ICD-10-CM

## 2019-01-01 DIAGNOSIS — T84.50XS INFECTION OR INFLAMMATORY REACTION DUE TO INTERNAL JOINT PROSTHESIS, SEQUELA: ICD-10-CM

## 2019-01-01 LAB
ABO + RH BLD: NORMAL
ALBUMIN FLD-MCNC: 0.1 G/DL
ALBUMIN FLD-MCNC: 0.2 G/DL
ALBUMIN SERPL ELPH-MCNC: 2.3 G/DL (ref 3.7–5.1)
ALBUMIN SERPL-MCNC: 1.6 G/DL (ref 3.4–5)
ALBUMIN SERPL-MCNC: 1.6 G/DL (ref 3.4–5)
ALBUMIN SERPL-MCNC: 1.9 G/DL (ref 3.4–5)
ALBUMIN SERPL-MCNC: 1.9 G/DL (ref 3.4–5)
ALBUMIN SERPL-MCNC: 2 G/DL (ref 3.4–5)
ALBUMIN SERPL-MCNC: 2 G/DL (ref 3.4–5)
ALBUMIN SERPL-MCNC: 2.1 G/DL (ref 3.4–5)
ALBUMIN SERPL-MCNC: 2.2 G/DL (ref 3.4–5)
ALBUMIN SERPL-MCNC: 2.3 G/DL (ref 3.4–5)
ALBUMIN SERPL-MCNC: 2.3 G/DL (ref 3.4–5)
ALBUMIN SERPL-MCNC: 2.4 G/DL (ref 3.4–5)
ALBUMIN SERPL-MCNC: 2.4 G/DL (ref 3.4–5)
ALBUMIN SERPL-MCNC: 2.5 G/DL (ref 3.4–5)
ALBUMIN SERPL-MCNC: 2.6 G/DL (ref 3.4–5)
ALBUMIN SERPL-MCNC: 2.7 G/DL (ref 3.4–5)
ALBUMIN SERPL-MCNC: 2.7 G/DL (ref 3.4–5)
ALBUMIN SERPL-MCNC: 2.8 G/DL (ref 3.4–5)
ALBUMIN SERPL-MCNC: 2.9 G/DL (ref 3.4–5)
ALBUMIN SERPL-MCNC: 2.9 G/DL (ref 3.4–5)
ALBUMIN SERPL-MCNC: 3.1 G/DL (ref 3.4–5)
ALBUMIN SERPL-MCNC: 3.2 G/DL (ref 3.4–5)
ALBUMIN SERPL-MCNC: 3.2 G/DL (ref 3.4–5)
ALBUMIN SERPL-MCNC: 3.4 G/DL (ref 3.4–5)
ALBUMIN SERPL-MCNC: 3.4 G/DL (ref 3.4–5)
ALBUMIN SERPL-MCNC: 3.6 G/DL (ref 3.4–5)
ALBUMIN SERPL-MCNC: 4 G/DL (ref 3.4–5)
ALBUMIN UR-MCNC: NEGATIVE MG/DL
ALBUMIN UR-MCNC: NEGATIVE MG/DL
ALP SERPL-CCNC: 108 U/L (ref 40–150)
ALP SERPL-CCNC: 132 U/L (ref 40–150)
ALP SERPL-CCNC: 147 U/L (ref 40–150)
ALP SERPL-CCNC: 150 U/L (ref 40–150)
ALP SERPL-CCNC: 156 U/L (ref 40–150)
ALP SERPL-CCNC: 174 U/L (ref 40–150)
ALP SERPL-CCNC: 181 U/L (ref 40–150)
ALP SERPL-CCNC: 192 U/L (ref 40–150)
ALP SERPL-CCNC: 192 U/L (ref 40–150)
ALP SERPL-CCNC: 209 U/L (ref 40–150)
ALP SERPL-CCNC: 216 U/L (ref 40–150)
ALP SERPL-CCNC: 226 U/L (ref 40–150)
ALP SERPL-CCNC: 236 U/L (ref 40–150)
ALP SERPL-CCNC: 236 U/L (ref 40–150)
ALP SERPL-CCNC: 251 U/L (ref 40–150)
ALP SERPL-CCNC: 255 U/L (ref 40–150)
ALP SERPL-CCNC: 260 U/L (ref 40–150)
ALP SERPL-CCNC: 280 U/L (ref 40–150)
ALP SERPL-CCNC: 280 U/L (ref 40–150)
ALP SERPL-CCNC: 292 U/L (ref 40–150)
ALP SERPL-CCNC: 300 U/L (ref 40–150)
ALP SERPL-CCNC: 304 U/L (ref 40–150)
ALP SERPL-CCNC: 306 U/L (ref 40–150)
ALP SERPL-CCNC: 312 U/L (ref 40–150)
ALP SERPL-CCNC: 330 U/L (ref 40–150)
ALP SERPL-CCNC: 335 U/L (ref 40–150)
ALP SERPL-CCNC: 342 U/L (ref 40–150)
ALP SERPL-CCNC: 359 U/L (ref 40–150)
ALP SERPL-CCNC: 380 U/L (ref 40–150)
ALP SERPL-CCNC: 408 U/L (ref 40–150)
ALP SERPL-CCNC: 436 U/L (ref 40–150)
ALP SERPL-CCNC: 440 U/L (ref 40–150)
ALP SERPL-CCNC: 458 U/L (ref 40–150)
ALPHA1 GLOB SERPL ELPH-MCNC: 0.3 G/DL (ref 0.2–0.4)
ALPHA2 GLOB SERPL ELPH-MCNC: 0.4 G/DL (ref 0.5–0.9)
ALT SERPL W P-5'-P-CCNC: 117 U/L (ref 0–50)
ALT SERPL W P-5'-P-CCNC: 25 U/L (ref 0–50)
ALT SERPL W P-5'-P-CCNC: 25 U/L (ref 0–50)
ALT SERPL W P-5'-P-CCNC: 26 U/L (ref 0–50)
ALT SERPL W P-5'-P-CCNC: 26 U/L (ref 0–50)
ALT SERPL W P-5'-P-CCNC: 28 U/L (ref 0–50)
ALT SERPL W P-5'-P-CCNC: 28 U/L (ref 0–50)
ALT SERPL W P-5'-P-CCNC: 34 U/L (ref 0–50)
ALT SERPL W P-5'-P-CCNC: 37 U/L (ref 0–50)
ALT SERPL W P-5'-P-CCNC: 38 U/L (ref 0–50)
ALT SERPL W P-5'-P-CCNC: 39 U/L (ref 0–50)
ALT SERPL W P-5'-P-CCNC: 40 U/L (ref 0–50)
ALT SERPL W P-5'-P-CCNC: 41 U/L (ref 0–50)
ALT SERPL W P-5'-P-CCNC: 41 U/L (ref 0–50)
ALT SERPL W P-5'-P-CCNC: 42 U/L (ref 0–50)
ALT SERPL W P-5'-P-CCNC: 42 U/L (ref 0–50)
ALT SERPL W P-5'-P-CCNC: 43 U/L (ref 0–50)
ALT SERPL W P-5'-P-CCNC: 44 U/L (ref 0–50)
ALT SERPL W P-5'-P-CCNC: 46 U/L (ref 0–50)
ALT SERPL W P-5'-P-CCNC: 53 U/L (ref 0–50)
ALT SERPL W P-5'-P-CCNC: 54 U/L (ref 0–50)
ALT SERPL W P-5'-P-CCNC: 56 U/L (ref 0–50)
ALT SERPL W P-5'-P-CCNC: 57 U/L (ref 0–50)
ALT SERPL W P-5'-P-CCNC: 59 U/L (ref 0–50)
ALT SERPL W P-5'-P-CCNC: 61 U/L (ref 0–50)
ALT SERPL W P-5'-P-CCNC: 61 U/L (ref 0–50)
ALT SERPL W P-5'-P-CCNC: 62 U/L (ref 0–50)
ALT SERPL W P-5'-P-CCNC: 63 U/L (ref 0–50)
ALT SERPL W P-5'-P-CCNC: 63 U/L (ref 0–50)
ALT SERPL W P-5'-P-CCNC: 66 U/L (ref 0–50)
ALT SERPL W P-5'-P-CCNC: 78 U/L (ref 0–50)
AMMONIA PLAS-SCNC: 30 UMOL/L (ref 10–50)
AMMONIA PLAS-SCNC: 30 UMOL/L (ref 10–50)
AMMONIA PLAS-SCNC: 33 UMOL/L (ref 10–50)
AMMONIA PLAS-SCNC: 69 UMOL/L (ref 10–50)
AMMONIA PLAS-SCNC: NORMAL UMOL/L (ref 10–50)
ANION GAP SERPL CALCULATED.3IONS-SCNC: 10 MMOL/L (ref 3–14)
ANION GAP SERPL CALCULATED.3IONS-SCNC: 12 MMOL/L (ref 3–14)
ANION GAP SERPL CALCULATED.3IONS-SCNC: 13 MMOL/L (ref 3–14)
ANION GAP SERPL CALCULATED.3IONS-SCNC: 13 MMOL/L (ref 3–14)
ANION GAP SERPL CALCULATED.3IONS-SCNC: 14 MMOL/L (ref 3–14)
ANION GAP SERPL CALCULATED.3IONS-SCNC: 15 MMOL/L (ref 3–14)
ANION GAP SERPL CALCULATED.3IONS-SCNC: 16 MMOL/L (ref 3–14)
ANION GAP SERPL CALCULATED.3IONS-SCNC: 18 MMOL/L (ref 3–14)
ANION GAP SERPL CALCULATED.3IONS-SCNC: 5 MMOL/L (ref 3–14)
ANION GAP SERPL CALCULATED.3IONS-SCNC: 7 MMOL/L (ref 3–14)
ANION GAP SERPL CALCULATED.3IONS-SCNC: 7 MMOL/L (ref 3–14)
ANION GAP SERPL CALCULATED.3IONS-SCNC: 8 MMOL/L (ref 3–14)
ANION GAP SERPL CALCULATED.3IONS-SCNC: 9 MMOL/L (ref 3–14)
ANISOCYTOSIS BLD QL SMEAR: ABNORMAL
APPEARANCE FLD: CLEAR
APPEARANCE FLD: NORMAL
APPEARANCE UR: ABNORMAL
APPEARANCE UR: CLEAR
APTT PPP: 56 SEC (ref 22–37)
AST SERPL W P-5'-P-CCNC: 100 U/L (ref 0–45)
AST SERPL W P-5'-P-CCNC: 103 U/L (ref 0–45)
AST SERPL W P-5'-P-CCNC: 104 U/L (ref 0–45)
AST SERPL W P-5'-P-CCNC: 113 U/L (ref 0–45)
AST SERPL W P-5'-P-CCNC: 114 U/L (ref 0–45)
AST SERPL W P-5'-P-CCNC: 114 U/L (ref 0–45)
AST SERPL W P-5'-P-CCNC: 129 U/L (ref 0–45)
AST SERPL W P-5'-P-CCNC: 141 U/L (ref 0–45)
AST SERPL W P-5'-P-CCNC: 141 U/L (ref 0–45)
AST SERPL W P-5'-P-CCNC: 146 U/L (ref 0–45)
AST SERPL W P-5'-P-CCNC: 153 U/L (ref 0–45)
AST SERPL W P-5'-P-CCNC: 182 U/L (ref 0–45)
AST SERPL W P-5'-P-CCNC: 227 U/L (ref 0–45)
AST SERPL W P-5'-P-CCNC: 44 U/L (ref 0–45)
AST SERPL W P-5'-P-CCNC: 49 U/L (ref 0–45)
AST SERPL W P-5'-P-CCNC: 50 U/L (ref 0–45)
AST SERPL W P-5'-P-CCNC: 52 U/L (ref 0–45)
AST SERPL W P-5'-P-CCNC: 53 U/L (ref 0–45)
AST SERPL W P-5'-P-CCNC: 58 U/L (ref 0–45)
AST SERPL W P-5'-P-CCNC: 64 U/L (ref 0–45)
AST SERPL W P-5'-P-CCNC: 65 U/L (ref 0–45)
AST SERPL W P-5'-P-CCNC: 65 U/L (ref 0–45)
AST SERPL W P-5'-P-CCNC: 70 U/L (ref 0–45)
AST SERPL W P-5'-P-CCNC: 71 U/L (ref 0–45)
AST SERPL W P-5'-P-CCNC: 73 U/L (ref 0–45)
AST SERPL W P-5'-P-CCNC: 74 U/L (ref 0–45)
AST SERPL W P-5'-P-CCNC: 74 U/L (ref 0–45)
AST SERPL W P-5'-P-CCNC: 76 U/L (ref 0–45)
AST SERPL W P-5'-P-CCNC: 76 U/L (ref 0–45)
AST SERPL W P-5'-P-CCNC: 80 U/L (ref 0–45)
AST SERPL W P-5'-P-CCNC: 96 U/L (ref 0–45)
AST SERPL W P-5'-P-CCNC: ABNORMAL U/L (ref 0–45)
AST SERPL W P-5'-P-CCNC: ABNORMAL U/L (ref 0–45)
B-GLOBULIN SERPL ELPH-MCNC: 0.9 G/DL (ref 0.6–1)
BACTERIA #/AREA URNS HPF: ABNORMAL /HPF
BACTERIA SPEC CULT: NO GROWTH
BACTERIA SPEC CULT: NORMAL
BACTERIA SPEC CULT: NORMAL
BASE DEFICIT BLDA-SCNC: 0.3 MMOL/L
BASE EXCESS BLDA CALC-SCNC: 0.9 MMOL/L
BASE EXCESS BLDV CALC-SCNC: 0.2 MMOL/L
BASOPHILS # BLD AUTO: 0 10E9/L (ref 0–0.2)
BASOPHILS # BLD AUTO: 0.1 10E9/L (ref 0–0.2)
BASOPHILS NFR BLD AUTO: 0.1 %
BASOPHILS NFR BLD AUTO: 0.2 %
BASOPHILS NFR BLD AUTO: 0.4 %
BASOPHILS NFR BLD AUTO: 0.5 %
BASOPHILS NFR BLD AUTO: 1 %
BASOPHILS NFR BLD AUTO: 1 %
BASOPHILS NFR BLD AUTO: 1.4 %
BILIRUB DIRECT SERPL-MCNC: 12.5 MG/DL (ref 0–0.2)
BILIRUB DIRECT SERPL-MCNC: 3.4 MG/DL (ref 0–0.2)
BILIRUB DIRECT SERPL-MCNC: 4.1 MG/DL (ref 0–0.2)
BILIRUB DIRECT SERPL-MCNC: 5.7 MG/DL (ref 0–0.2)
BILIRUB SERPL-MCNC: 1.7 MG/DL (ref 0.2–1.3)
BILIRUB SERPL-MCNC: 11.6 MG/DL (ref 0.2–1.3)
BILIRUB SERPL-MCNC: 12.7 MG/DL (ref 0.2–1.3)
BILIRUB SERPL-MCNC: 13 MG/DL (ref 0.2–1.3)
BILIRUB SERPL-MCNC: 13.7 MG/DL (ref 0.2–1.3)
BILIRUB SERPL-MCNC: 15 MG/DL (ref 0.2–1.3)
BILIRUB SERPL-MCNC: 15.9 MG/DL (ref 0.2–1.3)
BILIRUB SERPL-MCNC: 15.9 MG/DL (ref 0.2–1.3)
BILIRUB SERPL-MCNC: 16 MG/DL (ref 0.2–1.3)
BILIRUB SERPL-MCNC: 16.6 MG/DL (ref 0.2–1.3)
BILIRUB SERPL-MCNC: 16.7 MG/DL (ref 0.2–1.3)
BILIRUB SERPL-MCNC: 16.8 MG/DL (ref 0.2–1.3)
BILIRUB SERPL-MCNC: 17.4 MG/DL (ref 0.2–1.3)
BILIRUB SERPL-MCNC: 17.7 MG/DL (ref 0.2–1.3)
BILIRUB SERPL-MCNC: 17.8 MG/DL (ref 0.2–1.3)
BILIRUB SERPL-MCNC: 18.2 MG/DL (ref 0.2–1.3)
BILIRUB SERPL-MCNC: 4.6 MG/DL (ref 0.2–1.3)
BILIRUB SERPL-MCNC: 4.7 MG/DL (ref 0.2–1.3)
BILIRUB SERPL-MCNC: 5 MG/DL (ref 0.2–1.3)
BILIRUB SERPL-MCNC: 5.7 MG/DL (ref 0.2–1.3)
BILIRUB SERPL-MCNC: 5.7 MG/DL (ref 0.2–1.3)
BILIRUB SERPL-MCNC: 7.6 MG/DL (ref 0.2–1.3)
BILIRUB SERPL-MCNC: 7.6 MG/DL (ref 0.2–1.3)
BILIRUB SERPL-MCNC: 7.7 MG/DL (ref 0.2–1.3)
BILIRUB SERPL-MCNC: 8.6 MG/DL (ref 0.2–1.3)
BILIRUB SERPL-MCNC: 8.8 MG/DL (ref 0.2–1.3)
BILIRUB SERPL-MCNC: 9.1 MG/DL (ref 0.2–1.3)
BILIRUB SERPL-MCNC: 9.3 MG/DL (ref 0.2–1.3)
BILIRUB SERPL-MCNC: 9.4 MG/DL (ref 0.2–1.3)
BILIRUB SERPL-MCNC: 9.5 MG/DL (ref 0.2–1.3)
BILIRUB UR QL STRIP: ABNORMAL
BILIRUB UR QL STRIP: ABNORMAL
BLD GP AB SCN SERPL QL: NORMAL
BLD PROD TYP BPU: NORMAL
BLD UNIT ID BPU: 0
BLOOD BANK CMNT PATIENT-IMP: NORMAL
BLOOD PRODUCT CODE: NORMAL
BPU ID: NORMAL
BUN SERPL-MCNC: 101 MG/DL (ref 7–30)
BUN SERPL-MCNC: 107 MG/DL (ref 7–30)
BUN SERPL-MCNC: 107 MG/DL (ref 7–30)
BUN SERPL-MCNC: 111 MG/DL (ref 7–30)
BUN SERPL-MCNC: 122 MG/DL (ref 7–30)
BUN SERPL-MCNC: 124 MG/DL (ref 7–30)
BUN SERPL-MCNC: 129 MG/DL (ref 7–30)
BUN SERPL-MCNC: 13 MG/DL (ref 7–30)
BUN SERPL-MCNC: 14 MG/DL (ref 7–30)
BUN SERPL-MCNC: 14 MG/DL (ref 7–30)
BUN SERPL-MCNC: 15 MG/DL (ref 7–30)
BUN SERPL-MCNC: 17 MG/DL (ref 7–30)
BUN SERPL-MCNC: 18 MG/DL (ref 7–30)
BUN SERPL-MCNC: 20 MG/DL (ref 7–30)
BUN SERPL-MCNC: 22 MG/DL (ref 7–30)
BUN SERPL-MCNC: 25 MG/DL (ref 7–30)
BUN SERPL-MCNC: 26 MG/DL (ref 7–30)
BUN SERPL-MCNC: 26 MG/DL (ref 7–30)
BUN SERPL-MCNC: 28 MG/DL (ref 7–30)
BUN SERPL-MCNC: 28 MG/DL (ref 7–30)
BUN SERPL-MCNC: 30 MG/DL (ref 7–30)
BUN SERPL-MCNC: 32 MG/DL (ref 7–30)
BUN SERPL-MCNC: 33 MG/DL (ref 7–30)
BUN SERPL-MCNC: 33 MG/DL (ref 7–30)
BUN SERPL-MCNC: 34 MG/DL (ref 7–30)
BUN SERPL-MCNC: 35 MG/DL (ref 7–30)
BUN SERPL-MCNC: 36 MG/DL (ref 7–30)
BUN SERPL-MCNC: 39 MG/DL (ref 7–30)
BUN SERPL-MCNC: 40 MG/DL (ref 7–30)
BUN SERPL-MCNC: 43 MG/DL (ref 7–30)
BUN SERPL-MCNC: 44 MG/DL (ref 7–30)
BUN SERPL-MCNC: 54 MG/DL (ref 7–30)
BUN SERPL-MCNC: 61 MG/DL (ref 7–30)
BUN SERPL-MCNC: 69 MG/DL (ref 7–30)
BUN SERPL-MCNC: 80 MG/DL (ref 7–30)
BUN SERPL-MCNC: 88 MG/DL (ref 7–30)
BUN SERPL-MCNC: 93 MG/DL (ref 7–30)
BUN SERPL-MCNC: 95 MG/DL (ref 7–30)
BUN SERPL-MCNC: 96 MG/DL (ref 7–30)
C COLI+JEJUNI+LARI FUSA STL QL NAA+PROBE: NOT DETECTED
C DIFF TOX B STL QL: NEGATIVE
CA-I SERPL ISE-MCNC: 4.4 MG/DL (ref 4.4–5.2)
CA-I SERPL ISE-MCNC: 4.7 MG/DL (ref 4.4–5.2)
CALCIUM SERPL-MCNC: 10.3 MG/DL (ref 8.5–10.1)
CALCIUM SERPL-MCNC: 10.4 MG/DL (ref 8.5–10.1)
CALCIUM SERPL-MCNC: 10.5 MG/DL (ref 8.5–10.1)
CALCIUM SERPL-MCNC: 10.6 MG/DL (ref 8.5–10.1)
CALCIUM SERPL-MCNC: 7.4 MG/DL (ref 8.5–10.1)
CALCIUM SERPL-MCNC: 7.6 MG/DL (ref 8.5–10.1)
CALCIUM SERPL-MCNC: 7.6 MG/DL (ref 8.5–10.1)
CALCIUM SERPL-MCNC: 8.2 MG/DL (ref 8.5–10.1)
CALCIUM SERPL-MCNC: 8.2 MG/DL (ref 8.5–10.1)
CALCIUM SERPL-MCNC: 8.3 MG/DL (ref 8.5–10.1)
CALCIUM SERPL-MCNC: 8.4 MG/DL (ref 8.5–10.1)
CALCIUM SERPL-MCNC: 8.4 MG/DL (ref 8.5–10.1)
CALCIUM SERPL-MCNC: 8.5 MG/DL (ref 8.5–10.1)
CALCIUM SERPL-MCNC: 8.6 MG/DL (ref 8.5–10.1)
CALCIUM SERPL-MCNC: 8.7 MG/DL (ref 8.5–10.1)
CALCIUM SERPL-MCNC: 8.8 MG/DL (ref 8.5–10.1)
CALCIUM SERPL-MCNC: 8.9 MG/DL (ref 8.5–10.1)
CALCIUM SERPL-MCNC: 8.9 MG/DL (ref 8.5–10.1)
CALCIUM SERPL-MCNC: 9 MG/DL (ref 8.5–10.1)
CALCIUM SERPL-MCNC: 9.1 MG/DL (ref 8.5–10.1)
CALCIUM SERPL-MCNC: 9.2 MG/DL (ref 8.5–10.1)
CALCIUM SERPL-MCNC: 9.3 MG/DL (ref 8.5–10.1)
CALCIUM SERPL-MCNC: 9.6 MG/DL (ref 8.5–10.1)
CALCIUM SERPL-MCNC: 9.7 MG/DL (ref 8.5–10.1)
CALCIUM SERPL-MCNC: 9.8 MG/DL (ref 8.5–10.1)
CALCIUM SERPL-MCNC: ABNORMAL MG/DL (ref 8.5–10.1)
CHLORIDE SERPL-SCNC: 100 MMOL/L (ref 94–109)
CHLORIDE SERPL-SCNC: 101 MMOL/L (ref 94–109)
CHLORIDE SERPL-SCNC: 102 MMOL/L (ref 94–109)
CHLORIDE SERPL-SCNC: 104 MMOL/L (ref 94–109)
CHLORIDE SERPL-SCNC: 105 MMOL/L (ref 94–109)
CHLORIDE SERPL-SCNC: 108 MMOL/L (ref 94–109)
CHLORIDE SERPL-SCNC: 110 MMOL/L (ref 94–109)
CHLORIDE SERPL-SCNC: 113 MMOL/L (ref 94–109)
CHLORIDE SERPL-SCNC: 116 MMOL/L (ref 94–109)
CHLORIDE SERPL-SCNC: 90 MMOL/L (ref 94–109)
CHLORIDE SERPL-SCNC: 91 MMOL/L (ref 94–109)
CHLORIDE SERPL-SCNC: 94 MMOL/L (ref 94–109)
CHLORIDE SERPL-SCNC: 94 MMOL/L (ref 94–109)
CHLORIDE SERPL-SCNC: 95 MMOL/L (ref 94–109)
CHLORIDE SERPL-SCNC: 95 MMOL/L (ref 94–109)
CHLORIDE SERPL-SCNC: 96 MMOL/L (ref 94–109)
CHLORIDE SERPL-SCNC: 96 MMOL/L (ref 94–109)
CHLORIDE SERPL-SCNC: 97 MMOL/L (ref 94–109)
CHLORIDE SERPL-SCNC: 97 MMOL/L (ref 94–109)
CHLORIDE SERPL-SCNC: 98 MMOL/L (ref 94–109)
CHLORIDE SERPL-SCNC: 98 MMOL/L (ref 94–109)
CHLORIDE SERPL-SCNC: 99 MMOL/L (ref 94–109)
CHLORIDE SERPL-SCNC: 99 MMOL/L (ref 94–109)
CO2 BLDCOV-SCNC: 30 MMOL/L (ref 21–28)
CO2 SERPL-SCNC: 12 MMOL/L (ref 20–32)
CO2 SERPL-SCNC: 13 MMOL/L (ref 20–32)
CO2 SERPL-SCNC: 14 MMOL/L (ref 20–32)
CO2 SERPL-SCNC: 15 MMOL/L (ref 20–32)
CO2 SERPL-SCNC: 15 MMOL/L (ref 20–32)
CO2 SERPL-SCNC: 17 MMOL/L (ref 20–32)
CO2 SERPL-SCNC: 18 MMOL/L (ref 20–32)
CO2 SERPL-SCNC: 19 MMOL/L (ref 20–32)
CO2 SERPL-SCNC: 19 MMOL/L (ref 20–32)
CO2 SERPL-SCNC: 20 MMOL/L (ref 20–32)
CO2 SERPL-SCNC: 21 MMOL/L (ref 20–32)
CO2 SERPL-SCNC: 22 MMOL/L (ref 20–32)
CO2 SERPL-SCNC: 23 MMOL/L (ref 20–32)
CO2 SERPL-SCNC: 25 MMOL/L (ref 20–32)
CO2 SERPL-SCNC: 25 MMOL/L (ref 20–32)
CO2 SERPL-SCNC: 26 MMOL/L (ref 20–32)
CO2 SERPL-SCNC: 26 MMOL/L (ref 20–32)
CO2 SERPL-SCNC: 27 MMOL/L (ref 20–32)
CO2 SERPL-SCNC: 27 MMOL/L (ref 20–32)
CO2 SERPL-SCNC: 29 MMOL/L (ref 20–32)
CO2 SERPL-SCNC: 29 MMOL/L (ref 20–32)
CO2 SERPL-SCNC: 30 MMOL/L (ref 20–32)
CO2 SERPL-SCNC: 33 MMOL/L (ref 20–32)
CO2 SERPL-SCNC: 34 MMOL/L (ref 20–32)
CO2 SERPL-SCNC: 34 MMOL/L (ref 20–32)
COLOR FLD: YELLOW
COLOR FLD: YELLOW
COLOR UR AUTO: ABNORMAL
COLOR UR AUTO: ABNORMAL
CREAT SERPL-MCNC: 0.95 MG/DL (ref 0.52–1.04)
CREAT SERPL-MCNC: 1 MG/DL (ref 0.52–1.04)
CREAT SERPL-MCNC: 1.02 MG/DL (ref 0.52–1.04)
CREAT SERPL-MCNC: 1.06 MG/DL (ref 0.52–1.04)
CREAT SERPL-MCNC: 1.11 MG/DL (ref 0.52–1.04)
CREAT SERPL-MCNC: 1.11 MG/DL (ref 0.52–1.04)
CREAT SERPL-MCNC: 1.13 MG/DL (ref 0.52–1.04)
CREAT SERPL-MCNC: 1.2 MG/DL (ref 0.52–1.04)
CREAT SERPL-MCNC: 1.24 MG/DL (ref 0.52–1.04)
CREAT SERPL-MCNC: 1.26 MG/DL (ref 0.52–1.04)
CREAT SERPL-MCNC: 1.32 MG/DL (ref 0.52–1.04)
CREAT SERPL-MCNC: 1.34 MG/DL (ref 0.52–1.04)
CREAT SERPL-MCNC: 1.38 MG/DL (ref 0.52–1.04)
CREAT SERPL-MCNC: 1.49 MG/DL (ref 0.52–1.04)
CREAT SERPL-MCNC: 1.6 MG/DL (ref 0.52–1.04)
CREAT SERPL-MCNC: 1.62 MG/DL (ref 0.52–1.04)
CREAT SERPL-MCNC: 1.67 MG/DL (ref 0.52–1.04)
CREAT SERPL-MCNC: 1.7 MG/DL (ref 0.52–1.04)
CREAT SERPL-MCNC: 2.37 MG/DL (ref 0.52–1.04)
CREAT SERPL-MCNC: 2.54 MG/DL (ref 0.52–1.04)
CREAT SERPL-MCNC: 2.7 MG/DL (ref 0.52–1.04)
CREAT SERPL-MCNC: 3.21 MG/DL (ref 0.52–1.04)
CREAT SERPL-MCNC: 3.57 MG/DL (ref 0.52–1.04)
CREAT SERPL-MCNC: 3.58 MG/DL (ref 0.52–1.04)
CREAT SERPL-MCNC: 3.65 MG/DL (ref 0.52–1.04)
CREAT SERPL-MCNC: 3.81 MG/DL (ref 0.52–1.04)
CREAT SERPL-MCNC: 3.92 MG/DL (ref 0.52–1.04)
CREAT SERPL-MCNC: 4.04 MG/DL (ref 0.52–1.04)
CREAT SERPL-MCNC: 4.04 MG/DL (ref 0.52–1.04)
CREAT SERPL-MCNC: 4.08 MG/DL (ref 0.52–1.04)
CREAT SERPL-MCNC: 4.12 MG/DL (ref 0.52–1.04)
CREAT SERPL-MCNC: 4.22 MG/DL (ref 0.52–1.04)
CREAT SERPL-MCNC: 4.31 MG/DL (ref 0.52–1.04)
CREAT SERPL-MCNC: 4.45 MG/DL (ref 0.52–1.04)
CREAT SERPL-MCNC: 4.51 MG/DL (ref 0.52–1.04)
CREAT SERPL-MCNC: 4.64 MG/DL (ref 0.52–1.04)
CREAT SERPL-MCNC: 4.68 MG/DL (ref 0.52–1.04)
CREAT SERPL-MCNC: 4.85 MG/DL (ref 0.52–1.04)
CREAT SERPL-MCNC: 4.98 MG/DL (ref 0.52–1.04)
CREAT SERPL-MCNC: 5.06 MG/DL (ref 0.52–1.04)
CREAT SERPL-MCNC: 5.12 MG/DL (ref 0.52–1.04)
CREAT SERPL-MCNC: 5.53 MG/DL (ref 0.52–1.04)
CREAT UR-MCNC: 79 MG/DL
DEPRECATED CALCIDIOL+CALCIFEROL SERPL-MC: 27 UG/L (ref 20–75)
DIFFERENTIAL METHOD BLD: ABNORMAL
EC STX1 GENE STL QL NAA+PROBE: NOT DETECTED
EC STX2 GENE STL QL NAA+PROBE: NOT DETECTED
ENTERIC PATHOGEN COMMENT: NORMAL
EOSINOPHIL # BLD AUTO: 0.1 10E9/L (ref 0–0.7)
EOSINOPHIL # BLD AUTO: 0.2 10E9/L (ref 0–0.7)
EOSINOPHIL # BLD AUTO: 0.3 10E9/L (ref 0–0.7)
EOSINOPHIL # BLD AUTO: 0.3 10E9/L (ref 0–0.7)
EOSINOPHIL # BLD AUTO: 0.4 10E9/L (ref 0–0.7)
EOSINOPHIL # BLD AUTO: 0.5 10E9/L (ref 0–0.7)
EOSINOPHIL # BLD AUTO: 0.5 10E9/L (ref 0–0.7)
EOSINOPHIL NFR BLD AUTO: 0.8 %
EOSINOPHIL NFR BLD AUTO: 0.8 %
EOSINOPHIL NFR BLD AUTO: 2 %
EOSINOPHIL NFR BLD AUTO: 2 %
EOSINOPHIL NFR BLD AUTO: 2.7 %
EOSINOPHIL NFR BLD AUTO: 3.5 %
EOSINOPHIL NFR BLD AUTO: 3.7 %
EOSINOPHIL NFR BLD AUTO: 4.4 %
EOSINOPHIL NFR BLD AUTO: 4.8 %
EOSINOPHIL NFR BLD AUTO: 5.3 %
EOSINOPHIL NFR BLD AUTO: 5.6 %
EOSINOPHIL NFR BLD AUTO: 6.1 %
ERYTHROCYTE [DISTWIDTH] IN BLOOD BY AUTOMATED COUNT: 17.7 % (ref 10–15)
ERYTHROCYTE [DISTWIDTH] IN BLOOD BY AUTOMATED COUNT: 18.6 % (ref 10–15)
ERYTHROCYTE [DISTWIDTH] IN BLOOD BY AUTOMATED COUNT: 18.7 % (ref 10–15)
ERYTHROCYTE [DISTWIDTH] IN BLOOD BY AUTOMATED COUNT: 18.9 % (ref 10–15)
ERYTHROCYTE [DISTWIDTH] IN BLOOD BY AUTOMATED COUNT: 18.9 % (ref 10–15)
ERYTHROCYTE [DISTWIDTH] IN BLOOD BY AUTOMATED COUNT: 19 % (ref 10–15)
ERYTHROCYTE [DISTWIDTH] IN BLOOD BY AUTOMATED COUNT: 19.5 % (ref 10–15)
ERYTHROCYTE [DISTWIDTH] IN BLOOD BY AUTOMATED COUNT: 19.9 % (ref 10–15)
ERYTHROCYTE [DISTWIDTH] IN BLOOD BY AUTOMATED COUNT: 19.9 % (ref 10–15)
ERYTHROCYTE [DISTWIDTH] IN BLOOD BY AUTOMATED COUNT: 20.1 % (ref 10–15)
ERYTHROCYTE [DISTWIDTH] IN BLOOD BY AUTOMATED COUNT: 20.1 % (ref 10–15)
ERYTHROCYTE [DISTWIDTH] IN BLOOD BY AUTOMATED COUNT: 20.4 % (ref 10–15)
ERYTHROCYTE [DISTWIDTH] IN BLOOD BY AUTOMATED COUNT: 20.4 % (ref 10–15)
ERYTHROCYTE [DISTWIDTH] IN BLOOD BY AUTOMATED COUNT: 20.7 % (ref 10–15)
ERYTHROCYTE [DISTWIDTH] IN BLOOD BY AUTOMATED COUNT: 20.9 % (ref 10–15)
ERYTHROCYTE [DISTWIDTH] IN BLOOD BY AUTOMATED COUNT: 21 % (ref 10–15)
ERYTHROCYTE [DISTWIDTH] IN BLOOD BY AUTOMATED COUNT: 21.4 % (ref 10–15)
ERYTHROCYTE [DISTWIDTH] IN BLOOD BY AUTOMATED COUNT: 21.4 % (ref 10–15)
ERYTHROCYTE [DISTWIDTH] IN BLOOD BY AUTOMATED COUNT: 22 % (ref 10–15)
ERYTHROCYTE [DISTWIDTH] IN BLOOD BY AUTOMATED COUNT: 22.1 % (ref 10–15)
ERYTHROCYTE [DISTWIDTH] IN BLOOD BY AUTOMATED COUNT: 22.3 % (ref 10–15)
ERYTHROCYTE [DISTWIDTH] IN BLOOD BY AUTOMATED COUNT: 22.3 % (ref 10–15)
ERYTHROCYTE [DISTWIDTH] IN BLOOD BY AUTOMATED COUNT: 22.6 % (ref 10–15)
ERYTHROCYTE [DISTWIDTH] IN BLOOD BY AUTOMATED COUNT: 22.7 % (ref 10–15)
ERYTHROCYTE [DISTWIDTH] IN BLOOD BY AUTOMATED COUNT: 22.9 % (ref 10–15)
ERYTHROCYTE [DISTWIDTH] IN BLOOD BY AUTOMATED COUNT: 23 % (ref 10–15)
ERYTHROCYTE [DISTWIDTH] IN BLOOD BY AUTOMATED COUNT: 23.1 % (ref 10–15)
ERYTHROCYTE [DISTWIDTH] IN BLOOD BY AUTOMATED COUNT: 23.2 % (ref 10–15)
ERYTHROCYTE [DISTWIDTH] IN BLOOD BY AUTOMATED COUNT: 23.5 % (ref 10–15)
ERYTHROCYTE [DISTWIDTH] IN BLOOD BY AUTOMATED COUNT: 23.6 % (ref 10–15)
ERYTHROCYTE [DISTWIDTH] IN BLOOD BY AUTOMATED COUNT: 23.6 % (ref 10–15)
ERYTHROCYTE [DISTWIDTH] IN BLOOD BY AUTOMATED COUNT: 23.8 % (ref 10–15)
ERYTHROCYTE [DISTWIDTH] IN BLOOD BY AUTOMATED COUNT: 24.1 % (ref 10–15)
ERYTHROCYTE [DISTWIDTH] IN BLOOD BY AUTOMATED COUNT: 24.5 % (ref 10–15)
ERYTHROCYTE [DISTWIDTH] IN BLOOD BY AUTOMATED COUNT: 24.5 % (ref 10–15)
ERYTHROCYTE [DISTWIDTH] IN BLOOD BY AUTOMATED COUNT: 27.3 % (ref 10–15)
ERYTHROCYTE [DISTWIDTH] IN BLOOD BY AUTOMATED COUNT: 30 % (ref 10–15)
ERYTHROCYTE [DISTWIDTH] IN BLOOD BY AUTOMATED COUNT: ABNORMAL % (ref 10–15)
ERYTHROCYTE [DISTWIDTH] IN BLOOD BY AUTOMATED COUNT: NORMAL % (ref 10–15)
ETHANOL SERPL-MCNC: <0.01 G/DL
ETHYL GLUCURONIDE UR QL: NEGATIVE
FERRITIN SERPL-MCNC: 211 NG/ML (ref 8–252)
FERRITIN SERPL-MCNC: 23 NG/ML (ref 8–252)
FERRITIN SERPL-MCNC: 24 NG/ML (ref 8–252)
FIBRINOGEN PPP-MCNC: 109 MG/DL (ref 200–420)
FIBRINOGEN PPP-MCNC: 126 MG/DL (ref 200–420)
FIBRINOGEN PPP-MCNC: 132 MG/DL (ref 200–420)
FIBRINOGEN PPP-MCNC: 136 MG/DL (ref 200–420)
FIBRINOGEN PPP-MCNC: 137 MG/DL (ref 200–420)
FIBRINOGEN PPP-MCNC: 143 MG/DL (ref 200–420)
FIBRINOGEN PPP-MCNC: 146 MG/DL (ref 200–420)
FIBRINOGEN PPP-MCNC: 159 MG/DL (ref 200–420)
FIBRINOGEN PPP-MCNC: 172 MG/DL (ref 200–420)
FIBRINOGEN PPP-MCNC: 174 MG/DL (ref 200–420)
FIBRINOGEN PPP-MCNC: 174 MG/DL (ref 200–420)
FIBRINOGEN PPP-MCNC: 175 MG/DL (ref 200–420)
FIBRINOGEN PPP-MCNC: 180 MG/DL (ref 200–420)
FIBRINOGEN PPP-MCNC: 185 MG/DL (ref 200–420)
FIBRINOGEN PPP-MCNC: 186 MG/DL (ref 200–420)
FOLATE SERPL-MCNC: 2.9 NG/ML
FRACT EXCRET NA UR+SERPL-RTO: <0.2 %
GAMMA GLOB SERPL ELPH-MCNC: 2.6 G/DL (ref 0.7–1.6)
GAMMA INTERFERON BACKGROUND BLD IA-ACNC: 0.02 IU/ML
GFR SERPL CREATININE-BSD FRML MDRD: 10 ML/MIN/{1.73_M2}
GFR SERPL CREATININE-BSD FRML MDRD: 11 ML/MIN/{1.73_M2}
GFR SERPL CREATININE-BSD FRML MDRD: 12 ML/MIN/{1.73_M2}
GFR SERPL CREATININE-BSD FRML MDRD: 13 ML/MIN/{1.73_M2}
GFR SERPL CREATININE-BSD FRML MDRD: 15 ML/MIN/{1.73_M2}
GFR SERPL CREATININE-BSD FRML MDRD: 18 ML/MIN/{1.73_M2}
GFR SERPL CREATININE-BSD FRML MDRD: 19 ML/MIN/{1.73_M2}
GFR SERPL CREATININE-BSD FRML MDRD: 21 ML/MIN/{1.73_M2}
GFR SERPL CREATININE-BSD FRML MDRD: 32 ML/MIN/{1.73_M2}
GFR SERPL CREATININE-BSD FRML MDRD: 32 ML/MIN/{1.73_M2}
GFR SERPL CREATININE-BSD FRML MDRD: 33 ML/MIN/{1.73_M2}
GFR SERPL CREATININE-BSD FRML MDRD: 34 ML/MIN/{1.73_M2}
GFR SERPL CREATININE-BSD FRML MDRD: 37 ML/MIN/{1.73_M2}
GFR SERPL CREATININE-BSD FRML MDRD: 41 ML/MIN/{1.73_M2}
GFR SERPL CREATININE-BSD FRML MDRD: 42 ML/MIN/{1.73_M2}
GFR SERPL CREATININE-BSD FRML MDRD: 43 ML/MIN/{1.73_M2}
GFR SERPL CREATININE-BSD FRML MDRD: 45 ML/MIN/{1.73_M2}
GFR SERPL CREATININE-BSD FRML MDRD: 46 ML/MIN/{1.73_M2}
GFR SERPL CREATININE-BSD FRML MDRD: 48 ML/MIN/{1.73_M2}
GFR SERPL CREATININE-BSD FRML MDRD: 52 ML/MIN/{1.73_M2}
GFR SERPL CREATININE-BSD FRML MDRD: 53 ML/MIN/{1.73_M2}
GFR SERPL CREATININE-BSD FRML MDRD: 53 ML/MIN/{1.73_M2}
GFR SERPL CREATININE-BSD FRML MDRD: 56 ML/MIN/{1.73_M2}
GFR SERPL CREATININE-BSD FRML MDRD: 59 ML/MIN/{1.73_M2}
GFR SERPL CREATININE-BSD FRML MDRD: 60 ML/MIN/{1.73_M2}
GFR SERPL CREATININE-BSD FRML MDRD: 64 ML/MIN/{1.73_M2}
GFR SERPL CREATININE-BSD FRML MDRD: 8 ML/MIN/{1.73_M2}
GFR SERPL CREATININE-BSD FRML MDRD: 9 ML/MIN/{1.73_M2}
GLUCOSE BLDC GLUCOMTR-MCNC: 102 MG/DL (ref 70–99)
GLUCOSE BLDC GLUCOMTR-MCNC: 103 MG/DL (ref 70–99)
GLUCOSE BLDC GLUCOMTR-MCNC: 112 MG/DL (ref 70–99)
GLUCOSE BLDC GLUCOMTR-MCNC: 127 MG/DL (ref 70–99)
GLUCOSE BLDC GLUCOMTR-MCNC: 130 MG/DL (ref 70–99)
GLUCOSE BLDC GLUCOMTR-MCNC: 134 MG/DL (ref 70–99)
GLUCOSE BLDC GLUCOMTR-MCNC: 135 MG/DL (ref 70–99)
GLUCOSE BLDC GLUCOMTR-MCNC: 144 MG/DL (ref 70–99)
GLUCOSE BLDC GLUCOMTR-MCNC: 178 MG/DL (ref 70–99)
GLUCOSE BLDC GLUCOMTR-MCNC: 76 MG/DL (ref 70–99)
GLUCOSE BLDC GLUCOMTR-MCNC: 78 MG/DL (ref 70–99)
GLUCOSE BLDC GLUCOMTR-MCNC: 83 MG/DL (ref 70–99)
GLUCOSE BLDC GLUCOMTR-MCNC: 89 MG/DL (ref 70–99)
GLUCOSE BLDC GLUCOMTR-MCNC: 91 MG/DL (ref 70–99)
GLUCOSE BLDC GLUCOMTR-MCNC: 91 MG/DL (ref 70–99)
GLUCOSE BLDC GLUCOMTR-MCNC: 93 MG/DL (ref 70–99)
GLUCOSE SERPL-MCNC: 104 MG/DL (ref 70–99)
GLUCOSE SERPL-MCNC: 105 MG/DL (ref 70–99)
GLUCOSE SERPL-MCNC: 106 MG/DL (ref 70–99)
GLUCOSE SERPL-MCNC: 109 MG/DL (ref 70–99)
GLUCOSE SERPL-MCNC: 113 MG/DL (ref 70–99)
GLUCOSE SERPL-MCNC: 113 MG/DL (ref 70–99)
GLUCOSE SERPL-MCNC: 114 MG/DL (ref 70–99)
GLUCOSE SERPL-MCNC: 115 MG/DL (ref 70–99)
GLUCOSE SERPL-MCNC: 126 MG/DL (ref 70–99)
GLUCOSE SERPL-MCNC: 133 MG/DL (ref 70–99)
GLUCOSE SERPL-MCNC: 134 MG/DL (ref 70–99)
GLUCOSE SERPL-MCNC: 134 MG/DL (ref 70–99)
GLUCOSE SERPL-MCNC: 177 MG/DL (ref 70–99)
GLUCOSE SERPL-MCNC: 78 MG/DL (ref 70–99)
GLUCOSE SERPL-MCNC: 79 MG/DL (ref 70–99)
GLUCOSE SERPL-MCNC: 81 MG/DL (ref 70–99)
GLUCOSE SERPL-MCNC: 82 MG/DL (ref 70–99)
GLUCOSE SERPL-MCNC: 82 MG/DL (ref 70–99)
GLUCOSE SERPL-MCNC: 84 MG/DL (ref 70–99)
GLUCOSE SERPL-MCNC: 84 MG/DL (ref 70–99)
GLUCOSE SERPL-MCNC: 85 MG/DL (ref 70–99)
GLUCOSE SERPL-MCNC: 85 MG/DL (ref 70–99)
GLUCOSE SERPL-MCNC: 86 MG/DL (ref 70–99)
GLUCOSE SERPL-MCNC: 86 MG/DL (ref 70–99)
GLUCOSE SERPL-MCNC: 87 MG/DL (ref 70–99)
GLUCOSE SERPL-MCNC: 88 MG/DL (ref 70–99)
GLUCOSE SERPL-MCNC: 89 MG/DL (ref 70–99)
GLUCOSE SERPL-MCNC: 90 MG/DL (ref 70–99)
GLUCOSE SERPL-MCNC: 91 MG/DL (ref 70–99)
GLUCOSE SERPL-MCNC: 92 MG/DL (ref 70–99)
GLUCOSE SERPL-MCNC: 93 MG/DL (ref 70–99)
GLUCOSE SERPL-MCNC: 94 MG/DL (ref 70–99)
GLUCOSE SERPL-MCNC: 94 MG/DL (ref 70–99)
GLUCOSE SERPL-MCNC: 95 MG/DL (ref 70–99)
GLUCOSE SERPL-MCNC: 95 MG/DL (ref 70–99)
GLUCOSE SERPL-MCNC: 97 MG/DL (ref 70–99)
GLUCOSE SERPL-MCNC: 97 MG/DL (ref 70–99)
GLUCOSE SERPL-MCNC: 98 MG/DL (ref 70–99)
GLUCOSE SERPL-MCNC: 99 MG/DL (ref 70–99)
GLUCOSE UR STRIP-MCNC: NEGATIVE MG/DL
GLUCOSE UR STRIP-MCNC: NEGATIVE MG/DL
GRAM STN SPEC: NORMAL
HBV CORE AB SERPL QL IA: NONREACTIVE
HBV SURFACE AB SERPL IA-ACNC: 0.69 M[IU]/ML
HBV SURFACE AG SERPL QL IA: NONREACTIVE
HCO3 BLD-SCNC: 24 MMOL/L (ref 21–28)
HCO3 BLD-SCNC: 24 MMOL/L (ref 21–28)
HCO3 BLDV-SCNC: 25 MMOL/L (ref 21–28)
HCT VFR BLD AUTO: 18.8 % (ref 35–47)
HCT VFR BLD AUTO: 19.3 % (ref 35–47)
HCT VFR BLD AUTO: 21.4 % (ref 35–47)
HCT VFR BLD AUTO: 21.4 % (ref 35–47)
HCT VFR BLD AUTO: 22.1 % (ref 35–47)
HCT VFR BLD AUTO: 22.2 % (ref 35–47)
HCT VFR BLD AUTO: 22.2 % (ref 35–47)
HCT VFR BLD AUTO: 22.6 % (ref 35–47)
HCT VFR BLD AUTO: 22.6 % (ref 35–47)
HCT VFR BLD AUTO: 22.8 % (ref 35–47)
HCT VFR BLD AUTO: 22.9 % (ref 35–47)
HCT VFR BLD AUTO: 23.2 % (ref 35–47)
HCT VFR BLD AUTO: 23.3 % (ref 35–47)
HCT VFR BLD AUTO: 23.3 % (ref 35–47)
HCT VFR BLD AUTO: 23.4 % (ref 35–47)
HCT VFR BLD AUTO: 23.6 % (ref 35–47)
HCT VFR BLD AUTO: 23.6 % (ref 35–47)
HCT VFR BLD AUTO: 23.7 % (ref 35–47)
HCT VFR BLD AUTO: 23.7 % (ref 35–47)
HCT VFR BLD AUTO: 23.9 % (ref 35–47)
HCT VFR BLD AUTO: 24.1 % (ref 35–47)
HCT VFR BLD AUTO: 24.1 % (ref 35–47)
HCT VFR BLD AUTO: 24.5 % (ref 35–47)
HCT VFR BLD AUTO: 24.9 % (ref 35–47)
HCT VFR BLD AUTO: 28.7 % (ref 35–47)
HCT VFR BLD AUTO: 28.8 % (ref 35–47)
HCT VFR BLD AUTO: 29.4 % (ref 35–47)
HCT VFR BLD AUTO: 29.7 % (ref 35–47)
HCT VFR BLD AUTO: 29.7 % (ref 35–47)
HCT VFR BLD AUTO: 31 % (ref 35–47)
HCT VFR BLD AUTO: 33.3 % (ref 35–47)
HCT VFR BLD AUTO: 34.9 % (ref 35–47)
HCT VFR BLD AUTO: 35.2 % (ref 35–47)
HCT VFR BLD AUTO: 35.3 % (ref 35–47)
HCT VFR BLD AUTO: 35.3 % (ref 35–47)
HCT VFR BLD AUTO: 36 % (ref 35–47)
HCT VFR BLD AUTO: 36.5 % (ref 35–47)
HCT VFR BLD AUTO: 39 % (ref 35–47)
HCT VFR BLD AUTO: NORMAL % (ref 35–47)
HGB BLD-MCNC: 10.2 G/DL (ref 11.7–15.7)
HGB BLD-MCNC: 10.6 G/DL (ref 11.7–15.7)
HGB BLD-MCNC: 10.8 G/DL (ref 11.7–15.7)
HGB BLD-MCNC: 10.9 G/DL (ref 11.7–15.7)
HGB BLD-MCNC: 11.1 G/DL (ref 11.7–15.7)
HGB BLD-MCNC: 11.1 G/DL (ref 11.7–15.7)
HGB BLD-MCNC: 11.7 G/DL (ref 11.7–15.7)
HGB BLD-MCNC: 12 G/DL (ref 11.7–15.7)
HGB BLD-MCNC: 12.7 G/DL (ref 11.7–15.7)
HGB BLD-MCNC: 5.8 G/DL (ref 11.7–15.7)
HGB BLD-MCNC: 6 G/DL (ref 11.7–15.7)
HGB BLD-MCNC: 6 G/DL (ref 11.7–15.7)
HGB BLD-MCNC: 6.1 G/DL (ref 11.7–15.7)
HGB BLD-MCNC: 6.4 G/DL (ref 11.7–15.7)
HGB BLD-MCNC: 6.9 G/DL (ref 11.7–15.7)
HGB BLD-MCNC: 7.2 G/DL (ref 11.7–15.7)
HGB BLD-MCNC: 7.3 G/DL (ref 11.7–15.7)
HGB BLD-MCNC: 7.4 G/DL (ref 11.7–15.7)
HGB BLD-MCNC: 7.5 G/DL (ref 11.7–15.7)
HGB BLD-MCNC: 7.6 G/DL (ref 11.7–15.7)
HGB BLD-MCNC: 7.7 G/DL (ref 11.7–15.7)
HGB BLD-MCNC: 7.8 G/DL (ref 11.7–15.7)
HGB BLD-MCNC: 7.8 G/DL (ref 11.7–15.7)
HGB BLD-MCNC: 7.9 G/DL (ref 11.7–15.7)
HGB BLD-MCNC: 8 G/DL (ref 11.7–15.7)
HGB BLD-MCNC: 8 G/DL (ref 11.7–15.7)
HGB BLD-MCNC: 8.1 G/DL (ref 11.7–15.7)
HGB BLD-MCNC: 8.1 G/DL (ref 11.7–15.7)
HGB BLD-MCNC: 8.4 G/DL (ref 11.7–15.7)
HGB BLD-MCNC: 8.6 G/DL (ref 11.7–15.7)
HGB BLD-MCNC: 8.8 G/DL (ref 11.7–15.7)
HGB BLD-MCNC: 8.9 G/DL (ref 11.7–15.7)
HGB BLD-MCNC: 9 G/DL (ref 11.7–15.7)
HGB BLD-MCNC: 9.3 G/DL (ref 11.7–15.7)
HGB BLD-MCNC: 9.5 G/DL (ref 11.7–15.7)
HGB BLD-MCNC: NORMAL G/DL (ref 11.7–15.7)
HGB UR QL STRIP: ABNORMAL
HGB UR QL STRIP: NEGATIVE
HYALINE CASTS #/AREA URNS LPF: 10 /LPF (ref 0–2)
HYPOCHROMIA BLD QL: PRESENT
HYPOCHROMIA BLD QL: PRESENT
IMM GRANULOCYTES # BLD: 0 10E9/L (ref 0–0.4)
IMM GRANULOCYTES # BLD: 0.1 10E9/L (ref 0–0.4)
IMM GRANULOCYTES # BLD: 0.2 10E9/L (ref 0–0.4)
IMM GRANULOCYTES # BLD: 0.2 10E9/L (ref 0–0.4)
IMM GRANULOCYTES NFR BLD: 0.5 %
IMM GRANULOCYTES NFR BLD: 0.6 %
IMM GRANULOCYTES NFR BLD: 1 %
IMM GRANULOCYTES NFR BLD: 1 %
IMM GRANULOCYTES NFR BLD: 1.2 %
IMM GRANULOCYTES NFR BLD: 1.3 %
IMM GRANULOCYTES NFR BLD: 1.5 %
IMM GRANULOCYTES NFR BLD: 1.7 %
IMM GRANULOCYTES NFR BLD: 2 %
INR PPP: 1.07 (ref 0.86–1.14)
INR PPP: 1.38 (ref 0.86–1.14)
INR PPP: 1.41 (ref 0.86–1.14)
INR PPP: 1.52 (ref 0.86–1.14)
INR PPP: 1.52 (ref 0.86–1.14)
INR PPP: 1.53 (ref 0.86–1.14)
INR PPP: 1.59 (ref 0.86–1.14)
INR PPP: 1.64 (ref 0.86–1.14)
INR PPP: 1.75 (ref 0.86–1.14)
INR PPP: 1.77 (ref 0.86–1.14)
INR PPP: 1.8 (ref 0.86–1.14)
INR PPP: 1.81 (ref 0.86–1.14)
INR PPP: 1.83 (ref 0.86–1.14)
INR PPP: 1.84 (ref 0.86–1.14)
INR PPP: 1.85 (ref 0.86–1.14)
INR PPP: 1.86 (ref 0.86–1.14)
INR PPP: 1.89 (ref 0.86–1.14)
INR PPP: 1.9 (ref 0.86–1.14)
INR PPP: 1.9 (ref 0.86–1.14)
INR PPP: 1.95 (ref 0.86–1.14)
INR PPP: 1.97 (ref 0.86–1.14)
INR PPP: 2.03 (ref 0.86–1.14)
INR PPP: 2.04 (ref 0.86–1.14)
INR PPP: 2.08 (ref 0.86–1.14)
INR PPP: 2.18 (ref 0.86–1.14)
INR PPP: 2.2 (ref 0.86–1.14)
INR PPP: 2.25 (ref 0.86–1.14)
INR PPP: 2.3 (ref 0.86–1.14)
INR PPP: 2.32 (ref 0.86–1.14)
INR PPP: 2.4 (ref 0.86–1.14)
INR PPP: 2.46 (ref 0.86–1.14)
INTERPRETATION ECG - MUSE: NORMAL
INTERPRETATION ECG - MUSE: NORMAL
IRON SATN MFR SERPL: 27 % (ref 15–46)
IRON SATN MFR SERPL: 5 % (ref 15–46)
IRON SATN MFR SERPL: 9 % (ref 15–46)
IRON SERPL-MCNC: 16 UG/DL (ref 35–180)
IRON SERPL-MCNC: 26 UG/DL (ref 35–180)
IRON SERPL-MCNC: 47 UG/DL (ref 35–180)
KETONES UR STRIP-MCNC: NEGATIVE MG/DL
KETONES UR STRIP-MCNC: NEGATIVE MG/DL
LAB SCANNED RESULT: NORMAL
LACTATE BLD-SCNC: 2 MMOL/L (ref 0.7–2)
LACTATE BLD-SCNC: 2.2 MMOL/L (ref 0.7–2)
LACTATE BLD-SCNC: 2.3 MMOL/L (ref 0.7–2)
LACTATE BLD-SCNC: 2.3 MMOL/L (ref 0.7–2)
LACTATE BLD-SCNC: 3.2 MMOL/L (ref 0.7–2.1)
LACTATE BLD-SCNC: 3.3 MMOL/L (ref 0.7–2)
LEUKOCYTE ESTERASE UR QL STRIP: ABNORMAL
LEUKOCYTE ESTERASE UR QL STRIP: ABNORMAL
LIPASE SERPL-CCNC: 220 U/L (ref 73–393)
LIPASE SERPL-CCNC: 283 U/L (ref 73–393)
LYMPHOCYTES # BLD AUTO: 0.7 10E9/L (ref 0.8–5.3)
LYMPHOCYTES # BLD AUTO: 0.8 10E9/L (ref 0.8–5.3)
LYMPHOCYTES # BLD AUTO: 0.8 10E9/L (ref 0.8–5.3)
LYMPHOCYTES # BLD AUTO: 1 10E9/L (ref 0.8–5.3)
LYMPHOCYTES # BLD AUTO: 1.1 10E9/L (ref 0.8–5.3)
LYMPHOCYTES # BLD AUTO: 1.1 10E9/L (ref 0.8–5.3)
LYMPHOCYTES # BLD AUTO: 1.2 10E9/L (ref 0.8–5.3)
LYMPHOCYTES # BLD AUTO: 1.4 10E9/L (ref 0.8–5.3)
LYMPHOCYTES # BLD AUTO: 1.4 10E9/L (ref 0.8–5.3)
LYMPHOCYTES # BLD AUTO: 1.5 10E9/L (ref 0.8–5.3)
LYMPHOCYTES NFR BLD AUTO: 10.2 %
LYMPHOCYTES NFR BLD AUTO: 10.9 %
LYMPHOCYTES NFR BLD AUTO: 11 %
LYMPHOCYTES NFR BLD AUTO: 11.9 %
LYMPHOCYTES NFR BLD AUTO: 12 %
LYMPHOCYTES NFR BLD AUTO: 12.3 %
LYMPHOCYTES NFR BLD AUTO: 13.5 %
LYMPHOCYTES NFR BLD AUTO: 21 %
LYMPHOCYTES NFR BLD AUTO: 24 %
LYMPHOCYTES NFR BLD AUTO: 25.6 %
LYMPHOCYTES NFR BLD AUTO: 8.4 %
LYMPHOCYTES NFR FLD MANUAL: 22 %
LYMPHOCYTES NFR FLD MANUAL: 5 %
Lab: NORMAL
M PROTEIN SERPL ELPH-MCNC: 0 G/DL
M TB IFN-G BLD-IMP: NEGATIVE
M TB IFN-G CD4+ BCKGRND COR BLD-ACNC: 3.71 IU/ML
MAGNESIUM SERPL-MCNC: 1.3 MG/DL (ref 1.6–2.3)
MAGNESIUM SERPL-MCNC: 1.4 MG/DL (ref 1.6–2.3)
MAGNESIUM SERPL-MCNC: 1.5 MG/DL (ref 1.6–2.3)
MAGNESIUM SERPL-MCNC: 1.6 MG/DL (ref 1.6–2.3)
MAGNESIUM SERPL-MCNC: 1.8 MG/DL (ref 1.6–2.3)
MAGNESIUM SERPL-MCNC: 1.8 MG/DL (ref 1.6–2.3)
MAGNESIUM SERPL-MCNC: 1.9 MG/DL (ref 1.6–2.3)
MAGNESIUM SERPL-MCNC: 2 MG/DL (ref 1.6–2.3)
MAGNESIUM SERPL-MCNC: 2 MG/DL (ref 1.6–2.3)
MAGNESIUM SERPL-MCNC: 2.1 MG/DL (ref 1.6–2.3)
MCH RBC QN AUTO: 24.1 PG (ref 26.5–33)
MCH RBC QN AUTO: 24.5 PG (ref 26.5–33)
MCH RBC QN AUTO: 25.2 PG (ref 26.5–33)
MCH RBC QN AUTO: 26 PG (ref 26.5–33)
MCH RBC QN AUTO: 28.6 PG (ref 26.5–33)
MCH RBC QN AUTO: 30.2 PG (ref 26.5–33)
MCH RBC QN AUTO: 30.3 PG (ref 26.5–33)
MCH RBC QN AUTO: 30.3 PG (ref 26.5–33)
MCH RBC QN AUTO: 30.4 PG (ref 26.5–33)
MCH RBC QN AUTO: 30.5 PG (ref 26.5–33)
MCH RBC QN AUTO: 30.5 PG (ref 26.5–33)
MCH RBC QN AUTO: 30.6 PG (ref 26.5–33)
MCH RBC QN AUTO: 30.6 PG (ref 26.5–33)
MCH RBC QN AUTO: 30.7 PG (ref 26.5–33)
MCH RBC QN AUTO: 30.7 PG (ref 26.5–33)
MCH RBC QN AUTO: 31 PG (ref 26.5–33)
MCH RBC QN AUTO: 31 PG (ref 26.5–33)
MCH RBC QN AUTO: 31.1 PG (ref 26.5–33)
MCH RBC QN AUTO: 31.1 PG (ref 26.5–33)
MCH RBC QN AUTO: 31.2 PG (ref 26.5–33)
MCH RBC QN AUTO: 31.3 PG (ref 26.5–33)
MCH RBC QN AUTO: 31.3 PG (ref 26.5–33)
MCH RBC QN AUTO: 31.4 PG (ref 26.5–33)
MCH RBC QN AUTO: 31.5 PG (ref 26.5–33)
MCH RBC QN AUTO: 31.6 PG (ref 26.5–33)
MCH RBC QN AUTO: 31.6 PG (ref 26.5–33)
MCH RBC QN AUTO: 31.7 PG (ref 26.5–33)
MCH RBC QN AUTO: 31.8 PG (ref 26.5–33)
MCH RBC QN AUTO: 31.9 PG (ref 26.5–33)
MCH RBC QN AUTO: 32 PG (ref 26.5–33)
MCH RBC QN AUTO: 32 PG (ref 26.5–33)
MCH RBC QN AUTO: 32.2 PG (ref 26.5–33)
MCH RBC QN AUTO: 32.3 PG (ref 26.5–33)
MCH RBC QN AUTO: 32.4 PG (ref 26.5–33)
MCH RBC QN AUTO: 32.6 PG (ref 26.5–33)
MCH RBC QN AUTO: 32.6 PG (ref 26.5–33)
MCH RBC QN AUTO: NORMAL PG (ref 26.5–33)
MCHC RBC AUTO-ENTMCNC: 30 G/DL (ref 31.5–36.5)
MCHC RBC AUTO-ENTMCNC: 30 G/DL (ref 31.5–36.5)
MCHC RBC AUTO-ENTMCNC: 30.3 G/DL (ref 31.5–36.5)
MCHC RBC AUTO-ENTMCNC: 30.7 G/DL (ref 31.5–36.5)
MCHC RBC AUTO-ENTMCNC: 30.9 G/DL (ref 31.5–36.5)
MCHC RBC AUTO-ENTMCNC: 31.4 G/DL (ref 31.5–36.5)
MCHC RBC AUTO-ENTMCNC: 31.5 G/DL (ref 31.5–36.5)
MCHC RBC AUTO-ENTMCNC: 31.6 G/DL (ref 31.5–36.5)
MCHC RBC AUTO-ENTMCNC: 31.6 G/DL (ref 31.5–36.5)
MCHC RBC AUTO-ENTMCNC: 32.2 G/DL (ref 31.5–36.5)
MCHC RBC AUTO-ENTMCNC: 32.3 G/DL (ref 31.5–36.5)
MCHC RBC AUTO-ENTMCNC: 32.3 G/DL (ref 31.5–36.5)
MCHC RBC AUTO-ENTMCNC: 32.5 G/DL (ref 31.5–36.5)
MCHC RBC AUTO-ENTMCNC: 32.6 G/DL (ref 31.5–36.5)
MCHC RBC AUTO-ENTMCNC: 32.7 G/DL (ref 31.5–36.5)
MCHC RBC AUTO-ENTMCNC: 32.7 G/DL (ref 31.5–36.5)
MCHC RBC AUTO-ENTMCNC: 32.9 G/DL (ref 31.5–36.5)
MCHC RBC AUTO-ENTMCNC: 32.9 G/DL (ref 31.5–36.5)
MCHC RBC AUTO-ENTMCNC: 33 G/DL (ref 31.5–36.5)
MCHC RBC AUTO-ENTMCNC: 33 G/DL (ref 31.5–36.5)
MCHC RBC AUTO-ENTMCNC: 33.1 G/DL (ref 31.5–36.5)
MCHC RBC AUTO-ENTMCNC: 33.1 G/DL (ref 31.5–36.5)
MCHC RBC AUTO-ENTMCNC: 33.2 G/DL (ref 31.5–36.5)
MCHC RBC AUTO-ENTMCNC: 33.5 G/DL (ref 31.5–36.5)
MCHC RBC AUTO-ENTMCNC: 33.5 G/DL (ref 31.5–36.5)
MCHC RBC AUTO-ENTMCNC: 33.6 G/DL (ref 31.5–36.5)
MCHC RBC AUTO-ENTMCNC: 33.7 G/DL (ref 31.5–36.5)
MCHC RBC AUTO-ENTMCNC: 33.8 G/DL (ref 31.5–36.5)
MCHC RBC AUTO-ENTMCNC: 33.9 G/DL (ref 31.5–36.5)
MCHC RBC AUTO-ENTMCNC: 34.1 G/DL (ref 31.5–36.5)
MCHC RBC AUTO-ENTMCNC: 34.2 G/DL (ref 31.5–36.5)
MCHC RBC AUTO-ENTMCNC: 34.3 G/DL (ref 31.5–36.5)
MCHC RBC AUTO-ENTMCNC: NORMAL G/DL (ref 31.5–36.5)
MCV RBC AUTO: 101 FL (ref 78–100)
MCV RBC AUTO: 103 FL (ref 78–100)
MCV RBC AUTO: 79 FL (ref 78–100)
MCV RBC AUTO: 82 FL (ref 78–100)
MCV RBC AUTO: 82 FL (ref 78–100)
MCV RBC AUTO: 86 FL (ref 78–100)
MCV RBC AUTO: 92 FL (ref 78–100)
MCV RBC AUTO: 93 FL (ref 78–100)
MCV RBC AUTO: 94 FL (ref 78–100)
MCV RBC AUTO: 95 FL (ref 78–100)
MCV RBC AUTO: 96 FL (ref 78–100)
MCV RBC AUTO: 97 FL (ref 78–100)
MCV RBC AUTO: 98 FL (ref 78–100)
MCV RBC AUTO: 99 FL (ref 78–100)
MCV RBC AUTO: NORMAL FL (ref 78–100)
MISCELLANEOUS TEST: NORMAL
MITOGEN IGNF BCKGRD COR BLD-ACNC: 0 IU/ML
MITOGEN IGNF BCKGRD COR BLD-ACNC: 0.01 IU/ML
MONOCYTES # BLD AUTO: 0.6 10E9/L (ref 0–1.3)
MONOCYTES # BLD AUTO: 0.7 10E9/L (ref 0–1.3)
MONOCYTES # BLD AUTO: 0.8 10E9/L (ref 0–1.3)
MONOCYTES # BLD AUTO: 0.8 10E9/L (ref 0–1.3)
MONOCYTES # BLD AUTO: 0.9 10E9/L (ref 0–1.3)
MONOCYTES # BLD AUTO: 1.1 10E9/L (ref 0–1.3)
MONOCYTES # BLD AUTO: 1.3 10E9/L (ref 0–1.3)
MONOCYTES NFR BLD AUTO: 10.5 %
MONOCYTES NFR BLD AUTO: 11.5 %
MONOCYTES NFR BLD AUTO: 12.2 %
MONOCYTES NFR BLD AUTO: 14.5 %
MONOCYTES NFR BLD AUTO: 16 %
MONOCYTES NFR BLD AUTO: 19 %
MONOCYTES NFR BLD AUTO: 8 %
MONOCYTES NFR BLD AUTO: 8.2 %
MONOCYTES NFR BLD AUTO: 8.2 %
MONOCYTES NFR BLD AUTO: 8.6 %
MONOCYTES NFR BLD AUTO: 8.8 %
MONOCYTES NFR BLD AUTO: 9.5 %
MONOCYTES NFR BLD AUTO: 9.8 %
MUCOUS THREADS #/AREA URNS LPF: PRESENT /LPF
NEUTROPHILS # BLD AUTO: 3.2 10E9/L (ref 1.6–8.3)
NEUTROPHILS # BLD AUTO: 3.3 10E9/L (ref 1.6–8.3)
NEUTROPHILS # BLD AUTO: 4 10E9/L (ref 1.6–8.3)
NEUTROPHILS # BLD AUTO: 5.4 10E9/L (ref 1.6–8.3)
NEUTROPHILS # BLD AUTO: 5.6 10E9/L (ref 1.6–8.3)
NEUTROPHILS # BLD AUTO: 5.9 10E9/L (ref 1.6–8.3)
NEUTROPHILS # BLD AUTO: 6 10E9/L (ref 1.6–8.3)
NEUTROPHILS # BLD AUTO: 6.2 10E9/L (ref 1.6–8.3)
NEUTROPHILS # BLD AUTO: 6.3 10E9/L (ref 1.6–8.3)
NEUTROPHILS # BLD AUTO: 6.3 10E9/L (ref 1.6–8.3)
NEUTROPHILS # BLD AUTO: 6.7 10E9/L (ref 1.6–8.3)
NEUTROPHILS # BLD AUTO: 7 10E9/L (ref 1.6–8.3)
NEUTROPHILS # BLD AUTO: 7.4 10E9/L (ref 1.6–8.3)
NEUTROPHILS NFR BLD AUTO: 54.8 %
NEUTROPHILS NFR BLD AUTO: 57 %
NEUTROPHILS NFR BLD AUTO: 59 %
NEUTROPHILS NFR BLD AUTO: 69.7 %
NEUTROPHILS NFR BLD AUTO: 69.7 %
NEUTROPHILS NFR BLD AUTO: 72 %
NEUTROPHILS NFR BLD AUTO: 73.3 %
NEUTROPHILS NFR BLD AUTO: 73.7 %
NEUTROPHILS NFR BLD AUTO: 73.7 %
NEUTROPHILS NFR BLD AUTO: 74.7 %
NEUTROPHILS NFR BLD AUTO: 75.2 %
NEUTROPHILS NFR BLD AUTO: 79 %
NEUTROPHILS NFR BLD AUTO: 79.9 %
NEUTS BAND NFR FLD MANUAL: 15 %
NEUTS BAND NFR FLD MANUAL: 33 %
NITRATE UR QL: NEGATIVE
NITRATE UR QL: NEGATIVE
NOROV GI+II ORF1-ORF2 JNC STL QL NAA+PR: NOT DETECTED
NRBC # BLD AUTO: 0 10*3/UL
NRBC # BLD AUTO: 0 10*3/UL
NRBC # BLD AUTO: 0.1 10*3/UL
NRBC BLD AUTO-RTO: 0 /100
NRBC BLD AUTO-RTO: 0 /100
NRBC BLD AUTO-RTO: 1 /100
NRBC BLD AUTO-RTO: 2 /100
NUM BPU REQUESTED: 1
NUM BPU REQUESTED: 2
NUM BPU REQUESTED: 2
NUM BPU REQUESTED: 4
NUM BPU REQUESTED: 5
O2/TOTAL GAS SETTING VFR VENT: 30 %
O2/TOTAL GAS SETTING VFR VENT: 30 %
O2/TOTAL GAS SETTING VFR VENT: 50 %
OSMOLALITY SERPL: 280 MMOL/KG (ref 280–301)
OSMOLALITY SERPL: 313 MMOL/KG (ref 280–301)
OSMOLALITY UR: 212 MMOL/KG (ref 100–1200)
OSMOLALITY UR: 314 MMOL/KG (ref 100–1200)
OTHER CELLS FLD MANUAL: 62 %
OTHER CELLS FLD MANUAL: 63 %
OXYHGB MFR BLD: 98 % (ref 92–100)
PCO2 BLD: 31 MM HG (ref 35–45)
PCO2 BLD: 36 MM HG (ref 35–45)
PCO2 BLDV: 34 MM HG (ref 40–50)
PCO2 BLDV: 38 MM HG (ref 40–50)
PH BLD: 7.44 PH (ref 7.35–7.45)
PH BLD: 7.49 PH (ref 7.35–7.45)
PH BLDV: 7.42 PH (ref 7.32–7.43)
PH BLDV: 7.56 PH (ref 7.32–7.43)
PH UR STRIP: 5 PH (ref 5–7)
PH UR STRIP: 5.5 PH (ref 5–7)
PHOSPHATE SERPL-MCNC: 2.3 MG/DL (ref 2.5–4.5)
PHOSPHATE SERPL-MCNC: 4.2 MG/DL (ref 2.5–4.5)
PHOSPHATE SERPL-MCNC: 4.5 MG/DL (ref 2.5–4.5)
PHOSPHATE SERPL-MCNC: 4.6 MG/DL (ref 2.5–4.5)
PHOSPHATE SERPL-MCNC: 5.2 MG/DL (ref 2.5–4.5)
PLATELET # BLD AUTO: 101 10E9/L (ref 150–450)
PLATELET # BLD AUTO: 102 10E9/L (ref 150–450)
PLATELET # BLD AUTO: 104 10E9/L (ref 150–450)
PLATELET # BLD AUTO: 123 10E9/L (ref 150–450)
PLATELET # BLD AUTO: 132 10E9/L (ref 150–450)
PLATELET # BLD AUTO: 138 10E9/L (ref 150–450)
PLATELET # BLD AUTO: 145 10E9/L (ref 150–450)
PLATELET # BLD AUTO: 151 10E9/L (ref 150–450)
PLATELET # BLD AUTO: 153 10E9/L (ref 150–450)
PLATELET # BLD AUTO: 156 10E9/L (ref 150–450)
PLATELET # BLD AUTO: 156 10E9/L (ref 150–450)
PLATELET # BLD AUTO: 180 10E9/L (ref 150–450)
PLATELET # BLD AUTO: 180 10E9/L (ref 150–450)
PLATELET # BLD AUTO: 189 10E9/L (ref 150–450)
PLATELET # BLD AUTO: 193 10E9/L (ref 150–450)
PLATELET # BLD AUTO: 198 10E9/L (ref 150–450)
PLATELET # BLD AUTO: 217 10E9/L (ref 150–450)
PLATELET # BLD AUTO: 255 10E9/L (ref 150–450)
PLATELET # BLD AUTO: 49 10E9/L (ref 150–450)
PLATELET # BLD AUTO: 50 10E9/L (ref 150–450)
PLATELET # BLD AUTO: 53 10E9/L (ref 150–450)
PLATELET # BLD AUTO: 61 10E9/L (ref 150–450)
PLATELET # BLD AUTO: 67 10E9/L (ref 150–450)
PLATELET # BLD AUTO: 68 10E9/L (ref 150–450)
PLATELET # BLD AUTO: 68 10E9/L (ref 150–450)
PLATELET # BLD AUTO: 73 10E9/L (ref 150–450)
PLATELET # BLD AUTO: 74 10E9/L (ref 150–450)
PLATELET # BLD AUTO: 75 10E9/L (ref 150–450)
PLATELET # BLD AUTO: 76 10E9/L (ref 150–450)
PLATELET # BLD AUTO: 80 10E9/L (ref 150–450)
PLATELET # BLD AUTO: 81 10E9/L (ref 150–450)
PLATELET # BLD AUTO: 82 10E9/L (ref 150–450)
PLATELET # BLD AUTO: 83 10E9/L (ref 150–450)
PLATELET # BLD AUTO: 83 10E9/L (ref 150–450)
PLATELET # BLD AUTO: 85 10E9/L (ref 150–450)
PLATELET # BLD AUTO: 86 10E9/L (ref 150–450)
PLATELET # BLD AUTO: 88 10E9/L (ref 150–450)
PLATELET # BLD AUTO: 90 10E9/L (ref 150–450)
PLATELET # BLD AUTO: 91 10E9/L (ref 150–450)
PLATELET # BLD AUTO: 94 10E9/L (ref 150–450)
PLATELET # BLD AUTO: 95 10E9/L (ref 150–450)
PLATELET # BLD AUTO: 97 10E9/L (ref 150–450)
PLATELET # BLD AUTO: 98 10E9/L (ref 150–450)
PLATELET # BLD AUTO: NORMAL 10E9/L (ref 150–450)
PLATELET # BLD EST: ABNORMAL 10*3/UL
PO2 BLD: 114 MM HG (ref 80–105)
PO2 BLD: 208 MM HG (ref 80–105)
PO2 BLDV: 26 MM HG (ref 25–47)
PO2 BLDV: 48 MM HG (ref 25–47)
POLYCHROMASIA BLD QL SMEAR: ABNORMAL
POTASSIUM SERPL-SCNC: 2.4 MMOL/L (ref 3.4–5.3)
POTASSIUM SERPL-SCNC: 2.7 MMOL/L (ref 3.4–5.3)
POTASSIUM SERPL-SCNC: 2.8 MMOL/L (ref 3.4–5.3)
POTASSIUM SERPL-SCNC: 3 MMOL/L (ref 3.4–5.3)
POTASSIUM SERPL-SCNC: 3.1 MMOL/L (ref 3.4–5.3)
POTASSIUM SERPL-SCNC: 3.2 MMOL/L (ref 3.4–5.3)
POTASSIUM SERPL-SCNC: 3.3 MMOL/L (ref 3.4–5.3)
POTASSIUM SERPL-SCNC: 3.5 MMOL/L (ref 3.4–5.3)
POTASSIUM SERPL-SCNC: 3.6 MMOL/L (ref 3.4–5.3)
POTASSIUM SERPL-SCNC: 3.7 MMOL/L (ref 3.4–5.3)
POTASSIUM SERPL-SCNC: 3.8 MMOL/L (ref 3.4–5.3)
POTASSIUM SERPL-SCNC: 3.9 MMOL/L (ref 3.4–5.3)
POTASSIUM SERPL-SCNC: 4 MMOL/L (ref 3.4–5.3)
POTASSIUM SERPL-SCNC: 4.1 MMOL/L (ref 3.4–5.3)
POTASSIUM SERPL-SCNC: 4.2 MMOL/L (ref 3.4–5.3)
POTASSIUM SERPL-SCNC: 4.3 MMOL/L (ref 3.4–5.3)
POTASSIUM SERPL-SCNC: 4.4 MMOL/L (ref 3.4–5.3)
POTASSIUM SERPL-SCNC: 4.4 MMOL/L (ref 3.4–5.3)
POTASSIUM SERPL-SCNC: 4.5 MMOL/L (ref 3.4–5.3)
POTASSIUM SERPL-SCNC: 4.5 MMOL/L (ref 3.4–5.3)
POTASSIUM SERPL-SCNC: 4.7 MMOL/L (ref 3.4–5.3)
POTASSIUM SERPL-SCNC: 4.8 MMOL/L (ref 3.4–5.3)
POTASSIUM SERPL-SCNC: 6.3 MMOL/L (ref 3.4–5.3)
POTASSIUM SERPL-SCNC: 7.6 MMOL/L (ref 3.4–5.3)
PROT FLD-MCNC: 0.4 G/DL
PROT FLD-MCNC: 0.5 G/DL
PROT PATTERN SERPL ELPH-IMP: ABNORMAL
PROT PATTERN UR ELPH-IMP: NORMAL
PROT SERPL-MCNC: 4.3 G/DL (ref 6.8–8.8)
PROT SERPL-MCNC: 4.8 G/DL (ref 6.8–8.8)
PROT SERPL-MCNC: 5 G/DL (ref 6.8–8.8)
PROT SERPL-MCNC: 5.3 G/DL (ref 6.8–8.8)
PROT SERPL-MCNC: 5.4 G/DL (ref 6.8–8.8)
PROT SERPL-MCNC: 5.4 G/DL (ref 6.8–8.8)
PROT SERPL-MCNC: 5.5 G/DL (ref 6.8–8.8)
PROT SERPL-MCNC: 5.6 G/DL (ref 6.8–8.8)
PROT SERPL-MCNC: 5.7 G/DL (ref 6.8–8.8)
PROT SERPL-MCNC: 5.7 G/DL (ref 6.8–8.8)
PROT SERPL-MCNC: 5.9 G/DL (ref 6.8–8.8)
PROT SERPL-MCNC: 6 G/DL (ref 6.8–8.8)
PROT SERPL-MCNC: 6 G/DL (ref 6.8–8.8)
PROT SERPL-MCNC: 6.6 G/DL (ref 6.8–8.8)
PROT SERPL-MCNC: 6.7 G/DL (ref 6.8–8.8)
PROT SERPL-MCNC: 7 G/DL (ref 6.8–8.8)
PROT SERPL-MCNC: 7.3 G/DL (ref 6.8–8.8)
PROT SERPL-MCNC: 7.4 G/DL (ref 6.8–8.8)
PROT SERPL-MCNC: 7.5 G/DL (ref 6.8–8.8)
PROT SERPL-MCNC: 7.6 G/DL (ref 6.8–8.8)
PROT SERPL-MCNC: 7.7 G/DL (ref 6.8–8.8)
PROT SERPL-MCNC: 7.7 G/DL (ref 6.8–8.8)
PROT SERPL-MCNC: 7.8 G/DL (ref 6.8–8.8)
PROT SERPL-MCNC: 7.9 G/DL (ref 6.8–8.8)
PTH-INTACT SERPL-MCNC: 58 PG/ML (ref 18–80)
RBC # BLD AUTO: 1.86 10E12/L (ref 3.8–5.2)
RBC # BLD AUTO: 1.87 10E12/L (ref 3.8–5.2)
RBC # BLD AUTO: 2.22 10E12/L (ref 3.8–5.2)
RBC # BLD AUTO: 2.29 10E12/L (ref 3.8–5.2)
RBC # BLD AUTO: 2.31 10E12/L (ref 3.8–5.2)
RBC # BLD AUTO: 2.32 10E12/L (ref 3.8–5.2)
RBC # BLD AUTO: 2.33 10E12/L (ref 3.8–5.2)
RBC # BLD AUTO: 2.35 10E12/L (ref 3.8–5.2)
RBC # BLD AUTO: 2.35 10E12/L (ref 3.8–5.2)
RBC # BLD AUTO: 2.37 10E12/L (ref 3.8–5.2)
RBC # BLD AUTO: 2.38 10E12/L (ref 3.8–5.2)
RBC # BLD AUTO: 2.41 10E12/L (ref 3.8–5.2)
RBC # BLD AUTO: 2.42 10E12/L (ref 3.8–5.2)
RBC # BLD AUTO: 2.42 10E12/L (ref 3.8–5.2)
RBC # BLD AUTO: 2.44 10E12/L (ref 3.8–5.2)
RBC # BLD AUTO: 2.44 10E12/L (ref 3.8–5.2)
RBC # BLD AUTO: 2.45 10E12/L (ref 3.8–5.2)
RBC # BLD AUTO: 2.47 10E12/L (ref 3.8–5.2)
RBC # BLD AUTO: 2.48 10E12/L (ref 3.8–5.2)
RBC # BLD AUTO: 2.5 10E12/L (ref 3.8–5.2)
RBC # BLD AUTO: 2.5 10E12/L (ref 3.8–5.2)
RBC # BLD AUTO: 2.51 10E12/L (ref 3.8–5.2)
RBC # BLD AUTO: 2.51 10E12/L (ref 3.8–5.2)
RBC # BLD AUTO: 2.52 10E12/L (ref 3.8–5.2)
RBC # BLD AUTO: 2.57 10E12/L (ref 3.8–5.2)
RBC # BLD AUTO: 2.68 10E12/L (ref 3.8–5.2)
RBC # BLD AUTO: 3.06 10E12/L (ref 3.8–5.2)
RBC # BLD AUTO: 3.22 10E12/L (ref 3.8–5.2)
RBC # BLD AUTO: 3.46 10E12/L (ref 3.8–5.2)
RBC # BLD AUTO: 3.53 10E12/L (ref 3.8–5.2)
RBC # BLD AUTO: 3.54 10E12/L (ref 3.8–5.2)
RBC # BLD AUTO: 3.61 10E12/L (ref 3.8–5.2)
RBC # BLD AUTO: 3.64 10E12/L (ref 3.8–5.2)
RBC # BLD AUTO: 3.65 10E12/L (ref 3.8–5.2)
RBC # BLD AUTO: 3.65 10E12/L (ref 3.8–5.2)
RBC # BLD AUTO: 3.71 10E12/L (ref 3.8–5.2)
RBC # BLD AUTO: 3.8 10E12/L (ref 3.8–5.2)
RBC # BLD AUTO: 3.86 10E12/L (ref 3.8–5.2)
RBC # BLD AUTO: 3.94 10E12/L (ref 3.8–5.2)
RBC # BLD AUTO: 4.19 10E12/L (ref 3.8–5.2)
RBC # BLD AUTO: NORMAL 10E12/L (ref 3.8–5.2)
RBC #/AREA URNS AUTO: 1 /HPF (ref 0–2)
RBC #/AREA URNS AUTO: 4 /HPF (ref 0–2)
RVA NSP5 STL QL NAA+PROBE: NOT DETECTED
SALMONELLA SP RPOD STL QL NAA+PROBE: NOT DETECTED
SAO2 % BLDV FROM PO2: 58 %
SHIGELLA SP+EIEC IPAH STL QL NAA+PROBE: NOT DETECTED
SODIUM SERPL-SCNC: 120 MMOL/L (ref 133–144)
SODIUM SERPL-SCNC: 122 MMOL/L (ref 133–144)
SODIUM SERPL-SCNC: 123 MMOL/L (ref 133–144)
SODIUM SERPL-SCNC: 125 MMOL/L (ref 133–144)
SODIUM SERPL-SCNC: 125 MMOL/L (ref 133–144)
SODIUM SERPL-SCNC: 126 MMOL/L (ref 133–144)
SODIUM SERPL-SCNC: 127 MMOL/L (ref 133–144)
SODIUM SERPL-SCNC: 128 MMOL/L (ref 133–144)
SODIUM SERPL-SCNC: 128 MMOL/L (ref 133–144)
SODIUM SERPL-SCNC: 129 MMOL/L (ref 133–144)
SODIUM SERPL-SCNC: 130 MMOL/L (ref 133–144)
SODIUM SERPL-SCNC: 131 MMOL/L (ref 133–144)
SODIUM SERPL-SCNC: 131 MMOL/L (ref 133–144)
SODIUM SERPL-SCNC: 132 MMOL/L (ref 133–144)
SODIUM SERPL-SCNC: 133 MMOL/L (ref 133–144)
SODIUM SERPL-SCNC: 134 MMOL/L (ref 133–144)
SODIUM SERPL-SCNC: 135 MMOL/L (ref 133–144)
SODIUM SERPL-SCNC: 135 MMOL/L (ref 133–144)
SODIUM SERPL-SCNC: 136 MMOL/L (ref 133–144)
SODIUM SERPL-SCNC: 139 MMOL/L (ref 133–144)
SODIUM SERPL-SCNC: 139 MMOL/L (ref 133–144)
SODIUM SERPL-SCNC: 141 MMOL/L (ref 133–144)
SODIUM SERPL-SCNC: 141 MMOL/L (ref 133–144)
SODIUM SERPL-SCNC: 142 MMOL/L (ref 133–144)
SODIUM SERPL-SCNC: 142 MMOL/L (ref 133–144)
SODIUM SERPL-SCNC: 143 MMOL/L (ref 133–144)
SODIUM SERPL-SCNC: 143 MMOL/L (ref 133–144)
SODIUM SERPL-SCNC: 145 MMOL/L (ref 133–144)
SODIUM SERPL-SCNC: 146 MMOL/L (ref 133–144)
SODIUM SERPL-SCNC: 147 MMOL/L (ref 133–144)
SODIUM SERPL-SCNC: NORMAL MMOL/L (ref 133–144)
SODIUM UR-SCNC: 8 MMOL/L
SODIUM UR-SCNC: <5 MMOL/L
SOURCE: ABNORMAL
SOURCE: ABNORMAL
SP GR UR STRIP: 1.01 (ref 1–1.03)
SP GR UR STRIP: 1.01 (ref 1–1.03)
SPECIMEN EXP DATE BLD: NORMAL
SPECIMEN SOURCE FLD: NORMAL
SPECIMEN SOURCE: NORMAL
SQUAMOUS #/AREA URNS AUTO: 2 /HPF (ref 0–1)
SQUAMOUS #/AREA URNS AUTO: 5 /HPF (ref 0–1)
TARGETS BLD QL SMEAR: SLIGHT
TARGETS BLD QL SMEAR: SLIGHT
TIBC SERPL-MCNC: 176 UG/DL (ref 240–430)
TIBC SERPL-MCNC: 280 UG/DL (ref 240–430)
TIBC SERPL-MCNC: 341 UG/DL (ref 240–430)
TRANS CELLS #/AREA URNS HPF: <1 /HPF (ref 0–1)
TRANSFUSION STATUS PATIENT QL: NORMAL
TRIGL SERPL-MCNC: 63 MG/DL
TRIGL SERPL-MCNC: NORMAL MG/DL
TRIGL SERPL-MCNC: NORMAL MG/DL
TSH SERPL DL<=0.005 MIU/L-ACNC: 0.82 MU/L (ref 0.4–4)
TSH SERPL DL<=0.005 MIU/L-ACNC: 2.47 MU/L (ref 0.4–4)
TSH SERPL DL<=0.005 MIU/L-ACNC: 3.01 MU/L (ref 0.4–4)
UPPER GI ENDOSCOPY: NORMAL
UPPER GI ENDOSCOPY: NORMAL
URATE SERPL-MCNC: 11 MG/DL (ref 2.6–6)
UROBILINOGEN UR STRIP-MCNC: 2 MG/DL (ref 0–2)
UROBILINOGEN UR STRIP-MCNC: 2 MG/DL (ref 0–2)
V CHOL+PARA RFBL+TRKH+TNAA STL QL NAA+PR: NOT DETECTED
VIT B12 SERPL-MCNC: 1841 PG/ML (ref 193–986)
VIT B12 SERPL-MCNC: 3246 PG/ML (ref 193–986)
WBC # BLD AUTO: 10 10E9/L (ref 4–11)
WBC # BLD AUTO: 5.7 10E9/L (ref 4–11)
WBC # BLD AUTO: 5.9 10E9/L (ref 4–11)
WBC # BLD AUTO: 6 10E9/L (ref 4–11)
WBC # BLD AUTO: 6 10E9/L (ref 4–11)
WBC # BLD AUTO: 6.5 10E9/L (ref 4–11)
WBC # BLD AUTO: 6.6 10E9/L (ref 4–11)
WBC # BLD AUTO: 6.8 10E9/L (ref 4–11)
WBC # BLD AUTO: 6.9 10E9/L (ref 4–11)
WBC # BLD AUTO: 6.9 10E9/L (ref 4–11)
WBC # BLD AUTO: 7.7 10E9/L (ref 4–11)
WBC # BLD AUTO: 7.9 10E9/L (ref 4–11)
WBC # BLD AUTO: 8 10E9/L (ref 4–11)
WBC # BLD AUTO: 8 10E9/L (ref 4–11)
WBC # BLD AUTO: 8.1 10E9/L (ref 4–11)
WBC # BLD AUTO: 8.1 10E9/L (ref 4–11)
WBC # BLD AUTO: 8.2 10E9/L (ref 4–11)
WBC # BLD AUTO: 8.3 10E9/L (ref 4–11)
WBC # BLD AUTO: 8.3 10E9/L (ref 4–11)
WBC # BLD AUTO: 8.4 10E9/L (ref 4–11)
WBC # BLD AUTO: 8.5 10E9/L (ref 4–11)
WBC # BLD AUTO: 8.5 10E9/L (ref 4–11)
WBC # BLD AUTO: 8.6 10E9/L (ref 4–11)
WBC # BLD AUTO: 8.6 10E9/L (ref 4–11)
WBC # BLD AUTO: 8.7 10E9/L (ref 4–11)
WBC # BLD AUTO: 8.9 10E9/L (ref 4–11)
WBC # BLD AUTO: 9 10E9/L (ref 4–11)
WBC # BLD AUTO: 9.1 10E9/L (ref 4–11)
WBC # BLD AUTO: 9.2 10E9/L (ref 4–11)
WBC # BLD AUTO: 9.4 10E9/L (ref 4–11)
WBC # BLD AUTO: 9.5 10E9/L (ref 4–11)
WBC # BLD AUTO: 9.6 10E9/L (ref 4–11)
WBC # BLD AUTO: 9.6 10E9/L (ref 4–11)
WBC # BLD AUTO: 9.8 10E9/L (ref 4–11)
WBC # BLD AUTO: 9.8 10E9/L (ref 4–11)
WBC # BLD AUTO: NORMAL 10E9/L (ref 4–11)
WBC # FLD AUTO: 62 /UL
WBC # FLD AUTO: 80 /UL
WBC #/AREA URNS AUTO: 10 /HPF (ref 0–5)
WBC #/AREA URNS AUTO: 8 /HPF (ref 0–5)
Y ENTERO RECN STL QL NAA+PROBE: NOT DETECTED

## 2019-01-01 PROCEDURE — 85027 COMPLETE CBC AUTOMATED: CPT | Performed by: STUDENT IN AN ORGANIZED HEALTH CARE EDUCATION/TRAINING PROGRAM

## 2019-01-01 PROCEDURE — 25800030 ZZH RX IP 258 OP 636: Performed by: NURSE ANESTHETIST, CERTIFIED REGISTERED

## 2019-01-01 PROCEDURE — 81001 URINALYSIS AUTO W/SCOPE: CPT | Performed by: EMERGENCY MEDICINE

## 2019-01-01 PROCEDURE — 12000001 ZZH R&B MED SURG/OB UMMC

## 2019-01-01 PROCEDURE — 3E033XZ INTRODUCTION OF VASOPRESSOR INTO PERIPHERAL VEIN, PERCUTANEOUS APPROACH: ICD-10-PCS | Performed by: INTERNAL MEDICINE

## 2019-01-01 PROCEDURE — 25000128 H RX IP 250 OP 636: Performed by: STUDENT IN AN ORGANIZED HEALTH CARE EDUCATION/TRAINING PROGRAM

## 2019-01-01 PROCEDURE — 80053 COMPREHEN METABOLIC PANEL: CPT | Performed by: FAMILY MEDICINE

## 2019-01-01 PROCEDURE — 82435 ASSAY OF BLOOD CHLORIDE: CPT

## 2019-01-01 PROCEDURE — 85610 PROTHROMBIN TIME: CPT | Performed by: STUDENT IN AN ORGANIZED HEALTH CARE EDUCATION/TRAINING PROGRAM

## 2019-01-01 PROCEDURE — 89051 BODY FLUID CELL COUNT: CPT | Performed by: STUDENT IN AN ORGANIZED HEALTH CARE EDUCATION/TRAINING PROGRAM

## 2019-01-01 PROCEDURE — 83735 ASSAY OF MAGNESIUM: CPT | Performed by: STUDENT IN AN ORGANIZED HEALTH CARE EDUCATION/TRAINING PROGRAM

## 2019-01-01 PROCEDURE — 84460 ALANINE AMINO (ALT) (SGPT): CPT

## 2019-01-01 PROCEDURE — 36415 COLL VENOUS BLD VENIPUNCTURE: CPT | Performed by: STUDENT IN AN ORGANIZED HEALTH CARE EDUCATION/TRAINING PROGRAM

## 2019-01-01 PROCEDURE — 71045 X-RAY EXAM CHEST 1 VIEW: CPT

## 2019-01-01 PROCEDURE — 80076 HEPATIC FUNCTION PANEL: CPT | Performed by: INTERNAL MEDICINE

## 2019-01-01 PROCEDURE — 85384 FIBRINOGEN ACTIVITY: CPT | Performed by: STUDENT IN AN ORGANIZED HEALTH CARE EDUCATION/TRAINING PROGRAM

## 2019-01-01 PROCEDURE — 25000132 ZZH RX MED GY IP 250 OP 250 PS 637: Performed by: ORTHOPAEDIC SURGERY

## 2019-01-01 PROCEDURE — 80053 COMPREHEN METABOLIC PANEL: CPT | Performed by: STUDENT IN AN ORGANIZED HEALTH CARE EDUCATION/TRAINING PROGRAM

## 2019-01-01 PROCEDURE — 40000809 ZZH STATISTIC NO DOCUMENTATION TO SUPPORT CHARGE

## 2019-01-01 PROCEDURE — 84520 ASSAY OF UREA NITROGEN: CPT

## 2019-01-01 PROCEDURE — 5A1955Z RESPIRATORY VENTILATION, GREATER THAN 96 CONSECUTIVE HOURS: ICD-10-PCS | Performed by: INTERNAL MEDICINE

## 2019-01-01 PROCEDURE — 20000004 ZZH R&B ICU UMMC

## 2019-01-01 PROCEDURE — P9059 PLASMA, FRZ BETWEEN 8-24HOUR: HCPCS | Performed by: STUDENT IN AN ORGANIZED HEALTH CARE EDUCATION/TRAINING PROGRAM

## 2019-01-01 PROCEDURE — 90937 HEMODIALYSIS REPEATED EVAL: CPT

## 2019-01-01 PROCEDURE — 99239 HOSP IP/OBS DSCHRG MGMT >30: CPT | Mod: GC | Performed by: INTERNAL MEDICINE

## 2019-01-01 PROCEDURE — 84295 ASSAY OF SERUM SODIUM: CPT | Performed by: STUDENT IN AN ORGANIZED HEALTH CARE EDUCATION/TRAINING PROGRAM

## 2019-01-01 PROCEDURE — 25000132 ZZH RX MED GY IP 250 OP 250 PS 637: Performed by: STUDENT IN AN ORGANIZED HEALTH CARE EDUCATION/TRAINING PROGRAM

## 2019-01-01 PROCEDURE — 90472 IMMUNIZATION ADMIN EACH ADD: CPT | Performed by: FAMILY MEDICINE

## 2019-01-01 PROCEDURE — 12000004 ZZH R&B IMCU UMMC

## 2019-01-01 PROCEDURE — 89051 BODY FLUID CELL COUNT: CPT | Performed by: EMERGENCY MEDICINE

## 2019-01-01 PROCEDURE — 25000131 ZZH RX MED GY IP 250 OP 636 PS 637: Performed by: STUDENT IN AN ORGANIZED HEALTH CARE EDUCATION/TRAINING PROGRAM

## 2019-01-01 PROCEDURE — 25000125 ZZHC RX 250: Performed by: STUDENT IN AN ORGANIZED HEALTH CARE EDUCATION/TRAINING PROGRAM

## 2019-01-01 PROCEDURE — 97110 THERAPEUTIC EXERCISES: CPT | Mod: GP

## 2019-01-01 PROCEDURE — 87493 C DIFF AMPLIFIED PROBE: CPT | Mod: 59 | Performed by: FAMILY MEDICINE

## 2019-01-01 PROCEDURE — 97530 THERAPEUTIC ACTIVITIES: CPT | Mod: GO | Performed by: OCCUPATIONAL THERAPIST

## 2019-01-01 PROCEDURE — P9047 ALBUMIN (HUMAN), 25%, 50ML: HCPCS | Performed by: STUDENT IN AN ORGANIZED HEALTH CARE EDUCATION/TRAINING PROGRAM

## 2019-01-01 PROCEDURE — 25800030 ZZH RX IP 258 OP 636: Performed by: STUDENT IN AN ORGANIZED HEALTH CARE EDUCATION/TRAINING PROGRAM

## 2019-01-01 PROCEDURE — 36569 INSJ PICC 5 YR+ W/O IMAGING: CPT

## 2019-01-01 PROCEDURE — P9037 PLATE PHERES LEUKOREDU IRRAD: HCPCS | Performed by: STUDENT IN AN ORGANIZED HEALTH CARE EDUCATION/TRAINING PROGRAM

## 2019-01-01 PROCEDURE — 83550 IRON BINDING TEST: CPT | Performed by: FAMILY MEDICINE

## 2019-01-01 PROCEDURE — 83735 ASSAY OF MAGNESIUM: CPT | Performed by: FAMILY MEDICINE

## 2019-01-01 PROCEDURE — 85027 COMPLETE CBC AUTOMATED: CPT | Performed by: INTERNAL MEDICINE

## 2019-01-01 PROCEDURE — 85025 COMPLETE CBC W/AUTO DIFF WBC: CPT | Performed by: EMERGENCY MEDICINE

## 2019-01-01 PROCEDURE — 00000146 ZZHCL STATISTIC GLUCOSE BY METER IP

## 2019-01-01 PROCEDURE — 40000671 ZZH STATISTIC ANESTHESIA CASE

## 2019-01-01 PROCEDURE — 43235 EGD DIAGNOSTIC BRUSH WASH: CPT | Performed by: INTERNAL MEDICINE

## 2019-01-01 PROCEDURE — 84132 ASSAY OF SERUM POTASSIUM: CPT | Performed by: STUDENT IN AN ORGANIZED HEALTH CARE EDUCATION/TRAINING PROGRAM

## 2019-01-01 PROCEDURE — P9016 RBC LEUKOCYTES REDUCED: HCPCS | Performed by: INTERNAL MEDICINE

## 2019-01-01 PROCEDURE — 40000275 ZZH STATISTIC RCP TIME EA 10 MIN

## 2019-01-01 PROCEDURE — G0463 HOSPITAL OUTPT CLINIC VISIT: HCPCS

## 2019-01-01 PROCEDURE — 83735 ASSAY OF MAGNESIUM: CPT | Performed by: NURSE PRACTITIONER

## 2019-01-01 PROCEDURE — 83605 ASSAY OF LACTIC ACID: CPT | Performed by: STUDENT IN AN ORGANIZED HEALTH CARE EDUCATION/TRAINING PROGRAM

## 2019-01-01 PROCEDURE — 85025 COMPLETE CBC W/AUTO DIFF WBC: CPT | Performed by: STUDENT IN AN ORGANIZED HEALTH CARE EDUCATION/TRAINING PROGRAM

## 2019-01-01 PROCEDURE — 97535 SELF CARE MNGMENT TRAINING: CPT | Mod: GO | Performed by: OCCUPATIONAL THERAPIST

## 2019-01-01 PROCEDURE — 99214 OFFICE O/P EST MOD 30 MIN: CPT | Performed by: NURSE PRACTITIONER

## 2019-01-01 PROCEDURE — 25000128 H RX IP 250 OP 636: Performed by: PHYSICIAN ASSISTANT

## 2019-01-01 PROCEDURE — 80048 BASIC METABOLIC PNL TOTAL CA: CPT | Performed by: STUDENT IN AN ORGANIZED HEALTH CARE EDUCATION/TRAINING PROGRAM

## 2019-01-01 PROCEDURE — 84100 ASSAY OF PHOSPHORUS: CPT | Performed by: STUDENT IN AN ORGANIZED HEALTH CARE EDUCATION/TRAINING PROGRAM

## 2019-01-01 PROCEDURE — 85018 HEMOGLOBIN: CPT | Performed by: STUDENT IN AN ORGANIZED HEALTH CARE EDUCATION/TRAINING PROGRAM

## 2019-01-01 PROCEDURE — 94003 VENT MGMT INPAT SUBQ DAY: CPT

## 2019-01-01 PROCEDURE — 80048 BASIC METABOLIC PNL TOTAL CA: CPT | Performed by: INTERNAL MEDICINE

## 2019-01-01 PROCEDURE — 36415 COLL VENOUS BLD VENIPUNCTURE: CPT | Performed by: FAMILY MEDICINE

## 2019-01-01 PROCEDURE — 85049 AUTOMATED PLATELET COUNT: CPT | Performed by: STUDENT IN AN ORGANIZED HEALTH CARE EDUCATION/TRAINING PROGRAM

## 2019-01-01 PROCEDURE — 76937 US GUIDE VASCULAR ACCESS: CPT

## 2019-01-01 PROCEDURE — 80076 HEPATIC FUNCTION PANEL: CPT | Performed by: STUDENT IN AN ORGANIZED HEALTH CARE EDUCATION/TRAINING PROGRAM

## 2019-01-01 PROCEDURE — 97110 THERAPEUTIC EXERCISES: CPT | Mod: GO | Performed by: OCCUPATIONAL THERAPIST

## 2019-01-01 PROCEDURE — 99233 SBSQ HOSP IP/OBS HIGH 50: CPT | Mod: 24 | Performed by: PEDIATRICS

## 2019-01-01 PROCEDURE — 84075 ASSAY ALKALINE PHOSPHATASE: CPT

## 2019-01-01 PROCEDURE — 25000132 ZZH RX MED GY IP 250 OP 250 PS 637: Performed by: HOSPITALIST

## 2019-01-01 PROCEDURE — 82247 BILIRUBIN TOTAL: CPT

## 2019-01-01 PROCEDURE — 82310 ASSAY OF CALCIUM: CPT

## 2019-01-01 PROCEDURE — 84157 ASSAY OF PROTEIN OTHER: CPT | Performed by: EMERGENCY MEDICINE

## 2019-01-01 PROCEDURE — 36558 INSERT TUNNELED CV CATH: CPT | Mod: LT

## 2019-01-01 PROCEDURE — P9012 CRYOPRECIPITATE EACH UNIT: HCPCS | Performed by: STUDENT IN AN ORGANIZED HEALTH CARE EDUCATION/TRAINING PROGRAM

## 2019-01-01 PROCEDURE — C9113 INJ PANTOPRAZOLE SODIUM, VIA: HCPCS | Performed by: STUDENT IN AN ORGANIZED HEALTH CARE EDUCATION/TRAINING PROGRAM

## 2019-01-01 PROCEDURE — 90746 HEPB VACCINE 3 DOSE ADULT IM: CPT | Performed by: FAMILY MEDICINE

## 2019-01-01 PROCEDURE — 82607 VITAMIN B-12: CPT | Performed by: FAMILY MEDICINE

## 2019-01-01 PROCEDURE — 97116 GAIT TRAINING THERAPY: CPT | Mod: GP

## 2019-01-01 PROCEDURE — 83550 IRON BINDING TEST: CPT | Performed by: STUDENT IN AN ORGANIZED HEALTH CARE EDUCATION/TRAINING PROGRAM

## 2019-01-01 PROCEDURE — 40000986 XR CHEST PORT 1 VW

## 2019-01-01 PROCEDURE — 25000128 H RX IP 250 OP 636

## 2019-01-01 PROCEDURE — 97535 SELF CARE MNGMENT TRAINING: CPT | Mod: GO

## 2019-01-01 PROCEDURE — 87070 CULTURE OTHR SPECIMN AEROBIC: CPT | Performed by: EMERGENCY MEDICINE

## 2019-01-01 PROCEDURE — 27211391 ZZ H KIT, 6 FR TL BIOFLO OPEN ENDED PICC

## 2019-01-01 PROCEDURE — 84443 ASSAY THYROID STIM HORMONE: CPT | Performed by: FAMILY MEDICINE

## 2019-01-01 PROCEDURE — 84295 ASSAY OF SERUM SODIUM: CPT

## 2019-01-01 PROCEDURE — 80053 COMPREHEN METABOLIC PANEL: CPT | Performed by: EMERGENCY MEDICINE

## 2019-01-01 PROCEDURE — 86901 BLOOD TYPING SEROLOGIC RH(D): CPT | Performed by: INTERNAL MEDICINE

## 2019-01-01 PROCEDURE — 5A1D70Z PERFORMANCE OF URINARY FILTRATION, INTERMITTENT, LESS THAN 6 HOURS PER DAY: ICD-10-PCS | Performed by: INTERNAL MEDICINE

## 2019-01-01 PROCEDURE — 84155 ASSAY OF PROTEIN SERUM: CPT

## 2019-01-01 PROCEDURE — 99285 EMERGENCY DEPT VISIT HI MDM: CPT | Mod: 25 | Performed by: EMERGENCY MEDICINE

## 2019-01-01 PROCEDURE — 36415 COLL VENOUS BLD VENIPUNCTURE: CPT | Performed by: NURSE PRACTITIONER

## 2019-01-01 PROCEDURE — 82803 BLOOD GASES ANY COMBINATION: CPT | Performed by: STUDENT IN AN ORGANIZED HEALTH CARE EDUCATION/TRAINING PROGRAM

## 2019-01-01 PROCEDURE — 83540 ASSAY OF IRON: CPT | Performed by: STUDENT IN AN ORGANIZED HEALTH CARE EDUCATION/TRAINING PROGRAM

## 2019-01-01 PROCEDURE — 84478 ASSAY OF TRIGLYCERIDES: CPT | Performed by: STUDENT IN AN ORGANIZED HEALTH CARE EDUCATION/TRAINING PROGRAM

## 2019-01-01 PROCEDURE — 84443 ASSAY THYROID STIM HORMONE: CPT | Performed by: STUDENT IN AN ORGANIZED HEALTH CARE EDUCATION/TRAINING PROGRAM

## 2019-01-01 PROCEDURE — 0W3P8ZZ CONTROL BLEEDING IN GASTROINTESTINAL TRACT, VIA NATURAL OR ARTIFICIAL OPENING ENDOSCOPIC: ICD-10-PCS | Performed by: INTERNAL MEDICINE

## 2019-01-01 PROCEDURE — 82565 ASSAY OF CREATININE: CPT | Performed by: STUDENT IN AN ORGANIZED HEALTH CARE EDUCATION/TRAINING PROGRAM

## 2019-01-01 PROCEDURE — 82570 ASSAY OF URINE CREATININE: CPT | Performed by: STUDENT IN AN ORGANIZED HEALTH CARE EDUCATION/TRAINING PROGRAM

## 2019-01-01 PROCEDURE — 0W9G3ZZ DRAINAGE OF PERITONEAL CAVITY, PERCUTANEOUS APPROACH: ICD-10-PCS | Performed by: EMERGENCY MEDICINE

## 2019-01-01 PROCEDURE — 82947 ASSAY GLUCOSE BLOOD QUANT: CPT

## 2019-01-01 PROCEDURE — 82140 ASSAY OF AMMONIA: CPT | Performed by: STUDENT IN AN ORGANIZED HEALTH CARE EDUCATION/TRAINING PROGRAM

## 2019-01-01 PROCEDURE — 83930 ASSAY OF BLOOD OSMOLALITY: CPT | Performed by: STUDENT IN AN ORGANIZED HEALTH CARE EDUCATION/TRAINING PROGRAM

## 2019-01-01 PROCEDURE — 25800030 ZZH RX IP 258 OP 636: Performed by: INTERNAL MEDICINE

## 2019-01-01 PROCEDURE — 84300 ASSAY OF URINE SODIUM: CPT | Performed by: PEDIATRICS

## 2019-01-01 PROCEDURE — 40000879 ZZH CANCELLED SURGERY UP TO 16-30 MINS: Performed by: INTERNAL MEDICINE

## 2019-01-01 PROCEDURE — 82310 ASSAY OF CALCIUM: CPT | Performed by: ORTHOPAEDIC SURGERY

## 2019-01-01 PROCEDURE — 82565 ASSAY OF CREATININE: CPT

## 2019-01-01 PROCEDURE — C1769 GUIDE WIRE: HCPCS

## 2019-01-01 PROCEDURE — 25000132 ZZH RX MED GY IP 250 OP 250 PS 637: Performed by: INTERNAL MEDICINE

## 2019-01-01 PROCEDURE — 99233 SBSQ HOSP IP/OBS HIGH 50: CPT | Mod: GC | Performed by: ORTHOPAEDIC SURGERY

## 2019-01-01 PROCEDURE — 82040 ASSAY OF SERUM ALBUMIN: CPT

## 2019-01-01 PROCEDURE — 83970 ASSAY OF PARATHORMONE: CPT | Performed by: STUDENT IN AN ORGANIZED HEALTH CARE EDUCATION/TRAINING PROGRAM

## 2019-01-01 PROCEDURE — 96367 TX/PROPH/DG ADDL SEQ IV INF: CPT | Performed by: EMERGENCY MEDICINE

## 2019-01-01 PROCEDURE — 49083 ABD PARACENTESIS W/IMAGING: CPT | Performed by: INTERNAL MEDICINE

## 2019-01-01 PROCEDURE — 84132 ASSAY OF SERUM POTASSIUM: CPT | Performed by: ORTHOPAEDIC SURGERY

## 2019-01-01 PROCEDURE — 84132 ASSAY OF SERUM POTASSIUM: CPT

## 2019-01-01 PROCEDURE — 63400005 ZZH RX 634: Performed by: STUDENT IN AN ORGANIZED HEALTH CARE EDUCATION/TRAINING PROGRAM

## 2019-01-01 PROCEDURE — 82248 BILIRUBIN DIRECT: CPT | Performed by: FAMILY MEDICINE

## 2019-01-01 PROCEDURE — 36600 WITHDRAWAL OF ARTERIAL BLOOD: CPT

## 2019-01-01 PROCEDURE — 85610 PROTHROMBIN TIME: CPT | Performed by: FAMILY MEDICINE

## 2019-01-01 PROCEDURE — 99223 1ST HOSP IP/OBS HIGH 75: CPT | Mod: AI | Performed by: ORTHOPAEDIC SURGERY

## 2019-01-01 PROCEDURE — 99232 SBSQ HOSP IP/OBS MODERATE 35: CPT | Mod: GC | Performed by: STUDENT IN AN ORGANIZED HEALTH CARE EDUCATION/TRAINING PROGRAM

## 2019-01-01 PROCEDURE — 80048 BASIC METABOLIC PNL TOTAL CA: CPT | Performed by: FAMILY MEDICINE

## 2019-01-01 PROCEDURE — 80053 COMPREHEN METABOLIC PANEL: CPT | Performed by: NURSE PRACTITIONER

## 2019-01-01 PROCEDURE — 40000141 ZZH STATISTIC PERIPHERAL IV START W/O US GUIDANCE

## 2019-01-01 PROCEDURE — 97530 THERAPEUTIC ACTIVITIES: CPT | Mod: GP

## 2019-01-01 PROCEDURE — 82607 VITAMIN B-12: CPT | Performed by: NURSE PRACTITIONER

## 2019-01-01 PROCEDURE — 85610 PROTHROMBIN TIME: CPT | Performed by: INTERNAL MEDICINE

## 2019-01-01 PROCEDURE — 82374 ASSAY BLOOD CARBON DIOXIDE: CPT

## 2019-01-01 PROCEDURE — 36415 COLL VENOUS BLD VENIPUNCTURE: CPT | Performed by: ORTHOPAEDIC SURGERY

## 2019-01-01 PROCEDURE — 36415 COLL VENOUS BLD VENIPUNCTURE: CPT | Performed by: HOSPITALIST

## 2019-01-01 PROCEDURE — 83605 ASSAY OF LACTIC ACID: CPT | Performed by: INTERNAL MEDICINE

## 2019-01-01 PROCEDURE — 82042 OTHER SOURCE ALBUMIN QUAN EA: CPT | Performed by: STUDENT IN AN ORGANIZED HEALTH CARE EDUCATION/TRAINING PROGRAM

## 2019-01-01 PROCEDURE — 36415 COLL VENOUS BLD VENIPUNCTURE: CPT | Performed by: INTERNAL MEDICINE

## 2019-01-01 PROCEDURE — 82140 ASSAY OF AMMONIA: CPT | Performed by: FAMILY MEDICINE

## 2019-01-01 PROCEDURE — 27210908 ZZH NEEDLE CR4

## 2019-01-01 PROCEDURE — 80321 ALCOHOLS BIOMARKERS 1OR 2: CPT | Performed by: HOSPITALIST

## 2019-01-01 PROCEDURE — 83605 ASSAY OF LACTIC ACID: CPT

## 2019-01-01 PROCEDURE — 84165 PROTEIN E-PHORESIS SERUM: CPT | Performed by: STUDENT IN AN ORGANIZED HEALTH CARE EDUCATION/TRAINING PROGRAM

## 2019-01-01 PROCEDURE — 43244 EGD VARICES LIGATION: CPT | Performed by: INTERNAL MEDICINE

## 2019-01-01 PROCEDURE — 84157 ASSAY OF PROTEIN OTHER: CPT | Performed by: STUDENT IN AN ORGANIZED HEALTH CARE EDUCATION/TRAINING PROGRAM

## 2019-01-01 PROCEDURE — 73090 X-RAY EXAM OF FOREARM: CPT | Mod: LT

## 2019-01-01 PROCEDURE — 40000344 ZZHCL STATISTIC THAWING COMPONENT: Performed by: STUDENT IN AN ORGANIZED HEALTH CARE EDUCATION/TRAINING PROGRAM

## 2019-01-01 PROCEDURE — 49083 ABD PARACENTESIS W/IMAGING: CPT | Mod: Z6 | Performed by: EMERGENCY MEDICINE

## 2019-01-01 PROCEDURE — P9016 RBC LEUKOCYTES REDUCED: HCPCS | Performed by: ANESTHESIOLOGY

## 2019-01-01 PROCEDURE — 90632 HEPA VACCINE ADULT IM: CPT | Performed by: FAMILY MEDICINE

## 2019-01-01 PROCEDURE — 99215 OFFICE O/P EST HI 40 MIN: CPT | Performed by: FAMILY MEDICINE

## 2019-01-01 PROCEDURE — 96375 TX/PRO/DX INJ NEW DRUG ADDON: CPT | Performed by: EMERGENCY MEDICINE

## 2019-01-01 PROCEDURE — 85027 COMPLETE CBC AUTOMATED: CPT | Performed by: NURSE PRACTITIONER

## 2019-01-01 PROCEDURE — 85025 COMPLETE CBC W/AUTO DIFF WBC: CPT | Performed by: FAMILY MEDICINE

## 2019-01-01 PROCEDURE — 87070 CULTURE OTHR SPECIMN AEROBIC: CPT | Performed by: STUDENT IN AN ORGANIZED HEALTH CARE EDUCATION/TRAINING PROGRAM

## 2019-01-01 PROCEDURE — 0W9G3ZZ DRAINAGE OF PERITONEAL CAVITY, PERCUTANEOUS APPROACH: ICD-10-PCS | Performed by: INTERNAL MEDICINE

## 2019-01-01 PROCEDURE — 99310 SBSQ NF CARE HIGH MDM 45: CPT | Performed by: NURSE PRACTITIONER

## 2019-01-01 PROCEDURE — 96361 HYDRATE IV INFUSION ADD-ON: CPT | Performed by: EMERGENCY MEDICINE

## 2019-01-01 PROCEDURE — 82746 ASSAY OF FOLIC ACID SERUM: CPT | Performed by: FAMILY MEDICINE

## 2019-01-01 PROCEDURE — 25000128 H RX IP 250 OP 636: Performed by: NURSE ANESTHETIST, CERTIFIED REGISTERED

## 2019-01-01 PROCEDURE — 76700 US EXAM ABDOM COMPLETE: CPT | Performed by: RADIOLOGY

## 2019-01-01 PROCEDURE — 99291 CRITICAL CARE FIRST HOUR: CPT | Mod: GC | Performed by: SURGERY

## 2019-01-01 PROCEDURE — 86704 HEP B CORE ANTIBODY TOTAL: CPT | Performed by: STUDENT IN AN ORGANIZED HEALTH CARE EDUCATION/TRAINING PROGRAM

## 2019-01-01 PROCEDURE — 99213 OFFICE O/P EST LOW 20 MIN: CPT | Performed by: PHYSICIAN ASSISTANT

## 2019-01-01 PROCEDURE — 99214 OFFICE O/P EST MOD 30 MIN: CPT | Mod: 25 | Performed by: FAMILY MEDICINE

## 2019-01-01 PROCEDURE — 82728 ASSAY OF FERRITIN: CPT | Performed by: STUDENT IN AN ORGANIZED HEALTH CARE EDUCATION/TRAINING PROGRAM

## 2019-01-01 PROCEDURE — 0W9G3ZX DRAINAGE OF PERITONEAL CAVITY, PERCUTANEOUS APPROACH, DIAGNOSTIC: ICD-10-PCS | Performed by: INTERNAL MEDICINE

## 2019-01-01 PROCEDURE — P9059 PLASMA, FRZ BETWEEN 8-24HOUR: HCPCS | Performed by: INTERNAL MEDICINE

## 2019-01-01 PROCEDURE — 97161 PT EVAL LOW COMPLEX 20 MIN: CPT | Mod: GP | Performed by: PHYSICAL THERAPIST

## 2019-01-01 PROCEDURE — 93010 ELECTROCARDIOGRAM REPORT: CPT | Mod: 59 | Performed by: EMERGENCY MEDICINE

## 2019-01-01 PROCEDURE — 25000125 ZZHC RX 250

## 2019-01-01 PROCEDURE — 87015 SPECIMEN INFECT AGNT CONCNTJ: CPT | Performed by: STUDENT IN AN ORGANIZED HEALTH CARE EDUCATION/TRAINING PROGRAM

## 2019-01-01 PROCEDURE — 93005 ELECTROCARDIOGRAM TRACING: CPT

## 2019-01-01 PROCEDURE — 80307 DRUG TEST PRSMV CHEM ANLYZR: CPT | Performed by: STUDENT IN AN ORGANIZED HEALTH CARE EDUCATION/TRAINING PROGRAM

## 2019-01-01 PROCEDURE — 86850 RBC ANTIBODY SCREEN: CPT | Performed by: INTERNAL MEDICINE

## 2019-01-01 PROCEDURE — 83690 ASSAY OF LIPASE: CPT | Performed by: FAMILY MEDICINE

## 2019-01-01 PROCEDURE — G0463 HOSPITAL OUTPT CLINIC VISIT: HCPCS | Mod: ZF

## 2019-01-01 PROCEDURE — 02PAX3Z REMOVAL OF INFUSION DEVICE FROM HEART, EXTERNAL APPROACH: ICD-10-PCS | Performed by: RADIOLOGY

## 2019-01-01 PROCEDURE — 86900 BLOOD TYPING SEROLOGIC ABO: CPT | Performed by: STUDENT IN AN ORGANIZED HEALTH CARE EDUCATION/TRAINING PROGRAM

## 2019-01-01 PROCEDURE — 83930 ASSAY OF BLOOD OSMOLALITY: CPT | Performed by: PEDIATRICS

## 2019-01-01 PROCEDURE — 97530 THERAPEUTIC ACTIVITIES: CPT | Mod: GP | Performed by: PHYSICAL THERAPIST

## 2019-01-01 PROCEDURE — 81001 URINALYSIS AUTO W/SCOPE: CPT | Performed by: PEDIATRICS

## 2019-01-01 PROCEDURE — 87040 BLOOD CULTURE FOR BACTERIA: CPT | Performed by: HOSPITALIST

## 2019-01-01 PROCEDURE — 25000128 H RX IP 250 OP 636: Performed by: INTERNAL MEDICINE

## 2019-01-01 PROCEDURE — 86923 COMPATIBILITY TEST ELECTRIC: CPT | Performed by: INTERNAL MEDICINE

## 2019-01-01 PROCEDURE — 99316 NF DSCHRG MGMT 30 MIN+: CPT | Performed by: NURSE PRACTITIONER

## 2019-01-01 PROCEDURE — 84443 ASSAY THYROID STIM HORMONE: CPT | Performed by: NURSE PRACTITIONER

## 2019-01-01 PROCEDURE — 86901 BLOOD TYPING SEROLOGIC RH(D): CPT | Performed by: STUDENT IN AN ORGANIZED HEALTH CARE EDUCATION/TRAINING PROGRAM

## 2019-01-01 PROCEDURE — 83935 ASSAY OF URINE OSMOLALITY: CPT | Performed by: PEDIATRICS

## 2019-01-01 PROCEDURE — 93975 VASCULAR STUDY: CPT | Performed by: RADIOLOGY

## 2019-01-01 PROCEDURE — 83935 ASSAY OF URINE OSMOLALITY: CPT | Performed by: STUDENT IN AN ORGANIZED HEALTH CARE EDUCATION/TRAINING PROGRAM

## 2019-01-01 PROCEDURE — 82042 OTHER SOURCE ALBUMIN QUAN EA: CPT | Performed by: EMERGENCY MEDICINE

## 2019-01-01 PROCEDURE — 80307 DRUG TEST PRSMV CHEM ANLYZR: CPT | Performed by: HOSPITALIST

## 2019-01-01 PROCEDURE — 70450 CT HEAD/BRAIN W/O DYE: CPT

## 2019-01-01 PROCEDURE — 85730 THROMBOPLASTIN TIME PARTIAL: CPT | Performed by: STUDENT IN AN ORGANIZED HEALTH CARE EDUCATION/TRAINING PROGRAM

## 2019-01-01 PROCEDURE — 97116 GAIT TRAINING THERAPY: CPT | Mod: GP | Performed by: PHYSICAL THERAPIST

## 2019-01-01 PROCEDURE — 40000344 ZZHCL STATISTIC THAWING COMPONENT: Performed by: INTERNAL MEDICINE

## 2019-01-01 PROCEDURE — 25000125 ZZHC RX 250: Performed by: INTERNAL MEDICINE

## 2019-01-01 PROCEDURE — 85025 COMPLETE CBC W/AUTO DIFF WBC: CPT | Performed by: NURSE PRACTITIONER

## 2019-01-01 PROCEDURE — P9016 RBC LEUKOCYTES REDUCED: HCPCS | Performed by: STUDENT IN AN ORGANIZED HEALTH CARE EDUCATION/TRAINING PROGRAM

## 2019-01-01 PROCEDURE — 97116 GAIT TRAINING THERAPY: CPT | Mod: GP | Performed by: REHABILITATION PRACTITIONER

## 2019-01-01 PROCEDURE — 87086 URINE CULTURE/COLONY COUNT: CPT | Performed by: INTERNAL MEDICINE

## 2019-01-01 PROCEDURE — 99291 CRITICAL CARE FIRST HOUR: CPT | Mod: GC | Performed by: INTERNAL MEDICINE

## 2019-01-01 PROCEDURE — 84132 ASSAY OF SERUM POTASSIUM: CPT | Performed by: INTERNAL MEDICINE

## 2019-01-01 PROCEDURE — 76700 US EXAM ABDOM COMPLETE: CPT | Mod: 59 | Performed by: RADIOLOGY

## 2019-01-01 PROCEDURE — 76770 US EXAM ABDO BACK WALL COMP: CPT

## 2019-01-01 PROCEDURE — 00000402 ZZHCL STATISTIC TOTAL PROTEIN: Performed by: STUDENT IN AN ORGANIZED HEALTH CARE EDUCATION/TRAINING PROGRAM

## 2019-01-01 PROCEDURE — 97168 OT RE-EVAL EST PLAN CARE: CPT | Mod: GO | Performed by: OCCUPATIONAL THERAPIST

## 2019-01-01 PROCEDURE — 86923 COMPATIBILITY TEST ELECTRIC: CPT | Performed by: ANESTHESIOLOGY

## 2019-01-01 PROCEDURE — 87205 SMEAR GRAM STAIN: CPT | Performed by: STUDENT IN AN ORGANIZED HEALTH CARE EDUCATION/TRAINING PROGRAM

## 2019-01-01 PROCEDURE — 83550 IRON BINDING TEST: CPT | Performed by: NURSE PRACTITIONER

## 2019-01-01 PROCEDURE — 83540 ASSAY OF IRON: CPT | Performed by: FAMILY MEDICINE

## 2019-01-01 PROCEDURE — 99238 HOSP IP/OBS DSCHRG MGMT 30/<: CPT | Mod: GC | Performed by: PEDIATRICS

## 2019-01-01 PROCEDURE — 93306 TTE W/DOPPLER COMPLETE: CPT

## 2019-01-01 PROCEDURE — 97112 NEUROMUSCULAR REEDUCATION: CPT | Mod: GP

## 2019-01-01 PROCEDURE — 25800030 ZZH RX IP 258 OP 636: Performed by: ORTHOPAEDIC SURGERY

## 2019-01-01 PROCEDURE — 82306 VITAMIN D 25 HYDROXY: CPT | Performed by: STUDENT IN AN ORGANIZED HEALTH CARE EDUCATION/TRAINING PROGRAM

## 2019-01-01 PROCEDURE — 92610 EVALUATE SWALLOWING FUNCTION: CPT | Mod: GN

## 2019-01-01 PROCEDURE — 97140 MANUAL THERAPY 1/> REGIONS: CPT | Mod: GO | Performed by: OCCUPATIONAL THERAPIST

## 2019-01-01 PROCEDURE — 99233 SBSQ HOSP IP/OBS HIGH 50: CPT | Performed by: INTERNAL MEDICINE

## 2019-01-01 PROCEDURE — 20600000 ZZH R&B BMT

## 2019-01-01 PROCEDURE — 83735 ASSAY OF MAGNESIUM: CPT | Performed by: INTERNAL MEDICINE

## 2019-01-01 PROCEDURE — 90471 IMMUNIZATION ADMIN: CPT | Performed by: FAMILY MEDICINE

## 2019-01-01 PROCEDURE — 99291 CRITICAL CARE FIRST HOUR: CPT | Mod: GC | Performed by: PHYSICIAN ASSISTANT

## 2019-01-01 PROCEDURE — 82330 ASSAY OF CALCIUM: CPT | Performed by: INTERNAL MEDICINE

## 2019-01-01 PROCEDURE — 84166 PROTEIN E-PHORESIS/URINE/CSF: CPT | Performed by: STUDENT IN AN ORGANIZED HEALTH CARE EDUCATION/TRAINING PROGRAM

## 2019-01-01 PROCEDURE — 97165 OT EVAL LOW COMPLEX 30 MIN: CPT | Mod: GO

## 2019-01-01 PROCEDURE — 86900 BLOOD TYPING SEROLOGIC ABO: CPT | Performed by: ANESTHESIOLOGY

## 2019-01-01 PROCEDURE — 99233 SBSQ HOSP IP/OBS HIGH 50: CPT | Mod: GC | Performed by: INTERNAL MEDICINE

## 2019-01-01 PROCEDURE — 82330 ASSAY OF CALCIUM: CPT | Performed by: STUDENT IN AN ORGANIZED HEALTH CARE EDUCATION/TRAINING PROGRAM

## 2019-01-01 PROCEDURE — 82803 BLOOD GASES ANY COMBINATION: CPT

## 2019-01-01 PROCEDURE — 99152 MOD SED SAME PHYS/QHP 5/>YRS: CPT

## 2019-01-01 PROCEDURE — 86923 COMPATIBILITY TEST ELECTRIC: CPT | Performed by: STUDENT IN AN ORGANIZED HEALTH CARE EDUCATION/TRAINING PROGRAM

## 2019-01-01 PROCEDURE — 76705 ECHO EXAM OF ABDOMEN: CPT

## 2019-01-01 PROCEDURE — 25000125 ZZHC RX 250: Performed by: PHYSICIAN ASSISTANT

## 2019-01-01 PROCEDURE — 3E0436Z INTRODUCTION OF NUTRITIONAL SUBSTANCE INTO CENTRAL VEIN, PERCUTANEOUS APPROACH: ICD-10-PCS | Performed by: INTERNAL MEDICINE

## 2019-01-01 PROCEDURE — 87086 URINE CULTURE/COLONY COUNT: CPT | Performed by: EMERGENCY MEDICINE

## 2019-01-01 PROCEDURE — 93000 ELECTROCARDIOGRAM COMPLETE: CPT | Performed by: FAMILY MEDICINE

## 2019-01-01 PROCEDURE — 82140 ASSAY OF AMMONIA: CPT | Performed by: EMERGENCY MEDICINE

## 2019-01-01 PROCEDURE — 27211417 ZZ H KIT, VPS RHYTHM STYLET

## 2019-01-01 PROCEDURE — 96365 THER/PROPH/DIAG IV INF INIT: CPT | Performed by: EMERGENCY MEDICINE

## 2019-01-01 PROCEDURE — 99153 MOD SED SAME PHYS/QHP EA: CPT

## 2019-01-01 PROCEDURE — 87040 BLOOD CULTURE FOR BACTERIA: CPT | Performed by: EMERGENCY MEDICINE

## 2019-01-01 PROCEDURE — 83540 ASSAY OF IRON: CPT | Performed by: NURSE PRACTITIONER

## 2019-01-01 PROCEDURE — 86901 BLOOD TYPING SEROLOGIC RH(D): CPT | Performed by: ANESTHESIOLOGY

## 2019-01-01 PROCEDURE — 82728 ASSAY OF FERRITIN: CPT | Performed by: NURSE PRACTITIONER

## 2019-01-01 PROCEDURE — 84300 ASSAY OF URINE SODIUM: CPT | Performed by: STUDENT IN AN ORGANIZED HEALTH CARE EDUCATION/TRAINING PROGRAM

## 2019-01-01 PROCEDURE — 85610 PROTHROMBIN TIME: CPT | Performed by: NURSE PRACTITIONER

## 2019-01-01 PROCEDURE — 80320 DRUG SCREEN QUANTALCOHOLS: CPT | Performed by: STUDENT IN AN ORGANIZED HEALTH CARE EDUCATION/TRAINING PROGRAM

## 2019-01-01 PROCEDURE — 86850 RBC ANTIBODY SCREEN: CPT | Performed by: ANESTHESIOLOGY

## 2019-01-01 PROCEDURE — 36589 REMOVAL TUNNELED CV CATH: CPT | Mod: RT

## 2019-01-01 PROCEDURE — 82728 ASSAY OF FERRITIN: CPT | Performed by: FAMILY MEDICINE

## 2019-01-01 PROCEDURE — 02H633Z INSERTION OF INFUSION DEVICE INTO RIGHT ATRIUM, PERCUTANEOUS APPROACH: ICD-10-PCS | Performed by: PHYSICIAN ASSISTANT

## 2019-01-01 PROCEDURE — 97530 THERAPEUTIC ACTIVITIES: CPT | Mod: GO

## 2019-01-01 PROCEDURE — 85610 PROTHROMBIN TIME: CPT | Performed by: EMERGENCY MEDICINE

## 2019-01-01 PROCEDURE — 93005 ELECTROCARDIOGRAM TRACING: CPT | Performed by: EMERGENCY MEDICINE

## 2019-01-01 PROCEDURE — 86850 RBC ANTIBODY SCREEN: CPT | Performed by: STUDENT IN AN ORGANIZED HEALTH CARE EDUCATION/TRAINING PROGRAM

## 2019-01-01 PROCEDURE — 87506 IADNA-DNA/RNA PROBE TQ 6-11: CPT | Performed by: FAMILY MEDICINE

## 2019-01-01 PROCEDURE — 94002 VENT MGMT INPAT INIT DAY: CPT

## 2019-01-01 PROCEDURE — 99223 1ST HOSP IP/OBS HIGH 75: CPT | Mod: AI | Performed by: INTERNAL MEDICINE

## 2019-01-01 PROCEDURE — C1750 CATH, HEMODIALYSIS,LONG-TERM: HCPCS

## 2019-01-01 PROCEDURE — 87015 SPECIMEN INFECT AGNT CONCNTJ: CPT | Performed by: EMERGENCY MEDICINE

## 2019-01-01 PROCEDURE — 86706 HEP B SURFACE ANTIBODY: CPT | Performed by: STUDENT IN AN ORGANIZED HEALTH CARE EDUCATION/TRAINING PROGRAM

## 2019-01-01 PROCEDURE — 49083 ABD PARACENTESIS W/IMAGING: CPT | Performed by: EMERGENCY MEDICINE

## 2019-01-01 PROCEDURE — 99215 OFFICE O/P EST HI 40 MIN: CPT | Performed by: NURSE PRACTITIONER

## 2019-01-01 PROCEDURE — 96366 THER/PROPH/DIAG IV INF ADDON: CPT | Performed by: EMERGENCY MEDICINE

## 2019-01-01 PROCEDURE — 82810 BLOOD GASES O2 SAT ONLY: CPT | Performed by: STUDENT IN AN ORGANIZED HEALTH CARE EDUCATION/TRAINING PROGRAM

## 2019-01-01 PROCEDURE — 99233 SBSQ HOSP IP/OBS HIGH 50: CPT | Performed by: ORTHOPAEDIC SURGERY

## 2019-01-01 PROCEDURE — 27210732 ZZH ACCESSORY CR1

## 2019-01-01 PROCEDURE — 83690 ASSAY OF LIPASE: CPT | Performed by: EMERGENCY MEDICINE

## 2019-01-01 PROCEDURE — 25000128 H RX IP 250 OP 636: Performed by: EMERGENCY MEDICINE

## 2019-01-01 PROCEDURE — 84999 UNLISTED CHEMISTRY PROCEDURE: CPT | Performed by: HOSPITALIST

## 2019-01-01 PROCEDURE — 93010 ELECTROCARDIOGRAM REPORT: CPT | Performed by: INTERNAL MEDICINE

## 2019-01-01 PROCEDURE — P9041 ALBUMIN (HUMAN),5%, 50ML: HCPCS | Performed by: STUDENT IN AN ORGANIZED HEALTH CARE EDUCATION/TRAINING PROGRAM

## 2019-01-01 PROCEDURE — 27211193 ZZ H WOUND GLUE CR1

## 2019-01-01 PROCEDURE — 93306 TTE W/DOPPLER COMPLETE: CPT | Mod: 26 | Performed by: INTERNAL MEDICINE

## 2019-01-01 PROCEDURE — 86481 TB AG RESPONSE T-CELL SUSP: CPT | Performed by: STUDENT IN AN ORGANIZED HEALTH CARE EDUCATION/TRAINING PROGRAM

## 2019-01-01 PROCEDURE — 80053 COMPREHEN METABOLIC PANEL: CPT | Performed by: INTERNAL MEDICINE

## 2019-01-01 PROCEDURE — 84550 ASSAY OF BLOOD/URIC ACID: CPT | Performed by: FAMILY MEDICINE

## 2019-01-01 PROCEDURE — 25800030 ZZH RX IP 258 OP 636: Performed by: EMERGENCY MEDICINE

## 2019-01-01 PROCEDURE — 86900 BLOOD TYPING SEROLOGIC ABO: CPT | Performed by: INTERNAL MEDICINE

## 2019-01-01 PROCEDURE — 25000132 ZZH RX MED GY IP 250 OP 250 PS 637: Performed by: EMERGENCY MEDICINE

## 2019-01-01 PROCEDURE — 97110 THERAPEUTIC EXERCISES: CPT | Mod: GP | Performed by: REHABILITATION PRACTITIONER

## 2019-01-01 PROCEDURE — 87205 SMEAR GRAM STAIN: CPT | Performed by: EMERGENCY MEDICINE

## 2019-01-01 PROCEDURE — 97162 PT EVAL MOD COMPLEX 30 MIN: CPT | Mod: GP

## 2019-01-01 PROCEDURE — 87340 HEPATITIS B SURFACE AG IA: CPT | Performed by: STUDENT IN AN ORGANIZED HEALTH CARE EDUCATION/TRAINING PROGRAM

## 2019-01-01 RX ORDER — HEPARIN SODIUM 1000 [USP'U]/ML
1.6 INJECTION, SOLUTION INTRAVENOUS; SUBCUTANEOUS ONCE
Status: COMPLETED | OUTPATIENT
Start: 2019-01-01 | End: 2019-01-01

## 2019-01-01 RX ORDER — FERROUS SULFATE 325(65) MG
650 TABLET ORAL DAILY
Status: DISCONTINUED | OUTPATIENT
Start: 2019-01-01 | End: 2019-01-01

## 2019-01-01 RX ORDER — TORSEMIDE 10 MG/1
10 TABLET ORAL 2 TIMES DAILY
Status: DISCONTINUED | OUTPATIENT
Start: 2019-01-01 | End: 2019-01-01

## 2019-01-01 RX ORDER — HEPARIN SODIUM 1000 [USP'U]/ML
3 INJECTION, SOLUTION INTRAVENOUS; SUBCUTANEOUS ONCE
Status: DISCONTINUED | OUTPATIENT
Start: 2019-01-01 | End: 2019-01-01

## 2019-01-01 RX ORDER — COLCHICINE 0.6 MG/1
0.6 TABLET ORAL 2 TIMES DAILY
Qty: 180 TABLET | Refills: 1 | Status: SHIPPED | OUTPATIENT
Start: 2019-01-01 | End: 2019-01-01

## 2019-01-01 RX ORDER — CEFTRIAXONE 1 G/1
1 INJECTION, POWDER, FOR SOLUTION INTRAMUSCULAR; INTRAVENOUS EVERY 24 HOURS
Status: DISCONTINUED | OUTPATIENT
Start: 2019-01-01 | End: 2019-01-01

## 2019-01-01 RX ORDER — NADOLOL 20 MG/1
20 TABLET ORAL DAILY
Status: DISCONTINUED | OUTPATIENT
Start: 2019-01-01 | End: 2019-01-01

## 2019-01-01 RX ORDER — LACTULOSE 10 G/15ML
20 SOLUTION ORAL
Status: DISCONTINUED | OUTPATIENT
Start: 2019-01-01 | End: 2019-01-01

## 2019-01-01 RX ORDER — DEXTROSE MONOHYDRATE 25 G/50ML
25-50 INJECTION, SOLUTION INTRAVENOUS
Status: DISCONTINUED | OUTPATIENT
Start: 2019-01-01 | End: 2019-01-01 | Stop reason: HOSPADM

## 2019-01-01 RX ORDER — FENTANYL CITRATE 50 UG/ML
25 INJECTION, SOLUTION INTRAMUSCULAR; INTRAVENOUS EVERY 5 MIN PRN
Status: ACTIVE | OUTPATIENT
Start: 2019-01-01 | End: 2019-01-01

## 2019-01-01 RX ORDER — SODIUM CHLORIDE 9 MG/ML
INJECTION, SOLUTION INTRAVENOUS CONTINUOUS
Status: DISCONTINUED | OUTPATIENT
Start: 2019-01-01 | End: 2019-01-01

## 2019-01-01 RX ORDER — FENTANYL CITRATE 50 UG/ML
100 INJECTION, SOLUTION INTRAMUSCULAR; INTRAVENOUS ONCE
Status: COMPLETED | OUTPATIENT
Start: 2019-01-01 | End: 2019-01-01

## 2019-01-01 RX ORDER — SPIRONOLACTONE 25 MG/1
50 TABLET ORAL DAILY
Qty: 60 TABLET | Refills: 0 | Status: ON HOLD | OUTPATIENT
Start: 2019-01-01 | End: 2019-01-01

## 2019-01-01 RX ORDER — PROPOFOL 10 MG/ML
INJECTION, EMULSION INTRAVENOUS PRN
Status: DISCONTINUED | OUTPATIENT
Start: 2019-01-01 | End: 2019-01-01

## 2019-01-01 RX ORDER — MIDODRINE HYDROCHLORIDE 5 MG/1
20 TABLET ORAL 3 TIMES DAILY
Status: DISCONTINUED | OUTPATIENT
Start: 2019-01-01 | End: 2019-01-01 | Stop reason: HOSPADM

## 2019-01-01 RX ORDER — NICOTINE POLACRILEX 4 MG
15-30 LOZENGE BUCCAL
Status: CANCELLED | OUTPATIENT
Start: 2019-01-01

## 2019-01-01 RX ORDER — NADOLOL 40 MG/1
40 TABLET ORAL DAILY
Qty: 90 TABLET | Refills: 3 | Status: SHIPPED | OUTPATIENT
Start: 2019-01-01 | End: 2019-01-01

## 2019-01-01 RX ORDER — MULTIVITAMIN,THERAPEUTIC
1 TABLET ORAL DAILY
Status: DISCONTINUED | OUTPATIENT
Start: 2019-01-01 | End: 2019-01-01 | Stop reason: HOSPADM

## 2019-01-01 RX ORDER — TRIAMCINOLONE ACETONIDE 1 MG/G
CREAM TOPICAL 2 TIMES DAILY
Status: DISCONTINUED | OUTPATIENT
Start: 2019-01-01 | End: 2019-01-01

## 2019-01-01 RX ORDER — FLUMAZENIL 0.1 MG/ML
0.2 INJECTION, SOLUTION INTRAVENOUS
Status: ACTIVE | OUTPATIENT
Start: 2019-01-01 | End: 2019-01-01

## 2019-01-01 RX ORDER — SUMATRIPTAN 100 MG/1
100 TABLET, FILM COATED ORAL
Qty: 18 TABLET | Refills: 3 | Status: SHIPPED | OUTPATIENT
Start: 2019-01-01 | End: 2019-01-01

## 2019-01-01 RX ORDER — NOREPINEPHRINE BITARTRATE 0.06 MG/ML
INJECTION, SOLUTION INTRAVENOUS
Status: COMPLETED
Start: 2019-01-01 | End: 2019-01-01

## 2019-01-01 RX ORDER — MIDODRINE HYDROCHLORIDE 5 MG/1
10 TABLET ORAL
Status: DISCONTINUED | OUTPATIENT
Start: 2019-01-01 | End: 2019-01-01

## 2019-01-01 RX ORDER — FENTANYL CITRATE 50 UG/ML
50-100 INJECTION, SOLUTION INTRAMUSCULAR; INTRAVENOUS
Status: DISCONTINUED | OUTPATIENT
Start: 2019-01-01 | End: 2019-01-01

## 2019-01-01 RX ORDER — NALOXONE HYDROCHLORIDE 0.4 MG/ML
.1-.4 INJECTION, SOLUTION INTRAMUSCULAR; INTRAVENOUS; SUBCUTANEOUS
Status: DISCONTINUED | OUTPATIENT
Start: 2019-01-01 | End: 2019-01-01

## 2019-01-01 RX ORDER — LACTULOSE 10 G/15ML
30 SOLUTION ORAL
Status: DISCONTINUED | OUTPATIENT
Start: 2019-01-01 | End: 2019-01-01

## 2019-01-01 RX ORDER — POTASSIUM CHLORIDE 750 MG/1
40 TABLET, EXTENDED RELEASE ORAL ONCE
Status: COMPLETED | OUTPATIENT
Start: 2019-01-01 | End: 2019-01-01

## 2019-01-01 RX ORDER — OXYMETAZOLINE HYDROCHLORIDE 0.05 G/100ML
2 SPRAY NASAL ONCE
Status: COMPLETED | OUTPATIENT
Start: 2019-01-01 | End: 2019-01-01

## 2019-01-01 RX ORDER — LIDOCAINE HYDROCHLORIDE 10 MG/ML
1-30 INJECTION, SOLUTION EPIDURAL; INFILTRATION; INTRACAUDAL; PERINEURAL
Status: COMPLETED | OUTPATIENT
Start: 2019-01-01 | End: 2019-01-01

## 2019-01-01 RX ORDER — SPIRONOLACTONE 50 MG/1
50 TABLET, FILM COATED ORAL DAILY
Status: CANCELLED | OUTPATIENT
Start: 2019-01-01

## 2019-01-01 RX ORDER — FOLIC ACID 1 MG/1
1 TABLET ORAL DAILY
Status: DISCONTINUED | OUTPATIENT
Start: 2019-01-01 | End: 2019-01-01 | Stop reason: HOSPADM

## 2019-01-01 RX ORDER — FERROUS SULFATE 325(65) MG
325 TABLET ORAL
Qty: 90 TABLET | Refills: 0 | Status: SHIPPED | OUTPATIENT
Start: 2019-01-01 | End: 2019-01-01

## 2019-01-01 RX ORDER — NADOLOL 40 MG/1
40 TABLET ORAL DAILY
Status: ON HOLD | COMMUNITY
End: 2019-01-01

## 2019-01-01 RX ORDER — MIDODRINE HYDROCHLORIDE 5 MG/1
5 TABLET ORAL
Status: DISCONTINUED | OUTPATIENT
Start: 2019-01-01 | End: 2019-01-01

## 2019-01-01 RX ORDER — NALOXONE HYDROCHLORIDE 0.4 MG/ML
1 INJECTION, SOLUTION INTRAMUSCULAR; INTRAVENOUS; SUBCUTANEOUS ONCE
Status: DISCONTINUED | OUTPATIENT
Start: 2019-01-01 | End: 2019-01-01

## 2019-01-01 RX ORDER — LACTULOSE 20 G/30ML
30 SOLUTION ORAL 2 TIMES DAILY
Status: ON HOLD | COMMUNITY
End: 2019-01-01

## 2019-01-01 RX ORDER — FENTANYL CITRATE 50 UG/ML
INJECTION, SOLUTION INTRAMUSCULAR; INTRAVENOUS
Status: COMPLETED
Start: 2019-01-01 | End: 2019-01-01

## 2019-01-01 RX ORDER — CYCLOBENZAPRINE HCL 10 MG
10 TABLET ORAL 3 TIMES DAILY PRN
Qty: 30 TABLET | Refills: 0 | Status: SHIPPED | OUTPATIENT
Start: 2019-01-01 | End: 2019-01-01

## 2019-01-01 RX ORDER — NADOLOL 20 MG/1
40 TABLET ORAL DAILY
Status: DISCONTINUED | OUTPATIENT
Start: 2019-01-01 | End: 2019-01-01 | Stop reason: HOSPADM

## 2019-01-01 RX ORDER — NADOLOL 40 MG/1
40 TABLET ORAL DAILY
Qty: 14 TABLET | Refills: 0 | Status: SHIPPED | OUTPATIENT
Start: 2019-01-01 | End: 2019-01-01

## 2019-01-01 RX ORDER — LIDOCAINE 40 MG/G
CREAM TOPICAL
Status: DISCONTINUED | OUTPATIENT
Start: 2019-01-01 | End: 2019-01-01 | Stop reason: HOSPADM

## 2019-01-01 RX ORDER — POTASSIUM CL/LIDO/0.9 % NACL 10MEQ/0.1L
10 INTRAVENOUS SOLUTION, PIGGYBACK (ML) INTRAVENOUS
Status: DISCONTINUED | OUTPATIENT
Start: 2019-01-01 | End: 2019-01-01

## 2019-01-01 RX ORDER — MIDODRINE HYDROCHLORIDE 10 MG/1
10 TABLET ORAL
Qty: 90 TABLET | Refills: 1 | Status: CANCELLED | OUTPATIENT
Start: 2019-01-01

## 2019-01-01 RX ORDER — ALLOPURINOL 100 MG/1
100 TABLET ORAL
Status: DISCONTINUED | OUTPATIENT
Start: 2019-01-01 | End: 2019-01-01 | Stop reason: HOSPADM

## 2019-01-01 RX ORDER — FOLIC ACID 1 MG/1
1 TABLET ORAL
Status: DISCONTINUED | OUTPATIENT
Start: 2019-01-01 | End: 2019-01-01

## 2019-01-01 RX ORDER — CEFAZOLIN SODIUM 2 G/100ML
2 INJECTION, SOLUTION INTRAVENOUS
Status: CANCELLED | OUTPATIENT
Start: 2019-01-01

## 2019-01-01 RX ORDER — LIDOCAINE 40 MG/G
CREAM TOPICAL
Status: DISCONTINUED | OUTPATIENT
Start: 2019-01-01 | End: 2019-01-01

## 2019-01-01 RX ORDER — PANTOPRAZOLE SODIUM 40 MG/1
40 TABLET, DELAYED RELEASE ORAL DAILY
Status: DISCONTINUED | OUTPATIENT
Start: 2019-01-01 | End: 2019-01-01

## 2019-01-01 RX ORDER — CIPROFLOXACIN 250 MG/1
250 TABLET, FILM COATED ORAL DAILY
Status: DISCONTINUED | OUTPATIENT
Start: 2019-01-01 | End: 2019-01-01

## 2019-01-01 RX ORDER — MULTIVITAMIN,THERAPEUTIC
1 TABLET ORAL
Status: DISCONTINUED | OUTPATIENT
Start: 2019-01-01 | End: 2019-01-01

## 2019-01-01 RX ORDER — POTASSIUM CHLORIDE 1500 MG/1
20 TABLET, EXTENDED RELEASE ORAL
Qty: 60 TABLET | Refills: 11 | Status: SHIPPED | OUTPATIENT
Start: 2019-01-01 | End: 2019-01-01

## 2019-01-01 RX ORDER — ALLOPURINOL 100 MG/1
TABLET ORAL
Qty: 60 TABLET | Refills: 11 | Status: ON HOLD | OUTPATIENT
Start: 2019-01-01 | End: 2019-01-01

## 2019-01-01 RX ORDER — ZINC SULFATE 50(220)MG
220 CAPSULE ORAL 2 TIMES DAILY
Status: DISCONTINUED | OUTPATIENT
Start: 2019-01-01 | End: 2019-01-01

## 2019-01-01 RX ORDER — NADOLOL 40 MG/1
40 TABLET ORAL
Status: DISCONTINUED | OUTPATIENT
Start: 2019-01-01 | End: 2019-01-01

## 2019-01-01 RX ORDER — ZINC SULFATE 50(220)MG
220 CAPSULE ORAL 2 TIMES DAILY
Qty: 60 CAPSULE | Refills: 3 | Status: SHIPPED | OUTPATIENT
Start: 2019-01-01

## 2019-01-01 RX ORDER — FERROUS SULFATE 324(65)MG
648 TABLET, DELAYED RELEASE (ENTERIC COATED) ORAL DAILY
Status: ON HOLD | COMMUNITY
End: 2019-01-01

## 2019-01-01 RX ORDER — NADOLOL 40 MG/1
40 TABLET ORAL DAILY
Status: DISCONTINUED | OUTPATIENT
Start: 2019-01-01 | End: 2019-01-01

## 2019-01-01 RX ORDER — CYCLOBENZAPRINE HCL 10 MG
10 TABLET ORAL 3 TIMES DAILY PRN
Status: DISCONTINUED | OUTPATIENT
Start: 2019-01-01 | End: 2019-01-01 | Stop reason: HOSPADM

## 2019-01-01 RX ORDER — PANTOPRAZOLE SODIUM 40 MG/1
40 TABLET, DELAYED RELEASE ORAL DAILY
Qty: 90 TABLET | Refills: 3 | Status: SHIPPED | OUTPATIENT
Start: 2019-01-01 | End: 2019-01-01

## 2019-01-01 RX ORDER — POTASSIUM CHLORIDE 1.5 G/1.58G
POWDER, FOR SOLUTION ORAL
Qty: 60 PACKET | Refills: 1 | Status: SHIPPED | OUTPATIENT
Start: 2019-01-01 | End: 2019-01-01

## 2019-01-01 RX ORDER — LANOLIN ALCOHOL/MO/W.PET/CERES
3 CREAM (GRAM) TOPICAL ONCE
Status: COMPLETED | OUTPATIENT
Start: 2019-01-01 | End: 2019-01-01

## 2019-01-01 RX ORDER — MULTIPLE VITAMINS W/ MINERALS TAB 9MG-400MCG
1 TAB ORAL DAILY
Status: DISCONTINUED | OUTPATIENT
Start: 2019-01-01 | End: 2019-01-01 | Stop reason: HOSPADM

## 2019-01-01 RX ORDER — NALOXONE HYDROCHLORIDE 0.4 MG/ML
.1-.4 INJECTION, SOLUTION INTRAMUSCULAR; INTRAVENOUS; SUBCUTANEOUS
Status: ACTIVE | OUTPATIENT
Start: 2019-01-01 | End: 2019-01-01

## 2019-01-01 RX ORDER — FLUOXETINE 10 MG/1
CAPSULE ORAL
Qty: 30 CAPSULE | Refills: 0 | Status: ON HOLD | OUTPATIENT
Start: 2019-01-01 | End: 2019-01-01

## 2019-01-01 RX ORDER — POTASSIUM CHLORIDE 750 MG/1
20 TABLET, EXTENDED RELEASE ORAL ONCE
Status: COMPLETED | OUTPATIENT
Start: 2019-01-01 | End: 2019-01-01

## 2019-01-01 RX ORDER — ACETAMINOPHEN 325 MG/1
650 TABLET ORAL EVERY 4 HOURS PRN
Status: DISCONTINUED | OUTPATIENT
Start: 2019-01-01 | End: 2019-01-01 | Stop reason: HOSPADM

## 2019-01-01 RX ORDER — POTASSIUM CHLORIDE 29.8 MG/ML
20 INJECTION INTRAVENOUS ONCE
Status: COMPLETED | OUTPATIENT
Start: 2019-01-01 | End: 2019-01-01

## 2019-01-01 RX ORDER — CIPROFLOXACIN 500 MG/1
500 TABLET, FILM COATED ORAL DAILY
Status: DISCONTINUED | OUTPATIENT
Start: 2019-01-01 | End: 2019-01-01

## 2019-01-01 RX ORDER — FERROUS SULFATE 325(65) MG
650 TABLET ORAL DAILY
Status: DISCONTINUED | OUTPATIENT
Start: 2019-01-01 | End: 2019-01-01 | Stop reason: HOSPADM

## 2019-01-01 RX ORDER — CIPROFLOXACIN 250 MG/1
250 TABLET, FILM COATED ORAL DAILY
Qty: 30 TABLET | Refills: 1 | Status: CANCELLED | OUTPATIENT
Start: 2019-01-01

## 2019-01-01 RX ORDER — PREDNISONE 20 MG/1
TABLET ORAL
Qty: 20 TABLET | Refills: 0 | Status: SHIPPED | OUTPATIENT
Start: 2019-01-01 | End: 2019-01-01

## 2019-01-01 RX ORDER — ONDANSETRON 4 MG/1
4 TABLET, ORALLY DISINTEGRATING ORAL EVERY 6 HOURS PRN
Qty: 120 TABLET | Refills: 0 | Status: SHIPPED | OUTPATIENT
Start: 2019-01-01

## 2019-01-01 RX ORDER — DIAZEPAM 10 MG/2ML
2.5 INJECTION, SOLUTION INTRAMUSCULAR; INTRAVENOUS ONCE
Status: COMPLETED | OUTPATIENT
Start: 2019-01-01 | End: 2019-01-01

## 2019-01-01 RX ORDER — MULTIVITAMIN,THERAPEUTIC
1 TABLET ORAL DAILY
Qty: 30 TABLET | Refills: 1 | Status: CANCELLED | OUTPATIENT
Start: 2019-01-01

## 2019-01-01 RX ORDER — MIDODRINE HYDROCHLORIDE 10 MG/1
20 TABLET ORAL 3 TIMES DAILY
Qty: 90 TABLET | Refills: 0 | Status: SHIPPED | OUTPATIENT
Start: 2019-01-01

## 2019-01-01 RX ORDER — TRIAMCINOLONE ACETONIDE 1 MG/G
CREAM TOPICAL 2 TIMES DAILY
Qty: 80 G | Refills: 1 | Status: ON HOLD | OUTPATIENT
Start: 2019-01-01 | End: 2019-01-01

## 2019-01-01 RX ORDER — SUMATRIPTAN 100 MG/1
100 TABLET, FILM COATED ORAL
Status: DISCONTINUED | OUTPATIENT
Start: 2019-01-01 | End: 2019-01-01 | Stop reason: HOSPADM

## 2019-01-01 RX ORDER — NOREPINEPHRINE BITARTRATE 0.06 MG/ML
0.03-0.4 INJECTION, SOLUTION INTRAVENOUS CONTINUOUS
Status: DISCONTINUED | OUTPATIENT
Start: 2019-01-01 | End: 2019-01-01

## 2019-01-01 RX ORDER — ALBUMIN, HUMAN INJ 5% 5 %
25 SOLUTION INTRAVENOUS ONCE
Status: COMPLETED | OUTPATIENT
Start: 2019-01-01 | End: 2019-01-01

## 2019-01-01 RX ORDER — ONDANSETRON 2 MG/ML
4 INJECTION INTRAMUSCULAR; INTRAVENOUS EVERY 6 HOURS PRN
Status: DISCONTINUED | OUTPATIENT
Start: 2019-01-01 | End: 2019-01-01 | Stop reason: HOSPADM

## 2019-01-01 RX ORDER — ONDANSETRON 4 MG/1
4 TABLET, ORALLY DISINTEGRATING ORAL EVERY 12 HOURS PRN
Qty: 20 TABLET | Refills: 0 | Status: ON HOLD | OUTPATIENT
Start: 2019-01-01 | End: 2019-01-01

## 2019-01-01 RX ORDER — LACTULOSE 20 G/30ML
30 SOLUTION ORAL 4 TIMES DAILY
DISCHARGE
Start: 2019-01-01

## 2019-01-01 RX ORDER — LACTULOSE 10 G/15ML
100 SOLUTION ORAL
Status: DISCONTINUED | OUTPATIENT
Start: 2019-01-01 | End: 2019-01-01

## 2019-01-01 RX ORDER — POTASSIUM CHLORIDE 20MEQ/15ML
20 LIQUID (ML) ORAL ONCE
Status: DISCONTINUED | OUTPATIENT
Start: 2019-01-01 | End: 2019-01-01

## 2019-01-01 RX ORDER — NALOXONE HYDROCHLORIDE 0.4 MG/ML
.1-.4 INJECTION, SOLUTION INTRAMUSCULAR; INTRAVENOUS; SUBCUTANEOUS
Status: DISCONTINUED | OUTPATIENT
Start: 2019-01-01 | End: 2019-01-01 | Stop reason: HOSPADM

## 2019-01-01 RX ORDER — LACTULOSE 10 G/15ML
30 SOLUTION ORAL
Status: DISCONTINUED | OUTPATIENT
Start: 2019-01-01 | End: 2019-01-01 | Stop reason: ALTCHOICE

## 2019-01-01 RX ORDER — TORSEMIDE 10 MG/1
10 TABLET ORAL 2 TIMES DAILY
Qty: 180 TABLET | Refills: 1 | Status: SHIPPED | OUTPATIENT
Start: 2019-01-01 | End: 2019-01-01

## 2019-01-01 RX ORDER — ALLOPURINOL 100 MG/1
100 TABLET ORAL
Status: DISCONTINUED | OUTPATIENT
Start: 2019-01-01 | End: 2019-01-01

## 2019-01-01 RX ORDER — MORPHINE SULFATE 2 MG/ML
2 INJECTION, SOLUTION INTRAMUSCULAR; INTRAVENOUS ONCE
Status: COMPLETED | OUTPATIENT
Start: 2019-01-01 | End: 2019-01-01

## 2019-01-01 RX ORDER — LACTULOSE 10 G/15ML
20 SOLUTION ORAL 3 TIMES DAILY
Status: DISCONTINUED | OUTPATIENT
Start: 2019-01-01 | End: 2019-01-01 | Stop reason: HOSPADM

## 2019-01-01 RX ORDER — ALBUMIN (HUMAN) 12.5 G/50ML
50 SOLUTION INTRAVENOUS
Status: DISCONTINUED | OUTPATIENT
Start: 2019-01-01 | End: 2019-01-01

## 2019-01-01 RX ORDER — PANTOPRAZOLE SODIUM 40 MG/1
40 TABLET, DELAYED RELEASE ORAL DAILY
Qty: 90 TABLET | Refills: 1 | Status: SHIPPED | OUTPATIENT
Start: 2019-01-01 | End: 2019-01-01

## 2019-01-01 RX ORDER — POTASSIUM CHLORIDE 7.45 MG/ML
10 INJECTION INTRAVENOUS
Status: DISCONTINUED | OUTPATIENT
Start: 2019-01-01 | End: 2019-01-01

## 2019-01-01 RX ORDER — POTASSIUM CHLORIDE 1500 MG/1
20 TABLET, EXTENDED RELEASE ORAL
Qty: 37 TABLET | Refills: 0 | Status: SHIPPED | OUTPATIENT
Start: 2019-01-01 | End: 2019-01-01

## 2019-01-01 RX ORDER — MULTIVITAMIN,THERAPEUTIC
1 TABLET ORAL DAILY
Status: DISCONTINUED | OUTPATIENT
Start: 2019-01-01 | End: 2019-01-01

## 2019-01-01 RX ORDER — FENTANYL CITRATE 50 UG/ML
50-100 INJECTION, SOLUTION INTRAMUSCULAR; INTRAVENOUS
Status: DISCONTINUED | OUTPATIENT
Start: 2019-01-01 | End: 2019-01-01 | Stop reason: HOSPADM

## 2019-01-01 RX ORDER — PREDNISONE 20 MG/1
40 TABLET ORAL DAILY
Qty: 10 TABLET | Refills: 0 | Status: SHIPPED | OUTPATIENT
Start: 2019-01-01 | End: 2019-01-01

## 2019-01-01 RX ORDER — TORSEMIDE 10 MG/1
10 TABLET ORAL 2 TIMES DAILY
Qty: 180 TABLET | Refills: 1 | Status: ON HOLD | OUTPATIENT
Start: 2019-01-01 | End: 2019-01-01

## 2019-01-01 RX ORDER — ALLOPURINOL 100 MG/1
200 TABLET ORAL DAILY
Status: DISCONTINUED | OUTPATIENT
Start: 2019-01-01 | End: 2019-01-01

## 2019-01-01 RX ORDER — NADOLOL 40 MG/1
40 TABLET ORAL 2 TIMES DAILY
Status: DISCONTINUED | OUTPATIENT
Start: 2019-01-01 | End: 2019-01-01

## 2019-01-01 RX ORDER — ALBUMIN (HUMAN) 12.5 G/50ML
100 SOLUTION INTRAVENOUS DAILY
Status: DISCONTINUED | OUTPATIENT
Start: 2019-01-01 | End: 2019-01-01

## 2019-01-01 RX ORDER — PANTOPRAZOLE SODIUM 40 MG/1
40 TABLET, DELAYED RELEASE ORAL DAILY
Status: DISCONTINUED | OUTPATIENT
Start: 2019-01-01 | End: 2019-01-01 | Stop reason: HOSPADM

## 2019-01-01 RX ORDER — ALBUMIN (HUMAN) 12.5 G/50ML
12.5 SOLUTION INTRAVENOUS ONCE
Status: COMPLETED | OUTPATIENT
Start: 2019-01-01 | End: 2019-01-01

## 2019-01-01 RX ORDER — CYCLOBENZAPRINE HCL 10 MG
10 TABLET ORAL 3 TIMES DAILY PRN
Qty: 90 TABLET | Refills: 0 | Status: SHIPPED | OUTPATIENT
Start: 2019-01-01 | End: 2019-01-01

## 2019-01-01 RX ORDER — SUMATRIPTAN 100 MG/1
100 TABLET, FILM COATED ORAL
COMMUNITY

## 2019-01-01 RX ORDER — ALLOPURINOL 100 MG/1
200 TABLET ORAL DAILY
Status: DISCONTINUED | OUTPATIENT
Start: 2019-01-01 | End: 2019-01-01 | Stop reason: HOSPADM

## 2019-01-01 RX ORDER — ONDANSETRON 4 MG/1
4 TABLET, ORALLY DISINTEGRATING ORAL EVERY 6 HOURS PRN
Status: DISCONTINUED | OUTPATIENT
Start: 2019-01-01 | End: 2019-01-01 | Stop reason: HOSPADM

## 2019-01-01 RX ORDER — MORPHINE SULFATE 10 MG/5ML
5 SOLUTION ORAL
Qty: 40 ML | Refills: 0 | Status: SHIPPED | OUTPATIENT
Start: 2019-01-01 | End: 2019-10-22

## 2019-01-01 RX ORDER — LACTULOSE 10 G/15ML
200 SOLUTION ORAL EVERY 8 HOURS
Status: DISCONTINUED | OUTPATIENT
Start: 2019-01-01 | End: 2019-01-01

## 2019-01-01 RX ORDER — LACTULOSE 10 G/15ML
200 SOLUTION ORAL EVERY 4 HOURS
Status: DISCONTINUED | OUTPATIENT
Start: 2019-01-01 | End: 2019-01-01

## 2019-01-01 RX ORDER — LACTULOSE 10 G/15ML
20 SOLUTION ORAL ONCE
Status: COMPLETED | OUTPATIENT
Start: 2019-01-01 | End: 2019-01-01

## 2019-01-01 RX ORDER — DEXTROSE MONOHYDRATE 50 MG/ML
INJECTION, SOLUTION INTRAVENOUS CONTINUOUS
Status: DISCONTINUED | OUTPATIENT
Start: 2019-01-01 | End: 2019-01-01

## 2019-01-01 RX ORDER — FENTANYL CITRATE 50 UG/ML
50 INJECTION, SOLUTION INTRAMUSCULAR; INTRAVENOUS
Status: ACTIVE | OUTPATIENT
Start: 2019-01-01 | End: 2019-01-01

## 2019-01-01 RX ORDER — ONDANSETRON 2 MG/ML
8 INJECTION INTRAMUSCULAR; INTRAVENOUS ONCE
Status: COMPLETED | OUTPATIENT
Start: 2019-01-01 | End: 2019-01-01

## 2019-01-01 RX ORDER — SIMETHICONE
LIQUID (ML) MISCELLANEOUS PRN
Status: DISCONTINUED | OUTPATIENT
Start: 2019-01-01 | End: 2019-01-01

## 2019-01-01 RX ORDER — OCTREOTIDE ACETATE 50 UG/ML
50 INJECTION, SOLUTION INTRAVENOUS; SUBCUTANEOUS ONCE
Status: COMPLETED | OUTPATIENT
Start: 2019-01-01 | End: 2019-01-01

## 2019-01-01 RX ORDER — PANTOPRAZOLE SODIUM 40 MG/1
40 TABLET, DELAYED RELEASE ORAL 2 TIMES DAILY
Qty: 90 TABLET | Refills: 0 | Status: SHIPPED | OUTPATIENT
Start: 2019-01-01

## 2019-01-01 RX ORDER — SUCRALFATE ORAL 1 G/10ML
1 SUSPENSION ORAL 4 TIMES DAILY PRN
Status: DISCONTINUED | OUTPATIENT
Start: 2019-01-01 | End: 2019-01-01 | Stop reason: HOSPADM

## 2019-01-01 RX ORDER — BUPROPION HYDROCHLORIDE 75 MG/1
75 TABLET ORAL 2 TIMES DAILY
Qty: 60 TABLET | Refills: 1 | Status: SHIPPED | OUTPATIENT
Start: 2019-01-01 | End: 2019-01-01

## 2019-01-01 RX ORDER — POTASSIUM CL/LIDO/0.9 % NACL 10MEQ/0.1L
10 INTRAVENOUS SOLUTION, PIGGYBACK (ML) INTRAVENOUS ONCE
Status: DISCONTINUED | OUTPATIENT
Start: 2019-01-01 | End: 2019-01-01

## 2019-01-01 RX ORDER — COLCHICINE 0.6 MG/1
0.6 TABLET ORAL DAILY
Status: DISCONTINUED | OUTPATIENT
Start: 2019-01-01 | End: 2019-01-01

## 2019-01-01 RX ORDER — OLANZAPINE 10 MG/2ML
5 INJECTION, POWDER, FOR SOLUTION INTRAMUSCULAR ONCE
Status: COMPLETED | OUTPATIENT
Start: 2019-01-01 | End: 2019-01-01

## 2019-01-01 RX ORDER — SUCRALFATE ORAL 1 G/10ML
1 SUSPENSION ORAL 4 TIMES DAILY PRN
COMMUNITY

## 2019-01-01 RX ORDER — MULTIPLE VITAMINS W/ MINERALS TAB 9MG-400MCG
1 TAB ORAL DAILY
Status: DISCONTINUED | OUTPATIENT
Start: 2019-01-01 | End: 2019-01-01

## 2019-01-01 RX ORDER — CEFTRIAXONE 1 G/1
1 INJECTION, POWDER, FOR SOLUTION INTRAMUSCULAR; INTRAVENOUS EVERY 24 HOURS
Status: COMPLETED | OUTPATIENT
Start: 2019-01-01 | End: 2019-01-01

## 2019-01-01 RX ORDER — SPIRONOLACTONE 25 MG/1
25 TABLET ORAL DAILY
Qty: 60 TABLET | Refills: 0 | Status: SHIPPED | OUTPATIENT
Start: 2019-01-01 | End: 2019-01-01

## 2019-01-01 RX ORDER — POTASSIUM CHLORIDE 1.5 G/1.58G
20 POWDER, FOR SOLUTION ORAL 2 TIMES DAILY
Qty: 60 PACKET | Refills: 1 | Status: ON HOLD | OUTPATIENT
Start: 2019-01-01 | End: 2019-01-01

## 2019-01-01 RX ORDER — CIPROFLOXACIN 500 MG/1
500 TABLET, FILM COATED ORAL
Status: DISCONTINUED | OUTPATIENT
Start: 2019-01-01 | End: 2019-01-01 | Stop reason: HOSPADM

## 2019-01-01 RX ORDER — FOLIC ACID 1 MG/1
1 TABLET ORAL DAILY
Status: DISCONTINUED | OUTPATIENT
Start: 2019-01-01 | End: 2019-01-01

## 2019-01-01 RX ORDER — ALBUMIN (HUMAN) 12.5 G/50ML
100 SOLUTION INTRAVENOUS DAILY
Status: COMPLETED | OUTPATIENT
Start: 2019-01-01 | End: 2019-01-01

## 2019-01-01 RX ORDER — ZINC SULFATE 50(220)MG
220 CAPSULE ORAL DAILY
Status: DISCONTINUED | OUTPATIENT
Start: 2019-01-01 | End: 2019-01-01 | Stop reason: HOSPADM

## 2019-01-01 RX ORDER — ALLOPURINOL 100 MG/1
TABLET ORAL
Qty: 60 TABLET | Refills: 0 | Status: SHIPPED | OUTPATIENT
Start: 2019-01-01 | End: 2019-01-01

## 2019-01-01 RX ORDER — ALLOPURINOL 100 MG/1
100 TABLET ORAL
Qty: 12 TABLET | Refills: 3 | Status: CANCELLED | OUTPATIENT
Start: 2019-01-01

## 2019-01-01 RX ORDER — NICOTINE POLACRILEX 4 MG
15-30 LOZENGE BUCCAL
Status: DISCONTINUED | OUTPATIENT
Start: 2019-01-01 | End: 2019-01-01 | Stop reason: HOSPADM

## 2019-01-01 RX ORDER — HEPARIN SODIUM 5000 [USP'U]/.5ML
5000 INJECTION, SOLUTION INTRAVENOUS; SUBCUTANEOUS EVERY 12 HOURS
Status: DISCONTINUED | OUTPATIENT
Start: 2019-01-01 | End: 2019-01-01

## 2019-01-01 RX ORDER — METOCLOPRAMIDE HYDROCHLORIDE 5 MG/ML
10 INJECTION INTRAMUSCULAR; INTRAVENOUS ONCE
Status: COMPLETED | OUTPATIENT
Start: 2019-01-01 | End: 2019-01-01

## 2019-01-01 RX ORDER — ALBUMIN (HUMAN) 12.5 G/50ML
50 SOLUTION INTRAVENOUS ONCE
Status: COMPLETED | OUTPATIENT
Start: 2019-01-01 | End: 2019-01-01

## 2019-01-01 RX ORDER — PHYTONADIONE 1 MG/.5ML
10 INJECTION, EMULSION INTRAMUSCULAR; INTRAVENOUS; SUBCUTANEOUS EVERY 24 HOURS
Status: DISCONTINUED | OUTPATIENT
Start: 2019-01-01 | End: 2019-01-01 | Stop reason: CLARIF

## 2019-01-01 RX ORDER — FOLIC ACID 1 MG/1
1 TABLET ORAL DAILY
Qty: 90 TABLET | Refills: 3 | Status: ON HOLD | OUTPATIENT
Start: 2019-01-01 | End: 2019-01-01

## 2019-01-01 RX ORDER — ZINC SULFATE 50(220)MG
220 CAPSULE ORAL
Status: DISCONTINUED | OUTPATIENT
Start: 2019-01-01 | End: 2019-01-01

## 2019-01-01 RX ORDER — CYCLOBENZAPRINE HCL 10 MG
10 TABLET ORAL 3 TIMES DAILY PRN
COMMUNITY

## 2019-01-01 RX ORDER — DEXTROSE MONOHYDRATE 25 G/50ML
25-50 INJECTION, SOLUTION INTRAVENOUS
Status: CANCELLED | OUTPATIENT
Start: 2019-01-01

## 2019-01-01 RX ORDER — BUPROPION HYDROCHLORIDE 75 MG/1
75 TABLET ORAL 2 TIMES DAILY
Qty: 60 TABLET | Refills: 1 | OUTPATIENT
Start: 2019-01-01

## 2019-01-01 RX ORDER — LACTULOSE 10 G/15ML
20 SOLUTION ORAL 4 TIMES DAILY
Status: DISCONTINUED | OUTPATIENT
Start: 2019-01-01 | End: 2019-01-01 | Stop reason: HOSPADM

## 2019-01-01 RX ORDER — TORSEMIDE 10 MG/1
10 TABLET ORAL DAILY
Status: DISCONTINUED | OUTPATIENT
Start: 2019-01-01 | End: 2019-01-01 | Stop reason: HOSPADM

## 2019-01-01 RX ORDER — PANTOPRAZOLE SODIUM 40 MG/1
40 TABLET, DELAYED RELEASE ORAL
Status: DISCONTINUED | OUTPATIENT
Start: 2019-01-01 | End: 2019-01-01

## 2019-01-01 RX ORDER — LORAZEPAM 2 MG/ML
1 CONCENTRATE ORAL
Qty: 360 ML | Refills: 1 | Status: SHIPPED | OUTPATIENT
Start: 2019-01-01 | End: 2019-10-22

## 2019-01-01 RX ORDER — ZINC SULFATE 50(220)MG
220 CAPSULE ORAL DAILY
Qty: 60 CAPSULE | Refills: 3 | Status: SHIPPED | OUTPATIENT
Start: 2019-01-01 | End: 2019-01-01 | Stop reason: DRUGHIGH

## 2019-01-01 RX ORDER — PANTOPRAZOLE SODIUM 40 MG/1
40 TABLET, DELAYED RELEASE ORAL DAILY
Qty: 90 TABLET | Refills: 0 | Status: ON HOLD | COMMUNITY
Start: 2019-01-01 | End: 2019-01-01

## 2019-01-01 RX ORDER — SPIRONOLACTONE 25 MG/1
50 TABLET ORAL DAILY
Status: DISCONTINUED | OUTPATIENT
Start: 2019-01-01 | End: 2019-01-01

## 2019-01-01 RX ORDER — LIDOCAINE HYDROCHLORIDE 20 MG/ML
INJECTION, SOLUTION EPIDURAL; INFILTRATION; INTRACAUDAL; PERINEURAL
Status: DISCONTINUED
Start: 2019-01-01 | End: 2019-01-01 | Stop reason: HOSPADM

## 2019-01-01 RX ORDER — PANTOPRAZOLE SODIUM 40 MG/1
40 TABLET, DELAYED RELEASE ORAL
Qty: 60 TABLET | Refills: 1 | Status: CANCELLED | OUTPATIENT
Start: 2019-01-01

## 2019-01-01 RX ORDER — LACTULOSE 20 G/30ML
30 SOLUTION ORAL
Qty: 600 ML | Refills: 3 | Status: CANCELLED | OUTPATIENT
Start: 2019-01-01

## 2019-01-01 RX ORDER — CEFTRIAXONE 2 G/1
2 INJECTION, POWDER, FOR SOLUTION INTRAMUSCULAR; INTRAVENOUS ONCE
Status: COMPLETED | OUTPATIENT
Start: 2019-01-01 | End: 2019-01-01

## 2019-01-01 RX ORDER — OCTREOTIDE ACETATE 100 UG/ML
100 INJECTION, SOLUTION INTRAVENOUS; SUBCUTANEOUS 3 TIMES DAILY
Status: DISCONTINUED | OUTPATIENT
Start: 2019-01-01 | End: 2019-01-01

## 2019-01-01 RX ORDER — COLCHICINE 0.6 MG/1
0.6 TABLET ORAL DAILY
Status: ON HOLD | COMMUNITY
End: 2019-01-01

## 2019-01-01 RX ORDER — CEPHALEXIN 500 MG/1
500 CAPSULE ORAL EVERY 12 HOURS
Status: DISCONTINUED | OUTPATIENT
Start: 2019-01-01 | End: 2019-01-01 | Stop reason: HOSPADM

## 2019-01-01 RX ORDER — SUCRALFATE ORAL 1 G/10ML
1 SUSPENSION ORAL 4 TIMES DAILY PRN
Qty: 420 ML | Refills: 1 | Status: SHIPPED | OUTPATIENT
Start: 2019-01-01 | End: 2019-01-01

## 2019-01-01 RX ORDER — CIPROFLOXACIN 500 MG/1
500 TABLET, FILM COATED ORAL EVERY 24 HOURS
Qty: 30 TABLET | Refills: 0 | Status: SHIPPED | OUTPATIENT
Start: 2019-01-01

## 2019-01-01 RX ORDER — FENTANYL CITRATE 50 UG/ML
50 INJECTION, SOLUTION INTRAMUSCULAR; INTRAVENOUS
Status: COMPLETED | OUTPATIENT
Start: 2019-01-01 | End: 2019-01-01

## 2019-01-01 RX ORDER — HEPARIN SODIUM,PORCINE 10 UNIT/ML
2-5 VIAL (ML) INTRAVENOUS
Status: DISCONTINUED | OUTPATIENT
Start: 2019-01-01 | End: 2019-01-01 | Stop reason: HOSPADM

## 2019-01-01 RX ORDER — DEXMEDETOMIDINE HYDROCHLORIDE 4 UG/ML
.2-1.2 INJECTION, SOLUTION INTRAVENOUS CONTINUOUS
Status: DISCONTINUED | OUTPATIENT
Start: 2019-01-01 | End: 2019-01-01

## 2019-01-01 RX ORDER — MIDAZOLAM (PF) 1 MG/ML IN 0.9 % SODIUM CHLORIDE INTRAVENOUS SOLUTION
1-8 CONTINUOUS
Status: DISCONTINUED | OUTPATIENT
Start: 2019-01-01 | End: 2019-01-01

## 2019-01-01 RX ADMIN — OCTREOTIDE ACETATE 100 MCG: 100 INJECTION, SOLUTION INTRAVENOUS; SUBCUTANEOUS at 08:12

## 2019-01-01 RX ADMIN — LACTULOSE 20 G: 20 SOLUTION ORAL at 08:02

## 2019-01-01 RX ADMIN — SODIUM CHLORIDE 300 ML: 9 INJECTION, SOLUTION INTRAVENOUS at 18:48

## 2019-01-01 RX ADMIN — MULTIPLE VITAMINS W/ MINERALS TAB 1 TABLET: TAB at 10:49

## 2019-01-01 RX ADMIN — MIDODRINE HYDROCHLORIDE 10 MG: 5 TABLET ORAL at 11:32

## 2019-01-01 RX ADMIN — MIDODRINE HYDROCHLORIDE 20 MG: 5 TABLET ORAL at 19:50

## 2019-01-01 RX ADMIN — CEFTRIAXONE 1 G: 1 INJECTION, POWDER, FOR SOLUTION INTRAMUSCULAR; INTRAVENOUS at 21:52

## 2019-01-01 RX ADMIN — EPOETIN ALFA 4000 UNITS: 10000 SOLUTION INTRAVENOUS; SUBCUTANEOUS at 15:07

## 2019-01-01 RX ADMIN — ACETAMINOPHEN 650 MG: 325 TABLET, FILM COATED ORAL at 08:26

## 2019-01-01 RX ADMIN — LACTULOSE 30 ML: 20 SOLUTION ORAL at 08:36

## 2019-01-01 RX ADMIN — MIDODRINE HYDROCHLORIDE 5 MG: 5 TABLET ORAL at 15:43

## 2019-01-01 RX ADMIN — ZINC SULFATE CAP 220 MG (50 MG ELEMENTAL ZN) 220 MG: 220 (50 ZN) CAP at 07:51

## 2019-01-01 RX ADMIN — ALBUMIN HUMAN 25 G: 0.05 INJECTION, SOLUTION INTRAVENOUS at 00:51

## 2019-01-01 RX ADMIN — LACTULOSE 20 G: 20 SOLUTION ORAL at 02:45

## 2019-01-01 RX ADMIN — OCTREOTIDE ACETATE 100 MCG: 100 INJECTION, SOLUTION INTRAVENOUS; SUBCUTANEOUS at 14:15

## 2019-01-01 RX ADMIN — POTASSIUM CHLORIDE 40 MEQ: 750 TABLET, EXTENDED RELEASE ORAL at 14:01

## 2019-01-01 RX ADMIN — TORSEMIDE 10 MG: 10 TABLET ORAL at 08:17

## 2019-01-01 RX ADMIN — ZINC SULFATE CAP 220 MG (50 MG ELEMENTAL ZN) 220 MG: 220 (50 ZN) CAP at 08:33

## 2019-01-01 RX ADMIN — RIFAXIMIN 550 MG: 550 TABLET ORAL at 19:31

## 2019-01-01 RX ADMIN — Medication 600 MG: at 08:18

## 2019-01-01 RX ADMIN — PANTOPRAZOLE SODIUM 8 MG/HR: 40 INJECTION, POWDER, FOR SOLUTION INTRAVENOUS at 19:00

## 2019-01-01 RX ADMIN — Medication 10 MEQ: at 05:40

## 2019-01-01 RX ADMIN — TORSEMIDE 10 MG: 10 TABLET ORAL at 10:17

## 2019-01-01 RX ADMIN — PROPOFOL 100 MG: 10 INJECTION, EMULSION INTRAVENOUS at 23:31

## 2019-01-01 RX ADMIN — FOLIC ACID 1 MG: 1 TABLET ORAL at 12:36

## 2019-01-01 RX ADMIN — TORSEMIDE 10 MG: 10 TABLET ORAL at 20:08

## 2019-01-01 RX ADMIN — RIFAXIMIN 550 MG: 550 TABLET ORAL at 08:13

## 2019-01-01 RX ADMIN — MIDODRINE HYDROCHLORIDE 5 MG: 5 TABLET ORAL at 18:01

## 2019-01-01 RX ADMIN — Medication 600 MG: at 08:00

## 2019-01-01 RX ADMIN — OCTREOTIDE ACETATE 50 MCG/HR: 200 INJECTION, SOLUTION INTRAVENOUS; SUBCUTANEOUS at 21:30

## 2019-01-01 RX ADMIN — LACTULOSE 200 G: 10 SOLUTION ORAL; RECTAL at 00:32

## 2019-01-01 RX ADMIN — Medication 600 MG: at 08:04

## 2019-01-01 RX ADMIN — ZINC SULFATE CAP 220 MG (50 MG ELEMENTAL ZN) 220 MG: 220 (50 ZN) CAP at 07:38

## 2019-01-01 RX ADMIN — PANTOPRAZOLE SODIUM 40 MG: 40 TABLET, DELAYED RELEASE ORAL at 07:54

## 2019-01-01 RX ADMIN — PANTOPRAZOLE SODIUM 40 MG: 40 TABLET, DELAYED RELEASE ORAL at 15:59

## 2019-01-01 RX ADMIN — LACTULOSE 30 ML: 20 SOLUTION ORAL at 16:44

## 2019-01-01 RX ADMIN — OMEPRAZOLE 20 MG: 20 CAPSULE, DELAYED RELEASE ORAL at 20:22

## 2019-01-01 RX ADMIN — CEFTRIAXONE 1 G: 1 INJECTION, POWDER, FOR SOLUTION INTRAMUSCULAR; INTRAVENOUS at 22:03

## 2019-01-01 RX ADMIN — RIFAXIMIN 550 MG: 550 TABLET ORAL at 19:51

## 2019-01-01 RX ADMIN — PANTOPRAZOLE SODIUM 40 MG: 40 TABLET, DELAYED RELEASE ORAL at 15:38

## 2019-01-01 RX ADMIN — FLUOXETINE 20 MG: 20 CAPSULE ORAL at 08:07

## 2019-01-01 RX ADMIN — ONDANSETRON 8 MG: 2 INJECTION INTRAMUSCULAR; INTRAVENOUS at 10:18

## 2019-01-01 RX ADMIN — HEPARIN SODIUM 5000 UNITS: 5000 INJECTION, SOLUTION INTRAVENOUS; SUBCUTANEOUS at 18:20

## 2019-01-01 RX ADMIN — LACTULOSE 200 G: 10 SOLUTION ORAL; RECTAL at 15:47

## 2019-01-01 RX ADMIN — ALLOPURINOL 200 MG: 100 TABLET ORAL at 08:17

## 2019-01-01 RX ADMIN — LACTULOSE 30 ML: 20 SOLUTION ORAL at 07:54

## 2019-01-01 RX ADMIN — ZINC SULFATE CAP 220 MG (50 MG ELEMENTAL ZN) 220 MG: 220 (50 ZN) CAP at 20:23

## 2019-01-01 RX ADMIN — ALBUMIN HUMAN 100 G: 0.25 SOLUTION INTRAVENOUS at 07:56

## 2019-01-01 RX ADMIN — OCTREOTIDE ACETATE 50 MCG/HR: 200 INJECTION, SOLUTION INTRAVENOUS; SUBCUTANEOUS at 05:47

## 2019-01-01 RX ADMIN — MIDODRINE HYDROCHLORIDE 20 MG: 5 TABLET ORAL at 08:35

## 2019-01-01 RX ADMIN — CEFTRIAXONE SODIUM 2 G: 2 INJECTION, POWDER, FOR SOLUTION INTRAMUSCULAR; INTRAVENOUS at 11:03

## 2019-01-01 RX ADMIN — RIFAXIMIN 550 MG: 550 TABLET ORAL at 08:24

## 2019-01-01 RX ADMIN — ZINC SULFATE CAP 220 MG (50 MG ELEMENTAL ZN) 220 MG: 220 (50 ZN) CAP at 22:00

## 2019-01-01 RX ADMIN — SODIUM CHLORIDE 300 ML: 9 INJECTION, SOLUTION INTRAVENOUS at 09:10

## 2019-01-01 RX ADMIN — RIFAXIMIN 550 MG: 550 TABLET ORAL at 19:38

## 2019-01-01 RX ADMIN — MIDODRINE HYDROCHLORIDE 5 MG: 5 TABLET ORAL at 12:35

## 2019-01-01 RX ADMIN — TORSEMIDE 10 MG: 10 TABLET ORAL at 19:50

## 2019-01-01 RX ADMIN — Medication: at 12:06

## 2019-01-01 RX ADMIN — COLCHICINE 0.6 MG: 0.6 TABLET, FILM COATED ORAL at 08:02

## 2019-01-01 RX ADMIN — NADOLOL 20 MG: 20 TABLET ORAL at 10:17

## 2019-01-01 RX ADMIN — POTASSIUM CHLORIDE 10 MEQ: 7.46 INJECTION, SOLUTION INTRAVENOUS at 04:24

## 2019-01-01 RX ADMIN — LACTULOSE 30 ML: 20 SOLUTION ORAL at 07:38

## 2019-01-01 RX ADMIN — LACTULOSE 30 ML: 20 SOLUTION ORAL at 08:00

## 2019-01-01 RX ADMIN — PANTOPRAZOLE SODIUM 40 MG: 40 INJECTION, POWDER, FOR SOLUTION INTRAVENOUS at 19:52

## 2019-01-01 RX ADMIN — PHENYLEPHRINE HYDROCHLORIDE 100 MCG: 10 INJECTION INTRAVENOUS at 23:32

## 2019-01-01 RX ADMIN — COLCHICINE 0.6 MG: 0.6 TABLET, FILM COATED ORAL at 10:17

## 2019-01-01 RX ADMIN — PANTOPRAZOLE SODIUM 40 MG: 40 TABLET, DELAYED RELEASE ORAL at 08:25

## 2019-01-01 RX ADMIN — SODIUM CHLORIDE: 9 INJECTION, SOLUTION INTRAVENOUS at 09:21

## 2019-01-01 RX ADMIN — ALBUMIN HUMAN 100 G: 0.25 SOLUTION INTRAVENOUS at 11:14

## 2019-01-01 RX ADMIN — SODIUM CHLORIDE 300 ML: 9 INJECTION, SOLUTION INTRAVENOUS at 12:05

## 2019-01-01 RX ADMIN — SPIRONOLACTONE 50 MG: 25 TABLET ORAL at 08:18

## 2019-01-01 RX ADMIN — CYCLOBENZAPRINE HYDROCHLORIDE 10 MG: 10 TABLET, FILM COATED ORAL at 19:18

## 2019-01-01 RX ADMIN — LACTULOSE 30 ML: 20 SOLUTION ORAL at 15:33

## 2019-01-01 RX ADMIN — Medication: at 11:55

## 2019-01-01 RX ADMIN — NADOLOL 20 MG: 20 TABLET ORAL at 08:04

## 2019-01-01 RX ADMIN — ACETAMINOPHEN 650 MG: 325 TABLET, FILM COATED ORAL at 19:54

## 2019-01-01 RX ADMIN — PANTOPRAZOLE SODIUM 40 MG: 40 TABLET, DELAYED RELEASE ORAL at 07:51

## 2019-01-01 RX ADMIN — FERROUS SULFATE TAB 325 MG (65 MG ELEMENTAL FE) 650 MG: 325 (65 FE) TAB at 10:47

## 2019-01-01 RX ADMIN — Medication 600 MG: at 07:51

## 2019-01-01 RX ADMIN — FLUOXETINE 20 MG: 20 CAPSULE ORAL at 08:03

## 2019-01-01 RX ADMIN — CEPHALEXIN 500 MG: 500 CAPSULE ORAL at 22:17

## 2019-01-01 RX ADMIN — OXYMETAZOLINE HYDROCHLORIDE 2 SPRAY: 0.05 SPRAY NASAL at 05:25

## 2019-01-01 RX ADMIN — SMOFLIPID 250 ML: 6; 6; 5; 3 INJECTION, EMULSION INTRAVENOUS at 20:22

## 2019-01-01 RX ADMIN — LACTULOSE 20 G: 20 SOLUTION ORAL at 15:44

## 2019-01-01 RX ADMIN — ZINC SULFATE CAP 220 MG (50 MG ELEMENTAL ZN) 220 MG: 220 (50 ZN) CAP at 07:54

## 2019-01-01 RX ADMIN — NOREPINEPHRINE BITARTRATE 0.05 MCG/KG/MIN: 0.06 INJECTION, SOLUTION INTRAVENOUS at 00:07

## 2019-01-01 RX ADMIN — PANTOPRAZOLE SODIUM 40 MG: 40 TABLET, DELAYED RELEASE ORAL at 08:14

## 2019-01-01 RX ADMIN — NADOLOL 20 MG: 20 TABLET ORAL at 11:46

## 2019-01-01 RX ADMIN — LACTULOSE 20 G: 20 SOLUTION ORAL at 00:40

## 2019-01-01 RX ADMIN — HEPARIN SODIUM 5000 UNITS: 5000 INJECTION, SOLUTION INTRAVENOUS; SUBCUTANEOUS at 19:51

## 2019-01-01 RX ADMIN — PANTOPRAZOLE SODIUM 40 MG: 40 INJECTION, POWDER, FOR SOLUTION INTRAVENOUS at 09:15

## 2019-01-01 RX ADMIN — MULTIPLE VITAMINS W/ MINERALS TAB 1 TABLET: TAB at 08:33

## 2019-01-01 RX ADMIN — ZINC SULFATE CAP 220 MG (50 MG ELEMENTAL ZN) 220 MG: 220 (50 ZN) CAP at 20:35

## 2019-01-01 RX ADMIN — FOLIC ACID 1 MG: 1 TABLET ORAL at 07:36

## 2019-01-01 RX ADMIN — NADOLOL 20 MG: 20 TABLET ORAL at 08:18

## 2019-01-01 RX ADMIN — CIPROFLOXACIN HYDROCHLORIDE 500 MG: 500 TABLET, FILM COATED ORAL at 09:09

## 2019-01-01 RX ADMIN — RIFAXIMIN 550 MG: 550 TABLET ORAL at 10:47

## 2019-01-01 RX ADMIN — PANTOPRAZOLE SODIUM 40 MG: 40 INJECTION, POWDER, FOR SOLUTION INTRAVENOUS at 07:38

## 2019-01-01 RX ADMIN — HEPARIN SODIUM 5000 UNITS: 5000 INJECTION, SOLUTION INTRAVENOUS; SUBCUTANEOUS at 20:08

## 2019-01-01 RX ADMIN — FLUOXETINE 20 MG: 20 CAPSULE ORAL at 08:32

## 2019-01-01 RX ADMIN — MIDODRINE HYDROCHLORIDE 5 MG: 5 TABLET ORAL at 08:07

## 2019-01-01 RX ADMIN — RIFAXIMIN 550 MG: 550 TABLET ORAL at 22:00

## 2019-01-01 RX ADMIN — LACTULOSE 200 G: 10 SOLUTION ORAL; RECTAL at 17:58

## 2019-01-01 RX ADMIN — FLUOXETINE 20 MG: 20 CAPSULE ORAL at 07:34

## 2019-01-01 RX ADMIN — ALLOPURINOL 200 MG: 100 TABLET ORAL at 10:17

## 2019-01-01 RX ADMIN — MIDODRINE HYDROCHLORIDE 20 MG: 5 TABLET ORAL at 07:33

## 2019-01-01 RX ADMIN — FOLIC ACID 1 MG: 1 TABLET ORAL at 08:18

## 2019-01-01 RX ADMIN — THERA TABS 1 TABLET: TAB at 08:35

## 2019-01-01 RX ADMIN — MULTIPLE VITAMINS W/ MINERALS TAB 1 TABLET: TAB at 10:17

## 2019-01-01 RX ADMIN — DIAZEPAM 2.5 MG: 5 INJECTION, SOLUTION INTRAMUSCULAR; INTRAVENOUS at 12:55

## 2019-01-01 RX ADMIN — Medication 50 MCG/HR: at 00:11

## 2019-01-01 RX ADMIN — HEPARIN SODIUM 1600 UNITS: 1000 INJECTION, SOLUTION INTRAVENOUS; SUBCUTANEOUS at 16:15

## 2019-01-01 RX ADMIN — PANTOPRAZOLE SODIUM 40 MG: 40 TABLET, DELAYED RELEASE ORAL at 16:51

## 2019-01-01 RX ADMIN — Medication 600 MG: at 10:15

## 2019-01-01 RX ADMIN — CIPROFLOXACIN HYDROCHLORIDE 250 MG: 250 TABLET, FILM COATED ORAL at 17:47

## 2019-01-01 RX ADMIN — SODIUM CHLORIDE 250 ML: 9 INJECTION, SOLUTION INTRAVENOUS at 09:11

## 2019-01-01 RX ADMIN — ZINC SULFATE CAP 220 MG (50 MG ELEMENTAL ZN) 220 MG: 220 (50 ZN) CAP at 19:31

## 2019-01-01 RX ADMIN — LACTULOSE 30 ML: 20 SOLUTION ORAL at 16:36

## 2019-01-01 RX ADMIN — SODIUM CHLORIDE 1000 ML: 9 INJECTION, SOLUTION INTRAVENOUS at 09:04

## 2019-01-01 RX ADMIN — ACETAMINOPHEN 650 MG: 325 TABLET, FILM COATED ORAL at 19:40

## 2019-01-01 RX ADMIN — COLCHICINE 0.6 MG: 0.6 TABLET, FILM COATED ORAL at 08:18

## 2019-01-01 RX ADMIN — Medication 600 MG: at 08:12

## 2019-01-01 RX ADMIN — FERROUS SULFATE TAB 325 MG (65 MG ELEMENTAL FE) 650 MG: 325 (65 FE) TAB at 08:30

## 2019-01-01 RX ADMIN — LACTULOSE 20 G: 20 SOLUTION ORAL at 20:08

## 2019-01-01 RX ADMIN — LACTULOSE 30 ML: 20 SOLUTION ORAL at 15:49

## 2019-01-01 RX ADMIN — ACETAMINOPHEN 650 MG: 325 TABLET, FILM COATED ORAL at 14:52

## 2019-01-01 RX ADMIN — ALBUMIN HUMAN 50 G: 0.25 SOLUTION INTRAVENOUS at 09:49

## 2019-01-01 RX ADMIN — RIFAXIMIN 550 MG: 550 TABLET ORAL at 19:50

## 2019-01-01 RX ADMIN — PHYTONADIONE 10 MG: 10 INJECTION, EMULSION INTRAMUSCULAR; INTRAVENOUS; SUBCUTANEOUS at 01:46

## 2019-01-01 RX ADMIN — FLUOXETINE 20 MG: 20 CAPSULE ORAL at 08:25

## 2019-01-01 RX ADMIN — DEXMEDETOMIDINE 0.4 MCG/KG/HR: 100 INJECTION, SOLUTION, CONCENTRATE INTRAVENOUS at 03:34

## 2019-01-01 RX ADMIN — Medication 600 MG: at 07:41

## 2019-01-01 RX ADMIN — FOLIC ACID 1 MG: 1 TABLET ORAL at 11:56

## 2019-01-01 RX ADMIN — LACTULOSE 200 G: 10 SOLUTION ORAL; RECTAL at 00:18

## 2019-01-01 RX ADMIN — MIDAZOLAM 2 MG/HR: 5 INJECTION INTRAMUSCULAR; INTRAVENOUS at 23:37

## 2019-01-01 RX ADMIN — THERA TABS 1 TABLET: TAB at 07:34

## 2019-01-01 RX ADMIN — MIDODRINE HYDROCHLORIDE 5 MG: 5 TABLET ORAL at 20:35

## 2019-01-01 RX ADMIN — LACTULOSE 20 G: 20 SOLUTION ORAL at 14:37

## 2019-01-01 RX ADMIN — PANTOPRAZOLE SODIUM 40 MG: 40 INJECTION, POWDER, FOR SOLUTION INTRAVENOUS at 11:54

## 2019-01-01 RX ADMIN — FLUOXETINE 20 MG: 20 CAPSULE ORAL at 10:17

## 2019-01-01 RX ADMIN — CEFTRIAXONE 1 G: 1 INJECTION, POWDER, FOR SOLUTION INTRAMUSCULAR; INTRAVENOUS at 22:00

## 2019-01-01 RX ADMIN — ALBUMIN HUMAN 12.5 G: 0.25 SOLUTION INTRAVENOUS at 08:53

## 2019-01-01 RX ADMIN — SMOFLIPID 250 ML: 6; 6; 5; 3 INJECTION, EMULSION INTRAVENOUS at 20:47

## 2019-01-01 RX ADMIN — SODIUM CHLORIDE 1000 ML: 9 INJECTION, SOLUTION INTRAVENOUS at 14:15

## 2019-01-01 RX ADMIN — SPIRONOLACTONE 50 MG: 25 TABLET ORAL at 10:17

## 2019-01-01 RX ADMIN — Medication: at 09:11

## 2019-01-01 RX ADMIN — THERA TABS 1 TABLET: TAB at 08:00

## 2019-01-01 RX ADMIN — LACTULOSE 20 G: 20 SOLUTION ORAL at 20:21

## 2019-01-01 RX ADMIN — Medication 80 MG: at 23:31

## 2019-01-01 RX ADMIN — MIDODRINE HYDROCHLORIDE 5 MG: 5 TABLET ORAL at 19:40

## 2019-01-01 RX ADMIN — PHYTONADIONE 10 MG: 10 INJECTION, EMULSION INTRAMUSCULAR; INTRAVENOUS; SUBCUTANEOUS at 03:14

## 2019-01-01 RX ADMIN — THERA TABS 1 TABLET: TAB at 08:14

## 2019-01-01 RX ADMIN — SODIUM CHLORIDE 250 ML: 9 INJECTION, SOLUTION INTRAVENOUS at 11:52

## 2019-01-01 RX ADMIN — CEFTRIAXONE 1 G: 1 INJECTION, POWDER, FOR SOLUTION INTRAMUSCULAR; INTRAVENOUS at 21:19

## 2019-01-01 RX ADMIN — PANTOPRAZOLE SODIUM 40 MG: 40 TABLET, DELAYED RELEASE ORAL at 10:17

## 2019-01-01 RX ADMIN — HEPARIN SODIUM 5000 UNITS: 5000 INJECTION, SOLUTION INTRAVENOUS; SUBCUTANEOUS at 10:15

## 2019-01-01 RX ADMIN — RIFAXIMIN 550 MG: 550 TABLET ORAL at 20:21

## 2019-01-01 RX ADMIN — OCTREOTIDE ACETATE 100 MCG: 100 INJECTION, SOLUTION INTRAVENOUS; SUBCUTANEOUS at 20:40

## 2019-01-01 RX ADMIN — FOLIC ACID 1 MG: 1 TABLET ORAL at 08:00

## 2019-01-01 RX ADMIN — OCTREOTIDE ACETATE 50 MCG/HR: 200 INJECTION, SOLUTION INTRAVENOUS; SUBCUTANEOUS at 23:13

## 2019-01-01 RX ADMIN — OCTREOTIDE ACETATE 50 MCG/HR: 200 INJECTION, SOLUTION INTRAVENOUS; SUBCUTANEOUS at 22:42

## 2019-01-01 RX ADMIN — OCTREOTIDE ACETATE 100 MCG: 100 INJECTION, SOLUTION INTRAVENOUS; SUBCUTANEOUS at 08:14

## 2019-01-01 RX ADMIN — POTASSIUM CHLORIDE 40 MEQ: 750 TABLET, EXTENDED RELEASE ORAL at 01:55

## 2019-01-01 RX ADMIN — ZINC SULFATE CAP 220 MG (50 MG ELEMENTAL ZN) 220 MG: 220 (50 ZN) CAP at 08:24

## 2019-01-01 RX ADMIN — ACETAMINOPHEN 650 MG: 325 TABLET, FILM COATED ORAL at 21:50

## 2019-01-01 RX ADMIN — POTASSIUM CHLORIDE: 2 INJECTION, SOLUTION, CONCENTRATE INTRAVENOUS at 21:14

## 2019-01-01 RX ADMIN — RIFAXIMIN 550 MG: 550 TABLET ORAL at 08:35

## 2019-01-01 RX ADMIN — OCTREOTIDE ACETATE 50 MCG/HR: 200 INJECTION, SOLUTION INTRAVENOUS; SUBCUTANEOUS at 00:08

## 2019-01-01 RX ADMIN — FLUOXETINE 20 MG: 20 CAPSULE ORAL at 08:18

## 2019-01-01 RX ADMIN — MIDODRINE HYDROCHLORIDE 5 MG: 5 TABLET ORAL at 08:14

## 2019-01-01 RX ADMIN — FOLIC ACID 1 MG: 1 TABLET ORAL at 08:08

## 2019-01-01 RX ADMIN — MIDAZOLAM: 1 INJECTION INTRAMUSCULAR; INTRAVENOUS at 05:20

## 2019-01-01 RX ADMIN — CEFTRIAXONE 1 G: 1 INJECTION, POWDER, FOR SOLUTION INTRAMUSCULAR; INTRAVENOUS at 22:25

## 2019-01-01 RX ADMIN — Medication 600 MG: at 11:14

## 2019-01-01 RX ADMIN — PANTOPRAZOLE SODIUM 40 MG: 40 TABLET, DELAYED RELEASE ORAL at 08:12

## 2019-01-01 RX ADMIN — FENTANYL CITRATE: 50 INJECTION, SOLUTION INTRAMUSCULAR; INTRAVENOUS at 05:19

## 2019-01-01 RX ADMIN — METOCLOPRAMIDE 10 MG: 5 INJECTION, SOLUTION INTRAMUSCULAR; INTRAVENOUS at 00:03

## 2019-01-01 RX ADMIN — SODIUM CHLORIDE: 9 INJECTION, SOLUTION INTRAVENOUS at 22:56

## 2019-01-01 RX ADMIN — SMOFLIPID 250 ML: 6; 6; 5; 3 INJECTION, EMULSION INTRAVENOUS at 21:20

## 2019-01-01 RX ADMIN — CEFTRIAXONE 1 G: 1 INJECTION, POWDER, FOR SOLUTION INTRAMUSCULAR; INTRAVENOUS at 21:39

## 2019-01-01 RX ADMIN — ACETAMINOPHEN 650 MG: 325 TABLET, FILM COATED ORAL at 16:22

## 2019-01-01 RX ADMIN — CALCIUM GLUCONATE: 98 INJECTION, SOLUTION INTRAVENOUS at 20:47

## 2019-01-01 RX ADMIN — FERROUS SULFATE TAB 325 MG (65 MG ELEMENTAL FE) 650 MG: 325 (65 FE) TAB at 10:16

## 2019-01-01 RX ADMIN — ONDANSETRON 4 MG: 2 INJECTION INTRAMUSCULAR; INTRAVENOUS at 06:06

## 2019-01-01 RX ADMIN — OCTREOTIDE ACETATE 50 MCG: 50 INJECTION, SOLUTION INTRAVENOUS; SUBCUTANEOUS at 22:20

## 2019-01-01 RX ADMIN — LACTULOSE 20 G: 20 SOLUTION ORAL at 16:41

## 2019-01-01 RX ADMIN — SODIUM CHLORIDE: 9 INJECTION, SOLUTION INTRAVENOUS at 16:34

## 2019-01-01 RX ADMIN — ALBUMIN HUMAN 100 G: 0.25 SOLUTION INTRAVENOUS at 08:52

## 2019-01-01 RX ADMIN — PANTOPRAZOLE SODIUM 8 MG/HR: 40 INJECTION, POWDER, FOR SOLUTION INTRAVENOUS at 19:11

## 2019-01-01 RX ADMIN — LACTULOSE 200 G: 10 SOLUTION ORAL; RECTAL at 08:49

## 2019-01-01 RX ADMIN — PANTOPRAZOLE SODIUM 8 MG/HR: 40 INJECTION, POWDER, FOR SOLUTION INTRAVENOUS at 23:34

## 2019-01-01 RX ADMIN — Medication 600 MG: at 12:36

## 2019-01-01 RX ADMIN — RIFAXIMIN 550 MG: 550 TABLET ORAL at 20:04

## 2019-01-01 RX ADMIN — FENTANYL CITRATE 100 MCG: 50 INJECTION INTRAMUSCULAR; INTRAVENOUS at 23:37

## 2019-01-01 RX ADMIN — SODIUM CHLORIDE: 9 INJECTION, SOLUTION INTRAVENOUS at 16:13

## 2019-01-01 RX ADMIN — FLUOXETINE 20 MG: 20 CAPSULE ORAL at 07:51

## 2019-01-01 RX ADMIN — ALLOPURINOL 200 MG: 100 TABLET ORAL at 08:30

## 2019-01-01 RX ADMIN — CEPHALEXIN 500 MG: 500 CAPSULE ORAL at 08:35

## 2019-01-01 RX ADMIN — LACTULOSE 200 G: 10 SOLUTION ORAL; RECTAL at 13:14

## 2019-01-01 RX ADMIN — MIDAZOLAM 5 MG: 1 INJECTION INTRAMUSCULAR; INTRAVENOUS at 05:18

## 2019-01-01 RX ADMIN — ONDANSETRON 4 MG: 2 INJECTION INTRAMUSCULAR; INTRAVENOUS at 05:19

## 2019-01-01 RX ADMIN — ZINC SULFATE CAP 220 MG (50 MG ELEMENTAL ZN) 220 MG: 220 (50 ZN) CAP at 20:06

## 2019-01-01 RX ADMIN — MIDODRINE HYDROCHLORIDE 5 MG: 5 TABLET ORAL at 18:53

## 2019-01-01 RX ADMIN — FLUOXETINE 20 MG: 20 CAPSULE ORAL at 07:54

## 2019-01-01 RX ADMIN — OCTREOTIDE ACETATE 100 MCG: 100 INJECTION, SOLUTION INTRAVENOUS; SUBCUTANEOUS at 13:36

## 2019-01-01 RX ADMIN — LACTULOSE 200 G: 10 SOLUTION ORAL; RECTAL at 05:25

## 2019-01-01 RX ADMIN — NALOXONE HYDROCHLORIDE 0.4 MG: 0.4 INJECTION, SOLUTION INTRAMUSCULAR; INTRAVENOUS; SUBCUTANEOUS at 03:30

## 2019-01-01 RX ADMIN — FOLIC ACID 1 MG: 1 TABLET ORAL at 07:54

## 2019-01-01 RX ADMIN — SODIUM CHLORIDE 300 ML: 9 INJECTION, SOLUTION INTRAVENOUS at 11:52

## 2019-01-01 RX ADMIN — ACETAMINOPHEN 650 MG: 325 TABLET, FILM COATED ORAL at 19:21

## 2019-01-01 RX ADMIN — MIDODRINE HYDROCHLORIDE 5 MG: 5 TABLET ORAL at 18:07

## 2019-01-01 RX ADMIN — TORSEMIDE 10 MG: 10 TABLET ORAL at 08:30

## 2019-01-01 RX ADMIN — FLUOXETINE 20 MG: 20 CAPSULE ORAL at 08:00

## 2019-01-01 RX ADMIN — MULTIPLE VITAMINS W/ MINERALS TAB 1 TABLET: TAB at 08:22

## 2019-01-01 RX ADMIN — FOLIC ACID 1 MG: 1 TABLET ORAL at 07:39

## 2019-01-01 RX ADMIN — FOLIC ACID 1 MG: 1 TABLET ORAL at 10:17

## 2019-01-01 RX ADMIN — Medication 600 MG: at 08:33

## 2019-01-01 RX ADMIN — FLUOXETINE 20 MG: 20 CAPSULE ORAL at 07:38

## 2019-01-01 RX ADMIN — SODIUM CHLORIDE 250 ML: 9 INJECTION, SOLUTION INTRAVENOUS at 12:05

## 2019-01-01 RX ADMIN — SODIUM CHLORIDE, POTASSIUM CHLORIDE, SODIUM LACTATE AND CALCIUM CHLORIDE 250 ML: 600; 310; 30; 20 INJECTION, SOLUTION INTRAVENOUS at 12:52

## 2019-01-01 RX ADMIN — PANTOPRAZOLE SODIUM 40 MG: 40 INJECTION, POWDER, FOR SOLUTION INTRAVENOUS at 08:32

## 2019-01-01 RX ADMIN — TRIAMCINOLONE ACETONIDE: 1 CREAM TOPICAL at 11:15

## 2019-01-01 RX ADMIN — OCTREOTIDE ACETATE 100 MCG: 100 INJECTION, SOLUTION INTRAVENOUS; SUBCUTANEOUS at 22:00

## 2019-01-01 RX ADMIN — RIFAXIMIN 550 MG: 550 TABLET ORAL at 08:22

## 2019-01-01 RX ADMIN — POTASSIUM CHLORIDE: 2 INJECTION, SOLUTION, CONCENTRATE INTRAVENOUS at 20:22

## 2019-01-01 RX ADMIN — NADOLOL 40 MG: 40 TABLET ORAL at 13:36

## 2019-01-01 RX ADMIN — FOLIC ACID 1 MG: 1 TABLET ORAL at 08:14

## 2019-01-01 RX ADMIN — CEFTRIAXONE 1 G: 1 INJECTION, POWDER, FOR SOLUTION INTRAMUSCULAR; INTRAVENOUS at 20:35

## 2019-01-01 RX ADMIN — ZINC SULFATE CAP 220 MG (50 MG ELEMENTAL ZN) 220 MG: 220 (50 ZN) CAP at 10:47

## 2019-01-01 RX ADMIN — THERA TABS 1 TABLET: TAB at 07:51

## 2019-01-01 RX ADMIN — SODIUM CHLORIDE, POTASSIUM CHLORIDE, SODIUM LACTATE AND CALCIUM CHLORIDE 1000 ML: 600; 310; 30; 20 INJECTION, SOLUTION INTRAVENOUS at 00:29

## 2019-01-01 RX ADMIN — OCTREOTIDE ACETATE 50 MCG/HR: 200 INJECTION, SOLUTION INTRAVENOUS; SUBCUTANEOUS at 21:29

## 2019-01-01 RX ADMIN — ALLOPURINOL 200 MG: 100 TABLET ORAL at 08:02

## 2019-01-01 RX ADMIN — ZINC SULFATE CAP 220 MG (50 MG ELEMENTAL ZN) 220 MG: 220 (50 ZN) CAP at 11:57

## 2019-01-01 RX ADMIN — MIDODRINE HYDROCHLORIDE 5 MG: 5 TABLET ORAL at 13:42

## 2019-01-01 RX ADMIN — Medication 600 MG: at 08:14

## 2019-01-01 RX ADMIN — ZINC SULFATE CAP 220 MG (50 MG ELEMENTAL ZN) 220 MG: 220 (50 ZN) CAP at 19:40

## 2019-01-01 RX ADMIN — SODIUM CHLORIDE 250 ML: 9 INJECTION, SOLUTION INTRAVENOUS at 18:48

## 2019-01-01 RX ADMIN — MIDODRINE HYDROCHLORIDE 5 MG: 5 TABLET ORAL at 07:51

## 2019-01-01 RX ADMIN — FLUOXETINE 20 MG: 20 CAPSULE ORAL at 11:54

## 2019-01-01 RX ADMIN — RIFAXIMIN 550 MG: 550 TABLET ORAL at 08:03

## 2019-01-01 RX ADMIN — RIFAXIMIN 550 MG: 550 TABLET ORAL at 19:40

## 2019-01-01 RX ADMIN — RIFAXIMIN 550 MG: 550 TABLET ORAL at 07:52

## 2019-01-01 RX ADMIN — LACTULOSE 200 G: 10 SOLUTION ORAL; RECTAL at 17:17

## 2019-01-01 RX ADMIN — Medication 1 MG: at 20:13

## 2019-01-01 RX ADMIN — MIDODRINE HYDROCHLORIDE 10 MG: 5 TABLET ORAL at 17:47

## 2019-01-01 RX ADMIN — ZINC SULFATE CAP 220 MG (50 MG ELEMENTAL ZN) 220 MG: 220 (50 ZN) CAP at 08:14

## 2019-01-01 RX ADMIN — HEPARIN SODIUM 5000 UNITS: 5000 INJECTION, SOLUTION INTRAVENOUS; SUBCUTANEOUS at 08:22

## 2019-01-01 RX ADMIN — OCTREOTIDE ACETATE 100 MCG: 100 INJECTION, SOLUTION INTRAVENOUS; SUBCUTANEOUS at 20:35

## 2019-01-01 RX ADMIN — RIFAXIMIN 550 MG: 550 TABLET ORAL at 07:34

## 2019-01-01 RX ADMIN — MIDODRINE HYDROCHLORIDE 5 MG: 5 TABLET ORAL at 13:36

## 2019-01-01 RX ADMIN — RIFAXIMIN 550 MG: 550 TABLET ORAL at 19:21

## 2019-01-01 RX ADMIN — MULTIPLE VITAMINS W/ MINERALS TAB 1 TABLET: TAB at 11:57

## 2019-01-01 RX ADMIN — MIDODRINE HYDROCHLORIDE 5 MG: 5 TABLET ORAL at 07:39

## 2019-01-01 RX ADMIN — Medication 600 MG: at 07:54

## 2019-01-01 RX ADMIN — RIFAXIMIN 550 MG: 550 TABLET ORAL at 08:32

## 2019-01-01 RX ADMIN — FOLIC ACID 1 MG: 1 TABLET ORAL at 10:47

## 2019-01-01 RX ADMIN — ACETAMINOPHEN 650 MG: 325 TABLET, FILM COATED ORAL at 11:01

## 2019-01-01 RX ADMIN — OCTREOTIDE ACETATE 50 MCG/HR: 200 INJECTION, SOLUTION INTRAVENOUS; SUBCUTANEOUS at 00:04

## 2019-01-01 RX ADMIN — ZINC SULFATE CAP 220 MG (50 MG ELEMENTAL ZN) 220 MG: 220 (50 ZN) CAP at 19:28

## 2019-01-01 RX ADMIN — PANTOPRAZOLE SODIUM 8 MG/HR: 40 INJECTION, POWDER, FOR SOLUTION INTRAVENOUS at 05:30

## 2019-01-01 RX ADMIN — MIDODRINE HYDROCHLORIDE 5 MG: 5 TABLET ORAL at 13:03

## 2019-01-01 RX ADMIN — MIDODRINE HYDROCHLORIDE 20 MG: 5 TABLET ORAL at 17:32

## 2019-01-01 RX ADMIN — RIFAXIMIN 550 MG: 550 TABLET ORAL at 20:23

## 2019-01-01 RX ADMIN — ALBUMIN HUMAN 100 G: 0.25 SOLUTION INTRAVENOUS at 13:32

## 2019-01-01 RX ADMIN — ALBUMIN HUMAN 12.5 G: 0.25 SOLUTION INTRAVENOUS at 22:27

## 2019-01-01 RX ADMIN — FERROUS SULFATE TAB 325 MG (65 MG ELEMENTAL FE) 650 MG: 325 (65 FE) TAB at 07:54

## 2019-01-01 RX ADMIN — ALLOPURINOL 100 MG: 100 TABLET ORAL at 17:47

## 2019-01-01 RX ADMIN — OCTREOTIDE ACETATE 100 MCG: 100 INJECTION, SOLUTION INTRAVENOUS; SUBCUTANEOUS at 07:51

## 2019-01-01 RX ADMIN — HEPARIN SODIUM 5000 UNITS: 5000 INJECTION, SOLUTION INTRAVENOUS; SUBCUTANEOUS at 07:46

## 2019-01-01 RX ADMIN — ZINC SULFATE CAP 220 MG (50 MG ELEMENTAL ZN) 220 MG: 220 (50 ZN) CAP at 12:36

## 2019-01-01 RX ADMIN — TRIAMCINOLONE ACETONIDE: 1 CREAM TOPICAL at 21:57

## 2019-01-01 RX ADMIN — OCTREOTIDE ACETATE 100 MCG: 100 INJECTION, SOLUTION INTRAVENOUS; SUBCUTANEOUS at 16:02

## 2019-01-01 RX ADMIN — MELATONIN TAB 3 MG 3 MG: 3 TAB at 21:57

## 2019-01-01 RX ADMIN — ONDANSETRON 4 MG: 2 INJECTION INTRAMUSCULAR; INTRAVENOUS at 21:58

## 2019-01-01 RX ADMIN — FOLIC ACID 1 MG: 1 TABLET ORAL at 08:30

## 2019-01-01 RX ADMIN — ACETAMINOPHEN 650 MG: 325 TABLET, FILM COATED ORAL at 20:05

## 2019-01-01 RX ADMIN — PHENYLEPHRINE HYDROCHLORIDE 100 MCG: 10 INJECTION INTRAVENOUS at 23:31

## 2019-01-01 RX ADMIN — FOLIC ACID 1 MG: 1 TABLET ORAL at 08:12

## 2019-01-01 RX ADMIN — MULTIPLE VITAMINS W/ MINERALS TAB 1 TABLET: TAB at 08:18

## 2019-01-01 RX ADMIN — RIFAXIMIN 550 MG: 550 TABLET ORAL at 21:00

## 2019-01-01 RX ADMIN — MIDAZOLAM HYDROCHLORIDE 0.5 MG: 2 INJECTION, SOLUTION INTRAMUSCULAR; INTRAVENOUS at 15:46

## 2019-01-01 RX ADMIN — ACETAMINOPHEN 650 MG: 325 TABLET, FILM COATED ORAL at 14:33

## 2019-01-01 RX ADMIN — ZINC SULFATE CAP 220 MG (50 MG ELEMENTAL ZN) 220 MG: 220 (50 ZN) CAP at 08:01

## 2019-01-01 RX ADMIN — ZINC SULFATE CAP 220 MG (50 MG ELEMENTAL ZN) 220 MG: 220 (50 ZN) CAP at 19:38

## 2019-01-01 RX ADMIN — RIFAXIMIN 550 MG: 550 TABLET ORAL at 20:06

## 2019-01-01 RX ADMIN — CEPHALEXIN 500 MG: 500 CAPSULE ORAL at 10:05

## 2019-01-01 RX ADMIN — PANTOPRAZOLE SODIUM 8 MG/HR: 40 INJECTION, POWDER, FOR SOLUTION INTRAVENOUS at 09:42

## 2019-01-01 RX ADMIN — RIFAXIMIN 550 MG: 550 TABLET ORAL at 19:27

## 2019-01-01 RX ADMIN — SPIRONOLACTONE 50 MG: 25 TABLET ORAL at 08:03

## 2019-01-01 RX ADMIN — LACTULOSE 20 G: 20 SOLUTION ORAL at 09:53

## 2019-01-01 RX ADMIN — FOLIC ACID 1 MG: 1 TABLET ORAL at 07:51

## 2019-01-01 RX ADMIN — MORPHINE SULFATE 2 MG: 2 INJECTION, SOLUTION INTRAMUSCULAR; INTRAVENOUS at 12:24

## 2019-01-01 RX ADMIN — MULTIPLE VITAMINS W/ MINERALS TAB 1 TABLET: TAB at 08:08

## 2019-01-01 RX ADMIN — LACTULOSE 200 G: 10 SOLUTION ORAL; RECTAL at 08:15

## 2019-01-01 RX ADMIN — PANTOPRAZOLE SODIUM 8 MG/HR: 40 INJECTION, POWDER, FOR SOLUTION INTRAVENOUS at 04:31

## 2019-01-01 RX ADMIN — FENTANYL CITRATE 250 MCG: 50 INJECTION, SOLUTION INTRAMUSCULAR; INTRAVENOUS at 05:18

## 2019-01-01 RX ADMIN — CEFTRIAXONE 1 G: 1 INJECTION, POWDER, FOR SOLUTION INTRAMUSCULAR; INTRAVENOUS at 21:29

## 2019-01-01 RX ADMIN — POTASSIUM CHLORIDE 20 MEQ: 29.8 INJECTION, SOLUTION INTRAVENOUS at 09:20

## 2019-01-01 RX ADMIN — PANTOPRAZOLE SODIUM 8 MG/HR: 40 INJECTION, POWDER, FOR SOLUTION INTRAVENOUS at 21:26

## 2019-01-01 RX ADMIN — SODIUM CHLORIDE, POTASSIUM CHLORIDE, SODIUM LACTATE AND CALCIUM CHLORIDE 1000 ML: 600; 310; 30; 20 INJECTION, SOLUTION INTRAVENOUS at 00:49

## 2019-01-01 RX ADMIN — ZINC SULFATE CAP 220 MG (50 MG ELEMENTAL ZN) 220 MG: 220 (50 ZN) CAP at 19:21

## 2019-01-01 RX ADMIN — PANTOPRAZOLE SODIUM 40 MG: 40 TABLET, DELAYED RELEASE ORAL at 08:18

## 2019-01-01 RX ADMIN — PANTOPRAZOLE SODIUM 40 MG: 40 TABLET, DELAYED RELEASE ORAL at 08:29

## 2019-01-01 RX ADMIN — RIFAXIMIN 550 MG: 550 TABLET ORAL at 11:54

## 2019-01-01 RX ADMIN — RIFAXIMIN 550 MG: 550 TABLET ORAL at 19:49

## 2019-01-01 RX ADMIN — LACTULOSE 20 G: 20 SOLUTION ORAL at 18:20

## 2019-01-01 RX ADMIN — RIFAXIMIN 550 MG: 550 TABLET ORAL at 07:45

## 2019-01-01 RX ADMIN — THERA TABS 1 TABLET: TAB at 12:36

## 2019-01-01 RX ADMIN — FLUOXETINE 20 MG: 20 CAPSULE ORAL at 08:30

## 2019-01-01 RX ADMIN — POTASSIUM CHLORIDE 20 MEQ: 750 TABLET, EXTENDED RELEASE ORAL at 09:47

## 2019-01-01 RX ADMIN — DEXMEDETOMIDINE 1.2 MCG/KG/HR: 100 INJECTION, SOLUTION, CONCENTRATE INTRAVENOUS at 02:15

## 2019-01-01 RX ADMIN — Medication 0.05 MCG/KG/MIN: at 00:07

## 2019-01-01 RX ADMIN — MULTIPLE VITAMINS W/ MINERALS TAB 1 TABLET: TAB at 07:54

## 2019-01-01 RX ADMIN — ONDANSETRON 4 MG: 2 INJECTION INTRAMUSCULAR; INTRAVENOUS at 08:52

## 2019-01-01 RX ADMIN — MULTIPLE VITAMINS W/ MINERALS TAB 1 TABLET: TAB at 08:29

## 2019-01-01 RX ADMIN — SODIUM CHLORIDE 1000 ML: 9 INJECTION, SOLUTION INTRAVENOUS at 21:48

## 2019-01-01 RX ADMIN — RIFAXIMIN 550 MG: 550 TABLET ORAL at 08:07

## 2019-01-01 RX ADMIN — PANTOPRAZOLE SODIUM 40 MG: 40 TABLET, DELAYED RELEASE ORAL at 10:46

## 2019-01-01 RX ADMIN — SODIUM CHLORIDE 300 ML: 9 INJECTION, SOLUTION INTRAVENOUS at 18:06

## 2019-01-01 RX ADMIN — LACTULOSE 30 ML: 20 SOLUTION ORAL at 20:22

## 2019-01-01 RX ADMIN — SODIUM CHLORIDE: 9 INJECTION, SOLUTION INTRAVENOUS at 05:19

## 2019-01-01 RX ADMIN — LACTULOSE 30 ML: 20 SOLUTION ORAL at 12:35

## 2019-01-01 RX ADMIN — MULTIPLE VITAMINS W/ MINERALS TAB 1 TABLET: TAB at 08:03

## 2019-01-01 RX ADMIN — MIDODRINE HYDROCHLORIDE 5 MG: 5 TABLET ORAL at 20:22

## 2019-01-01 RX ADMIN — RIFAXIMIN 550 MG: 550 TABLET ORAL at 12:36

## 2019-01-01 RX ADMIN — MIDODRINE HYDROCHLORIDE 20 MG: 5 TABLET ORAL at 14:37

## 2019-01-01 RX ADMIN — MIDODRINE HYDROCHLORIDE 5 MG: 5 TABLET ORAL at 12:37

## 2019-01-01 RX ADMIN — MULTIPLE VITAMINS W/ MINERALS TAB 1 TABLET: TAB at 13:15

## 2019-01-01 RX ADMIN — TORSEMIDE 10 MG: 10 TABLET ORAL at 08:04

## 2019-01-01 RX ADMIN — PANTOPRAZOLE SODIUM 40 MG: 40 TABLET, DELAYED RELEASE ORAL at 16:35

## 2019-01-01 RX ADMIN — CIPROFLOXACIN HYDROCHLORIDE 500 MG: 500 TABLET, FILM COATED ORAL at 16:03

## 2019-01-01 RX ADMIN — OMEPRAZOLE 20 MG: 20 CAPSULE, DELAYED RELEASE ORAL at 08:07

## 2019-01-01 RX ADMIN — FLUOXETINE 20 MG: 20 CAPSULE ORAL at 08:14

## 2019-01-01 RX ADMIN — LACTULOSE 20 G: 20 SOLUTION ORAL at 08:35

## 2019-01-01 RX ADMIN — FOLIC ACID 1 MG: 1 TABLET ORAL at 08:25

## 2019-01-01 RX ADMIN — ZINC SULFATE CAP 220 MG (50 MG ELEMENTAL ZN) 220 MG: 220 (50 ZN) CAP at 08:07

## 2019-01-01 RX ADMIN — PANTOPRAZOLE SODIUM 40 MG: 40 INJECTION, POWDER, FOR SOLUTION INTRAVENOUS at 20:13

## 2019-01-01 RX ADMIN — RIFAXIMIN 550 MG: 550 TABLET ORAL at 08:12

## 2019-01-01 RX ADMIN — PANTOPRAZOLE SODIUM 40 MG: 40 TABLET, DELAYED RELEASE ORAL at 08:03

## 2019-01-01 RX ADMIN — PANTOPRAZOLE SODIUM 40 MG: 40 TABLET, DELAYED RELEASE ORAL at 17:04

## 2019-01-01 RX ADMIN — LACTULOSE 20 G: 20 SOLUTION ORAL at 07:33

## 2019-01-01 RX ADMIN — OCTREOTIDE ACETATE 50 MCG/HR: 200 INJECTION, SOLUTION INTRAVENOUS; SUBCUTANEOUS at 05:53

## 2019-01-01 RX ADMIN — SODIUM CHLORIDE 1000 ML: 9 INJECTION, SOLUTION INTRAVENOUS at 18:19

## 2019-01-01 RX ADMIN — ALBUMIN HUMAN 12.5 G: 0.25 SOLUTION INTRAVENOUS at 18:09

## 2019-01-01 RX ADMIN — ZINC SULFATE CAP 220 MG (50 MG ELEMENTAL ZN) 220 MG: 220 (50 ZN) CAP at 19:50

## 2019-01-01 RX ADMIN — PHYTONADIONE 10 MG: 10 INJECTION, EMULSION INTRAMUSCULAR; INTRAVENOUS; SUBCUTANEOUS at 01:27

## 2019-01-01 RX ADMIN — RIFAXIMIN 550 MG: 550 TABLET ORAL at 20:11

## 2019-01-01 RX ADMIN — HEPARIN SODIUM 5000 UNITS: 5000 INJECTION, SOLUTION INTRAVENOUS; SUBCUTANEOUS at 21:00

## 2019-01-01 RX ADMIN — LACTULOSE 20 G: 20 SOLUTION ORAL at 08:22

## 2019-01-01 RX ADMIN — PANTOPRAZOLE SODIUM 40 MG: 40 INJECTION, POWDER, FOR SOLUTION INTRAVENOUS at 07:39

## 2019-01-01 RX ADMIN — CEFTRIAXONE 1 G: 1 INJECTION, POWDER, FOR SOLUTION INTRAMUSCULAR; INTRAVENOUS at 20:32

## 2019-01-01 RX ADMIN — RIFAXIMIN 550 MG: 550 TABLET ORAL at 08:29

## 2019-01-01 RX ADMIN — ZINC SULFATE CAP 220 MG (50 MG ELEMENTAL ZN) 220 MG: 220 (50 ZN) CAP at 08:35

## 2019-01-01 RX ADMIN — OLANZAPINE 5 MG: 10 INJECTION, POWDER, FOR SOLUTION INTRAMUSCULAR at 05:41

## 2019-01-01 RX ADMIN — RIFAXIMIN 550 MG: 550 TABLET ORAL at 20:08

## 2019-01-01 RX ADMIN — FOLIC ACID 1 MG: 1 TABLET ORAL at 08:03

## 2019-01-01 RX ADMIN — HEPARIN SODIUM 5000 UNITS: 5000 INJECTION, SOLUTION INTRAVENOUS; SUBCUTANEOUS at 08:18

## 2019-01-01 RX ADMIN — LACTULOSE 20 G: 20 SOLUTION ORAL at 11:47

## 2019-01-01 RX ADMIN — PHYTONADIONE 10 MG: 10 INJECTION, EMULSION INTRAMUSCULAR; INTRAVENOUS; SUBCUTANEOUS at 16:09

## 2019-01-01 RX ADMIN — ZINC SULFATE CAP 220 MG (50 MG ELEMENTAL ZN) 220 MG: 220 (50 ZN) CAP at 19:49

## 2019-01-01 RX ADMIN — MIDAZOLAM 2 MG: 1 INJECTION INTRAMUSCULAR; INTRAVENOUS at 23:37

## 2019-01-01 RX ADMIN — DEXTROSE MONOHYDRATE: 50 INJECTION, SOLUTION INTRAVENOUS at 09:15

## 2019-01-01 RX ADMIN — Medication: at 18:06

## 2019-01-01 RX ADMIN — PANTOPRAZOLE SODIUM 40 MG: 40 INJECTION, POWDER, FOR SOLUTION INTRAVENOUS at 19:49

## 2019-01-01 RX ADMIN — DEXMEDETOMIDINE 0.3 MCG/KG/HR: 100 INJECTION, SOLUTION, CONCENTRATE INTRAVENOUS at 16:47

## 2019-01-01 RX ADMIN — FERROUS SULFATE TAB 325 MG (65 MG ELEMENTAL FE) 650 MG: 325 (65 FE) TAB at 08:03

## 2019-01-01 RX ADMIN — ALBUMIN HUMAN 100 G: 0.25 SOLUTION INTRAVENOUS at 07:58

## 2019-01-01 RX ADMIN — LIDOCAINE HYDROCHLORIDE 1 ML: 10 INJECTION, SOLUTION INFILTRATION; PERINEURAL at 12:06

## 2019-01-01 RX ADMIN — FENTANYL CITRATE 50 MCG: 50 INJECTION INTRAMUSCULAR; INTRAVENOUS at 20:08

## 2019-01-01 RX ADMIN — EPOETIN ALFA 4000 UNITS: 10000 SOLUTION INTRAVENOUS; SUBCUTANEOUS at 18:24

## 2019-01-01 RX ADMIN — MIDODRINE HYDROCHLORIDE 5 MG: 5 TABLET ORAL at 17:04

## 2019-01-01 RX ADMIN — DEXMEDETOMIDINE 0.2 MCG/KG/HR: 100 INJECTION, SOLUTION, CONCENTRATE INTRAVENOUS at 13:38

## 2019-01-01 RX ADMIN — FENTANYL CITRATE 25 MCG: 50 INJECTION INTRAMUSCULAR; INTRAVENOUS at 15:46

## 2019-01-01 RX ADMIN — LACTULOSE 20 G: 20 SOLUTION ORAL at 19:51

## 2019-01-01 RX ADMIN — Medication 600 MG: at 11:56

## 2019-01-01 RX ADMIN — LACTULOSE 20 G: 20 SOLUTION ORAL at 07:45

## 2019-01-01 RX ADMIN — ZINC SULFATE CAP 220 MG (50 MG ELEMENTAL ZN) 220 MG: 220 (50 ZN) CAP at 20:05

## 2019-01-01 RX ADMIN — FLUOXETINE 20 MG: 20 CAPSULE ORAL at 08:12

## 2019-01-01 RX ADMIN — FOLIC ACID 1 MG: 1 TABLET ORAL at 08:35

## 2019-01-01 RX ADMIN — MIDAZOLAM 2 MG: 1 INJECTION INTRAMUSCULAR; INTRAVENOUS at 00:06

## 2019-01-01 RX ADMIN — ZINC SULFATE CAP 220 MG (50 MG ELEMENTAL ZN) 220 MG: 220 (50 ZN) CAP at 08:12

## 2019-01-01 RX ADMIN — PANTOPRAZOLE SODIUM 40 MG: 40 TABLET, DELAYED RELEASE ORAL at 08:00

## 2019-01-01 RX ADMIN — PANTOPRAZOLE SODIUM 40 MG: 40 INJECTION, POWDER, FOR SOLUTION INTRAVENOUS at 20:34

## 2019-01-01 RX ADMIN — PANTOPRAZOLE SODIUM 40 MG: 40 INJECTION, POWDER, FOR SOLUTION INTRAVENOUS at 19:56

## 2019-01-01 RX ADMIN — LACTULOSE 30 ML: 20 SOLUTION ORAL at 08:07

## 2019-01-01 RX ADMIN — MIDODRINE HYDROCHLORIDE 20 MG: 5 TABLET ORAL at 21:40

## 2019-01-01 RX ADMIN — RIFAXIMIN 550 MG: 550 TABLET ORAL at 07:54

## 2019-01-01 RX ADMIN — FENTANYL CITRATE 100 MCG: 50 INJECTION, SOLUTION INTRAMUSCULAR; INTRAVENOUS at 23:37

## 2019-01-01 RX ADMIN — PANTOPRAZOLE SODIUM 8 MG/HR: 40 INJECTION, POWDER, FOR SOLUTION INTRAVENOUS at 08:10

## 2019-01-01 RX ADMIN — MIDODRINE HYDROCHLORIDE 5 MG: 5 TABLET ORAL at 08:25

## 2019-01-01 RX ADMIN — RIFAXIMIN 550 MG: 550 TABLET ORAL at 10:16

## 2019-01-01 RX ADMIN — FENTANYL CITRATE 250 MCG: 50 INJECTION INTRAMUSCULAR; INTRAVENOUS at 05:18

## 2019-01-01 RX ADMIN — FLUOXETINE 20 MG: 20 CAPSULE ORAL at 12:35

## 2019-01-01 RX ADMIN — SODIUM CHLORIDE 250 ML: 9 INJECTION, SOLUTION INTRAVENOUS at 18:06

## 2019-01-01 RX ADMIN — CEFTRIAXONE 1 G: 1 INJECTION, POWDER, FOR SOLUTION INTRAMUSCULAR; INTRAVENOUS at 21:31

## 2019-01-01 RX ADMIN — ACETAMINOPHEN 650 MG: 325 TABLET, FILM COATED ORAL at 01:29

## 2019-01-01 RX ADMIN — ONDANSETRON 4 MG: 2 INJECTION INTRAMUSCULAR; INTRAVENOUS at 10:03

## 2019-01-01 RX ADMIN — PANTOPRAZOLE SODIUM 40 MG: 40 INJECTION, POWDER, FOR SOLUTION INTRAVENOUS at 18:12

## 2019-01-01 RX ADMIN — ZINC SULFATE CAP 220 MG (50 MG ELEMENTAL ZN) 220 MG: 220 (50 ZN) CAP at 07:33

## 2019-01-01 RX ADMIN — FERROUS SULFATE TAB 325 MG (65 MG ELEMENTAL FE) 650 MG: 325 (65 FE) TAB at 08:18

## 2019-01-01 RX ADMIN — LACTULOSE 20 G: 20 SOLUTION ORAL at 08:17

## 2019-01-01 RX ADMIN — LACTULOSE 20 G: 20 SOLUTION ORAL at 08:29

## 2019-01-01 RX ADMIN — PANTOPRAZOLE SODIUM 40 MG: 40 TABLET, DELAYED RELEASE ORAL at 16:22

## 2019-01-01 RX ADMIN — THERA TABS 1 TABLET: TAB at 08:25

## 2019-01-01 RX ADMIN — Medication 600 MG: at 08:07

## 2019-01-01 RX ADMIN — AZITHROMYCIN MONOHYDRATE 500 MG: 500 INJECTION, POWDER, LYOPHILIZED, FOR SOLUTION INTRAVENOUS at 12:27

## 2019-01-01 RX ADMIN — PANTOPRAZOLE SODIUM 40 MG: 40 INJECTION, POWDER, FOR SOLUTION INTRAVENOUS at 08:35

## 2019-01-01 RX ADMIN — RIFAXIMIN 550 MG: 550 TABLET ORAL at 08:01

## 2019-01-01 RX ADMIN — LACTULOSE 30 ML: 20 SOLUTION ORAL at 08:12

## 2019-01-01 RX ADMIN — NALOXONE HYDROCHLORIDE 0.4 MG: 0.4 INJECTION, SOLUTION INTRAMUSCULAR; INTRAVENOUS; SUBCUTANEOUS at 03:27

## 2019-01-01 RX ADMIN — RIFAXIMIN 550 MG: 550 TABLET ORAL at 08:18

## 2019-01-01 RX ADMIN — Medication 600 MG: at 08:24

## 2019-01-01 RX ADMIN — PROCHLORPERAZINE EDISYLATE 5 MG: 5 INJECTION INTRAMUSCULAR; INTRAVENOUS at 03:03

## 2019-01-01 RX ADMIN — MIDODRINE HYDROCHLORIDE 5 MG: 5 TABLET ORAL at 08:00

## 2019-01-01 RX ADMIN — MULTIPLE VITAMINS W/ MINERALS TAB 1 TABLET: TAB at 08:12

## 2019-01-01 RX ADMIN — FLUOXETINE 20 MG: 20 CAPSULE ORAL at 10:47

## 2019-01-01 RX ADMIN — PANTOPRAZOLE SODIUM 40 MG: 40 TABLET, DELAYED RELEASE ORAL at 17:46

## 2019-01-01 RX ADMIN — LIDOCAINE HYDROCHLORIDE 10 ML: 10 INJECTION, SOLUTION EPIDURAL; INFILTRATION; INTRACAUDAL; PERINEURAL at 16:19

## 2019-01-01 RX ADMIN — MULTIPLE VITAMINS W/ MINERALS TAB 1 TABLET: TAB at 07:39

## 2019-01-01 RX ADMIN — ACETAMINOPHEN 650 MG: 325 TABLET, FILM COATED ORAL at 15:33

## 2019-01-01 RX ADMIN — DEXTROSE MONOHYDRATE: 50 INJECTION, SOLUTION INTRAVENOUS at 05:47

## 2019-01-01 RX ADMIN — PANTOPRAZOLE SODIUM 8 MG/HR: 40 INJECTION, POWDER, FOR SOLUTION INTRAVENOUS at 08:05

## 2019-01-01 RX ADMIN — CEFTRIAXONE 1 G: 1 INJECTION, POWDER, FOR SOLUTION INTRAMUSCULAR; INTRAVENOUS at 22:09

## 2019-01-01 RX ADMIN — RIFAXIMIN 550 MG: 550 TABLET ORAL at 07:38

## 2019-01-01 RX ADMIN — FENTANYL CITRATE 100 MCG: 50 INJECTION INTRAMUSCULAR; INTRAVENOUS at 00:06

## 2019-01-01 RX ADMIN — LACTULOSE 30 ML: 20 SOLUTION ORAL at 15:38

## 2019-01-01 RX ADMIN — FOLIC ACID 1 MG: 1 TABLET ORAL at 08:32

## 2019-01-01 RX ADMIN — Medication 600 MG: at 08:30

## 2019-01-01 RX ADMIN — RIFAXIMIN 550 MG: 550 TABLET ORAL at 20:35

## 2019-01-01 RX ADMIN — CEFTRIAXONE 1 G: 1 INJECTION, POWDER, FOR SOLUTION INTRAMUSCULAR; INTRAVENOUS at 22:46

## 2019-01-01 RX ADMIN — FLUOXETINE 20 MG: 20 CAPSULE ORAL at 08:35

## 2019-01-01 RX ADMIN — HEPARIN SODIUM 5000 UNITS: 5000 INJECTION, SOLUTION INTRAVENOUS; SUBCUTANEOUS at 08:04

## 2019-01-01 RX ADMIN — LACTULOSE 20 G: 20 SOLUTION ORAL at 14:07

## 2019-01-01 ASSESSMENT — ACTIVITIES OF DAILY LIVING (ADL)
ADLS_ACUITY_SCORE: 18
ADLS_ACUITY_SCORE: 12
ADLS_ACUITY_SCORE: 14
ADLS_ACUITY_SCORE: 18
ADLS_ACUITY_SCORE: 14
ADLS_ACUITY_SCORE: 19
ADLS_ACUITY_SCORE: 18
ADLS_ACUITY_SCORE: 18
ADLS_ACUITY_SCORE: 19
ADLS_ACUITY_SCORE: 18
ADLS_ACUITY_SCORE: 22
ADLS_ACUITY_SCORE: 23
DEPENDENT_IADLS:: INDEPENDENT
ADLS_ACUITY_SCORE: 18
ADLS_ACUITY_SCORE: 19
ADLS_ACUITY_SCORE: 18
ADLS_ACUITY_SCORE: 20
ADLS_ACUITY_SCORE: 18
ADLS_ACUITY_SCORE: 16
ADLS_ACUITY_SCORE: 18
ADLS_ACUITY_SCORE: 22
ADLS_ACUITY_SCORE: 17
ADLS_ACUITY_SCORE: 18
ADLS_ACUITY_SCORE: 18
ADLS_ACUITY_SCORE: 19
ADLS_ACUITY_SCORE: 19
ADLS_ACUITY_SCORE: 15
ADLS_ACUITY_SCORE: 14
ADLS_ACUITY_SCORE: 14
ADLS_ACUITY_SCORE: 20
ADLS_ACUITY_SCORE: 19
ADLS_ACUITY_SCORE: 22
ADLS_ACUITY_SCORE: 17
ADLS_ACUITY_SCORE: 21
ADLS_ACUITY_SCORE: 17
ADLS_ACUITY_SCORE: 19
ADLS_ACUITY_SCORE: 22
ADLS_ACUITY_SCORE: 22
ADLS_ACUITY_SCORE: 20
ADLS_ACUITY_SCORE: 14
ADLS_ACUITY_SCORE: 17
ADLS_ACUITY_SCORE: 12
ADLS_ACUITY_SCORE: 20
ADLS_ACUITY_SCORE: 18
ADLS_ACUITY_SCORE: 19
ADLS_ACUITY_SCORE: 19
ADLS_ACUITY_SCORE: 18
ADLS_ACUITY_SCORE: 16
ADLS_ACUITY_SCORE: 17
ADLS_ACUITY_SCORE: 19
ADLS_ACUITY_SCORE: 17
ADLS_ACUITY_SCORE: 14
ADLS_ACUITY_SCORE: 18
PREVIOUS_RESPONSIBILITIES: MEAL PREP;HOUSEKEEPING;LAUNDRY;SHOPPING;MEDICATION MANAGEMENT;FINANCES;DRIVING;WORK
ADLS_ACUITY_SCORE: 22
ADLS_ACUITY_SCORE: 14
ADLS_ACUITY_SCORE: 18
ADLS_ACUITY_SCORE: 18
ADLS_ACUITY_SCORE: 20
ADLS_ACUITY_SCORE: 13
ADLS_ACUITY_SCORE: 18
ADLS_ACUITY_SCORE: 19
ADLS_ACUITY_SCORE: 25
ADLS_ACUITY_SCORE: 21
ADLS_ACUITY_SCORE: 19
ADLS_ACUITY_SCORE: 14
ADLS_ACUITY_SCORE: 21
ADLS_ACUITY_SCORE: 20
ADLS_ACUITY_SCORE: 17
ADLS_ACUITY_SCORE: 19
ADLS_ACUITY_SCORE: 21
ADLS_ACUITY_SCORE: 24
ADLS_ACUITY_SCORE: 18
ADLS_ACUITY_SCORE: 18
ADLS_ACUITY_SCORE: 14
ADLS_ACUITY_SCORE: 19
ADLS_ACUITY_SCORE: 18
ADLS_ACUITY_SCORE: 22
ADLS_ACUITY_SCORE: 16
ADLS_ACUITY_SCORE: 17
ADLS_ACUITY_SCORE: 18
ADLS_ACUITY_SCORE: 18
ADLS_ACUITY_SCORE: 19
ADLS_ACUITY_SCORE: 18
ADLS_ACUITY_SCORE: 22
ADLS_ACUITY_SCORE: 20
ADLS_ACUITY_SCORE: 20
ADLS_ACUITY_SCORE: 14
ADLS_ACUITY_SCORE: 18
ADLS_ACUITY_SCORE: 19
ADLS_ACUITY_SCORE: 14
ADLS_ACUITY_SCORE: 18
ADLS_ACUITY_SCORE: 17
ADLS_ACUITY_SCORE: 18
ADLS_ACUITY_SCORE: 16
ADLS_ACUITY_SCORE: 25
ADLS_ACUITY_SCORE: 18
ADLS_ACUITY_SCORE: 18
ADLS_ACUITY_SCORE: 14
ADLS_ACUITY_SCORE: 18
ADLS_ACUITY_SCORE: 19
ADLS_ACUITY_SCORE: 20
ADLS_ACUITY_SCORE: 14
ADLS_ACUITY_SCORE: 14
ADLS_ACUITY_SCORE: 21
ADLS_ACUITY_SCORE: 25
ADLS_ACUITY_SCORE: 20
ADLS_ACUITY_SCORE: 19
ADLS_ACUITY_SCORE: 20
ADLS_ACUITY_SCORE: 20
ADLS_ACUITY_SCORE: 23
ADLS_ACUITY_SCORE: 19
ADLS_ACUITY_SCORE: 18
ADLS_ACUITY_SCORE: 16
ADLS_ACUITY_SCORE: 14
ADLS_ACUITY_SCORE: 17
ADLS_ACUITY_SCORE: 19
ADLS_ACUITY_SCORE: 22
ADLS_ACUITY_SCORE: 19
ADLS_ACUITY_SCORE: 19
ADLS_ACUITY_SCORE: 23
ADLS_ACUITY_SCORE: 14
ADLS_ACUITY_SCORE: 18
ADLS_ACUITY_SCORE: 17
ADLS_ACUITY_SCORE: 25
ADLS_ACUITY_SCORE: 14
ADLS_ACUITY_SCORE: 19
ADLS_ACUITY_SCORE: 20
ADLS_ACUITY_SCORE: 18
ADLS_ACUITY_SCORE: 21
ADLS_ACUITY_SCORE: 15
ADLS_ACUITY_SCORE: 15
ADLS_ACUITY_SCORE: 14
ADLS_ACUITY_SCORE: 15
ADLS_ACUITY_SCORE: 14
ADLS_ACUITY_SCORE: 20
ADLS_ACUITY_SCORE: 21

## 2019-01-01 ASSESSMENT — MIFFLIN-ST. JEOR
SCORE: 1288.9
SCORE: 1275.29
SCORE: 1283.45
SCORE: 1238.55
SCORE: 1266.22
SCORE: 1257.15
SCORE: 1338.79
SCORE: 1338.79

## 2019-01-01 ASSESSMENT — PAIN DESCRIPTION - DESCRIPTORS
DESCRIPTORS: DISCOMFORT;SORE
DESCRIPTORS: DISCOMFORT
DESCRIPTORS: ACHING

## 2019-01-01 ASSESSMENT — PAIN SCALES - GENERAL
PAINLEVEL: NO PAIN (0)
PAINLEVEL: NO PAIN (1)
PAINLEVEL: NO PAIN (0)
PAINLEVEL: MODERATE PAIN (4)
PAINLEVEL: NO PAIN (0)

## 2019-01-01 ASSESSMENT — ANXIETY QUESTIONNAIRES
GAD7 TOTAL SCORE: 7
3. WORRYING TOO MUCH ABOUT DIFFERENT THINGS: MORE THAN HALF THE DAYS
1. FEELING NERVOUS, ANXIOUS, OR ON EDGE: SEVERAL DAYS
5. BEING SO RESTLESS THAT IT IS HARD TO SIT STILL: NOT AT ALL
GAD7 TOTAL SCORE: 7
6. BECOMING EASILY ANNOYED OR IRRITABLE: SEVERAL DAYS
7. FEELING AFRAID AS IF SOMETHING AWFUL MIGHT HAPPEN: SEVERAL DAYS
2. NOT BEING ABLE TO STOP OR CONTROL WORRYING: SEVERAL DAYS

## 2019-01-01 ASSESSMENT — ENCOUNTER SYMPTOMS
CONFUSION: 1
APPETITE CHANGE: 1
SHORTNESS OF BREATH: 1
VOMITING: 0
TREMORS: 1
FATIGUE: 1
DIARRHEA: 0

## 2019-01-01 ASSESSMENT — LIFESTYLE VARIABLES: TOBACCO_USE: 1

## 2019-01-01 ASSESSMENT — PATIENT HEALTH QUESTIONNAIRE - PHQ9
SUM OF ALL RESPONSES TO PHQ QUESTIONS 1-9: 10
5. POOR APPETITE OR OVEREATING: SEVERAL DAYS

## 2019-02-05 NOTE — PROGRESS NOTES
"  SUBJECTIVE:   Roslyn Palmer is a 63 year old female who presents to clinic today for the following health issues:    Back Pain       Duration: Saturday        Specific cause: none    Description:   Location of pain: low back right and neck bilateral  Character of pain: spasms  Pain radiation:radiates into the right groin  New numbness or weakness in legs, not attributed to pain:  no     Intensity: Currently 4/10    History:   Pain interferes with job: YES  History of back problems: previous herniated disc  Any previous MRI or X-rays: Yes- at [place].  Sees a specialist for back pain:  No  Therapies tried without relief: none    Alleviating factors:   Improved by: aleve and heating pad helps a little       Precipitating factors:  Worsened by: Bending, Standing, Sitting and Walking          Accompanying Signs & Symptoms:  Risk of Fracture:  None  Risk of Cauda Equina:  None  Risk of Infection:  None  Risk of Cancer:  None  Risk of Ankylosing Spondylitis:  Onset at age <35, male, AND morning back stiffness. no                Last time got muscle relaxer and believes vicodin   Month ago and still had couple muscle relaxers left which did help with lower spasms.    Pain for 4 days and last night gripping walls due to pain.   Didn't sleep because can't turn without pull over on mattress.   Double whammy- kind of rough  Started out with pain while at work Sitting in \"crappy chairs\" during lunch.  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  Can get spasms rated 8 out of 10   Next am awoke with neck affected as well.   doesn't know if slept wrong.  Recent dislocation of hip again   Has Tried oral steroid with gout attack in knee   No numbness or tingling no loss of bowel or blader control  No surgery on neck or back   Continues to drink  3-4 alcohol per day-Vodka - will go several days without drinking and then drink again        Problem list and histories reviewed & adjusted, as indicated.  Additional " history: as documented    Patient Active Problem List   Diagnosis     Joint Prosthesis Infection or Inflammation     Alcoholic liver disease (H)     Tobacco use disorder     CARDIOVASCULAR SCREENING; LDL GOAL LESS THAN 160     Anemia     Advanced directives, counseling/discussion     Benign essential hypertension     Hepatitis C virus infection, unspecified chronicity     Upper GI bleed     Alcohol abuse     Migraine with aura and without status migrainosus, not intractable     Low folate     Cervical cancer screening     Anemia, unspecified type     Hepatitis C, chronic (H)     History of esophageal varices     Past Surgical History:   Procedure Laterality Date     COLONOSCOPY N/A 2018    Procedure: COMBINED COLONOSCOPY, SINGLE OR MULTIPLE BIOPSY/POLYPECTOMY BY BIOPSY;;  Surgeon: Musa Diaz MD;  Location: [facility] OR     COLONOSCOPY WITH CO2 INSUFFLATION N/A 2018    Procedure: COLONOSCOPY WITH CO2 INSUFFLATION;  Colonoscopy, Egd;  Surgeon: Musa Diaz MD;  Location: [facility] OR     COMBINED ESOPHAGOSCOPY, GASTROSCOPY, DUODENOSCOPY (EGD) WITH CO2 INSUFFLATION N/A 2018    Procedure: COMBINED ESOPHAGOSCOPY, GASTROSCOPY, DUODENOSCOPY (EGD) WITH CO2 INSUFFLATION;  Combined, Upper GI bleed Alcoholic liver disease (H) Anemia, unspecified type, Ref By Celestino, BMI 27.55, [phone];  Surgeon: Musa Diaz MD;  Location:  OR     GYN SURGERY           HC ESOPHAGOSCOPY W BAND LIGATION ESOPHAGEAL VARICIES       ORTHOPEDIC SURGERY  ,    left and right hip replacement       Social History     Tobacco Use     Smoking status: Current Every Day Smoker     Packs/day: 0.50     Types: Cigarettes     Smokeless tobacco: Never Used   Substance Use Topics     Alcohol use: Yes     Comment: 3 to 4 drinks a day     Family History   Problem Relation Age of Onset     Breast Cancer Mother      Cancer Maternal Grandmother      Cerebrovascular Disease Maternal Grandmother         stroke      Cancer Maternal Grandfather         pancreatic cancer     Cancer Paternal Grandmother      Cancer Paternal Grandfather         brain cancer     Arthritis Sister          Current Outpatient Medications   Medication Sig Dispense Refill     cephALEXin (KEFLEX) 500 MG capsule Take 1 capsule by mouth 2 times daily. 60 capsule 6     colchicine (COLCRYS) 0.6 MG tablet Take 1 tablet (0.6 mg) by mouth 2 times daily 180 tablet 0     cyclobenzaprine (FLEXERIL) 10 MG tablet Take 1 tablet (10 mg) by mouth 3 times daily as needed for muscle spasms 90 tablet 0     nadolol (CORGARD) 40 MG tablet Take 1 tablet (40 mg) by mouth daily 90 tablet 3     pantoprazole (PROTONIX) 40 MG EC tablet Take 1 tablet (40 mg) by mouth daily 90 tablet 1     Potassium Chloride ER 20 MEQ TBCR Take 1 tablet (20 mEq) by mouth 2 times daily x7 days, then once daily 37 tablet 1     predniSONE (DELTASONE) 20 MG tablet Take 60 mg by mouth daily for 3 days, THEN 40 mg daily for 3 days, THEN 20 mg daily for 3 days, THEN 10 mg daily for 3 days. 20 tablet 0     SUMAtriptan (IMITREX) 100 MG tablet Take 1 tablet (100 mg) by mouth at onset of headache May repeat in 2 hours if needed: max 2/day; 18 tablet 0     torsemide (DEMADEX) 10 MG tablet Take 1 tablet (10 mg) by mouth 2 times daily 180 tablet 1     BP Readings from Last 3 Encounters:   02/05/19 150/78   07/30/18 126/79   06/04/18 132/78    Wt Readings from Last 3 Encounters:   02/05/19 76.2 kg (168 lb)   06/04/18 73.9 kg (163 lb)   11/27/17 77.9 kg (171 lb 12.8 oz)                    Reviewed and updated as needed this visit by clinical staff  Tobacco  Allergies  Meds  Med Hx  Surg Hx  Fam Hx  Soc Hx      Reviewed and updated as needed this visit by Provider  Tobacco  Allergies  Meds  Problems  Med Hx  Surg Hx  Fam Hx  Soc Hx          ROS:  Constitutional, HEENT, cardiovascular, pulmonary, gi and gu systems are negative, except as otherwise noted.    OBJECTIVE:     /78   Pulse 95   Temp  "98.9  F (37.2  C) (Oral)   Resp 19   Ht 1.676 m (5' 6\")   Wt 76.2 kg (168 lb)   LMP  (Exact Date)   SpO2 98%   Breastfeeding? No   BMI 27.12 kg/m    Body mass index is 27.12 kg/m .  GENERAL: alert, no distress and appears older than stated age  NECK: no adenopathy, no asymmetry, masses, or scars and thyroid normal to palpation  RESP: lungs clear to auscultation - no rales, rhonchi or wheezes  CV: regular rate and rhythm, normal S1 S2, no S3 or S4, no murmur, click or rub, no peripheral edema and peripheral pulses strong  ABDOMEN: soft, nontender, no hepatosplenomegaly, no masses and bowel sounds normal  MS: neck exam shows normal strength and ROM is normal  Comprehensive back pain exam:  Tenderness of right paralumbar spine , Pain limits the following motions: flexion, extension and rotation, Lower extremity strength functional and equal on both sides, Lower extremity reflexes within normal limits bilaterally, Lower extremity sensation normal and equal on both sides and Straight leg raise negative bilaterally    Diagnostic Test Results:  none     ASSESSMENT/PLAN:             1. Acute right-sided low back pain with right-sided sciatica  Will treat with burst of prednisone and muscle relaxer - follow up with PCP if no improvement over the next 2 weeks.   Given history of alcohol abuse I do not feel that narcotics are a good idea  - predniSONE (DELTASONE) 20 MG tablet; Take 60 mg by mouth daily for 3 days, THEN 40 mg daily for 3 days, THEN 20 mg daily for 3 days, THEN 10 mg daily for 3 days.  Dispense: 20 tablet; Refill: 0  - cyclobenzaprine (FLEXERIL) 10 MG tablet; Take 1 tablet (10 mg) by mouth 3 times daily as needed for muscle spasms  Dispense: 90 tablet; Refill: 0    2. Visit for screening mammogram    - MA SCREENING DIGITAL BILAT - Future  (s+30); Future    Patient Instructions   Take flexeril 10 mg up to three times a day as needed for pain.  Take prednisone burst and taper  Follow up with " Celestino in approximately 2 weeks. Return urgently if any change in symptoms like increasing pain, numbness, weakness or other change in symptoms.   Schedule mammogram at [facility] ([phone]).               Maria Luz Bright PA-C  [facility]

## 2019-02-05 NOTE — Clinical Note
tamikai- I forgot to address her blood pressure- recommended follow up with you in 2 weeks - smelled a bit of alcohol

## 2019-02-05 NOTE — PATIENT INSTRUCTIONS
Take flexeril 10 mg up to three times a day as needed for pain.  Take prednisone burst and taper  Follow up with Dr. Tirado in approximately 2 weeks. Return urgently if any change in symptoms like increasing pain, numbness, weakness or other change in symptoms.   Schedule mammogram at [facility] ([phone]).

## 2019-03-04 NOTE — TELEPHONE ENCOUNTER
Requested Prescriptions   Pending Prescriptions Disp Refills     cyclobenzaprine (FLEXERIL) 10 MG tablet 90 tablet 0     Sig: Take 1 tablet (10 mg) by mouth 3 times daily as needed for muscle spasms    There is no refill protocol information for this order          cyclobenzaprine (FLEXERIL) 10 MG tablet      Last Written Prescription Date:  2/5/18  Last Fill Quantity: 90,   # refills: 0  Last Office Visit: 2/5/19  Future Office visit:       Routing refill request to provider for review/approval because:  Drug not on the [facility], [facility] or [facility] refill protocol or controlled substance

## 2019-03-04 NOTE — TELEPHONE ENCOUNTER
Routing refill request to provider for review/approval because:  Drug not on the [facility] refill protocol   Tiffany Rankin RN

## 2019-03-25 NOTE — PATIENT INSTRUCTIONS
Let me know if your pain is not improving with the prednisone and muscle relaxant and I will order an MRI scan.    Do not miss the cyclobenzaprine (muscle relaxant) with alcohol.    Take prednisone in the morning with food.    Someone from the physical therapy scheduling office will call you to set up an appointment.    Take a daily multi-vitamin.     Schedule an appointment for your endoscopy.    Schedule an appointment with the GI doctor (liver doctor).    Please call [facility] (formerly called [facility]) at [phone] to schedule your abdominal ultrasound.    Schedule a medical assistant only visit in 2 months for your next Hepatitis B vaccine.

## 2019-03-25 NOTE — NURSING NOTE
Screening Questionnaire for Adult Immunization    Are you sick today?   No   Do you have allergies to medications, food, a vaccine component or latex?   Yes   Have you ever had a serious reaction after receiving a vaccination?   No   Do you have a long-term health problem with heart disease, lung disease, asthma, kidney disease, metabolic disease (e.g. diabetes), anemia, or other blood disorder?   No   Do you have cancer, leukemia, HIV/AIDS, or any other immune system problem?   No   In the past 3 months, have you taken medications that affect  your immune system, such as prednisone, other steroids, or anticancer drugs; drugs for the treatment of rheumatoid arthritis, Crohn s disease, or psoriasis; or have you had radiation treatments?   Yes   Have you had a seizure, or a brain or other nervous system problem?   No   During the past year, have you received a transfusion of blood or blood     products, or been given immune (gamma) globulin or antiviral drug?   No   For women: Are you pregnant or is there a chance you could become        pregnant during the next month?   No   Have you received any vaccinations in the past 4 weeks?   No     Immunization questionnaire was positive for at least one answer.  Notified known by provider, ok to give vaccines.         Patient instructed to remain in clinic for 15 minutes afterwards, and to report any adverse reaction to me immediately.       Screening performed by Barbara Taylor on 3/25/2019 at 12:57 PM.

## 2019-03-25 NOTE — PROGRESS NOTES
SUBJECTIVE:   Roslyn Palmer is a 63 year old female who presents to clinic today for the following health issues:    Back Pain     Duration: Saturday        Specific cause: none    Description:   Location of pain: low back right  Character of pain: sharp and spasms  Pain radiation:none  New numbness or weakness in legs, not attributed to pain:  no     Intensity: Currently 1/10    History:   Pain interferes with job: YES  History of back problems: previous herniated disc  Any previous MRI or X-rays: Yes- at [place].  Sees a specialist for back pain:  No  Therapies tried without relief: none    Alleviating factors:   Improved by: prednisone      Precipitating factors:  Worsened by: Standing and Sitting    Accompanying Signs & Symptoms:  Risk of Fracture:  None  Risk of Cauda Equina:  None  Risk of Infection:  None  Risk of Cancer:  None  Risk of Ankylosing Spondylitis:  Onset at age <35, male, AND morning back stiffness. no      -Patient reports this is the fourth time this year her back has gone out. Almost two months ago she was given prednisone and a muscle relaxant which helped with pain. This episode started four days ago. Pain is located in mid low back and is worse with certain movements. Pain wakes patient at night when she turns over   -No major falls or injuries in the last year; though patient dislocated her hip six months ago. She currently does not have any hip pain   -Denies radiation of pain into legs, no weakness, numbness or tingling in legs. No bowel or bladder changes or incontinence, no fever or chills   -Almost 40 years ago patient herniated multiple discs in her back. She has had intermittent back pain throughout her life but in the past few years it has not been flared     Alcohol use:  -Use is intermittent, sometimes she goes a few days without using alcohol. On days when she is still using it she has up to 3 drinks. She is trying to cut back. She does not feel she needs help stopping  currently   -Planning to see hepatologist for her liver disease, has been waiting to schedule appointment until weather is improved   -Denies experiencing any confusion     Additional:  -Experiencing 2-3 migraines per month, if she catches it in time and takes medication it is manageable   -Gout flares occasionally, has not been too bad and much better on colchicine  -Stopped her potassium because she got out of the habit of taking it   -Cannot remember whether she was vaccinated against Hepatitis A or Hepatitis B    Problem list and histories reviewed & adjusted, as indicated.  Additional history: as documented    Patient Active Problem List   Diagnosis     Joint Prosthesis Infection or Inflammation     Alcoholic liver disease (H)     Tobacco use disorder     CARDIOVASCULAR SCREENING; LDL GOAL LESS THAN 160     Anemia     Advanced directives, counseling/discussion     Benign essential hypertension     Hepatitis C virus infection, unspecified chronicity     Upper GI bleed     Alcohol abuse     Migraine with aura and without status migrainosus, not intractable     Low folate     Cervical cancer screening     Anemia, unspecified type     Hepatitis C, chronic (H)     History of esophageal varices     Past Surgical History:   Procedure Laterality Date     COLONOSCOPY N/A 7/30/2018    Procedure: COMBINED COLONOSCOPY, SINGLE OR MULTIPLE BIOPSY/POLYPECTOMY BY BIOPSY;;  Surgeon: Musa Diaz MD;  Location: [facility] OR     COLONOSCOPY WITH CO2 INSUFFLATION N/A 7/30/2018    Procedure: COLONOSCOPY WITH CO2 INSUFFLATION;  Colonoscopy, Egd;  Surgeon: Musa Diaz MD;  Location: [facility] OR     COMBINED ESOPHAGOSCOPY, GASTROSCOPY, DUODENOSCOPY (EGD) WITH CO2 INSUFFLATION N/A 7/30/2018    Procedure: COMBINED ESOPHAGOSCOPY, GASTROSCOPY, DUODENOSCOPY (EGD) WITH CO2 INSUFFLATION;  Combined, Upper GI bleed Alcoholic liver disease (H) Anemia, unspecified type, Ref By Celestino, BMI 27.55, -[phone];  Surgeon: Musa Diaz MD;   "Location: [facility] OR     GYN SURGERY           HC ESOPHAGOSCOPY W BAND LIGATION ESOPHAGEAL VARICIES       ORTHOPEDIC SURGERY  ,    left and right hip replacement       Social History     Tobacco Use     Smoking status: Current Every Day Smoker     Packs/day: 0.50     Types: Cigarettes     Smokeless tobacco: Never Used   Substance Use Topics     Alcohol use: Yes     Comment: 3 to 4 drinks a day     Family History   Problem Relation Age of Onset     Breast Cancer Mother      Cancer Maternal Grandmother      Cerebrovascular Disease Maternal Grandmother         stroke     Cancer Maternal Grandfather         pancreatic cancer     Cancer Paternal Grandmother      Cancer Paternal Grandfather         brain cancer     Arthritis Sister            Reviewed and updated as needed this visit by clinical staff  Tobacco  Allergies  Meds  Med Hx  Surg Hx  Fam Hx  Soc Hx      Reviewed and updated as needed this visit by Provider         ROS:  Constitutional, HEENT, cardiovascular, pulmonary, gi and gu systems are negative, except as otherwise noted.    This document serves as a record of the services and decisions personally performed by SOLOMON STAHL. It was created on his/her behalf by Awais Bashir, a trained medical scribe. The creation of this document is based on the provider's statements to the medical scribe. Awais Bashir, 2019 11:17 AM  OBJECTIVE:     /76 (BP Location: Right arm, Patient Position: Chair, Cuff Size: Adult Regular)   Pulse 90   Temp 98.8  F (37.1  C) (Tympanic)   Resp 20   Ht 1.676 m (5' 6\")   Wt 76.2 kg (168 lb)   LMP  (Exact Date)   SpO2 96%   Breastfeeding? No   BMI 27.12 kg/m    Body mass index is 27.12 kg/m .  GENERAL: healthy, alert and no distress  RESP: lungs clear to auscultation - no rales, rhonchi or wheezes  CV: regular rate and rhythm, normal S1 S2, no S3 or S4, no murmur, click or rub, no peripheral edema and peripheral pulses " strong  ABDOMEN: soft, nontender, no hepatosplenomegaly, no masses and bowel sounds normal  BACK: reducedforward flexion to 80 degrees. Limited extension. Tender and muscle spasm over right paraspinal muscles mid-lumbar area.  Positive SLR bilaterally. Lower extremity DTR intact bilaterally. Normal strength of bilateral lower extremities  PSYCH: mentation appears normal, affect normal/bright    ASSESSMENT/PLAN:     1. Acute right-sided low back pain without sciatica  Start course of prednisone and cyclobenzaprine (small quantity given). If pain is not improving, will order MR. Given recurrent episodes of pain, start PT  - predniSONE (DELTASONE) 20 MG tablet; Take 40 mg by mouth daily.  Dispense: 10 tablet; Refill: 0  - [facility], [facility]IROPRA[facility] REFERRAL; Future  - cyclobenzaprine (FLEXERIL) 10 MG tablet; Take 1 tablet (10 mg) by mouth 3 times daily as needed for muscle spasms  Dispense: 30 tablet; Refill: 0    2. Alcoholic liver disease (H)  3. Alcohol abuse  Counseled on discontinuing alcohol use- she is not interested in assistance. Discussed importance of scheduling appointment with GI  - Comprehensive metabolic panel  - CBC with platelets differential  - Ferritin  - Iron and iron binding capacity  - Vitamin B12  - Folate  - TSH with free T4 reflex  - GASTROENTEROLOGY ADULT REF CONSULT ONLY  - GASTROENTEROLOGY ADULT REF PROCEDURE ONLY [facility] [phone]  - INR  - torsemide (DEMADEX) 10 MG tablet; Take 1 tablet (10 mg) by mouth 2 times daily  Dispense: 180 tablet; Refill: 1  - Uric acid  - pantoprazole (PROTONIX) 40 MG EC tablet; Take 1 tablet (40 mg) by mouth daily  Dispense: 90 tablet; Refill: 1    4. Chronic hepatitis C without hepatic coma (H)  - US Abdomen Complete; Future    5. History of esophageal varices  Discussed risks of esophageal varices and need for endoscopy   - GASTROENTEROLOGY ADULT REF CONSULT ONLY  - GASTROENTEROLOGY ADULT REF PROCEDURE ONLY [facility] [place] [facility] [phone]    6. Anemia, unspecified type  See hgb recently with ID was quite low. Likely ACD from liver disease. Need to be rechecked. Start multi-vitamin with iron  - Comprehensive metabolic panel  - CBC with platelets differential  - Ferritin  - Iron and iron binding capacity  - Vitamin B12  - Folate  - TSH with free T4 reflex  - GASTROENTEROLOGY ADULT REF CONSULT ONLY  - GASTROENTEROLOGY ADULT REF PROCEDURE ONLY [facility] [phone]    7. Migraine with aura and without status migrainosus, not intractable  Stable. Continue as needed  - SUMAtriptan (IMITREX) 100 MG tablet; Take 1 tablet (100 mg) by mouth at onset of headache May repeat in 2 hours if needed: max 2/day;  Dispense: 18 tablet; Refill: 3    8. Benign essential hypertension  At goal. Continue current medications. Has not been taking potasssium for some time. Expect hypokalemia.   - nadolol (CORGARD) 40 MG tablet; Take 1 tablet (40 mg) by mouth daily  Dispense: 90 tablet; Refill: 3  - torsemide (DEMADEX) 10 MG tablet; Take 1 tablet (10 mg) by mouth 2 times daily  Dispense: 180 tablet; Refill: 1    9. Tobacco use disorder  Patient counseled on tobacco cessation    10. Gout, unspecified cause, unspecified chronicity, unspecified site  - colchicine (COLCRYS) 0.6 MG tablet; Take 1 tablet (0.6 mg) by mouth 2 times daily  Dispense: 180 tablet; Refill: 1    11. Need for prophylactic vaccination against hepatitis B virus  - EA ADD'L VACCINE    12. Need for vaccination against hepatitis A  - ADMIN 1st VACCINE    Patient Instructions   Let me know if your pain is not improving with the prednisone and muscle relaxant and I will order an MRI scan.    Do not miss the cyclobenzaprine (muscle relaxant) with alcohol.    Take prednisone in the morning with food.    Someone from the physical therapy scheduling office will call you to set up an appointment.    Take a daily multi-vitamin.     Schedule an appointment for your endoscopy.    Schedule an appointment  with the GI doctor (liver doctor).    Please call [facility] (formerly called [facility]) at [phone] to schedule your abdominal ultrasound.    Schedule a medical assistant only visit in 2 months for your next Hepatitis B vaccine.       Length of visit was 30 minutes with more than 50 percent of that time used for discussing medical concerns and education    The information in this document, created by the medical scribe for me, accurately reflects the services I personally performed and the decisions made by me. I have reviewed and approved this document for accuracy.   Monica Tirado MD  [facility]

## 2019-03-26 NOTE — PROGRESS NOTES
"Received notification of medication transmission error to pharmacy. Protonix Rx resent to pharmacy per provider documentation on 3/26/19. Received status of Protonix is \"sent to pharmacy.\"   Alis Ramírez RN    "

## 2019-03-27 NOTE — TELEPHONE ENCOUNTER
Please call patient with lab results. I had sent her [employer] message yesterday but she has not read. Due to urgent results, please call too.     I will also place referral for care coordination to help contact patient to ensure she gets her f/u       Marquita,   It was a pleasure to see you in the office recently. Several things of concern were noted with your labs:   1.  Your potassium level was again quite low.  It is very important that you start the potassium supplement back up.  I have sent a one-month prescription to your local pharmacy.  Plan to recheck your potassium in a couple weeks to ensure it is improving.  A lab only appointment is fine for this.  If the dose of potassium  prescribed is working well we can send this prescription to mail order if needed.  Low potassium can be quite dangerous to the heart and can trigger arrhythmias so it is very important to get this level back up.   2.  Your kidney function has dropped a bit from past checks.  Sometimes this can be due to dehydration at the time of the lab draw but could also be connected with worsening kidney function due to progressive liver disease.  I would encourage you to push fluids and we will recheck the kidney function with your potassium level in a couple weeks.   3.  Your liver panel shows further elevation of the alkaline phosphatase and AST.  This is very concerning for worsening liver disease.  Please continue with the plan to get the liver ultrasound and follow-up visit with a hepatologist in the near future.   4.  You continue to be very anemic, similar to when you had your blood drawn with the infectious disease doctor.  Your folic acid level is very low and this could be contributing.  Please continue with the plan to take a daily multivitamin but also add folic acid 1 mg daily.  This supplement is available over-the-counter but I will also send in a prescription option for you to  from your pharmacy.  Your iron levels are also  dropping.  It is important to get the upper endoscopy completed to ensure you not losing the iron through the GI tract.   5.  Thyroid test, vitamin B12 level, and blood clotting test were within normal range.   6.  Your uric acid continues to be extremely high.  Please make sure you take the colchicine twice daily to minimize gout.  Let us also start a medicine called allopurinol to see if we can get this level down further.  I have sent a prescription of this to your local pharmacy.     I know this is a lot of information so I would recommend a follow-up visit in 1 month to check in and see how things are going and recheck some of these labs. please also plan for a nonfasting lab visit in 2 weeks to recheck sodium and kidney function.     Please MyChart or call if you have any concerns or questions.   Sincerely,   Monica Tirado MD

## 2019-03-27 NOTE — TELEPHONE ENCOUNTER
This writer attempted to contact patient on 03/27/19      Reason for call results and left message.      If patient calls back:   Registered Nurse called. Follow Triage Call workflow        Modesta Thurman RN

## 2019-03-28 NOTE — TELEPHONE ENCOUNTER
Patient Result Comments     Viewed by MARQUITA Palmer on 3/27/2019  3:41 PM   Written by Monica Tirado MD on 3/26/2019  2:26 PM   Marquita,   It was a pleasure to see you in the office recently. Several things of concern were noted with your labs:   1.  Your potassium level was again quite low.  It is very important that you start the potassium supplement back up.  I have sent a one-month prescription to your local pharmacy.  Plan to recheck your potassium in a couple weeks to ensure it is improving.  A lab only appointment is fine for this.  If the dose of potassium  prescribed is working well we can send this prescription to mail order if needed.  Low potassium can be quite dangerous to the heart and can trigger arrhythmias so it is very important to get this level back up.   2.  Your kidney function has dropped a bit from past checks.  Sometimes this can be due to dehydration at the time of the lab draw but could also be connected with worsening kidney function due to progressive liver disease.  I would encourage you to push fluids and we will recheck the kidney function with your potassium level in a couple weeks.   3.  Your liver panel shows further elevation of the alkaline phosphatase and AST.  This is very concerning for worsening liver disease.  Please continue with the plan to get the liver ultrasound and follow-up visit with a hepatologist in the near future.   4.  You continue to be very anemic, similar to when you had your blood drawn with the infectious disease doctor.  Your folic acid level is very low and this could be contributing.  Please continue with the plan to take a daily multivitamin but also add folic acid 1 mg daily.  This supplement is available over-the-counter but I will also send in a prescription option for you to  from your pharmacy.  Your iron levels are also dropping.  It is important to get the upper endoscopy completed to ensure you not losing the iron through  the GI tract.   5.  Thyroid test, vitamin B12 level, and blood clotting test were within normal range.   6.  Your uric acid continues to be extremely high.  Please make sure you take the colchicine twice daily to minimize gout.  Let us also start a medicine called allopurinol to see if we can get this level down further.  I have sent a prescription of this to your local pharmacy.     I know this is a lot of information so I would recommend a follow-up visit in 1 month to check in and see how things are going and recheck some of these labs. please also plan for a nonfasting lab visit in 2 weeks to recheck sodium and kidney function.     Please MyChart or call if you have any concerns or questions.   Sincerely,   Monica Tirado MD    Patient Release Status:     This result is viewable by the patient in Real Time [employer].   Last viewed in [employer]:     3/27/2019  3:41 PM   By:     Roslyn Palmer     Per chart review, patient viewed result note regarding labs in [employer]. Will send a message asking patient if she has any further questions or concerns, and to encourage her to call and schedule a follow-up visit in 1 month with provider and a lab only appointment in 2 weeks.     Katiana Almazan RN

## 2019-03-28 NOTE — LETTER
Health Care Home - Access Care Plan    About Me  Patient Name:  Roslyn Palmer    YOB: 1955  Age:                             63 year old   Licha MRN:            [id number] Telephone Information:   Home Phone [phone]   Mobile [phone]       Address:    [address] [place] [address] Email address:  [email or web address]      Emergency Contact(s)  Name Relationship Lgl Grd Work Phone Home Phone Mobile Phone   1. KESHAWN AMADOR  Sister   [phone]   2. MARY ROLDAN Mother   [phone]              Health Maintenance:      My Access Plan  Medical Emergency 914   Questions or concerns during clinic hours Primary Clinic Line, I will call the clinic directly: [facility] [phone]   24 Hour Appointment Line [phone] or  [phone] [phone]) (toll free)   24 Hour Nurse Line [phone] (toll free)   Questions or concerns outside clinic hours 24 Hour Appointment Line, I will call the after-hours on-call line:   [facility] [phone] or [phone] ([phone]) (toll-free)   Preferred Urgent Care     Preferred Hospital     Preferred Pharmacy [address]     Behavioral Health Crisis Line The National Suicide Prevention Lifeline at 1-947.181.7868 or 911     My Care Team Members  Patient Care Team       Relationship Specialty Notifications Start End    Monica Tirado MD PCP - General Family Practice  7/25/17     Phone: [phone] Fax: [phone] [address] [facility] [address]    Monica Tirado MD Assigned PCP   4/16/17     Phone: [phone] Fax: [phone] [address] [facility] [address]    Minnie Flores LSW Clinic Care Coordinator Primary Care - CC  3/28/19     (Shannon TILLMAN)    Phone: [phone] Fax: [phone]               My Medical and Care Information  Problem List   Patient Active Problem  List   Diagnosis     Joint Prosthesis Infection or Inflammation     Alcoholic liver disease (H)     Tobacco use disorder     CARDIOVASCULAR SCREENING; LDL GOAL LESS THAN 160     Anemia     Advanced directives, counseling/discussion     Benign essential hypertension     Hepatitis C virus infection, unspecified chronicity     Upper GI bleed     Alcohol abuse     Migraine with aura and without status migrainosus, not intractable     Low folate     Cervical cancer screening     Anemia, unspecified type     Hepatitis C, chronic (H)     History of esophageal varices      Current Medications and Allergies:  See printed Medication Report

## 2019-03-28 NOTE — LETTER
[facility] CARE COORDINATION  [address] [address] [phone]    March 28, 2019    Roslyn Palmer  [address] [facility] [address] [address]      Dear Roslyn,    I am a clinic care coordinator who works with Monica Tirado MD at the [facility]. After your recent visit, I was asked to reach out to you to assist with scheduling some appointments  I did leave a message, but am also following up with this letter.      I wanted to introduce myself and provide you with my contact information so that you can call me with questions or concerns about your health care. Below is a description of clinic care coordination and how I can further assist you.     The clinic care coordinator is a registered nurse and/or  who understand the health care system. The goal of clinic care coordination is to help you manage your health and improve access to the [facility] system in the most efficient manner. The registered nurse can assist you in meeting your health care goals by providing education, coordinating services, and strengthening the communication among your providers. The  can assist you with financial, behavioral, psychosocial, chemical dependency, counseling, and/or psychiatric resources.    It appears the doctor wants you to schedule an Abdominal Ultrasound  I would be happy to assist you, or you can call [phone] to schedule. You will also  need to schedule an Endoscopy the number to schedule this is [phone].      You should also be receiving a call from Physical Therapy to schedule an appointment. As I said,  I would be happy to assist you with this.       Please feel free to contact me at [phone], with any questions or concerns. We at [facility] are focused on providing you with the highest-quality healthcare experience possible and that all starts with you.       Sincerely,       Shannon Flores  [facility]  , Clinic Care Coordination  Clinics:  [facility] [place], [place]  [phone]   3/28/2019   10:11 AM    Enclosed: I have enclosed a copy of a 24 Hour Access Plan. This has helpful phone numbers for you to call when needed. Please keep this in an easy to access place to use as needed.

## 2019-03-28 NOTE — PROGRESS NOTES
Clinic Care Coordination Contact  [facility] - Social Work     Referral Source: PCP  Clinical Data: Care Coordinator Outreach  Outreach attempted x 1.  Left message on voicemail with call back information and requested return call.    Plan: Care Coordinator will mail out care coordination introduction letter with care coordinator contact information and explanation of care coordination services.  Also listed appointments and numbers of procedures that need to be scheduled. Offered to assist.   Care Coordinator will try to reach patient again in 3-5 business days.    Shannon Flores [facility]  , Clinic Care Coordination  Clinics:  [facility] [place], [place]  [phone]   3/28/2019   10:02 AM

## 2019-04-03 NOTE — PROGRESS NOTES
"Clinic Care Coordination Contact  Received  a message from patient , late yesterday  She said she had received my message and letter. She was planning to call to schedule the Abdominal U/S.  \"I get overwhelmed if I try to do too many things. \" She stated she appreciated the list of appointments she needs to schedule and the numbers to call.      Plan Pt will schedule abdominal U/S.   SW will follow up with pt in 2-3 business days and assist as needed.    Shannon Flores [facility]  Primary Care   , Clinic Care Coordination  Clinics:  [facility] [place], [place]  [phone]   4/3/2019   1:04 PM          "

## 2019-04-04 NOTE — TELEPHONE ENCOUNTER
What type of form? Workability forms  What day did you drop off your forms? Thursday, April 4, 2019  Is there a due date? ASAP (7-10 business days to compete forms)   How would you like to receive these forms? Please fax to [phone]    What is the best number to contact you? Cell [phone]  What time works best to contact you with in 4 hrs? Any  Is it okay to leave a message? Yes    Sebas Avilez

## 2019-04-17 NOTE — PROGRESS NOTES
Clinic Care Coordination Contact  [facility] Social Work      Clinical Data: Care Coordinator Outreach  Outreach attempted x 2.  Left message on [employer] with call back information and requested return call.  Chart review indicates she did have her Abdominal U/S done . My Message offered to help her with additional appts.  Plan: Care Coordinator mailed out care coordination introduction letter on 3/28/19 with all appointments and phone numbers.. If no response to message, Care Coordinator will try to reach patient again in 1-2 weeks    Shannon Flores [facility]  , Clinic Care Coordination  Clinics:  [facility] [place], [place]  [phone]   4/17/2019   1:10 PM

## 2019-04-24 NOTE — TELEPHONE ENCOUNTER
"Requested Prescriptions   Pending Prescriptions Disp Refills     allopurinol (ZYLOPRIM) 100 MG tablet [Pharmacy Med Name: ALLOPURINOL 100 MG TABLET] 60 tablet 0     Sig: TAKE 1 TABLET (100 MG) BY MOUTH DAILY FOR 7 DAYS, THEN 2 TABLETS (200 MG) DAILY.       Gout Agents Protocol Failed - 4/24/2019  1:54 AM        Failed - Has Uric Acid on file in past 12 months and value is less than 6     Recent Labs   Lab Test 03/25/19  1141   URIC 11.0*     If level is 6mg/dL or greater, ok to refill one time and refer to provider.           Passed - CBC on file in past 12 months     Recent Labs   Lab Test 03/25/19  1141   WBC 5.9   RBC 3.80   HGB 9.3*   HCT 31.0*                    Passed - ALT on file in past 12 months     Recent Labs   Lab Test 03/25/19  1141   ALT 34             Passed - Recent (12 mo) or future (30 days) visit within the authorizing provider's specialty     Patient had office visit in the last 12 months or has a visit in the next 30 days with authorizing provider or within the authorizing provider's specialty.  See \"Patient Info\" tab in inbasket, or \"Choose Columns\" in Meds & Orders section of the refill encounter.              Passed - Medication is active on med list        Passed - Patient is age 18 or older        Passed - No active pregnancy on record        Passed - Normal serum creatinine on file in the past 12 months     Recent Labs   Lab Test 03/25/19  1141   CR 1.00             Passed - No positive pregnancy test in past year        allopurinol (ZYLOPRIM) 100 MG tablet  Last Written Prescription Date:  3/26/19  Last Fill Quantity: 60,  # refills: 0   Last office visit: 3/25/2019 with prescribing provider:  Dr. Tirado   Future Office Visit:      "

## 2019-05-01 NOTE — PROGRESS NOTES
Clinic Care Coordination Contact  [facility]/Vo[facility] - Social Work     Referral Source: PCP  Clinical Data: Care Coordinator Outreach  Outreach attempted x 2. As a follow up to last conversation. Left message on voicemail with call back information and requested return call.    Plan: Care Coordinator mailed out care coordination introduction letter on 3/28/19 with appointments that need to be scheduled. Pt does have [facility] contact information. If no response, Care Coordinator will follow up in 3 weeks.    Shannon Flores [facility]  , Clinic Care Coordination  Clinics:  [facility] [place], [place]  [phone]   5/1/2019   8:28 AM

## 2019-05-03 NOTE — PROGRESS NOTES
"Clinic Care Coordination Contact    Clinic Care Coordination Contact  [facility] Social Work     Referral Information:  Referral Source: PCP  Received a return call from pt.  She did have her Ultrasound and has an appt for her endoscopy on 5/29.  She has it scheduled in [facility].  Not quite sure how she got there, but needs to be heavily sedated.  Pt will denny [place] to see if it can be done there. She did not follow through with Physical Therapy because her pain improved. Sister is very supportive and \"gets on me to have these tests done\"   Not receptive to discussing ETOH use. Changed the subject  Is aware of AA.   Primary Diagnosis: CD    Chief Complaint   Patient presents with     Clinic Care Coordination - Initial     SW      Universal Utilization:Clinic Utilization  Difficulty keeping appointments:: No  Compliance Concerns: Yes(delay in scheduling tests)  No-Show Concerns: No  No PCP office visit in Past Year: No  Utilization    Last refreshed: 5/1/2019  9:34 AM:  Hospital Admissions 0           Last refreshed: 5/1/2019  9:34 AM:  ED Visits 0           Last refreshed: 5/1/2019  9:34 AM:  No Show Count (past year) 0              Current as of: 5/1/2019  9:34 AM            Clinical Concerns:  Current Medical Concerns:    Patient Active Problem List   Diagnosis     Joint Prosthesis Infection or Inflammation     Alcoholic liver disease (H)     Tobacco use disorder     CARDIOVASCULAR SCREENING; LDL GOAL LESS THAN 160     Anemia     Advanced directives, counseling/discussion     Benign essential hypertension     Hepatitis C virus infection, unspecified chronicity     Upper GI bleed     Alcohol abuse     Migraine with aura and without status migrainosus, not intractable     Low folate     Cervical cancer screening     Anemia, unspecified type     Hepatitis C, chronic (H)     History of esophageal varices      Current Behavioral Concerns:   Education Provided to patient: role of SWCC, resources, scheduling "     Pain  Pain (GOAL):: Yes  Location of chronic pain:: back  Progression: Improving  Health Maintenance Reviewed: Due/Overdue Immunizations Mammogram      Medication Management:  Has medications, taking as prescribed.    Functional Status:  Dependent ADLs:: Ambulation-no assistive device(on occasion needs cane)  Dependent IADLs:: Independent  Mobility Status: Independent    Living Situation:  Current living arrangement:: I live in a private home with family(with sister)  Type of residence:: Private home - stairs    Diet/Exercise/Sleep:  Inadequate nutrition (GOAL):: No  Food Insecurity: No  Tube Feeding: No  Exercise:: Currently not exercising    Transportation:  Transportation concerns (GOAL):: No(drives)  Transportation means:: Regular car     Psychosocial:  Mental health DX:: No  Informal Support system:: Family     Financial/Insurance:  [employer] through employer  Financial/Insurance concerns (GOAL):: No(employed)     Resources and Interventions:  Current Resources:     Community Resources: Other (see comment)(aware of AA)  Supplies used at home:: None  Equipment Currently Used at Home: none(Independent but does have cane and walker)    Referrals Placed: None     Goals:  Has made necessary appointments . Letter was previously sent with appointments that needed to be scheduled and phone #s. Sister has been supportive     Patient/Caregiver understanding: good       Future Appointments              In 4 days BA LAB ONLY [facility] [place], [place] [facility]    In 3 weeks Christy Mccollum MD [facility] Owned          Plan: At this time Pt has followed through on scheduling appointments. She feels she has the resources she needs and support from  sister.  Feels no further need for CC but will call with any needs.     Shannon Flores [facility] Services  , Clinic Care Coordination  Clinics:  [place],  [place], [place], [place]  [phone]   5/3/2019   2:42 PM

## 2019-06-19 PROBLEM — R17 JAUNDICE: Status: ACTIVE | Noted: 2019-01-01

## 2019-06-19 NOTE — PROGRESS NOTES
Subjective     Roslyn Palmer is a 63 year old female who presents to clinic today for the following health issues:    Her sister is also present today.  Her sister is concerned about Roslyn's overall health in addition to the dizziness.    HPI   Dizziness  Onset: 6 weeks ago    Description:   Do you feel faint:  YES  Does it feel like the surroundings (bed, room) are moving: no   Unsteady/off balance: YES  Have you passed out or fallen: no     Intensity: moderate, severe    Progression of Symptoms:  Intermittent and worsening    Accompanying Signs & Symptoms:  Heart palpitations: no   Nausea, vomiting: no   Weakness in arms or legs: more weak  Fatigue: YES  Vision or speech changes: no   Ringing in ears (Tinnitus): no   Hearing Loss: no   Has lost a lot of weight during this time not intentionally, has a loss of appetite    History:   Head trauma/concussion hx: no   Previous similar symptoms: no   Recent bleeding history: no     Precipitating factors:   Worse with activity or head movement: YES when laying in bed initially will feel funny  Any new medications (BP?): no   Alcohol/drug abuse/withdrawal: no     Alleviating factors:   Does staying in a fixed position give relief:  no     Therapies Tried and outcome: nothing    Dizziness: has not had this before. It is worsening slightly. When she first noticed a few weeks ago she noted when she would lay down she would have a few seconds of dizziness. No recent fever/chills URI. Is trying to drink fluids-she feels dehydrated. Has dry mouth. No change in vision or hearing. No SOB or chest pain. Also having increased weakness per sister that is here. Patient admits she is scared going up 6 stairs for falling.  Has generalized weakness, denies more weakness on one side of the body or other.  Normal bowels and bladder, no blood in her stool.     Smokes: 1/2PPD, smoked for over 40 years. Feels like she has cut down. No coughing.  Was going to try and quit this winter  but could not.    Alcohol: daily, 3-4 drinks. Doesn't drink during the day, doesn't drink and drive. Sometimes drinks on sundays.  Denies feeling she needs to cut back on her alcohol intake. Is a dispatcher for [employer].     Decreased appetite. Lost 18 lbs since March. Came more on suddenly. Didn't notice this until she was in the ER for her hip. Family noticed it though. Colonoscopy was 2018, repeat in 5 years. BM slightly 'off'. Sometimes gets urgency. On and off for years. No blood in stool. Urinating okay.     Right hip dislocation, re-placed in ER. 3rd time in 18 months. Past surgeon has retired.  She has had a left hip infections before which is why she is on chronic Keflex.    Needs a weeks worth of nadolol until mail order comes    decreased mood: no plan to hurt self but has some suicidal ideation.  It has been a long time since she has been on any medication.    No longer on prednisone, takes when back pain flares, same thing with the muscle relaxer.     Hypokalemia: tries to take potassium twice a day, but hard due to pills are so large. Talked about potassium packets.  Will wait to send this order until final kidney function is in.        Patient Active Problem List   Diagnosis     Joint Prosthesis Infection or Inflammation     Alcoholic liver disease (H)     Tobacco use disorder     CARDIOVASCULAR SCREENING; LDL GOAL LESS THAN 160     Anemia     Advanced directives, counseling/discussion     Benign essential hypertension     Hepatitis C virus infection, unspecified chronicity     Upper GI bleed     Alcohol abuse     Migraine with aura and without status migrainosus, not intractable     Low folate     Cervical cancer screening     Anemia, unspecified type     Hepatitis C, chronic (H)     History of esophageal varices     Past Surgical History:   Procedure Laterality Date     COLONOSCOPY N/A 7/30/2018    Procedure: COMBINED COLONOSCOPY, SINGLE OR MULTIPLE BIOPSY/POLYPECTOMY BY BIOPSY;;  Surgeon:  Musa Diaz MD;  Location: [facility] OR     COLONOSCOPY WITH CO2 INSUFFLATION N/A 2018    Procedure: COLONOSCOPY WITH CO2 INSUFFLATION;  Colonoscopy, Egd;  Surgeon: Musa Diaz MD;  Location: [facility] OR     COMBINED ESOPHAGOSCOPY, GASTROSCOPY, DUODENOSCOPY (EGD) WITH CO2 INSUFFLATION N/A 2018    Procedure: COMBINED ESOPHAGOSCOPY, GASTROSCOPY, DUODENOSCOPY (EGD) WITH CO2 INSUFFLATION;  Combined, Upper GI bleed Alcoholic liver disease (H) Anemia, unspecified type, Ref By Celestino, BMI 27.55, [phone];  Surgeon: Musa Diaz MD;  Location: [facility] OR     GYN SURGERY           HC ESOPHAGOSCOPY W BAND LIGATION ESOPHAGEAL VARICIES       ORTHOPEDIC SURGERY  ,    left and right hip replacement       Social History     Tobacco Use     Smoking status: Current Every Day Smoker     Packs/day: 0.50     Types: Cigarettes     Smokeless tobacco: Never Used   Substance Use Topics     Alcohol use: Yes     Comment: 3 to 4 drinks a day     Family History   Problem Relation Age of Onset     Breast Cancer Mother      Cancer Maternal Grandmother      Cerebrovascular Disease Maternal Grandmother         stroke     Cancer Maternal Grandfather         pancreatic cancer     Cancer Paternal Grandmother      Cancer Paternal Grandfather         brain cancer     Arthritis Sister          Current Outpatient Medications   Medication Sig Dispense Refill     allopurinol (ZYLOPRIM) 100 MG tablet TAKE 1 TABLET (100 MG) BY MOUTH DAILY FOR 7 DAYS, THEN 2 TABLETS (200 MG) DAILY. 60 tablet 11     buPROPion (WELLBUTRIN) 75 MG tablet Take 1 tablet (75 mg) by mouth 2 times daily 60 tablet 1     cephALEXin (KEFLEX) 500 MG capsule Take 1 capsule by mouth 2 times daily. 60 capsule 6     colchicine (COLCRYS) 0.6 MG tablet Take 1 tablet (0.6 mg) by mouth 2 times daily 180 tablet 1     cyclobenzaprine (FLEXERIL) 10 MG tablet Take 1 tablet (10 mg) by mouth 3 times daily as needed for muscle spasms 30 tablet 0     folic acid  "(FOLVITE) 1 MG tablet Take 1 tablet (1 mg) by mouth daily 90 tablet 3     nadolol (CORGARD) 40 MG tablet Take 1 tablet (40 mg) by mouth daily 14 tablet 0     pantoprazole (PROTONIX) 40 MG EC tablet Take 1 tablet (40 mg) by mouth daily 90 tablet 3     potassium chloride ER (K-TAB) 20 MEQ CR tablet Take 1 tablet (20 mEq) by mouth 2 times daily 60 tablet 11     SUMAtriptan (IMITREX) 100 MG tablet Take 1 tablet (100 mg) by mouth at onset of headache May repeat in 2 hours if needed: max 2/day; 18 tablet 3     torsemide (DEMADEX) 10 MG tablet Take 1 tablet (10 mg) by mouth 2 times daily 180 tablet 1     Allergies   Allergen Reactions     Sulfa Drugs        Reviewed and updated as needed this visit by Provider  Tobacco  Allergies  Meds  Problems  Med Hx  Surg Hx  Fam Hx         Review of Systems   ROS COMP: Constitutional overall health decreased as above, HEENT, cardiovascular, pulmonary, gi -as above and gu, MS as above systems are negative, except as otherwise noted.      Objective    /73 (BP Location: Right arm, Patient Position: Sitting, Cuff Size: Adult Regular)   Pulse 100   Temp 98.4  F (36.9  C) (Oral)   Resp 20   Ht 1.676 m (5' 6\")   Wt 68 kg (150 lb)   SpO2 97%   BMI 24.21 kg/m    Body mass index is 24.21 kg/m .  Physical Exam   GENERAL: ill appearing, appears older than stated age, alert and no distress  EYES: Eyes grossly normal to inspection, PERRL and conjunctivae and sclerae normal  HENT: ear canals and TM's normal, nose and mouth without ulcers or lesions  NECK: no adenopathy, no asymmetry, masses, or scars and thyroid normal to palpation  RESP: lungs clear to auscultation - no rales, rhonchi or wheezes  CV: regular rate and rhythm, normal S1 S2, no S3 or S4, no murmur, click or rub, no peripheral edema  ABDOMEN: soft, nontender, no hepatosplenomegaly, no masses and bowel sounds normal  MS: no gross musculoskeletal defects noted, moving slowly, appears weak overall  Skin: dark areas " under eyes, slight scleral jaundice   NEURO: decreased overall strength and tone, mentation intact and speech normalm EOM normal, PERRL, no facial drooping    Diagnostic Test Results:  Labs reviewed in Epic  Results for orders placed or performed in visit on 06/19/19 (from the past 24 hour(s))   CBC with platelets   Result Value Ref Range    WBC 5.7 4.0 - 11.0 10e9/L    RBC Count 3.65 (L) 3.8 - 5.2 10e12/L    Hemoglobin 8.8 (L) 11.7 - 15.7 g/dL    Hematocrit 28.7 (L) 35.0 - 47.0 %    MCV 79 78 - 100 fl    MCH 24.1 (L) 26.5 - 33.0 pg    MCHC 30.7 (L) 31.5 - 36.5 g/dL    RDW 23.6 (H) 10.0 - 15.0 %    Platelet Count 132 (L) 150 - 450 10e9/L           Assessment & Plan     1. Dizziness/6. Anemia, unspecified type  For dizziness: Unknown cause.  Patient appears unwell, pending lab results, may consider having her follow-up with physical therapy. Both could be related.     Noted anemia, decreased from 2 months ago.  Unsure where blood is going.  Added iron and ferritin.  Patient denies bleeding from vomiting or stools.  No blood in urine.  Her problem list does note history of upper GI bleed and history of esophageal varices.  She may need EGD-had to reschedule her one from a few weeks ago due to ER visit.  Will consider oral iron pending other labs  - Iron and iron binding capacity  - Ferritin  - CBC with platelets  - Vitamin B12  - TSH with free T4 reflex  - PHYSICAL THERAPY REFERRAL; Future    ADDENED: SPOKE TO CONSULTING MD, CALLED PATIENT BACK TO NOTIFY HER TO GO TO ER. SEE PHONE ENCOUNTERS AND SENT [email or web address] MESSAGE.  KENDALL Styles, NP-C  [facility]        2. Benign essential hypertension  Refill given until her mail order comes through.  She also takes potassium supplements but takes them infrequently due to large pills.  May send potassium packets which are covered by her insurance pending kidney function  - nadolol (CORGARD) 40 MG tablet; Take 1 tablet (40 mg) by mouth daily  Dispense: 14 tablet;  Refill: 0    3. Breast cancer screening by mammogram  Needs screening done, she can get this in the next couple months  - MA SCREENING DIGITAL BILAT - Future  (s+30); Future    4. Alcohol abuse/9. Alcoholic liver disease (H)/Jaundice-scleral  Has known hepatic disease, last ultrasound was done in April 2019-appears stable.  Patient currently drinks 3-4 drinks a night and sometimes on the weekends.  Advised that she should cut back to 1-2 drinks in the evening.  If she suddenly stopped all alcohol intake, there is risk for withdrawal symptoms.  Overall she needs to decrease her alcohol intake significantly, but need to do this slowly.  She verbalized understanding. Also noted scleral jaundice: patient aware of this can be a cause from too much alcohol intake  - Comprehensive metabolic panel (BMP + Alb, Alk Phos, ALT, AST, Total. Bili, TP)    5. Depression, unspecified depression type  Patient has slight suicidal ideation but denies a plan.  She is interested in trialing something for her mood.  This does not interact with any of her other medications.  It also may help her decrease or cut back on her smoking.  - buPROPion (WELLBUTRIN) 75 MG tablet; Take 1 tablet (75 mg) by mouth 2 times daily  Dispense: 60 tablet; Refill: 1    7. Thrombocytopenia (H)  Added differential, noted low platelets   - CBC with platelets and differential     8. dislocated left hip Subsequent encounter  Patient was in the emergency room a couple weeks ago because her left hip dislocated.  This is the third time in the past 18 months.  She also has history of infection in her right hip which is why she is on chronic Keflex    10. Tobacco use disorder  Patient smokes about half a pack per day starting bupropion for mood, maybe this will help her decrease her smoking further.    12.  Weight loss  Has had about 18 pound weight loss since March.  Need to consider chest CT of lung to rule out possible lung cancer, other labs are pending.  She just  has decreased appetite.  Last colonoscopy was a year ago due in 5 years.  She actually was supposed to have an EGD done a few weeks ago but she canceled because of her left hip dislocation.  She will reschedule that.        Tobacco Cessation:   reports that she has been smoking cigarettes.  She has been smoking about 0.50 packs per day. She has never used smokeless tobacco.  Tobacco Cessation Action Plan: Patient not ready to cut back, but did start be appropriate for mood      -See AVS, return in the next 2 weeks or sooner if worsening of symptoms.  Go to the emergency room if significantly worsening of symptoms.    Return in about 6 weeks (around 7/31/2019) for Depression Recheck.    KENDALL Styles, NP-C  [facility]    This chart was documented by provider using a voice activated software called Dragon in addition to manual typing. There may be vocabulary errors or other grammatical errors due to this.

## 2019-06-19 NOTE — TELEPHONE ENCOUNTER
"Marquita is returning a call from the clinic. The following message was relayed to patient as seen in chart review.     \"Relay message GO TO EMERGENCY ROOM. Spoke with consulting MD, she advised going to ER due to low potassium and dizziness. Any ER of her choice. Go today. , (read verbatim), document that pt called and close encounter.\"    Patient refused to go into the ER tonight and said she will go in the morning. FNA advised her that her potassium level is critically low at 2.4. Marquita asked what is a normal potassium level. FNA advised 3.4-5.3. Marquita still refused. FNA advised to call 911 if she decides to go in or having worsening symptoms/dizziness.    Dhara Anaya RN/[facility]    Reason for Disposition    Health Information question, no triage required and triager able to answer question    Protocols used: INFORMATION ONLY CALL-A-AH      "

## 2019-06-19 NOTE — PATIENT INSTRUCTIONS
Look into Ensure Plus : take 1-2 times a day    Mammogram: [phone]    Need future CT lung scan to rule out possible lung cancer due to smoking history    Due for body scan, DEXA, looking for osteoporosis    Follow up with provider in 2 weeks if no improvement in dizziness

## 2019-06-19 NOTE — TELEPHONE ENCOUNTER
This writer attempted to contact Roslyn on 06/19/19      Reason for call provider message and left detailed message.      If patient calls back:   Follow Redflag process        Jose David Hendrix RN, BSN, PHN

## 2019-06-19 NOTE — TELEPHONE ENCOUNTER
This writer attempted to contact Marquita on 06/19/19 again at 2:28pm  STAFF PLEASE TRY TO CALL PATIENT TO ADVISE HER TO GO TO ER INSTEAD OF TAKING ORAL MEDICATIONS THAT WERE ORIGINALLY SENT.       Reason for call abnormal potassium and left detailed message.      If patient calls back:   Relay message GO TO EMERGENCY ROOM. Spoke with consulting MD, she advised going to ER due to low potassium and dizziness. Any ER of her choice. Go today. , (read verbatim), document that pt called and close encounter        KENDALL Styles, NP-C  [facility]

## 2019-06-19 NOTE — TELEPHONE ENCOUNTER
"Marquita is returning a call from the clinic. The following message was relayed to patient as seen in chart review.      \"Relay message GO TO EMERGENCY ROOM. Spoke with consulting MD, she advised going to ER due to low potassium and dizziness. Any ER of her choice. Go today. , (read verbatim), document that pt called and close encounter.\"     Patient refused to go into the ER tonight and said she will go in the morning. FNA advised her that her potassium level is critically low at 2.4. Marquita asked what is a normal potassium level. FNA advised 3.4-5.3. Marquita still refused. FNA advised to call 911 if she decides to go in or having worsening symptoms/dizziness.     Dhara Anaya RN/[facility]     Reason for Disposition    Health Information question, no triage required and triager able to answer question    Protocols used: INFORMATION ONLY CALL-A-AH     "

## 2019-06-19 NOTE — TELEPHONE ENCOUNTER
This writer attempted to contact Marquita on 06/19/19--- staff please try and call patient again today to ensure she got the message and starts the medication per below.      Reason for call abnormal lab values and left detailed message.      If patient calls back:   Relay message: Your kidney function is abnormal, the potassium is low at 2.4.  I sent a prescription for potassium packets that you can mix with liquid, please take 1 packet 3 times a day for 3 days then decrease to 1 packet twice a day.  Repeat your kidney function on Monday, there is an order in the computer so you only need a lab appointment for Monday.  I also sent an order for iron 1 pill 3 times a day to your pharmacy, please start that and take it with food.  Follow-up with a provider in 1 or 2 weeks in person so we can see that things are improving.  In about 1 month, we will want to recheck your hemoglobin to make sure that is improving.  Please call if you have any questions.    , (read verbatim), document that pt called and close encounter-if patient has further questions okay to route to provider otherwise close encounter.        KENDALL Styles, NP-C  [facility]

## 2019-06-19 NOTE — Clinical Note
See plan, anything else for me to add?Thank you,KENDALL Styles, NP-[facility]

## 2019-06-20 NOTE — TELEPHONE ENCOUNTER
Notes recorded by Brooklynn Rosado NP on 6/20/2019 at 11:00 AM CDT  Please call patient:    B-12 is climbing and quite high. This also could be another reason for your dizziness in addition to your lot potassium and low hemoglobin. We do recommend you going into the emergency room still. Sent back to NP if further questions.   KENDALL Styles, NP-C  [facility]    Writer called and spoke with patient. She reports that she went to [facility] ED last evening and she is currently still there. She is getting a Potassium/Electrolyte IV currently. Relayed your result note above. Patient states understanding and says thank you for the call. She will follow-up with clinic per hospital recommendations whenever she is discharged.     Katiana Almazan RN, BSN

## 2019-06-21 NOTE — TELEPHONE ENCOUNTER
See other encounter. Patient was in ER already. Will close this encounter.  KENDALL Styles, NP-C  [facility]

## 2019-06-27 NOTE — PATIENT INSTRUCTIONS
A  from the orthopedics office will call you to help you set up an appointment. If you don't hear from them, let me know. Your orthopedic doctor can manage further work restrictions if necessary.     Schedule an appointment with the liver specialist and your endoscopy.    Try to drink three bottles of water daily.     After you have scheduled with ortho and the liver specialist, schedule an appointment with dermatology.    Please call [facility] at [phone] to schedule an abdominal ultrasound.     Follow-up with Brooklynn next week Monday or Tuesday. You can do your second Hep B shot then if you are feeling better.    If you develop fever, recurrent vomiting, abdominal pain, or blood in stool, you need to go to the emergency department.     At [facility], we strive to deliver an exceptional experience to you, every time we see you.  If you receive a survey in the mail, please send us back your thoughts. We really do value your feedback.    Your care team:     Family Medicine   CL Elliott MD Emily Bunt, APRN CNP S. Matthew Hockett, MD Pamela Kolacz, MD Angela Wermerskirchen, MD         Clinic hours: Monday - Wednesday 7 am-7 pm   Thursdays and Fridays 7 am-5 pm.     [facility]: Monday - Friday 11 am-9 pm,   Saturday and Sunday 9 am-5 pm.    [facility]: Monday -Thursday 8 am-8 pm; Friday 8 am-6 pm; Saturday and Sunday 9 am-5 pm.     [facility]: Monday - Thursday 8 am - 7 pm; Friday 8 am - 6 pm    Clinic: [phone]   [facility]: [phone]   [facility]: [phone]

## 2019-06-27 NOTE — TELEPHONE ENCOUNTER
Please add copy of my OV note from today  Copy of med list  Add tax ID# and clinic stamp and fax forms

## 2019-06-27 NOTE — PROGRESS NOTES
Subjective     Roslyn Palmer is a 63 year old female who presents with her sister to clinic today for the following health issues:     Hospital Follow-up Visit:    Hospital/Nursing Home/ Rehab Facility: [facility]   Date of Admission: 6/19/19  Date of Discharge: 6/21/19  Reason(s) for Admission: Hypokalemia              Problems taking medications regularly:  None        Medication changes since discharge: Experiencing loose stools/diarrhea x7 days now and vomitted x2 - possibly Magnesium causing symptom; never been on it before.     NEW MEDICATIONS   -magnesium oxide (patient unclear if actually taking this)  -potassium chloride   -iron supplement          Problems adhering to non-medication therapy:  None    Summary of hospitalization:  CareEverywhere information obtained and reviewed  Diagnostic Tests/Treatments reviewed.  Follow up needed:   Other Healthcare Providers Involved in Patient s Care:         GI, Ortho   Update since discharge: improved.     Post Discharge Medication Reconciliation: discharge medications reconciled and changed, per note/orders (see AVS).  Plan of care communicated with patient and family     Coding guidelines for this visit:  Type of Medical   Decision Making Face-to-Face Visit       within 7 Days of discharge Face-to-Face Visit        within 14 days of discharge   Moderate Complexity [id number]   High Complexity [id number]     [facility] DISCHARGE SUMMARY    Patient Name: Roslyn Palmer  YOB: 1955   Medical Record Number: [id number]   Attending Provider: Rehan Collins MD  PCP: Monica Tirado MD  Admission Date: 6/19/2019  Discharge Date: 6/21/2019   Disposition: Home    DISCHARGE DIAGNOSES  Recurrent right hip dislocation  Hypokalemia  Hypomagnesemia  CT    HOSPITAL COURSE  63-year-old female with past medical history of migraines, hep C, liver cirrhosis and recurrent right hip dislocations presented to the ED right hip pain  "due to recurrent right hip dislocation following sudden movement. Was noticed to have hypokalemia and hypomagnesemia.     Recurrent right hip dislocation.  She had her dislocation reduced in the emergency department.   consulted orthopedic surgery for further recommendations she says she has been evaluated by Dr. Taveras in the past. She was provided with a brace. With recurrent dislocations, ortho recommends revision to be set as an outpatient. Evaluated by PT/OT was felt to be safe with ambulation.     Hypokalemia and hypomagnesemia.  Her electrolyte abnormalities are due to a combination of diuretics and likely poor nutrition.  Started replacement protocols. Will provide supplements upon discharge to follow-up with her PCP.     CT.  Held her Demadex and gently hydrated.  Creatinine improved from 1.4-1.07.     Microcytic anemia and thrombocytopenia.  Iron deficiency  Both are likely result of chronic liver disease.  We will monitor CBC periodically.   Iron studies revealed low iron. Continue PTA ferrous sulfate.     Alcohol use disorder (patient drinks 10 standard drinks/week)  Blood alcohol is 235 mg/dL, started an alcohol withdrawal protocol to prevent withdrawal. No evidence of withdrawals     History of esophageal varices.  Continue nadolol.     Moderate malnutrition  Dietitian provided with supplements.     DISCHARGE EXAM    /71  Pulse 92  Temp 98.5  F (36.9  C)  Resp 16  Ht 5' 6\" (1.676 m)  Wt 70.8 kg (156 lb)  LMP 04/01/2006  SpO2 98%  BMI 25.18 kg/m    GENERAL APPEARANCE: Awake and in no acute distress.  HEENT: NC/AT. EOM intact, anicteric.  LYMPHATIC: No palpable adenopathy  RESPIRATORY: Lungs clear to auscultation bilaterally, no wheezing, no rhonchi  CARDIOVASCULAR: Normal S1, S2, regular rhythm  GASTROINTESTINAL: Soft, non-tender, normal bowel sounds.   MUSCULOSKELETAL: Normal range of motion of extremities  EXT: No calf tenderness. No cyanosis. Pulses equal throughout.  NEUROLOGIC: " Alert and oriented, moves all extremities. Non-focal exam.   SKIN: No mottling of the skin    DISCHARGE MEDICATIONS  Current Discharge Medication List     NEW MEDICATIONS   Details   magnesium oxide 200 mg magnesium oral Tab 2 abs twice daily for 48 hours, then 2 tabs joyce  Qty: 30 tablet, Refills: 0     !! potassium chloride (KLOR-CON) 20 mEq oral packet Take 1 packet (20 mEq) by mouth four times a day for 14 days.  Qty: 14 packet, Refills: 0     !! - Potential duplicate medications found. Please discuss with provider.     MEDICATIONS CONTINUED UNCHANGED   Details   acetaminophen (TYLENOL EXTRA STRENGTH) 500 mg Oral Tab Take 500 mg by mouth four times a day as needed.     allopurinol (ZYLOPRIM) 100 mg oral tablet Take 100 mg by mouth once daily.     buPROPion 75 mg oral tablet Take 75 mg by mouth twice a day.     Cephalexin 500 mg Oral Tab Take 1 Tab by mouth Twice a Day.  Qty: 180 Tab, Refills: 0   Associated Diagnoses: Staphylococcal infection     colchicine (COLCRYS) 0.6 mg oral tablet Take 0.6 mg by mouth twice a day.     cyclobenzaprine (FLEXERIL) 10 mg oral tablet Take 10 mg by mouth three times a day as needed.     ferrous sulfate (FERATAB) 325 mg (65 mg iron) oral tablet Take 325 mg by mouth three times a day.     folic acid (FOLVITE) 1 mg oral tablet Take 1 mg by mouth once daily.     nadolol (CORGARD) 40 mg oral tablet Take 40 mg by mouth once daily.     pantoprazole (PROTONIX) 40 mg oral delayed release tablet Take 1 tablet (40 mg) by mouth Once Daily..  Qty: 90 tablet, Refills: 0   Associated Diagnoses: Upper GI bleed     !! potassium chloride (KLOR-CON) 20 mEq oral packet Take 20 mEq by mouth twice a day.     potassium chloride 20 mEq oral TbSR Take 20 mEq by mouth twice a day.     SUMAtriptan succinate (IMITREX) 100 mg Oral Tab Take 1 Tab by mouth as needed.  Qty: 10 Tab, Refills: 3   Associated Diagnoses: Migraine with aura and without status migrainosus, not intractable     torsemide (DEMADEX) 10 mg  oral tablet TAKE 1 TABLET BY MOUTH TWICE DAILY  Qty: 180 tablet, Refills: 0   Associated Diagnoses: Essential hypertension; Renal insufficiency     !! - Potential duplicate medications found. Please discuss with provider.         DISCHARGE PROCEDURES AND FOLLOW UP  Contact information for follow-up     Monica Tirado MD   Specialty: Family Medicine   Relationship: PCP - General   [address] [address]   Phone: [phone]     [facility]   Relationship: PCP - Primary Care Clinic   [address] [address]   Phone: [phone]         PENDING TEST RESULTS  None     IMAGING  EXAM: XR PELVIS & HIP LATERAL RT  DATE: 6/19/2019 10:34 PM  IMPRESSION:  1. Right hip replacement dislocation.    EXAM:  XR PELVIS & HIP LATERAL RT DATE:   6/20/2019 12:07 AM    IMPRESSION:  1.  Interval reduction of the right hip replacement.     Time: over 30 minutes    Rehan Collins MD  [place] [facility]       -Patient was hospitalized last week for right hip pain and recurrent right hip dislocation for the second time in the last month. She had also been told by our office to go to the ER that day due to significant hypokalemai  -She was discharged with a hip stabilizer that she is supposed to wear at all times but she does not want to do this because it is uncomfortable to wear. Her sister reports she has only used it one time  -Her previous orthopedist retired so she needs a new one to manage her hip. She was given a referral when she was discharged from the hospital but has not scheduled with them and her sister would like a referral for a closer location   -Since her second night in the hospital she has been having intermittent loose stools and bowel urgency and incontinence, worse since she got home from the hospital. She has a small amount of bright red blood on toilet paper but she thinks it is from external irritation, has not noticed  hematochezia. She has had some vomiting as well, unsure what triggers this. Yesterday symptoms were improved with imodium. She has not had abdominal pain, fever, chills, nausea. No history of similar symptoms   -Yesterday she took one iron pill instead of 3 because she thought it might be causing her loose stools. She has not been taking magnesium she thinks because it wasn't on her discharge list   -Her sister helped her set up her medications in a daily pill dispenser to help her manage her medications  -Patient's sister is concerned that she is not eating enough, so she has been having her drink protein drinks like Boost. Prior to her hospitalization patient already had a diminished appetite and felt like she couldn't eat as much as she used to   -Her sister thinks she is gaining weight but only as abdominal swelling. She is taking her torsemide   -After starting back on potassium, her balance issues and dizziness are significantly improved. She is taking potassium packets 4 times daily  -She did not see hepatology while she was in the hospital and has not scheduled yet. She did have an endoscopy scheduled but she canceled it due to her hospitalization   -She talked to a  through [employer] yesterday who cleared patient to be off work through 07/14. She is off work due to her hip and other symptoms. She is restricted in her movements and was told not to bend or do any extended sitting; at work she sits at a desk for the duration of her shift.  Patient is nervous that too much movement will cause her to dislocate her hip again. Also notes she is very fatigued since hospital stay    Wt Readings from Last 4 Encounters:   06/27/19 71.2 kg (157 lb)   06/19/19 68 kg (150 lb)   03/25/19 76.2 kg (168 lb)   02/05/19 76.2 kg (168 lb)     Alcohol use:  -Since being home from the hospital she is trying to cut her drinking etioh in half. She is down to 2-2.5 drinks daily   -She doesn't think she needs help with her  alcohol use at this time     Additional:  -Denies SOB, significant cough different from baseline, chest pain or pressure   No fever, chills, abd pain but does feel bloated    Patient Active Problem List   Diagnosis     Joint Prosthesis Infection or Inflammation     Alcoholic liver disease (H)     Tobacco use disorder     CARDIOVASCULAR SCREENING; LDL GOAL LESS THAN 160     Anemia     Advanced directives, counseling/discussion     Benign essential hypertension     Hepatitis C virus infection, unspecified chronicity     Upper GI bleed     Alcohol abuse     Migraine with aura and without status migrainosus, not intractable     Low folate     Cervical cancer screening     Anemia, unspecified type     Hepatitis C, chronic (H)     History of esophageal varices     Jaundice     Past Surgical History:   Procedure Laterality Date     COLONOSCOPY N/A 2018    Procedure: COMBINED COLONOSCOPY, SINGLE OR MULTIPLE BIOPSY/POLYPECTOMY BY BIOPSY;;  Surgeon: Musa Diaz MD;  Location: [facility] OR     COLONOSCOPY WITH CO2 INSUFFLATION N/A 2018    Procedure: COLONOSCOPY WITH CO2 INSUFFLATION;  Colonoscopy, Egd;  Surgeon: Muas Diaz MD;  Location: [facility] OR     COMBINED ESOPHAGOSCOPY, GASTROSCOPY, DUODENOSCOPY (EGD) WITH CO2 INSUFFLATION N/A 2018    Procedure: COMBINED ESOPHAGOSCOPY, GASTROSCOPY, DUODENOSCOPY (EGD) WITH CO2 INSUFFLATION;  Combined, Upper GI bleed Alcoholic liver disease (H) Anemia, unspecified type, Ref By Celestino, BMI 27.55, [phone];  Surgeon: Musa Diaz MD;  Location: [facility] OR     GYN SURGERY           HC ESOPHAGOSCOPY W BAND LIGATION ESOPHAGEAL VARICIES       ORTHOPEDIC SURGERY  ,    left and right hip replacement       Social History     Tobacco Use     Smoking status: Current Every Day Smoker     Packs/day: 0.50     Types: Cigarettes     Smokeless tobacco: Never Used   Substance Use Topics     Alcohol use: Yes     Comment: 3 to 4 drinks a day     Family History  "  Problem Relation Age of Onset     Breast Cancer Mother      Cancer Maternal Grandmother      Cerebrovascular Disease Maternal Grandmother         stroke     Cancer Maternal Grandfather         pancreatic cancer     Cancer Paternal Grandmother      Cancer Paternal Grandfather         brain cancer     Arthritis Sister              Reviewed and updated as needed this visit by Provider         Review of Systems   ROS COMP: Constitutional, HEENT, cardiovascular, pulmonary, gi and gu systems are negative, except as otherwise noted.    This document serves as a record of the services and decisions personally performed by SOLOMON STAHL. It was created on his/her behalf by Awais Bashir, a trained medical scribe. The creation of this document is based on the provider's statements to the medical scribe. Awais Bashir, June 27, 2019 2:35 PM      Objective    /73 (BP Location: Right arm, Patient Position: Sitting, Cuff Size: Adult Regular)   Pulse 94   Temp 98.6  F (37  C) (Oral)   Resp 16   Ht 1.676 m (5' 6\")   Wt 71.2 kg (157 lb)   SpO2 100%   BMI 25.34 kg/m    Body mass index is 25.34 kg/m .  Physical Exam   GENERAL: , alert and no distress  HENT: approx 8 mm pearly domed hypopigmented papule worrisome for BCC left nasal bridge , pharyngeal erythema, ear canals and TM's normal, mouth without ulcers or lesions  NECK: no adenopathy, no asymmetry, masses, or scars and thyroid normal to palpation  RESP: lungs clear to auscultation - no rales, rhonchi or wheezes  CV: regular rate and rhythm, normal S1 S2, no S3 or S4, no murmur, click or rub, no peripheral edema and peripheral pulses strong  ABDOMEN:  diffuse abdominal fullness, no fluid wave appreciated, no caput medusa appreciated. Mild to moderate epigastric and RUQ tenderness,   no hepatosplenomegaly, no masses and bowel sounds normal  MS: no gross musculoskeletal defects noted, no edema  PSYCH: mentation appears normal, affect " normal/bright      4/15/19 US Abdomen:   Impression:   1.  Coarsened liver echotexture with nodular contour compatible with  intrinsic hepatic disease. No focal mass.  2.  Mild gallbladder wall thickening, most likely related to history  of hepatitis. Gallbladder sludge.           Assessment & Plan     1. Recurrent dislocation of right hip  Follow up with ortho, ecouraged bracing as much as she is able. Will fill out patient's [employer] forms. Off through 7/14/19 but may need further time off if surgery is completed.    - ORTHO  REFERRAL    2. Alcoholic liver disease (H)  3. Alcohol abuse  4. Chronic hepatitis C without hepatic coma (H)  5. History of esophageal varices  6. Jaundice  Ultrasound tomorrow due to concerns for possible ascites with a feeling of increasing abdominal girth and wt gain. Emphasized importance of scheduling appointment with hepatology. Consider increasing diuretic, adding spironolactone  - Comprehensive metabolic panel  - Lipase  - US Abdomen Limited; Future  - CBC with platelets differential    7. Anemia, unspecified type  On tid iron, ? Contributing to vomiting intermittently.   - CBC with platelets differential    8. Hypokalemia  9. Hypomagnesemia  Start magnesium oxide if low as she does not think she has been taking this.   - Comprehensive metabolic panel  - Magnesium    10. Benign essential hypertension  At goal  - Comprehensive metabolic panel  - CBC with platelets differential    11. Vomiting, intractability of vomiting not specified, presence of nausea not specified, unspecified vomiting type  Reviewed red flag symptoms that would precipitate the need for routine, urgent or emergent f/u   - Comprehensive metabolic panel  - Lipase  - CBC with platelets differential  - US Abdomen Limited; Future    12. Diarrhea, unspecified type  Push fluids. Reviewed red flag symptoms that would precipitate the need for routine, urgent or emergent f/u  - Clostridium difficile toxin B PCR;  "Future  - Enteric Bacteria and Virus Panel by [facility] Stool; Future    13. Skin lesion- r/o BCC. Derm referral placed    Tobacco Cessation:   reports that she has been smoking cigarettes.  She has been smoking about 0.50 packs per day. She has never used smokeless tobacco.  Tobacco Cessation Action Plan: Information offered: Patient not interested at this time      BMI:   Estimated body mass index is 25.34 kg/m  as calculated from the following:    Height as of this encounter: 1.676 m (5' 6\").    Weight as of this encounter: 71.2 kg (157 lb).   Weight management plan: Monitoring patient weight loss        Patient Instructions     A  from the orthopedics office will call you to help you set up an appointment. If you don't hear from them, let me know. Your orthopedic doctor can manage further work restrictions if necessary.     Schedule an appointment with the liver specialist and your endoscopy.    Try to drink three bottles of water daily.     After you have scheduled with ortho and the liver specialist, schedule an appointment with dermatology.    Please call [facility] at [phone] to schedule an abdominal ultrasound.     Follow-up with [facility] next week Monday or Tuesday. You can do your second Hep B shot then if you are feeling better.    If you develop fever, recurrent vomiting, abdominal pain, or blood in stool, you need to go to the emergency department.     At [facility], we strive to deliver an exceptional experience to you, every time we see you.  If you receive a survey in the mail, please send us back your thoughts. We really do value your feedback.    Your care team:     Family Medicine   CL Elliott MD Emily Bunt, KENDALL RUDD   S. MD Evangelina Carmichael MD Angela Wermerskirchen, MD         Clinic hours: Monday - Wednesday 7 am-7 pm   Thursdays and " Fridays 7 am-5 pm.     [facility]: Monday - Friday 11 am-9 pm,   Saturday and Sunday 9 am-5 pm.    [facility]: Monday -Thursday 8 am-8 pm; Friday 8 am-6 pm; Saturday and Sunday 9 am-5 pm.     [facility]: Monday - Thursday 8 am - 7 pm; Friday 8 am - 6 pm    Clinic: [phone]   [facility]: [phone]   [facility]: [phone]                Return in about 1 week (around 7/4/2019) for Recheck.     Length of visit was 40 minutes with more than 50 percent of that time used for discussing medical concerns and education    The information in this document, created by the medical scribe for me, accurately reflects the services I personally performed and the decisions made by me. I have reviewed and approved this document for accuracy.   Monica Tirado MD  [facility]

## 2019-06-28 NOTE — TELEPHONE ENCOUNTER
Attempted to call patient several times but only got voicemail.   Sent [employer] message with updated info I got back from Dr. Tejada.     -patient needs abd us asap. If ascites needs tap for sbp wtihin 24 hours. If not able to get as outpt will need ER  - should see hepatology within 2 weeks.   - imperative she quit drinking. Prognosis is very poor especially if she continues to drink.

## 2019-06-30 NOTE — TELEPHONE ENCOUNTER
"Marquita returning a call from Maria Luz Bright PA-C.  The below message was given to the patient.      \"Raphael Marquita  I  Attempted to reach you by phone. Dr. Tirado is out of the office and I am reviewing your results.   You have severe ascites or fluid in the abdomen.  Please go to the EMERGENCY DEPARTMENT right now. Please call or MyChart my office with any questions or concerns.   Maria Luz Bright, PAC\"    Marquita said that she will go to [facility].    Brittany Puentes RN, [facility]    Reason for Disposition    Health Information question, no triage required and triager able to answer question    Additional Information    Negative: Requesting regular office appointment    Negative: [1] Caller requesting NON-URGENT health information AND [2] PCP's office is the best resource    Negative: RN needs further essential information from caller in order to complete triage    Protocols used: INFORMATION ONLY CALL-A-AH      "

## 2019-06-30 NOTE — RESULT ENCOUNTER NOTE
Raphael Tirado is out of the office and I am reviewing your results.   Your stool cultures and clostridium difficile tests were negative.   Please call or MyChart my office with any questions or concerns.    Maria Luz Bright, PAC

## 2019-06-30 NOTE — RESULT ENCOUNTER NOTE
Raphael Juárez  I  Attempted to reach you by phone. Dr. Tirado is out of the office and I am reviewing your results.   You have severe ascites or fluid in the abdomen.  Please go to the EMERGENCY DEPARTMENT right now.   Please call or MyChart my office with any questions or concerns.    Maria Luz Bright, PAC

## 2019-07-02 NOTE — PROGRESS NOTES
Subjective     Roslyn Palmer is a 63 year old female who presents to clinic today for the following health issues:    HPI   ED/UC Followup:    Facility:  [facility]  Date of visit: 6-30-19  Reason for visit: Had an US Saturday and went to ER Sunday for possible fluid infection  Current Status: Not eating too much, drinking more fluid and having 2 boost shakes a day. 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 Didn't want to pull more fluid.  But it was done in a very nice way.  Cytology was negative for infection or malignancy.  Her blood work was as below, and please review care everywhere.  Noted worsening bilirubin, and liver function tests were about the same as last week.     Na 135  Cr 1.37  GFR 39  Bilirubin direct 4.96  Bilirubin total 6.6: climbing from last week (5.7)  - last week - 436  ALT 53--last week was 59  - last week was 146  Hemo 9.6  WBC 10  Plat 232  ETOH 78    No fever/chills since Sunday. No chest pain or SOB.  Poor appetie, drinking 2 boosts a day. She cut her alcohol down, had 2 drinks yesterday. One lasted about 4 hours. She is slowly cutting down.  We talked how this is a good idea to reduce her risk of seizures.  She will do 2 drinks today, 1 drink tomorrow and Thursday and then stop. Still has no energy, tries to eat, feels like vomiting. Will add carafate. She is already on protonix. Needs to re-schedule the EGD.  Talked about the risk of possible ulcer or gastritis.  Certainly rescheduling the EGD would be important. Couldn't get in GI-Hepatology until 7/24/19.  See plan.       No blood in stools but some when she wipes. Her diarrhea is back. They are wondering if related to magnesium, was taking mag TID. Taking potassium 3-4 times a day.  We will check her magnesium level today.  Potassium was normal  in the emergency room.    Has rash on both legs: started about a week ago. This occurred about a month ago also. No itching, dry, red patches all over.  See plan below.  She was given a dermatology consult a week ago, if rash is not improved and have her follow-up with dermatology.    Not using hip brace. Not comfortable. Too warm feeling. Part of it is plastic that goes on her leg. She is trying to be careful with her hip.  She has not made an appointment with orthopedics yet.        Patient Active Problem List   Diagnosis     Joint Prosthesis Infection or Inflammation     Alcoholic liver disease (H)     Tobacco use disorder     CARDIOVASCULAR SCREENING; LDL GOAL LESS THAN 160     Anemia     Advanced directives, counseling/discussion     Benign essential hypertension     Hepatitis C virus infection, unspecified chronicity     Upper GI bleed     Alcohol abuse     Migraine with aura and without status migrainosus, not intractable     Low folate     Cervical cancer screening     Anemia, unspecified type     Hepatitis C, chronic (H)     History of esophageal varices     Jaundice     Past Surgical History:   Procedure Laterality Date     COLONOSCOPY N/A 2018    Procedure: COMBINED COLONOSCOPY, SINGLE OR MULTIPLE BIOPSY/POLYPECTOMY BY BIOPSY;;  Surgeon: Musa Diaz MD;  Location:  OR     COLONOSCOPY WITH CO2 INSUFFLATION N/A 2018    Procedure: COLONOSCOPY WITH CO2 INSUFFLATION;  Colonoscopy, Egd;  Surgeon: Musa Diaz MD;  Location:  OR     COMBINED ESOPHAGOSCOPY, GASTROSCOPY, DUODENOSCOPY (EGD) WITH CO2 INSUFFLATION N/A 2018    Procedure: COMBINED ESOPHAGOSCOPY, GASTROSCOPY, DUODENOSCOPY (EGD) WITH CO2 INSUFFLATION;  Combined, Upper GI bleed Alcoholic liver disease (H) Anemia, unspecified type, Ref By Celestino, BMI 27.55, [phone];  Surgeon: Musa Diaz MD;  Location:  OR     GYN SURGERY           HC ESOPHAGOSCOPY W BAND LIGATION ESOPHAGEAL VARICIES        ORTHOPEDIC SURGERY  2006,2010    left and right hip replacement       Social History     Tobacco Use     Smoking status: Current Every Day Smoker     Packs/day: 0.50     Types: Cigarettes     Smokeless tobacco: Never Used   Substance Use Topics     Alcohol use: Yes     Comment: 3 to 4 drinks a day     Family History   Problem Relation Age of Onset     Breast Cancer Mother      Cancer Maternal Grandmother      Cerebrovascular Disease Maternal Grandmother         stroke     Cancer Maternal Grandfather         pancreatic cancer     Cancer Paternal Grandmother      Cancer Paternal Grandfather         brain cancer     Arthritis Sister          Current Outpatient Medications   Medication Sig Dispense Refill     pantoprazole (PROTONIX) 40 MG EC tablet Take 1 tablet (40 mg) by mouth daily 90 tablet 0     spironolactone (ALDACTONE) 25 MG tablet Take 1 tablet (25 mg) by mouth daily 60 tablet 0     sucralfate (CARAFATE) 1 GM/10ML suspension Take 10 mLs (1 g) by mouth 4 times daily as needed for nausea 420 mL 1     triamcinolone (KENALOG) 0.1 % external cream Apply topically 2 times daily 80 g 1     allopurinol (ZYLOPRIM) 100 MG tablet TAKE 1 TABLET (100 MG) BY MOUTH DAILY FOR 7 DAYS, THEN 2 TABLETS (200 MG) DAILY. 60 tablet 11     buPROPion (WELLBUTRIN) 75 MG tablet Take 1 tablet (75 mg) by mouth 2 times daily 60 tablet 1     cephALEXin (KEFLEX) 500 MG capsule Take 1 capsule by mouth 2 times daily. 60 capsule 6     colchicine (COLCRYS) 0.6 MG tablet Take 1 tablet (0.6 mg) by mouth 2 times daily 180 tablet 1     cyclobenzaprine (FLEXERIL) 10 MG tablet Take 1 tablet (10 mg) by mouth 3 times daily as needed for muscle spasms 30 tablet 0     ferrous sulfate (FEROSUL) 325 (65 Fe) MG tablet Take 1 tablet (325 mg) by mouth 3 times daily (with meals) 90 tablet 0     folic acid (FOLVITE) 1 MG tablet Take 1 tablet (1 mg) by mouth daily 90 tablet 3     magnesium oxide 200 MG TABS 2 abs twice daily for 48 hours, then 2 tabs joyce        "nadolol (CORGARD) 40 MG tablet Take 1 tablet (40 mg) by mouth daily 14 tablet 0     potassium chloride (KLOR-CON) 20 MEQ packet Take potassium 20 meq three times a day for 3 days, then decrease back to 20 meq twice a day 60 packet 1     SUMAtriptan (IMITREX) 100 MG tablet Take 1 tablet (100 mg) by mouth at onset of headache May repeat in 2 hours if needed: max 2/day; 18 tablet 3     torsemide (DEMADEX) 10 MG tablet Take 1 tablet (10 mg) by mouth 2 times daily 180 tablet 1     Allergies   Allergen Reactions     Sulfa Drugs          Reviewed and updated as needed this visit by Provider  Tobacco  Allergies  Meds  Problems  Med Hx  Surg Hx  Fam Hx         Review of Systems   ROS COMP: Constitutional, HEENT-as above, cardiovascular, pulmonary, gi-as above and gu systems are negative, except as otherwise noted.      Objective    /76 (BP Location: Right arm, Patient Position: Sitting, Cuff Size: Adult Regular)   Pulse 97   Temp 98.3  F (36.8  C) (Oral)   Resp 20   Ht 1.676 m (5' 6\")   Wt 70.7 kg (155 lb 12.8 oz)   SpO2 99%   BMI 25.15 kg/m    Body mass index is 25.15 kg/m .  Physical Exam   GENERAL: appears older than stated age, ill appearing, alert and no acute distress  EYES: Eyes grossly normal to inspection other than jaundice, PERRL and conjunctivae and sclerae jaundice  HENT: ear canals and TM's normal, nose and mouth without ulcers or lesions  NECK: no adenopathy, no asymmetry, masses, or scars and thyroid normal to palpation  RESP: lungs clear to auscultation - no rales, rhonchi or wheezes  CV: regular rate and rhythm, normal S1 S2, no S3 or S4, no murmur, click or rub, no peripheral edema  ABDOMEN: soft, round, slightly generalized tenderness, no hepatosplenomegaly noted but was difficult due to ascites present, no masses and bowel sounds normal  MS: no gross musculoskeletal defects noted but did have difficult time getting up and down from the exam table.  NEURO: Decreased overall strength " "and tone, mentation intact and speech normal  PSYCH: mentation appears normal, affect normal to sad  Skin: Flat slightly round macular rash noted on bilateral legs, it is not periodic, no bleeding.  Generalized skin is very fragile, jaundiced appearing, has scleral jaundiced    Diagnostic Test Results:  Labs reviewed in Epic  No results found for this or any previous visit (from the past 24 hour(s)).        Assessment & Plan     1. Alcoholic cirrhosis of liver with ascites (H)/2. Chronic hepatitis C without hepatic coma (H)  Patient is working on weaning her alcohol intake.  After Thursday she will no longer have any alcoholic beverages.  Labs are pending, will also add spiral lactone daily due to ascites.  Blood pressure is stable and she can tolerate this.  Provider called and left a voice message to patient with phone number to schedule paracentesis and ultrasound of the abdomen.  Need to assess if there is any gallbladder sludge and assess the portal vasculature.   Provider also called hepatology on-call, and to message and update about patient situation.  Dr. Dodd will get back to provider or patient if there will be a earlier appointment in July 24, otherwise he will send any other suggestions. Decrease potassium packets to twice a day.     -Provider did have a bit of a andressa discussion with patient and how serious her condition is.  Advised that her prognosis is \"poor\", and that when she quit drinking she needs to be done. Also advised she is at risk of passing away if she does not make change now. She was tearful at that point, and seemed like she was ready for change.  We did talk about there is not any fast way to reverse the damage.  She needs to follow-up with her hepatologist sooner than later which is why provider contacted the on-call hematologist.  See phone encounter.    -Of note there is no interventional radiology in [place], patient will have to have that an ultrasound done in [place].    - " "CBC with platelets  - Comprehensive metabolic panel (BMP + Alb, Alk Phos, ALT, AST, Total. Bili, TP)  - INR  - spironolactone (ALDACTONE) 25 MG tablet; Take 1 tablet (25 mg) by mouth daily  Dispense: 60 tablet; Refill: 0  - IR Paracentesis; Future  - US Abdomen Complete; Future    3. Nausea  Patient having ongoing nausea and types.  Will consider H. pylori, add Carafate.  This is likely related to her liver failure.  - pantoprazole (PROTONIX) 40 MG EC tablet; Take 1 tablet (40 mg) by mouth daily  Dispense: 90 tablet; Refill: 0  - sucralfate (CARAFATE) 1 GM/10ML suspension; Take 10 mLs (1 g) by mouth 4 times daily as needed for nausea  Dispense: 420 mL; Refill: 1  - US Abdomen Complete; Future    4. Rash and nonspecific skin eruption  Uncertain of cause, could be from liver failure.  Trial low-dose steroid, also try using a cream instead of the lotion  - triamcinolone (KENALOG) 0.1 % external cream; Apply topically 2 times daily  Dispense: 80 g; Refill: 1    5. Diarrhea, unspecified type  Could be related to liver failure, check labs and H. pylori  - Magnesium  - H Pylori antigen, stool; Future  - US Abdomen Complete; Future     Tobacco Cessation:   reports that she has been smoking cigarettes.  She has been smoking about 0.50 packs per day. She has never used smokeless tobacco.  Tobacco Cessation Action Plan: Information offered: Patient not interested at this time      BMI:   Estimated body mass index is 25.15 kg/m  as calculated from the following:    Height as of this encounter: 1.676 m (5' 6\").    Weight as of this encounter: 70.7 kg (155 lb 12.8 oz).         Return in about 1 week (around 7/9/2019) for Routine Visit.  Patient aware she should make a provider visit in 1 week for repeat labs and follow-up, her sister and her verbalized understanding    Provider also spent significant time of up to 50-60 min reviewing chart, making phone calls to referrals and helping to coordinate care.     KENDALL Styles, " NP-C  [facility]    This chart was documented by provider using a voice activated software called Dragon in addition to manual typing. There may be vocabulary errors or other grammatical errors due to this.

## 2019-07-02 NOTE — TELEPHONE ENCOUNTER
This writer attempted to contact Marquita on 07/02/19 at 3 noon :44 pm      Reason for call update on plan of care and left detailed message.      If patient calls back:   Relay message: Provider did call hematology to see if she could get she went to a sooner appointment, did send it your information to be reviewed by on-call hematologist.  He will let me know if he has any further suggestions to your appointment.  He did recommend we do the abdominal ultrasound again.  Either hepatology or our clinic will let you know if there is a sooner appointment available but keep the July 24 appointment.  [place] does not have interventional radiology, the closest places [place].  The paracentesis and abdominal ultrasound were both ordered, please call [phone] to get that scheduled.  You certainly could get both of those scheduled at the same time so you are just coming once. , (read verbatim), document that pt called and close encounter.  Or Send back to provider if patient has further questions.      KENDALL Styles, NP-C  [facility]

## 2019-07-02 NOTE — TELEPHONE ENCOUNTER
NP called Hepatology at [phone], spoke to  Judy. Trying to move hepatology appointment up from 7/24/19. She will transfer NP to provider to provider line. NP will send Dr. Anatoly Dodd a in[employer] message with patient information. He did say he would like an indepth ultrasound done if last weeks was just looking for ascites. He is unsure if they can get patient in sooner than her already scheduled appointment but he will take a look at her chart and offer suggestions in the mean time. NP thanked Dr. Dodd and will send message.   KENDALL Styles, NP-C  [facility]

## 2019-07-02 NOTE — PATIENT INSTRUCTIONS
Monitor for muscle cramps, chest pain, worsening abdominal pain or swelling, fever/chills, blood in stool, go to ER.     Adding Carafate, decrease potassium to twice a day, adding spironolactone and steroid cream for rash      Brooklynn Rosado NP to do:  Pharmacy: meds processed via liver?  Call GI about sooner appointment  IR in [place]?: closer to home. Can do repeat US at the same time?

## 2019-07-03 NOTE — TELEPHONE ENCOUNTER
This writer attempted to contact 1 on 07/03/19      Reason for call Referral        If patient calls back: She can call [phone] and schedule at [facility].    [facility] Team (MA/TC) called. Inform patient that someone from the team will contact them, document that pt called and route to care team.         [id number], MEDICAL ASSISTANT

## 2019-07-03 NOTE — TELEPHONE ENCOUNTER
Reason for Call:  Other     Detailed comments: patient called [facility] to schedule 2 appts and they do only one of them, the other one they do not do she thinks it is called, Paracentesis, she would have to do at the [facility], phone is [phone]    Phone Number Patient can be reached at: Home number on file [phone] (home)    Best Time: any    Can we leave a detailed message on this number? YES    Call taken on 7/3/2019 at 3:56 PM by Tarah Richmond

## 2019-07-05 NOTE — TELEPHONE ENCOUNTER
Adjusted referral. Let provider know if it needs to be adjusted. Let patient know she should call and try to schedule.  KENDALL Styles, NP-C  [facility]

## 2019-07-05 NOTE — TELEPHONE ENCOUNTER
Patient  returned call    Best number to reach caller: Home number on file [phone] (home) - Ok to leave detailed message    Is it ok to leave a detailed message: Pt has already called the [facility] clinic, they do not do parencenthesis at that location; Patient states [phone] is [facility] and they need referral.   Thank you

## 2019-07-05 NOTE — TELEPHONE ENCOUNTER
"Called and spoke with patient, she says that the [facility] was unable to see the order that was placed today, they are requesting a \"Therapy Plan\" order.     Also patient was able to schedule appointment for 7/24/19 but want something sooner.  "

## 2019-07-05 NOTE — TELEPHONE ENCOUNTER
Reason for Call:  Other     Detailed comments: Patient wants call back. Says needs order (for abdomen fluid drainage) asap and questions about liver specialist referral.  Sending High Priority Message.      Phone Number Patient can be reached at: Cell number on file:    Telephone Information:   Mobile [phone]       Best Time: any    Can we leave a detailed message on this number? YES    Call taken on 7/5/2019 at 3:28 PM by Naye Owen

## 2019-07-08 NOTE — PATIENT INSTRUCTIONS
Do not take more than 1000 mg Tylenol daily. Avoid ibuprofen (Advil) and  Aleve (naproxen).    For your kidney health it is important that you hydrate well. If you have a hard time drinking a lot of water, try sucking on ice chips.    Schedule an appointment with ortho. Ask your liver doctor when they think you will be able to do surgery with ortho.     If you are not getting improvement in your mood from the Wellbutrin within a week let me know and we can try something else.     Look for a liver disease or cirrhosis support group, and consider working with a counselor.

## 2019-07-08 NOTE — PROGRESS NOTES
"Subjective     Roslyn Palmer is a 63 year old female who presents to clinic today for the following health issues:    HPI   Forms  Follow up labs/ultrasound    GI:  -Patient complains of ongoing nausea and intermittent dry heaving. It takes her awhile to get her medication down in the morning. She did not end up buying Carafate because it was expensive. She is using Protonix daily. She's uncertain if new meds are contributing (on wellbutrin, magnesium, potassium packets since hospital stay and now also on spironolactone since last week).   -Her sister notes she is not eating very much but has a couple of Boost drinks per day. She does like to eat fruit but doesn't want to eat meat. When her sister makes meals, she only eats a small amount  -Complains of continued bowel urgency, when her stools are loose. Stools continue to alternate between more formed and loose. She has been wearing Depends because she worries she won't make it to the bathroom.   -Complains of experiencing \"brain fog\". Her sister notes she is more easily frustrated and irritable than usual   -She feels a little more SOB than usual since this last hospital stay, and her sister has noticed this as well. This is not worsening. No orthopnea, peripheral edema.   -Denies hematochezia, fever  -She has been taking magnesium twice daily   -She went to the ED last week to have ascites drained and checked for infection, which was negative  -She has an appointment scheduled with GI and is wondering if she can move her appointment sooner     Alcohol use:  -Patient has not had any alcohol in the last 5 days. She has not felt shaky and is doing okay. She weaned down slowly from 2 to one to 0 drinks per day    Mood:  -Patient was started on Wellbutrin about 19 days ago in the hospital and is not sure it is helping her mood. She is more down and cries easily   -Denies any thoughts of self-harm     Additional:  -Patient thinks she is urinating 5 times daily "   -Her short-term disability ended four days ago but she is not ready to go back to work  -Has not seen ortho, she wasn't able to schedule an appointment and thinks they won't operate due to her liver     Patient Active Problem List   Diagnosis     Joint Prosthesis Infection or Inflammation     Alcoholic liver disease (H)     Tobacco use disorder     CARDIOVASCULAR SCREENING; LDL GOAL LESS THAN 160     Anemia     Advanced directives, counseling/discussion     Benign essential hypertension     Hepatitis C virus infection, unspecified chronicity     Upper GI bleed     Alcohol abuse     Migraine with aura and without status migrainosus, not intractable     Low folate     Cervical cancer screening     Anemia, unspecified type     Hepatitis C, chronic (H)     History of esophageal varices     Jaundice     Past Surgical History:   Procedure Laterality Date     COLONOSCOPY N/A 2018    Procedure: COMBINED COLONOSCOPY, SINGLE OR MULTIPLE BIOPSY/POLYPECTOMY BY BIOPSY;;  Surgeon: Musa Diaz MD;  Location: [facility] OR     COLONOSCOPY WITH CO2 INSUFFLATION N/A 2018    Procedure: COLONOSCOPY WITH CO2 INSUFFLATION;  Colonoscopy, Egd;  Surgeon: uMsa Diaz MD;  Location: [facility] OR     COMBINED ESOPHAGOSCOPY, GASTROSCOPY, DUODENOSCOPY (EGD) WITH CO2 INSUFFLATION N/A 2018    Procedure: COMBINED ESOPHAGOSCOPY, GASTROSCOPY, DUODENOSCOPY (EGD) WITH CO2 INSUFFLATION;  Combined, Upper GI bleed Alcoholic liver disease (H) Anemia, unspecified type, Ref By Celestino, BMI 27.55, [phone];  Surgeon: Musa Diaz MD;  Location: [facility] OR     GYN SURGERY           HC ESOPHAGOSCOPY W BAND LIGATION ESOPHAGEAL VARICIES       ORTHOPEDIC SURGERY  ,    left and right hip replacement       Social History     Tobacco Use     Smoking status: Current Every Day Smoker     Packs/day: 0.50     Types: Cigarettes     Smokeless tobacco: Never Used   Substance Use Topics     Alcohol use: Yes     Comment: 3 to 4  "drinks a day     Family History   Problem Relation Age of Onset     Breast Cancer Mother      Cancer Maternal Grandmother      Cerebrovascular Disease Maternal Grandmother         stroke     Cancer Maternal Grandfather         pancreatic cancer     Cancer Paternal Grandmother      Cancer Paternal Grandfather         brain cancer     Arthritis Sister              Reviewed and updated as needed this visit by Provider         Review of Systems   ROS COMP: Constitutional, HEENT, cardiovascular, pulmonary, gi and gu systems are negative, except as otherwise noted.    This document serves as a record of the services and decisions personally performed by SOLOMON STAHL. It was created on his/her behalf by Awais Bashir, a trained medical scribe. The creation of this document is based on the provider's statements to the medical scribe. Awais Bashir, July 8, 2019 2:50 PM      Objective    /70 (BP Location: Right arm, Patient Position: Chair, Cuff Size: Adult Regular)   Pulse 95   Temp 98.3  F (36.8  C) (Oral)   Resp 19   Ht 1.676 m (5' 6\")   Wt 69.9 kg (154 lb)   LMP  (Exact Date)   SpO2 99%   Breastfeeding? No   BMI 24.86 kg/m    Body mass index is 24.86 kg/m .  Physical Exam   GENERAL: healthy, alert and no distress  EYES: scleral icterus, PERRL and conjunctivae normal   NECK: no adenopathy, no asymmetry, masses, or scars and thyroid normal to palpation  RESP: lungs clear to auscultation - no rales, rhonchi or wheezes  CV: regular rate and rhythm, normal S1 S2, no S3 or S4, no murmur, click or rub, no peripheral edema and peripheral pulses strong  ABDOMEN: diffuse abdominal fullness but improved from last time, no fluid wave appreciated, no caput medusa appreciated. Mild  epigastric and RUQ tenderness,  no hepatosplenomegaly, no masses and bowel sounds normal  MS: no gross musculoskeletal defects noted, no edema  PSYCH: mentation appears normal, affect normal/bright    7/6/19 US abdomen: "   Impression:  1. Cirrhotic configuration off the liver parenchyma. No focal liver  lesions.  a. LI-RADS US Category: US-1 Negative: No US evidence of HCC  b. Recommend continued surveillance US.  2. Sequela of portal hypertension including a small-to-moderate volume  of ascites. No splenomegaly.  3. Patent liver vasculature with normal direction of flow.  4. Cholelithiasis and biliary sludge within the gallbladder.  Gallbladder wall thickening likely due to ascites and adjacent hepatic  intrinsic parenchymal disease.     *Recommendations above based on LI-RADS? v2017:  https://www.acr.org/Clinical-Resources/Reporting-and-Data-Systems/LI-R  DS/Ultrasound-LI-RADS-v2017     JULIA ADAMS MD    Component      Latest Ref Rng & Units 6/29/2019   Campylobacter group by AKSHAT      NDET:Not Detected Not Detected   Salmonella species by AKSHAT      NDET:Not Detected Not Detected   Shigella species by AKSHAT      NDET:Not Detected Not Detected   Vibrio group by AKSHAT      NDET:Not Detected Not Detected   Rotavirus A by AKSHAT      NDET:Not Detected Not Detected   Shiga toxin 1 gene by AKSHAT      NDET:Not Detected Not Detected   Shiga toxin 2 gene by AKSHAT      NDET:Not Detected Not Detected   Norovirus I and II by AKSHAT      NDET:Not Detected Not Detected   Yersinia enterocolitica by AKSHAT      NDET:Not Detected Not Detected   Enteric pathogen comment       Testing performed by multiplexed, qualitative PCR using the [employer] [facility] . . .   Specimen Description       Feces   C Diff Toxin B PCR      NEG:Negative Negative       Component      Latest Ref Rng & Units 6/27/2019 7/2/2019   Sodium      133 - 144 mmol/L 134 135   Potassium      3.4 - 5.3 mmol/L 3.8 3.8   Chloride      94 - 109 mmol/L 100 100   Carbon Dioxide      20 - 32 mmol/L 25 27   Anion Gap      3 - 14 mmol/L 9 8   Glucose      70 - 99 mg/dL 91 94   Urea Nitrogen      7 - 30 mg/dL 13 14   Creatinine      0.52 - 1.04 mg/dL 1.11 (H) 1.24 (H)   GFR Estimate      >60  mL/min/1.73:m2 53 (L) 46 (L)   GFR Estimate If Black      >60 mL/min/1.73:m2 61 54 (L)   Calcium      8.5 - 10.1 mg/dL 8.4 (L) 8.8   Bilirubin Total      0.2 - 1.3 mg/dL 5.7 (H) 5.0 (H)   Albumin      3.4 - 5.0 g/dL 2.5 (L) 2.3 (L)   Protein Total      6.8 - 8.8 g/dL 7.7 7.5   Alkaline Phosphatase      40 - 150 U/L 436 (H) 440 (H)   ALT      0 - 50 U/L 59 (H) 63 (H)   AST      0 - 45 U/L 146 (H) 182 (H)           Assessment & Plan       ICD-10-CM    1. Chronic hepatitis C without hepatic coma (H) B18.2 Comprehensive metabolic panel     Magnesium     INR   2. Alcoholic cirrhosis of liver with ascites (H) K70.31 Comprehensive metabolic panel     Magnesium     INR   3. Nausea R11.0 Comprehensive metabolic panel     Magnesium     INR     ondansetron (ZOFRAN-ODT) 4 MG ODT tab   4. Diarrhea, unspecified type R19.7 Comprehensive metabolic panel     Magnesium     INR   5. Alcohol abuse F10.10    6. Vomiting, intractability of vomiting not specified, presence of nausea not specified, unspecified vomiting type R11.10    7. Malnutrition, unspecified type (H) E46    8. Mood change R45.86    9. Jaundice R17    10. Hypomagnesemia E83.42      Progressive liver failure with rising bili, INR, ascites, malaise, nausea/vomiting and loose stools.   Appointment with hepatology is scheduled for two weeks out right now  Congratulated her on etoh discontinuation and she is motivated to stay sober. No current withdrawal  Spironolactone started last week- tolerating. Ascites does not seem to be significantly built back up yet. Unclear how much the gallbladder changes are contributing to her sx's.   Unclear how much her new meds (magnesium, wellbutrin) are contributing to n/v/diarrhea but suspect magnesium related to the stool urgency/loose stools (cultures negative)  Will consider trial off magnesium if level back to normal. Also stop wellbutrin in the next week if mood not significantly improved on this med. Consider fluoxetine as  alternative.     Reviewed red flag symptoms that would precipitate the need for routine, urgent or emergent f/u . If symptoms worsen or patient can't get in with GI then follow up in one week. Patient counseled again on importance of avoiding alcohol.          Tobacco Cessation:   reports that she has been smoking cigarettes.  She has been smoking about 0.50 packs per day. She has never used smokeless tobacco.  Tobacco Cessation Action Plan: not addressed at this visit        Patient Instructions   Do not take more than 1000 mg Tylenol daily. Avoid ibuprofen (Advil) and  Aleve (naproxen).    For your kidney health it is important that you hydrate well. If you have a hard time drinking a lot of water, try sucking on ice chips.    Schedule an appointment with ortho. Ask your liver doctor when they think you will be able to do surgery with ortho.     If you are not getting improvement in your mood from the Wellbutrin within a week let me know and we can try something else.     Look for a liver disease or cirrhosis support group, and consider working with a counselor.       Return in about 1 week (around 7/15/2019), or if symptoms worsen or fail to improve, for Recheck, Lab Work.     Length of visit was 37 minutes with more than 50 percent of that time used for discussing medical concerns and education    The information in this document, created by the medical scribe for me, accurately reflects the services I personally performed and the decisions made by me. I have reviewed and approved this document for accuracy.   Monica Tirado MD  [facility]    ADDENDUM:  7/9/2019 0830: Called [facility] hepatology scheduling about the sooner hepatology appt for Marquita. A high priority message was sent to the RN team to review and reach out to the patient to schedule. My phone number was also given as contact if any concerns.

## 2019-07-08 NOTE — LETTER
My Depression Action Plan  Name: Roslyn Palmer   Date of Birth 1955  Date: 7/8/2019    My doctor: Monica Tirado   My clinic: [address] [id number] [phone]          GREEN    ZONE   Good Control    What it looks like:     Things are going generally well. You have normal up s and down s. You may even feel depressed from time to time, but bad moods usually last less than a day.   What you need to do:  1. Continue to care for yourself (see self care plan)  2. Check your depression survival kit and update it as needed  3. Follow your physician s recommendations including any medication.  4. Do not stop taking medication unless you consult with your physician first.           YELLOW         ZONE Getting Worse    What it looks like:     Depression is starting to interfere with your life.     It may be hard to get out of bed; you may be starting to isolate yourself from others.    Symptoms of depression are starting to last most all day and this has happened for several days.     You may have suicidal thoughts but they are not constant.   What you need to do:     1. Call your care team, your response to treatment will improve if you keep your care team informed of your progress. Yellow periods are signs an adjustment may need to be made.     2. Continue your self-care, even if you have to fake it!    3. Talk to someone in your support network    4. Open up your depression survival kit           RED    ZONE Medical Alert - Get Help    What it looks like:     Depression is seriously interfering with your life.     You may experience these or other symptoms: You can t get out of bed most days, can t work or engage in other necessary activities, you have trouble taking care of basic hygiene, or basic responsibilities, thoughts of suicide or death that will not go away, self-injurious behavior.     What you need to do:  1. Call  your care team and request a same-day appointment. If they are not available (weekends or after hours) call your local crisis line, emergency room or 911.            Depression Self Care Plan / Survival Kit    Self-Care for Depression  Here s the deal. Your body and mind are really not as separate as most people think.  What you do and think affects how you feel and how you feel influences what you do and think. This means if you do things that people who feel good do, it will help you feel better.  Sometimes this is all it takes.  There is also a place for medication and therapy depending on how severe your depression is, so be sure to consult with your medical provider and/ or Behavioral Health Consultant if your symptoms are worsening or not improving.     In order to better manage my stress, I will:    Exercise  Get some form of exercise, every day. This will help reduce pain and release endorphins, the  feel good  chemicals in your brain. This is almost as good as taking antidepressants!  This is not the same as joining a gym and then never going! (they count on that by the way ) It can be as simple as just going for a walk or doing some gardening, anything that will get you moving.      Hygiene   Maintain good hygiene (Get out of bed in the morning, Make your bed, Brush your teeth, Take a shower, and Get dressed like you were going to work, even if you are unemployed).  If your clothes don't fit try to get ones that do.    Diet  I will strive to eat foods that are good for me, drink plenty of water, and avoid excessive sugar, caffeine, alcohol, and other mood-altering substances.  Some foods that are helpful in depression are: complex carbohydrates, B vitamins, flaxseed, fish or fish oil, fresh fruits and vegetables.    Psychotherapy  I agree to participate in Individual Therapy (if recommended).    Medication  If prescribed medications, I agree to take them.  Missing doses can result in serious side effects.   I understand that drinking alcohol, or other illicit drug use, may cause potential side effects.  I will not stop my medication abruptly without first discussing it with my provider.    Staying Connected With Others  I will stay in touch with my friends, family members, and my primary care provider/team.    Use your imagination  Be creative.  We all have a creative side; it doesn t matter if it s oil painting, sand castles, or mud pies! This will also kick up the endorphins.    Witness Beauty  (AKA stop and smell the roses) Take a look outside, even in mid-winter. Notice colors, textures. Watch the squirrels and birds.     Service to others  Be of service to others.  There is always someone else in need.  By helping others we can  get out of ourselves  and remember the really important things.  This also provides opportunities for practicing all the other parts of the program.    Humor  Laugh and be silly!  Adjust your TV habits for less news and crime-drama and more comedy.    Control your stress  Try breathing deep, massage therapy, biofeedback, and meditation. Find time to relax each day.     My support system    Clinic Contact:  Phone number:    Contact 1:  Phone number:    Contact 2:  Phone number:    [employer]/:  Phone number:    Therapist:  Phone number:    [facility] crisis center:    Phone number:    Other community support:  Phone number:

## 2019-07-08 NOTE — TELEPHONE ENCOUNTER
Nurses please call patient: we can hold off on the paracentesis for now unless she is getting more uncomfortable. The ultrasound showed mild to moderate fluid in her stomach, no longer large amount as the paracentesis in the ER last week helped remove much of that fluid. Her ultrasound also showed gall bladder stones and 'sludge'. This likely requires another test or imaging, but NP will send GI a message and see if they want to wait or order imaging now. NP has already gotten a hold of Hepatology last week and they are looking into the schedule to potentially get her in sooner. She also can try calling them herself to see if anything opened up this week. For now, she should be seen weekly in clinic by a provider for check up and weekly labs.  Let NP know if further questions.     KENDALL Styles, NP-C  [facility]

## 2019-07-08 NOTE — TELEPHONE ENCOUNTER
This writer attempted to contact Marquita on 07/08/19      Reason for call provider message and left message.      If patient calls back:   Registered Nurse called. Follow Triage Call workflow        Tiffany Rankin RN

## 2019-07-09 NOTE — TELEPHONE ENCOUNTER
This writer attempted to contact Marquita on 07/09/19      Reason for call provider recommendations and left message.      If patient calls back:   Registered Nurse called. Follow Triage Call workflow        Tiffany Rankin RN

## 2019-07-09 NOTE — TELEPHONE ENCOUNTER
F/u with us depends on when she can get in for GI  GI has tried again to reach out to her today (see phone note from gastro today) to get her a sooner appt. She needs to call them back    If she's able to move up GI appt f/u with me can be in 3-4 weeks  If not able to move up appt rec recheck next week.

## 2019-07-09 NOTE — TELEPHONE ENCOUNTER
Will close encounter. Patient saw MD this week already.  KENDALL Styles, NP-C  [facility]

## 2019-07-09 NOTE — TELEPHONE ENCOUNTER
Spoke with patient on the phone and reviewed provider's message with her. She was in to the clinic yesterday and saw Dr. Tirado for Chronic hepatitis C without hepatic coma.     Could provider please clarify how often patient should be seen in future?  She has apt. With hepatology set up July 24th along with lab work that day.     Please review and advise how often patient needs to be seen by PCP?    Tiffany Rankin RN

## 2019-07-10 NOTE — TELEPHONE ENCOUNTER
Marquita returned call    Best number to reach caller: Home number on file [phone] (home)    Is it ok to leave a detailed message: YES

## 2019-07-10 NOTE — TELEPHONE ENCOUNTER
This writer attempted to contact Marquita on 07/10/19      Reason for call relay provider message  and left message.      If patient calls back:   Registered Nurse called. Follow Triage Call workflow        Cristela Badillo RN

## 2019-07-10 NOTE — TELEPHONE ENCOUNTER
"Writer took call from RN line.    Spoke with patient. Advised of below per Dr. Sharma. Patient has not yet contacted back to GI clinic, \"it's on my list to do today\". Encouraged patient to call when done with this conversation. Patient agreed and will do so. Is understanding of follow up with PCP then. Will come in next week if unable to get in sooner with GI dept, otherwise if does get in, will follow up in 3-4 weeks. Will call back to clinic if has any additional problems or questions.  Patient did ask writer if knows if \"forms\" were completed and faxed? Patient inquiring about disability forms. Per chart review, appears forms were completed and faxed on 7/9/19. Patient updated of this.  No other questions at this time.    Cristela Badillo RN        "

## 2019-07-11 NOTE — TELEPHONE ENCOUNTER
Called patient to get update and to check in on hepatology appt. I don't see sooner appt scheduled yet.       Patient reported she is feeling about the same. No better, but no worse. Able to eat and drink. Resting a lot.   Tried to call hepatology back but was told again they didn't that she needed sooner appt. Patient got frustrated and did not call back again.     Encouraged her to call one more time. Ask to speak to RN in hepatology clinic or have  look at phone note from 7/9/19 in which the clinic reached out to patient to schedule her sooner.     Patient agreed and will call back right away.

## 2019-07-12 NOTE — TELEPHONE ENCOUNTER
RECORDS RECEIVED FROM: Internal - Hep C/Liver disease, Referred by Pt PCP/Monica Tirado MD - Referral & Records in Epic - Per Pt (Pt wanted soonest opening with any provider who saw for this - okay to schedule per call to backline)   DATE RECEIVED: 07.16.2019   NOTES STATUS DETAILS   OFFICE NOTE from referring provider Internal 03.25.2019   OFFICE NOTES from other specialists Internal 06.19.2019  10.27.2017   DISCHARGE SUMMARY from hospital N/A    MEDICATION LIST Internal    LIVER BIOSPY (IF APPLICABLE)      PATHOLOGY REPORTS  N/A    IMAGING     ENDOSCOPY (IF AVAILABLE) N/A    COLONOSCOPY (IF AVAILABLE) Internal 07.30.2018   ULTRASOUND LIVER Internal 07.06.2019   CT OF ABDOMEN N/A    MRI OF LIVER N/A    FIBROSCAN, US ELASTOGRAPHY, FIBROSIS SCAN, MR ELASTOGRAPHY N/A    LABS     HEPATIC PANEL (LIVER PANEL) N/A    BASIC METABOLIC PANEL Care Everywhere 06.30.2019   COMPLETE METABOLIC PANEL Internal 07.08.2019   COMPLETE BLOOD COUNT (CBC) Internal 07.02.2019   INTERNATIONAL NORMALIZED RATIO (INR) Internal 07.08.2019   HEPATITIS C ANTIBODY N/A    HEPATITIS C VIRAL LOAD/PCR N/A    HEPATITIS C GENOTYPE N/A    HEPATITIS B SURFACE ANTIGEN N/A    HEPATITIS B SURFACE ANTIBODY N/A    HEPATITIS B DNA QUANT LEVEL N/A    HEPATITIS B CORE ANTIBODY N/A

## 2019-07-15 NOTE — PROGRESS NOTES
Was the patient contacted by phone and reminded of the upcoming visit? message left    Was the patient instructed to bring a current list of all medications to the appointment or instructed to bring in all medication bottles? Yes     Is it anticipated the patient will need additional appointments? No      Was the patient instructed to arrive prior to the appointment time to have ordered labs drawn? Yes     Were the needed lab orders placed? Yes    Was lab appointment made 1 hour or more prior to visit? Yes    Ginna Alatorre CMA  7/15/2019 2:58 PM

## 2019-07-16 NOTE — TELEPHONE ENCOUNTER
7/16 called and left voicemail. Explained we need to reschedule appointment on 7/30 with Dr. Webster. Instructed patient to call [phone] to reschedule.     Justina Rodriguez   Procedure    Ortho/Sports Med/Ent/Eye   [facility]   [phone]

## 2019-07-16 NOTE — NURSING NOTE
Met with patient and sister along with Dr. Owen. Faxed orders for PRN paracentesis to [facility]. Once they receive orders and enter them into their system, they will call patient to schedule. Increased spironolactone to 50 mg daily, continue torsemide as ordered. EGD scheduled 8/15 and will return to clinic to see Dr. Owen on 8/12. Obtained kit for stool culture due to persistent diarrhea and will collect at home and take to nearby [place] lab. Patient and sister were provided this writer's contact information. Patient will have labs drawn in 1 week at local [place] clinic. Answered patient and sisters questions and encouraged them to call with any concerns or further questions. Will follow up in 1 week.

## 2019-07-16 NOTE — PROGRESS NOTES
REASON FOR CONSULTATION: hepatitis c and alcoholic cirrhosis with decompensation  REFERRING PROVIDER: Dr. Monica Tirado    A/P  Roslyn Palmer is a 63 year old female with Hepatitis C and alcoholic cirrhosis, with decompensation, last ETOH 10 days ago. Some component of alcoholic hepatitis likely present. MELD 22.    Ascites Explained why she has this and how it is treated. Low Na diet, diuretics (increase best from 25 to 50, BMP in 1 week) and para prn. We will order para and help her set up the first one.  Microcytic anemia. Iron low. On iron. EGD ordered  N/V Has rx for Zofran. Rec she fill it. EGD as above.  Diarrhea. Common with this scenario, especially ETOH decompensated cirrhosis and just quit drinking. WIll test stool.    HCV GT 2b treatment naive. Will address HCV treatment in the future. Not urgent at this point.    HypoK Ok today. On KCL. Check with next labs.   HypoMg Was just instructed to increase from 2 tabs to 3. Check mg on next labs in 1 week.    Nutrition. Discussed basics. Will need further support as we get through the acute issues  Dentition. Very poor. Will need dental visit.  Alcohol use disorder. Very long standing. Discussed need for help with this briefly today.    Stop nadolol for now until we see improvement in kidney function and resolution of contribution of alcoholic hepatitis.     Recommend she avoid Flexeril.    RTC 4 weeks.  This was a 60 minute visit, over 50% counseling and coordination of care.     Pamela Owen MD  Hepatology/Liver Transplant  Medical Director, Liver Transplantation  [facility]    Subjective  Roslyn Palmer is a 63 year old female referred for hepatitis C and ETOH cirrhosis. Recently decompensated. Still drinking up until 10 days ago. She is here with her sister. THey tell me that her brother  from cirrhosis with HCC.    Liver disease diagnosed at least 6 years ago when she had EV GIB. She has known about HCV for many  years. Was never treated because she was drinking. She was doing ok over the last few years until about May of this year.    She was loosing weight over the last few months. She has lost 20 pounds. She has also been having a lot of diarrhea and this has improved with Imodium. She started having dizzy spells and then saw a doctor in June. Had low K and Mg and went in.    Tbili was normal until June 2019. Now 7.6 and slowly going up. AST about 140, ALT around 60. INR 1.52. Fluid build up started  2-3 weeks ago. She has had para 1 time, only diagnostic. It is bad some days. It affects her eating. She is not eating much if at all. She is having vomiting about every 2-3 days. She has nausea. She does not have anything for nausea. No blood in stool orin  Emesis. She had been on torsemide for a long time and then best was recently started. Torsemide 10 bid and best 25 daily    Sister says she is having some mood issues. Short tempered. Brain fog. She is on Wellbutrin for last 3 weeks. She is having a hard time taking her meds reliably.     She lives with her sister. Mom also lives with them in separate apartment. Independent in ADLs. She was working full time until recently.    Hospitalizations  6/30/19 ED for ascites seen on US. Had a dx para. Albumin 0.2. . BAL 78. No SBP  6/19-6/21/19 for recurrent R hip dislocation, hypoK (2.1) , hypoMg (0.6), CT (creat 1.4), microcytic anemia (hgb 7.9, MCV 77), thrombocytopenia (120s). BAL was 235.     ETOH  6-8 drinks/day. White Russians. For years.   Longest period of sobriety has been 6 months.  CD when she was younger. 4 times.  Last alcohol was July 5, 2019. Weaned off alcohol over several days.  Never been through withdrawal.    HCV  Genotype 2b  Last viral load 2017 3.7 million  Histrory of treatment: none. She was not treated 2/2 she was still drinking. She was seeing an ID doctor for this.  Risk factors/duration of infection  HBV results: HBsAg neg 2010  HIV no  record    EV in past had bleeding varices. About 6 years ago. NO EGD since then.    Gout  On colchicine  Last attack was 6 mo ago    Liver Function Studies -   Recent Labs   Lab Test 07/16/19  0921   PROTTOTAL 7.8   ALBUMIN 2.3*   BILITOTAL 7.6*   ALKPHOS 342*   *   ALT 54*     MELD-Na score: 22 at 7/16/2019  9:21 AM  MELD score: 20 at 7/16/2019  9:21 AM  Calculated from:  Serum Creatinine: 1.20 mg/dL at 7/16/2019  9:21 AM  Serum Sodium: 134 mmol/L at 7/16/2019  9:21 AM  Total Bilirubin: 7.6 mg/dL at 7/16/2019  9:21 AM  INR(ratio): 1.52 at 7/16/2019  9:21 AM  Age: 63 years    Past Medical History  Past Medical History:   Diagnosis Date     DJD (degenerative joint disease)      Essential hypertension, malignant      ETOH abuse      Hepatitis C    L hip infection on permanent Keflex.  Back pain Takes Flexeril on occasion. This is maybe a few times per year.     Social History    No children  Lives with her sister.  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    Family History   Problem Relation Age of Onset     Breast Cancer Mother      Cancer Maternal Grandmother      Cerebrovascular Disease Maternal Grandmother         stroke     Cancer Maternal Grandfather         pancreatic cancer     Cancer Paternal Grandmother      Cancer Paternal Grandfather         brain cancer     Arthritis Sister      ROS: 10 point ROS neg other than the symptoms noted above in the HPI.    /77 (BP Location: Left arm, Patient Position: Sitting, Cuff Size: Adult Small)   Pulse 85   Temp 98.1  F (36.7  C) (Oral)   Wt 67.9 kg (149 lb 9.6 oz)   SpO2 98%   BMI 24.15 kg/m    .   Constitutional: alert and no distress. Thin, muscle wasted  Neck: Neck supple. No adenopathy. Thyroid symmetric, normal size  HEENT:Normocephalic. No masses, lesions, tenderness or abnormalities. Temporal muscle wasting present. Poor dentition  ENT exam normal, no neck nodes or sinus  tenderness. No oral lesions  Cardiovascular: negative, No lifts, heaves, or thrills. RRR. No murmurs, clicks gallops or rub  Respiratory: negative, Lungs clear. No wheezes or rales. Decreased breath sounds  Gastrointestinal: Abdomen soft, non-tender. BS normal.  No masses. Mod distended.  Skin: Spider angiomata and palmar erythema are present. Nails normal.  Neurologic: Alert and oriented x3. No asterixis or tremor. Gait normal.   Psychiatric:  Appropriate.  Hematologic/Lymphatic/Immunologic: Normal cervical and supraclavicular  lymph nodes

## 2019-07-16 NOTE — NURSING NOTE
"Chief Complaint   Patient presents with     Consult     Hepatitis C     Vital signs:  Temp: 98.1  F (36.7  C) Temp src: Oral BP: 114/77 Pulse: 85     SpO2: 98 %       Weight: 67.9 kg (149 lb 9.6 oz)  Estimated body mass index is 24.15 kg/m  as calculated from the following:    Height as of 7/8/19: 1.676 m (5' 6\").    Weight as of this encounter: 67.9 kg (149 lb 9.6 oz).        Felisha Reyes CMA    7/16/2019 10:17 AM      "

## 2019-07-16 NOTE — LETTER
2019      RE: Roslyn Palmer  [address] [address]       REASON FOR CONSULTATION: hepatitis c and alcoholic cirrhosis with decompensation  REFERRING PROVIDER: Dr. Monica Tirado    A/P  Roslyn Palmer is a 63 year old female with Hepatitis C and alcoholic cirrhosis, with decompensation, last ETOH 10 days ago. Some component of alcoholic hepatitis likely present. MELD 22.    Ascites Explained why she has this and how it is treated. Low Na diet, diuretics (increase best from 25 to 50, BMP in 1 week) and para prn. We will order para and help her set up the first one.  Microcytic anemia. Iron low. On iron. EGD ordered  N/V Has rx for Zofran. Rec she fill it. EGD as above.  Diarrhea. Common with this scenario, especially ETOH decompensated cirrhosis and just quit drinking. WIll test stool.    HCV GT 2b treatment naive. Will address HCV treatment in the future. Not urgent at this point.    HypoK Ok today. On KCL. Check with next labs.   HypoMg Was just instructed to increase from 2 tabs to 3. Check mg on next labs in 1 week.    Nutrition. Discussed basics. Will need further support as we get through the acute issues  Dentition. Very poor. Will need dental visit.  Alcohol use disorder. Very long standing. Discussed need for help with this briefly today.    Stop nadolol for now until we see improvement in kidney function and resolution of contribution of alcoholic hepatitis.     Recommend she avoid Flexeril.    RTC 4 weeks.  This was a 60 minute visit, over 50% counseling and coordination of care.     Pamela Owen MD  Hepatology/Liver Transplant  Medical Director, Liver Transplantation  [facility]    Subjective  Roslyn Palmer is a 63 year old female referred for hepatitis C and ETOH cirrhosis. Recently decompensated. Still drinking up until 10 days ago. She is here with her sister. THey tell me that her brother  from cirrhosis with HCC.    Liver disease diagnosed at  least 6 years ago when she had EV GIB. She has known about HCV for many years. Was never treated because she was drinking. She was doing ok over the last few years until about May of this year.    She was loosing weight over the last few months. She has lost 20 pounds. She has also been having a lot of diarrhea and this has improved with Imodium. She started having dizzy spells and then saw a doctor in June. Had low K and Mg and went in.    Tbili was normal until June 2019. Now 7.6 and slowly going up. AST about 140, ALT around 60. INR 1.52. Fluid build up started  2-3 weeks ago. She has had para 1 time, only diagnostic. It is bad some days. It affects her eating. She is not eating much if at all. She is having vomiting about every 2-3 days. She has nausea. She does not have anything for nausea. No blood in stool orin  Emesis. She had been on torsemide for a long time and then best was recently started. Torsemide 10 bid and best 25 daily    Sister says she is having some mood issues. Short tempered. Brain fog. She is on Wellbutrin for last 3 weeks. She is having a hard time taking her meds reliably.     She lives with her sister. Mom also lives with them in separate apartment. Independent in ADLs. She was working full time until recently.    Hospitalizations  6/30/19 ED for ascites seen on US. Had a dx para. Albumin 0.2. . BAL 78. No SBP  6/19-6/21/19 for recurrent R hip dislocation, hypoK (2.1) , hypoMg (0.6), CT (creat 1.4), microcytic anemia (hgb 7.9, MCV 77), thrombocytopenia (120s). BAL was 235.     ETOH  6-8 drinks/day. White Russians. For years.   Longest period of sobriety has been 6 months.  CD when she was younger. 4 times.  Last alcohol was July 5, 2019. Weaned off alcohol over several days.  Never been through withdrawal.    HCV  Genotype 2b  Last viral load 2017 3.7 million  Histrory of treatment: none. She was not treated 2/2 she was still drinking. She was seeing an ID doctor for this.  Risk  factors/duration of infection  HBV results: HBsAg neg 2010  HIV no record    EV in past had bleeding varices. About 6 years ago. NO EGD since then.    Gout  On colchicine  Last attack was 6 mo ago    Liver Function Studies -   Recent Labs   Lab Test 07/16/19  0921   PROTTOTAL 7.8   ALBUMIN 2.3*   BILITOTAL 7.6*   ALKPHOS 342*   *   ALT 54*     MELD-Na score: 22 at 7/16/2019  9:21 AM  MELD score: 20 at 7/16/2019  9:21 AM  Calculated from:  Serum Creatinine: 1.20 mg/dL at 7/16/2019  9:21 AM  Serum Sodium: 134 mmol/L at 7/16/2019  9:21 AM  Total Bilirubin: 7.6 mg/dL at 7/16/2019  9:21 AM  INR(ratio): 1.52 at 7/16/2019  9:21 AM  Age: 63 years    Past Medical History  Past Medical History:   Diagnosis Date     DJD (degenerative joint disease)      Essential hypertension, malignant      ETOH abuse      Hepatitis C    L hip infection on permanent Keflex.  Back pain Takes Flexeril on occasion. This is maybe a few times per year.     Social History    No children  Lives with her sister.  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    Family History   Problem Relation Age of Onset     Breast Cancer Mother      Cancer Maternal Grandmother      Cerebrovascular Disease Maternal Grandmother         stroke     Cancer Maternal Grandfather         pancreatic cancer     Cancer Paternal Grandmother      Cancer Paternal Grandfather         brain cancer     Arthritis Sister      ROS: 10 point ROS neg other than the symptoms noted above in the HPI.    /77 (BP Location: Left arm, Patient Position: Sitting, Cuff Size: Adult Small)   Pulse 85   Temp 98.1  F (36.7  C) (Oral)   Wt 67.9 kg (149 lb 9.6 oz)   SpO2 98%   BMI 24.15 kg/m     .   Constitutional: alert and no distress. Thin, muscle wasted  Neck: Neck supple. No adenopathy. Thyroid symmetric, normal size  HEENT:Normocephalic. No masses, lesions, tenderness or abnormalities. Temporal muscle  wasting present. Poor dentition  ENT exam normal, no neck nodes or sinus tenderness. No oral lesions  Cardiovascular: negative, No lifts, heaves, or thrills. RRR. No murmurs, clicks gallops or rub  Respiratory: negative, Lungs clear. No wheezes or rales. Decreased breath sounds  Gastrointestinal: Abdomen soft, non-tender. BS normal.  No masses. Mod distended.  Skin: Spider angiomata and palmar erythema are present. Nails normal.  Neurologic: Alert and oriented x3. No asterixis or tremor. Gait normal.   Psychiatric:  Appropriate.  Hematologic/Lymphatic/Immunologic: Normal cervical and supraclavicular  lymph nodes        Pamela Owen MD

## 2019-07-22 NOTE — TELEPHONE ENCOUNTER
7/22 called and left voicemail. Explained we need to reschedule appointment on 7/30 with Dr. Webster. Instructed patient to call [phone] to reschedule.      Justina Rodriguez          Procedure    Ortho/Sports Med/Ent/Eye   [facility]   [phone]

## 2019-07-23 NOTE — TELEPHONE ENCOUNTER
7/23 called and left voicemail. Explained we need to reschedule appointment on 7/30 with Dr. Webster. Instructed patient to call [phone] to reschedule.      Justina Rodriguez          Procedure    Ortho/Sports Med/Ent/Eye   [facility]   [phone]

## 2019-07-24 NOTE — PROGRESS NOTES
Patient's sister called to report worsening symptoms. She states patient has gotten more weak, continues to not eat, confusion is worsening, diarrhea continues, abdominal swelling has increased, she complains of dizziness, and has been missing several of her meds each day. She has not obtained her stool culture nor scheduled a paracentesis. Per Dr. Owen, patient should report to the ER for evaluation. My states she will encourage patient to be seen, but is not certain she will comply.  Addendum: Per Care Everywhere, patient was seen and admitted at [facility] on 7/22 in the evening, and remains inpatient as of 7/24. Will continue to monitor admission and follow up with patient upon discharge.

## 2019-07-24 NOTE — TELEPHONE ENCOUNTER
7/24 called and left voicemail. Explained we need to reschedule appointment on 7/30 with Dr. Webster. Instructed patient to call [phone] to reschedule.      Justina Rodriguez          Procedure    Ortho/Sports Med/Ent/Eye   [facility]   [phone]

## 2019-07-25 NOTE — TELEPHONE ENCOUNTER
PREVISIT INFORMATION                                                    Roslyn Palmer scheduled for future visit at [facility].    Patient is scheduled to see Dr. Webster on 7/30  Reason for visit: Right hip dislocation  Referring provider Dr. Tirado  Has patient seen previous specialist? No  Medical Records:  Printed [facility] records, [facility]    REVIEW                                                      New patient packet mailed to patient: Yes  Medication reconciliation complete: Yes      Current Outpatient Medications   Medication Sig Dispense Refill     allopurinol (ZYLOPRIM) 100 MG tablet TAKE 1 TABLET (100 MG) BY MOUTH DAILY FOR 7 DAYS, THEN 2 TABLETS (200 MG) DAILY. 60 tablet 11     buPROPion (WELLBUTRIN) 75 MG tablet Take 1 tablet (75 mg) by mouth 2 times daily 60 tablet 1     cephALEXin (KEFLEX) 500 MG capsule Take 1 capsule by mouth 2 times daily. 60 capsule 6     colchicine (COLCRYS) 0.6 MG tablet Take 1 tablet (0.6 mg) by mouth 2 times daily 180 tablet 1     cyclobenzaprine (FLEXERIL) 10 MG tablet Take 1 tablet (10 mg) by mouth 3 times daily as needed for muscle spasms (Patient taking differently: Take 10 mg by mouth as needed for muscle spasms ) 30 tablet 0     ferrous sulfate (FEROSUL) 325 (65 Fe) MG tablet Take 1 tablet (325 mg) by mouth 3 times daily (with meals) (Patient taking differently: Take 325 mg by mouth 2 times daily ) 90 tablet 0     folic acid (FOLVITE) 1 MG tablet Take 1 tablet (1 mg) by mouth daily 90 tablet 3     magnesium oxide 200 MG TABS 2 abs twice daily for 48 hours, then 2 tabs joyce       ondansetron (ZOFRAN-ODT) 4 MG ODT tab Take 1 tablet (4 mg) by mouth every 12 hours as needed for nausea or vomiting (Patient not taking: Reported on 7/16/2019) 20 tablet 0     pantoprazole (PROTONIX) 40 MG EC tablet Take 1 tablet (40 mg) by mouth daily 90 tablet 0     potassium chloride (KLOR-CON) 20 MEQ packet Take potassium 20 meq three times  a day for 3 days, then decrease back to 20 meq twice a day 60 packet 1     spironolactone (ALDACTONE) 25 MG tablet Take 2 tablets (50 mg) by mouth daily 60 tablet 0     sucralfate (CARAFATE) 1 GM/10ML suspension Take 10 mLs (1 g) by mouth 4 times daily as needed for nausea (Patient not taking: Reported on 7/8/2019) 420 mL 1     SUMAtriptan (IMITREX) 100 MG tablet Take 1 tablet (100 mg) by mouth at onset of headache May repeat in 2 hours if needed: max 2/day; 18 tablet 3     torsemide (DEMADEX) 10 MG tablet Take 1 tablet (10 mg) by mouth 2 times daily 180 tablet 1     triamcinolone (KENALOG) 0.1 % external cream Apply topically 2 times daily (Patient not taking: Reported on 7/16/2019) 80 g 1       Allergies: Sulfa drugs    Have not called patient yet.    PLAN/FOLLOW-UP NEEDED                                                      Will route to care team for follow-up.    Patient Reminders Given:  Please, make sure you bring an updated list of your medications.   If you are having a procedure, please, present 15 minutes early.  If you need to cancel or reschedule,please call [phone].    Kathy Slade

## 2019-08-01 NOTE — TELEPHONE ENCOUNTER
Please see forms- please send records requested  Also attach med list.   Add clinic stamp and Fed tax ID#

## 2019-08-01 NOTE — PROGRESS NOTES
[address] [address]  [phone]  Dept: [phone]    PRE-OP EVALUATION:  Today's date: 2019    Roslyn Palmer (: 1955) presents for pre-operative evaluation assessment as requested by Pamela Gabriel.  She requires evaluation and anesthesia risk assessment prior to undergoing surgery/procedure for treatment of ESOPHAGOGASTRODUODENOSCOPY (EGD) .    Proposed Surgery/ Procedure: ESOPHAGOGASTRODUODENOSCOPY (EGD)  Date of Surgery/ Procedure: 8/15/19  Time of Surgery/ Procedure: 8:30AM  Hospital/Surgical Facility: [facility]   Fax number for surgical facility: not needed   Primary Physician: Monica Tirado  Type of Anesthesia Anticipated: Monitor Anesthesia Care    Patient has a Health Care Directive or Living Will:  NO    1. NO - Do you have a history of heart attack, stroke, stent, bypass or surgery on an artery in the head, neck, heart or legs?  2. NO - Do you ever have any pain or discomfort in your chest?  3. NO - Do you have a history of  Heart Failure?  4. NO - Are you troubled by shortness of breath when: walking on the level, up a slight hill or at night?  5. NO - Do you currently have a cold, bronchitis or other respiratory infection?  6. NO - Do you have a cough, shortness of breath or wheezing?  7. YES - DO YOU SOMETIMES GET PAINS IN THE CALVES OF YOUR LEGS WHEN YOU WALK?   8. NO - Do you or anyone in your family have previous history of blood clots?  9. NO - Do you or does anyone in your family have a serious bleeding problem such as prolonged bleeding following surgeries or cuts?  10. YES - HAVE YOU EVER HAD PROBLEMS WITH ANEMIA OR BEEN TOLD TO TAKE IRON PILLS?   11. YES - HAVE YOU HAD ANY ABNORMAL BLOOD LOSS SUCH AS BLACK, TARRY OR BLOODY STOOLS, OR ABNORMAL VAGINAL BLEEDING?   12. YES - HAVE YOU EVER HAD A BLOOD TRANSFUSION?   13. NO - Have you or any of your relatives ever had problems with  anesthesia?  14. NO - Do you have sleep apnea, excessive snoring or daytime drowsiness?  15. NO - Do you have any prosthetic heart valves?  16. YES - Do you have prosthetic joints? Both hips   17. NO - Is there any chance that you may be pregnant?      HPI:     HPI related to upcoming procedure: 63 year old female with alcoholic liver disease/cirrhosis with recent decompensation of her liver disease and ascites.  She will be undergoing upper endoscopy for surveillance of esophogeal varices.      -Patient was in the hospital one week ago due to hepatic encephalopathy. She was discharged on lactulose and continues to feel her mentation is much improved but is now having significant diarrhea from the medication. Paracentesis in the hospital and now feels her abd is slowly distending again. Denies significant abdominal pain. Continues to have intermittent n/v thought secondary to the poor liver function.   - had home RN reviewing and helping with her medications yesterday. There has been confusion in how she is taking some of her meds.   -Buproprion has not helped her mood. She feels depressed due to all of her health issues and doesn't know if she will make it through this. She has thought about seeing a counselor but has not gotten around to setting anything up since she is so busy with appointments and doesn't like leaving the house with the diarrhea.   -She stopped drinking alcohol and denies any urge to drink. She has been sober now for 3+ weeks now.    -She was unable to get her Magnesium rx filled by pharmacy so she bought some OTC. She has been taking 500 mg tablet daily.        See problem list for active medical problems.  Problems all longstanding and stable, except as noted/documented.  See ROS for pertinent symptoms related to these conditions.      MEDICAL HISTORY:     Patient Active Problem List    Diagnosis Date Noted     Jaundice 06/19/2019     Priority: Medium     History of esophageal varices  07/31/2018     Priority: Medium     Hepatitis C, chronic (H) 06/05/2018     Priority: Medium     Anemia, unspecified type 11/05/2017     Priority: Medium     Cervical cancer screening 03/24/2017     Priority: Medium     3/17/17 NIL pap/neg HR HPV @ 60 yo. Plan: cotest in 3 years.         Low folate 03/18/2017     Priority: Medium     Migraine with aura and without status migrainosus, not intractable 03/17/2017     Priority: Medium     Benign essential hypertension 04/13/2016     Priority: Medium     Nadolol 20 mg daily   Torsemide 10mg twice a day        Hepatitis C virus infection, unspecified chronicity 04/13/2016     Priority: Medium     Upper GI bleed 04/13/2016     Priority: Medium     4/17/2012       Alcohol abuse 04/13/2016     Priority: Medium     Advanced directives, counseling/discussion 02/29/2012     Priority: Medium     Advance Directive Problem List Overview:   Name Relationship Phone    Primary Health Care Agent            Alternative Health Care Agent          Discussed advance care planning with patient; however, patient declined at this time. 2/29/2012       Carina Colon M.A.           Anemia 07/21/2011     Priority: Medium     CARDIOVASCULAR SCREENING; LDL GOAL LESS THAN 160 10/31/2010     Priority: Medium     Joint Prosthesis Infection or Inflammation 09/08/2010     Priority: Medium     MSSA septic left hip post KATINA. On chronic abx, follows with ID, Dr. Canela, once yearly       Alcoholic liver disease (H) 09/08/2010     Priority: Medium     Tobacco use disorder 09/08/2010     Priority: Medium      Past Medical History:   Diagnosis Date     DJD (degenerative joint disease)      Essential hypertension, malignant      ETOH abuse      Hepatitis C      Past Surgical History:   Procedure Laterality Date     COLONOSCOPY N/A 7/30/2018    Procedure: COMBINED COLONOSCOPY, SINGLE OR MULTIPLE BIOPSY/POLYPECTOMY BY BIOPSY;;  Surgeon: Musa Diaz MD;  Location: [facility] OR     COLONOSCOPY WITH CO2  INSUFFLATION N/A 2018    Procedure: COLONOSCOPY WITH CO2 INSUFFLATION;  Colonoscopy, Egd;  Surgeon: Musa Diaz MD;  Location: [facility] OR     COMBINED ESOPHAGOSCOPY, GASTROSCOPY, DUODENOSCOPY (EGD) WITH CO2 INSUFFLATION N/A 2018    Procedure: COMBINED ESOPHAGOSCOPY, GASTROSCOPY, DUODENOSCOPY (EGD) WITH CO2 INSUFFLATION;  Combined, Upper GI bleed Alcoholic liver disease (H) Anemia, unspecified type, Ref By Celestino, BMI 27.55, [phone];  Surgeon: Musa Diaz MD;  Location: [facility] OR     GYN SURGERY           HC ESOPHAGOSCOPY W BAND LIGATION ESOPHAGEAL VARICIES       ORTHOPEDIC SURGERY  ,    left and right hip replacement     Current Outpatient Medications   Medication Sig Dispense Refill     allopurinol (ZYLOPRIM) 100 MG tablet TAKE 1 TABLET (100 MG) BY MOUTH DAILY FOR 7 DAYS, THEN 2 TABLETS (200 MG) DAILY. 60 tablet 11     buPROPion (WELLBUTRIN) 75 MG tablet Take 1 tablet (75 mg) by mouth 2 times daily 60 tablet 1     cephALEXin (KEFLEX) 500 MG capsule Take 1 capsule by mouth 2 times daily. 60 capsule 6     colchicine (COLCRYS) 0.6 MG tablet Take 1 tablet (0.6 mg) by mouth 2 times daily 180 tablet 1     cyclobenzaprine (FLEXERIL) 10 MG tablet Take 1 tablet (10 mg) by mouth 3 times daily as needed for muscle spasms (Patient taking differently: Take 10 mg by mouth as needed for muscle spasms ) 30 tablet 0     ferrous sulfate (FEROSUL) 325 (65 Fe) MG tablet Take 1 tablet (325 mg) by mouth 3 times daily (with meals) (Patient taking differently: Take 325 mg by mouth 2 times daily ) 90 tablet 0     folic acid (FOLVITE) 1 MG tablet Take 1 tablet (1 mg) by mouth daily 90 tablet 3     lactulose 20 GM/30ML SOLN Take 30 mLs by mouth 2 times daily       magnesium oxide 200 MG TABS 2 abs twice daily for 48 hours, then 2 tabs joyce       ondansetron (ZOFRAN-ODT) 4 MG ODT tab Take 1 tablet (4 mg) by mouth every 12 hours as needed for nausea or vomiting 20 tablet 0     pantoprazole  (PROTONIX) 40 MG EC tablet Take 1 tablet (40 mg) by mouth daily 90 tablet 0     potassium chloride (KLOR-CON) 20 MEQ packet Take potassium 20 meq three times a day for 3 days, then decrease back to 20 meq twice a day 60 packet 1     spironolactone (ALDACTONE) 25 MG tablet Take 2 tablets (50 mg) by mouth daily 60 tablet 0     sucralfate (CARAFATE) 1 GM/10ML suspension Take 10 mLs (1 g) by mouth 4 times daily as needed for nausea 420 mL 1     SUMAtriptan (IMITREX) 100 MG tablet Take 1 tablet (100 mg) by mouth at onset of headache May repeat in 2 hours if needed: max 2/day; 18 tablet 3     torsemide (DEMADEX) 10 MG tablet Take 1 tablet (10 mg) by mouth 2 times daily 180 tablet 1     triamcinolone (KENALOG) 0.1 % external cream Apply topically 2 times daily 80 g 1     OTC products: None, except as noted above    Allergies   Allergen Reactions     Sulfa Drugs       Latex Allergy: NO    Social History     Tobacco Use     Smoking status: Current Every Day Smoker     Packs/day: 0.50     Types: Cigarettes     Smokeless tobacco: Never Used   Substance Use Topics     Alcohol use: Yes     Comment: 3 to 4 drinks a day     History   Drug Use No     Comment: 1980's       REVIEW OF SYSTEMS:   CONSTITUTIONAL: NEGATIVE for fever, chills  INTEGUMENTARY/SKIN: NEGATIVE for worrisome rashes, moles or lesions. Feels she is looking less jaundiced  EYES: NEGATIVE for vision changes or irritation  ENT/MOUTH: NEGATIVE for ear, mouth and throat problems  RESP: NEGATIVE for significant cough or SOB  CV: NEGATIVE for chest pain, palpitations or peripheral edema. Able to take the stairs and do light housework without significant cp or dyspnea  GI: see hpi  : NEGATIVE for frequency, dysuria, or hematuria  MUSCULOSKELETAL: no recent hip dislocation. On daily abx prophylaxis for hx of prosthetic joint infection.   NEURO: generalized weakness, o/w neg for focal concerns.   ENDOCRINE: NEGATIVE for temperature intolerance, skin/hair changes  HEME:  "NEGATIVE for bleeding problems  PSYCHIATRIC: see HPI    This document serves as a record of the services and decisions personally performed by SOLOMON STAHL. It was created on his/her behalf by Marie Ibarra, a trained medical scribe. The creation of this document is based on the provider's statements to the medical scribe. Marie Ibarra, August 1, 2019 11:14 AM    EXAM:   /84 (BP Location: Right arm, Patient Position: Sitting, Cuff Size: Adult Regular)   Pulse 102   Temp 97.9  F (36.6  C) (Oral)   Resp 16   Ht 1.676 m (5' 6\")   Wt 68.9 kg (152 lb)   SpO2 98%   BMI 24.53 kg/m      GENERAL APPEARANCE: tearful when talking about mood,  alert and no distress, dentures on top     EYES: EOMI, PERRL, mild scleral icterus     HENT: ear canals and TM's normal and nose and mouth without ulcers or lesions     NECK: no adenopathy, no asymmetry, masses, or scars and thyroid normal to palpation     RESP: lungs clear to auscultation - no rales, rhonchi or wheezes     CV: regular rates and rhythm, normal S1 S2, no S3 or S4 and no murmur, click or rub     ABDOMEN: diffuse abdominal fullness but improved from last time, question if small fluid wave appreciated, no caput medusa appreciated. no tenderness, no masses and bowel sounds normal     MS: extremities normal- no gross deformities noted, no evidence of inflammation in joints,    SKIN: no suspicious lesions or rashes     NEURO: Non focal,  mentation intact and speech normal     PSYCH: mentation appears normal. and affect full but mood is tearful, sad     LYMPHATICS: No cervical adenopathy    DIAGNOSTICS:   08/01/19 EKG - appears normal, NSR, normal axis, normal intervals, no acute ST/T changes c/w ischemia, no LVH by voltage criteria, there are no prior tracings available    Recent Labs   Lab Test 07/16/19  0921 07/08/19  1529 07/02/19  1224   HGB 10.6*  --  9.0*     --  217   INR 1.52* 1.41* 1.38*    136 135   POTASSIUM 3.3* 3.6 3.8   CR " 1.20* 1.13* 1.24*      Results for orders placed or performed in visit on 07/16/19   INR   Result Value Ref Range    INR 1.52 (H) 0.86 - 1.14   Hepatic panel   Result Value Ref Range    Bilirubin Direct 5.7 (H) 0.0 - 0.2 mg/dL    Bilirubin Total 7.6 (H) 0.2 - 1.3 mg/dL    Albumin 2.3 (L) 3.4 - 5.0 g/dL    Protein Total 7.8 6.8 - 8.8 g/dL    Alkaline Phosphatase 342 (H) 40 - 150 U/L    ALT 54 (H) 0 - 50 U/L     (H) 0 - 45 U/L   CBC with platelets   Result Value Ref Range    WBC 6.0 4.0 - 11.0 10e9/L    RBC Count 3.71 (L) 3.8 - 5.2 10e12/L    Hemoglobin 10.6 (L) 11.7 - 15.7 g/dL    Hematocrit 35.3 35.0 - 47.0 %    MCV 95 78 - 100 fl    MCH 28.6 26.5 - 33.0 pg    MCHC 30.0 (L) 31.5 - 36.5 g/dL    RDW Dimorphic population - unable to calculate 10.0 - 15.0 %    Platelet Count 193 150 - 450 10e9/L   Basic metabolic panel   Result Value Ref Range    Sodium 134 133 - 144 mmol/L    Potassium 3.3 (L) 3.4 - 5.3 mmol/L    Chloride 96 94 - 109 mmol/L    Carbon Dioxide 34 (H) 20 - 32 mmol/L    Anion Gap 5 3 - 14 mmol/L    Glucose 104 (H) 70 - 99 mg/dL    Urea Nitrogen 30 7 - 30 mg/dL    Creatinine 1.20 (H) 0.52 - 1.04 mg/dL    GFR Estimate 48 (L) >60 mL/min/[1.73_m2]    GFR Estimate If Black 56 (L) >60 mL/min/[1.73_m2]    Calcium 9.3 8.5 - 10.1 mg/dL         IMPRESSION:   Reason for surgery/procedure: ESOPHAGOGASTRODUODENOSCOPY (EGD)    The proposed surgical procedure is considered LOW risk.    REVISED CARDIAC RISK INDEX  The patient has the following serious cardiovascular risks for perioperative complications such as (MI, PE, VFib and 3  AV Block):  No serious cardiac risks  INTERPRETATION: 0 risks: Class I (very low risk - 0.4% complication rate)    The patient has the following additional risks for perioperative complications:  Hx of Alcohol abuse with recent sobriety. Monitor for withdrawal.  Advanced alcoholic liver disease, cirrhosis with ascites, elevated INR          ICD-10-CM    1. Preop general physical exam  Z01.818 EKG 12-lead complete w/read - Clinics     Comprehensive metabolic panel     Magnesium     CBC with platelets differential     Ammonia     INR   2. Alcoholic liver disease (H) K70.9 Comprehensive metabolic panel     Magnesium     CBC with platelets differential     Ammonia     INR   3. Alcoholic cirrhosis of liver with ascites (H) K70.31 magnesium oxide 200 MG TABS   4. History of esophageal varices Z87.19    5. Diarrhea, unspecified type R19.7 magnesium oxide 200 MG TABS   6. Anemia, unspecified type D64.9    7. Vomiting, intractability of vomiting not specified, presence of nausea not specified, unspecified vomiting type R11.10 magnesium oxide 200 MG TABS   8. Malnutrition, unspecified type (H) E46    9. Mood change R45.86 FLUoxetine (PROZAC) 10 MG capsule     FLUoxetine (PROZAC) 20 MG capsule   10. Depression, unspecified depression type F32.9 FLUoxetine (PROZAC) 10 MG capsule     FLUoxetine (PROZAC) 20 MG capsule   11. Benign essential hypertension I10    12. Infection or inflammatory reaction due to internal joint prosthesis, sequela T84.50XS        RECOMMENDATIONS:       Cardiovascular Risk  Performs 4 METs exercise without symptoms (Light housework (dusting, washing dishes) and Climb a flight of stairs) .       Anemia  Anemia and does not require treatment prior to surgery.  Monitor Hemoglobin postoperatively.      --Patient is to take all scheduled medications on the day of surgery    APPROVAL GIVEN to proceed with proposed procedure, without further diagnostic evaluation       Signed Electronically by: Monica Tirado MD      Copy of this evaluation report is provided to requesting physician.    [place] Preop Guidelines    Revised Cardiac Risk Index

## 2019-08-01 NOTE — PATIENT INSTRUCTIONS
Stop Wellbutrin. Take 10 mg Prozac daily for two weeks, then bump it up to 20 mg daily.     Take three 200 mg tables of Magnesium daily.    The day of surgery you can take all of your morning medications with a small sip of water.    Do not take NSAIDs (ibuprofen, Aleve, naproxen, Advil, aspirin). Tylenol is fine for pain (500 mg up to four times a day)      Before Your Surgery      Call your surgeon if there is any change in your health. This includes signs of a cold or flu (such as a sore throat, runny nose, cough, rash or fever).    Do not smoke, drink alcohol or take over the counter medicine (unless your surgeon or primary care doctor tells you to) for the 24 hours before and after surgery.    If you take prescribed drugs: Follow your doctor s orders about which medicines to take and which to stop until after surgery.    Eating and drinking prior to surgery: follow the instructions from your surgeon    Take a shower or bath the night before surgery. Use the soap your surgeon gave you to gently clean your skin. If you do not have soap from your surgeon, use your regular soap. Do not shave or scrub the surgery site.  Wear clean pajamas and have clean sheets on your bed.     At [facility], we strive to deliver an exceptional experience to you, every time we see you.  If you receive a survey in the mail, please send us back your thoughts. We really do value your feedback.    Your care team:     Family Medicine   CL Elliott MD Emily Bunt, APRN [facility]   S. MD Evangelina Carmichael MD Angela Wermerskirchen, MD         Clinic hours: Monday - Wednesday 7 am-7 pm   Thursdays and Fridays 7 am-5 pm.     [facility]: Monday - Friday 11 am-9 pm,   Saturday and Sunday 9 am-5 pm.    [facility]: Monday -Thursday 8 am-8 pm; Friday 8 am-6 pm; Saturday and Sunday 9 am-5 pm.     [facility]: Monday - Thursday 8 am - 7 pm; Friday 8  am - 6 pm    Clinic: [phone]   [facility]: [phone]   [facility]: [phone]

## 2019-08-06 NOTE — PROGRESS NOTES
Was the patient contacted by phone and reminded of the upcoming visit? message left    Was the patient instructed to bring a current list of all medications to the appointment or instructed to bring in all medication bottles? Yes       Was the patient instructed to arrive prior to the appointment time to have ordered labs drawn? Yes     Were the needed lab orders placed? Yes    Was lab appointment made 1 hour or more prior to visit? Yes    Patient instructed to arrive early for check-in    Ginna Alatorre CMA  8/6/2019 3:57 PM

## 2019-08-06 NOTE — PROGRESS NOTES
Patient called to get number to [facility] in order to make a paracentesis appointment. She is feeling full again and in need of a para. She states she was recently hospitalized for a few days for encephalopathy, and last para was when she was inpatient on 7/24/19 with 3 liters removed. She takes lactulose 30 ml 3 times per day and has 4-8 stools. Discussed lactulose titration as needed for goal of 3-5 stools per day. Patient has no further questions or concerns today, and is aware of next appointment with Dr. Owen on 8/12/19.

## 2019-08-08 NOTE — TELEPHONE ENCOUNTER
Received another form for short term disability requesting paperwork.   Form was completed and faxed on 8/2/19.   Unclear if records requested were also faxed at that time.     Please refax:  OV note from 8/1/19, labs from 6/19/19 to present, and us imaging of abd from 6/29 and 7/6/19

## 2019-08-08 NOTE — TELEPHONE ENCOUNTER
OV note from 8/1/19, labs from 6/19/19 to present, and us imaging of abd from 6/29 and 7/6/19 faxed to [phone] [facility].  These medical records placed in TC bin for future use if needed. Request form for medical release placed in scan bin    Estefani VELÁZQUEZ)(APRIL)

## 2019-08-10 NOTE — TELEPHONE ENCOUNTER
Patient Name: Roslyn Palmer   : 1955  MRN: [id number]       : [x] N/A       VM with information needed to complete pre-assessment call.  Request pt contact Endoscopy Pre-assessment RN to complete upcoming procedure information.  This information may be complete by VM if necessary. Telephone call-back number provided.    Suzy Shannon, RN  [facility]    Additional Information regarding appointment:      Patient scheduled for:  [x] EGD      Indication for procedure.   [x] Alcoholic liver disease    Sedation Type: [] Conscious Sedation   [x] MAC   [] None    Procedure Provider:  Brayden      Referring Provider. Princess GeorgesPCP)    Arrival time verified: /15/19 / 0700    Facility location verified:   [x][facility] - [address], [address]st Floor, [address]    Pt meets medical necessity for outpatient procedure in hospital Endoscopy Unit:     [x] N/A for this Payor (non-BCBS)      Prep Type:   [x]NPO /p 0400, No solid food /p 2200 the night before  Anticoagulants or blood thinners: [x]None     Electronic implanted devices: [x] No     H&P / Pre op physical completed:  [x] Complete, Date 19 - Celestino - EPIC    Additional Information: None at this time.     _______________________________________________

## 2019-08-12 NOTE — LETTER
8/12/2019       RE: Roslyn Palmer  [address] [facility] [address]     Dear Colleague,    Thank you for referring your patient, Roslyn Palmer, to the [facility] at [facility]. Please see a copy of my visit note below.    REASON FOR CONSULTATION: hepatitis c and alcoholic cirrhosis with decompensation  REFERRING PROVIDER: Dr. Monica Tirado     A/P  Roslyn Palmer is a 63 year old female with Hepatitis C and alcoholic cirrhosis, with decompensation, last ETOH about 40 days ago. Some component of alcoholic hepatitis likely present. MELD 23.     Ascites Explained why she has this and how it is treated. Low Na diet, diuretics, prn para in place. Continue same diuretics while CT resolves.  Microcytic anemia. Iron low. On iron. EGD scheudled for this week  N/V Has rx for Zofran. Improved EGD as above.  HE Just started lactulose, but having too many stools. WIll add rifaximin and zinc. Discussed lactulose titration.  Diarrhea Common with this scenario, especially ETOH decompensated cirrhosis and just quit drinking. WIll test stool.     HCV GT 2b treatment naive. Will address HCV treatment in the future. Not urgent at this point.     HypoK Ok today. On KCL. Check with next labs.   HypoMg  2/2 diarrhea. On Mg. Mg 1.9 today.     Nutrition. Discussed basics again.   Dentition. Very poor. Will need dental visit.  Alcohol use disorder. Very long standing. Discussed need for help with this and recommended CD eval.     RTC 4 weeks.  This was a 60 minute visit, over 50% counseling and coordination of care.      Pamela Owen MD  Hepatology/Liver Transplant  Medical Director, Liver Transplantation  [facility]  ===================================================================       Subjective  Roslyn Palmer is a 63 year old female referred for hepatitis C and ETOH cirrhosis. Recently decompensated. Still drinking up until July 5, 2019. She is  here with her sister. THey tell me that her brother  from cirrhosis with HCC.    Complaints today muscle loss, hair loss, skin color is yellow and brain fog. Doing a little better with eating. Lack of energy and crying fits. She is on Prozac through Dr. Tirado.     Hospitalizations  - [facility] for HE. Started lactulose. She is on 20 bid. Did not start rifaximin or zinc. Having about 8-10 bowel movements per day. SBP neg. UA neg. CT creat 1.86  19 ED for ascites seen on US. Had a dx para. Albumin 0.2. . BAL 78. No SBP  -19 for recurrent R hip dislocation, hypoK (2.1) , hypoMg (0.6), CT (creat 1.4), microcytic anemia (hgb 7.9, MCV 77), thrombocytopenia (120s). BAL was 235.     Ascites  Para 4 days ago. 3200 ml. Scheduled for this Friday for another. This was her 2nd para  Sourav 50 and torsemide 10 bid  Gets lightheaded with position changes.  Fluid build up started  about May.    N/V  She is having vomiting a few times per week. She has nausea. No blood in stool or in  emesis. EGD scheduled for later this week.    Labs   Tbili was normal until 2019. Peak 7.6. Improving now in the 5 range  Creat peak 1.86    Liver disease diagnosed at least 6 years ago when she had EV GIB. She has known about HCV for many years. Was never treated because she was drinking. She was doing ok over the last few years until about May of this year.     She lives with her sister. Mom also lives with them in separate apartment. Independent in ADLs. She was working full time until recently.    ETOH  6-8 drinks/day. White Russians. For years.   Longest period of sobriety has been 6 months.  CD when she was younger. 4 times.  Last alcohol was 2019. Weaned off alcohol over several days.  Never been through withdrawal.     HCV  Genotype 2b  Last viral load  3.7 million  Histrory of treatment: none. She was not treated 2/2 she was still drinking. She was seeing an ID doctor for this.  Risk  factors/duration of infection  HBV results: HBsAg neg 2010  HIV no record     EV screening   In past had bleeding varices. About 6 years ago. N o EGD since then.     Gout  On colchicine  Last attack was 6 mo ago    Lab Test 08/12/19  0923   PROTTOTAL 7.8   ALBUMIN 2.2*   BILITOTAL 5.7*   ALKPHOS 380*   *   ALT 63*     Lab Test 07/16/19  0921   PROTTOTAL 7.8   ALBUMIN 2.3*   BILITOTAL 7.6*   ALKPHOS 342*   *   ALT 54*      MELD-Na score: 23 at 8/5/2019 11:01 AM  MELD score: 19 at 8/5/2019 11:01 AM  Calculated from:  Serum Creatinine: 1.34 mg/dL at 8/5/2019 11:01 AM  Serum Sodium: 131 mmol/L at 8/5/2019 11:01 AM  Total Bilirubin: 4.6 mg/dL at 8/5/2019 11:01 AM  INR(ratio): 1.52 at 8/5/2019 11:01 AM  Age: 63 years    MELD-Na score: 22 at 7/16/2019  9:21 AM  MELD score: 20 at 7/16/2019  9:21 AM  Calculated from:  Serum Creatinine: 1.20 mg/dL at 7/16/2019  9:21 AM  Serum Sodium: 134 mmol/L at 7/16/2019  9:21 AM  Total Bilirubin: 7.6 mg/dL at 7/16/2019  9:21 AM  INR(ratio): 1.52 at 7/16/2019  9:21 AM  Age: 63 years     Past Medical History  Past Medical History        Past Medical History:   Diagnosis Date     DJD (degenerative joint disease)       Essential hypertension, malignant       ETOH abuse       Hepatitis C        L hip infection on permanent Keflex.  Back pain Takes Flexeril on occasion. This is maybe a few times per year.      Social History    No children  Lives with her sister.  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 OUt on ST disability now.     Family History         Family History   Problem Relation Age of Onset     Breast Cancer Mother       Cancer Maternal Grandmother       Cerebrovascular Disease Maternal Grandmother           stroke     Cancer Maternal Grandfather           pancreatic cancer     Cancer Paternal Grandmother       Cancer Paternal Grandfather           brain cancer     Arthritis Sister           ROS: 10 point ROS neg other  than the symptoms noted above in the HPI.     /83 (BP Location: Right arm, Patient Position: Sitting, Cuff Size: Adult Regular)   Pulse 120   Temp 98.2  F (36.8  C)   Wt 66.1 kg (145 lb 12.8 oz)   SpO2 97%   BMI 23.53 kg/m     Constitutional: alert and no distress. Thin, muscle wasted  Neck: Neck supple. No adenopathy. Thyroid symmetric, normal size  HEENT:Normocephalic. No masses, lesions, tenderness or abnormalities. Temporal muscle wasting present. Poor dentition  ENT exam normal, no neck nodes or sinus tenderness. No oral lesions  Cardiovascular: loud murmur LUSB  Respiratory: negative, Lungs clear. No wheezes or rales. Decreased breath sounds  Gastrointestinal: Abdomen soft, non-tender. BS normal.  No masses. Mod distended.  Skin: Spider angiomata and palmar erythema are present. Nails normal.  Neurologic: Alert and oriented x3. No asterixis or tremor. Gait normal.   Psychiatric:  Appropriate.  Hematologic/Lymphatic/Immunologic: Normal cervical and supraclavicular  lymph nodes         Again, thank you for allowing me to participate in the care of your patient.      Sincerely,    Pamela Owen MD

## 2019-08-12 NOTE — PROGRESS NOTES
REASON FOR CONSULTATION: hepatitis c and alcoholic cirrhosis with decompensation  REFERRING PROVIDER: Dr. Monica Tirado     A/P  Roslyn Palmer is a 63 year old female with Hepatitis C and alcoholic cirrhosis, with decompensation, last ETOH about 40 days ago. Some component of alcoholic hepatitis likely present. MELD 23.     Ascites Explained why she has this and how it is treated. Low Na diet, diuretics, prn para in place. Continue same diuretics while CT resolves.  Microcytic anemia. Iron low. On iron. EGD scheudled for this week  N/V Has rx for Zofran. Improved EGD as above.  HE Just started lactulose, but having too many stools. WIll add rifaximin and zinc. Discussed lactulose titration.  Diarrhea Common with this scenario, especially ETOH decompensated cirrhosis and just quit drinking. WIll test stool.     HCV GT 2b treatment naive. Will address HCV treatment in the future. Not urgent at this point.     HypoK Ok today. On KCL. Check with next labs.   HypoMg 2/2 diarrhea. On Mg. Mg 1.9 today.     Nutrition. Discussed basics again.   Dentition. Very poor. Will need dental visit.  Alcohol use disorder. Very long standing. Discussed need for help with this and recommended CD eval.     RTC 4 weeks.  This was a 60 minute visit, over 50% counseling and coordination of care.      Pamela Owen MD  Hepatology/Liver Transplant  Medical Director, Liver Transplantation  [facility]  ===================================================================       Subjective  Roslyn Palmer is a 63 year old female referred for hepatitis C and ETOH cirrhosis. Recently decompensated. Still drinking up until 2019. She is here with her sister. THey tell me that her brother  from cirrhosis with HCC.    Complaints today muscle loss, hair loss, skin color is yellow and brain fog. Doing a little better with eating. Lack of energy and crying fits. She is on Prozac through Dr. Tirado.      Hospitalizations  7/22-7/25 [facility] for HE. Started lactulose. She is on 20 bid. Did not start rifaximin or zinc. Having about 8-10 bowel movements per day. SBP neg. UA neg. CT creat 1.86  6/30/19 ED for ascites seen on US. Had a dx para. Albumin 0.2. . BAL 78. No SBP  6/19-6/21/19 for recurrent R hip dislocation, hypoK (2.1) , hypoMg (0.6), CT (creat 1.4), microcytic anemia (hgb 7.9, MCV 77), thrombocytopenia (120s). BAL was 235.     Ascites  Para 4 days ago. 3200 ml. Scheduled for this Friday for another. This was her 2nd para  [place] 50 and torsemide 10 bid  Gets lightheaded with position changes.  Fluid build up started about May.    N/V  She is having vomiting a few times per week. She has nausea. No blood in stool or in  emesis. EGD scheduled for later this week.    Labs   Tbili was normal until June 2019. Peak 7.6. Improving now in the 5 range  Creat peak 1.86    Liver disease diagnosed at least 6 years ago when she had EV GIB. She has known about HCV for many years. Was never treated because she was drinking. She was doing ok over the last few years until about May of this year.     She lives with her sister. Mom also lives with them in separate apartment. Independent in ADLs. She was working full time until recently.    ETOH  6-8 drinks/day. White Russians. For years.   Longest period of sobriety has been 6 months.  CD when she was younger. 4 times.  Last alcohol was July 5, 2019. Weaned off alcohol over several days.  Never been through withdrawal.     HCV  Genotype 2b  Last viral load 2017 3.7 million  Histrory of treatment: none. She was not treated 2/2 she was still drinking. She was seeing an ID doctor for this.  Risk factors/duration of infection  HBV results: HBsAg neg 2010  HIV no record     EV screening   In past had bleeding varices. About 6 years ago. No EGD since then.     Gout  On colchicine  Last attack was 6 mo ago    Lab Test 08/12/19  0923   PROTTOTAL 7.8   ALBUMIN 2.2*   BILITOTAL  5.7*   ALKPHOS 380*   *   ALT 63*     Lab Test 07/16/19  0921   PROTTOTAL 7.8   ALBUMIN 2.3*   BILITOTAL 7.6*   ALKPHOS 342*   *   ALT 54*      MELD-Na score: 23 at 8/5/2019 11:01 AM  MELD score: 19 at 8/5/2019 11:01 AM  Calculated from:  Serum Creatinine: 1.34 mg/dL at 8/5/2019 11:01 AM  Serum Sodium: 131 mmol/L at 8/5/2019 11:01 AM  Total Bilirubin: 4.6 mg/dL at 8/5/2019 11:01 AM  INR(ratio): 1.52 at 8/5/2019 11:01 AM  Age: 63 years    MELD-Na score: 22 at 7/16/2019  9:21 AM  MELD score: 20 at 7/16/2019  9:21 AM  Calculated from:  Serum Creatinine: 1.20 mg/dL at 7/16/2019  9:21 AM  Serum Sodium: 134 mmol/L at 7/16/2019  9:21 AM  Total Bilirubin: 7.6 mg/dL at 7/16/2019  9:21 AM  INR(ratio): 1.52 at 7/16/2019  9:21 AM  Age: 63 years     Past Medical History  Past Medical History        Past Medical History:   Diagnosis Date     DJD (degenerative joint disease)       Essential hypertension, malignant       ETOH abuse       Hepatitis C        L hip infection on permanent Keflex.  Back pain Takes Flexeril on occasion. This is maybe a few times per year.      Social History    No children  Lives with her sister.  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     Family History         Family History   Problem Relation Age of Onset     Breast Cancer Mother       Cancer Maternal Grandmother       Cerebrovascular Disease Maternal Grandmother           stroke     Cancer Maternal Grandfather           pancreatic cancer     Cancer Paternal Grandmother       Cancer Paternal Grandfather           brain cancer     Arthritis Sister           ROS: 10 point ROS neg other than the symptoms noted above in the HPI.     /83 (BP Location: Right arm, Patient Position: Sitting, Cuff Size: Adult Regular)   Pulse 120   Temp 98.2  F (36.8  C)   Wt 66.1 kg (145 lb 12.8 oz)   SpO2 97%   BMI 23.53 kg/m    Constitutional: alert and no distress. Thin,  muscle wasted  Neck: Neck supple. No adenopathy. Thyroid symmetric, normal size  HEENT:Normocephalic. No masses, lesions, tenderness or abnormalities. Temporal muscle wasting present. Poor dentition  ENT exam normal, no neck nodes or sinus tenderness. No oral lesions  Cardiovascular: loud murmur LUSB  Respiratory: negative, Lungs clear. No wheezes or rales. Decreased breath sounds  Gastrointestinal: Abdomen soft, non-tender. BS normal.  No masses. Mod distended.  Skin: Spider angiomata and palmar erythema are present. Nails normal.  Neurologic: Alert and oriented x3. No asterixis or tremor. Gait normal.   Psychiatric:  Appropriate.  Hematologic/Lymphatic/Immunologic: Normal cervical and supraclavicular  lymph nodes

## 2019-08-12 NOTE — TELEPHONE ENCOUNTER
Central Prior Authorization Team   Phone: [phone]      PA Initiation    Medication: Xifaxan 550 mg- PA Pending  Insurance Company: [employer] - Phone [phone] Fax [phone]  Pharmacy Filling the Rx: [id number] - [place], [place] - [address], [facility]  Filling Pharmacy Phone: [phone]  Filling Pharmacy Fax: [phone]  Start Date: 8/12/2019        PA Initiation    Medication: Xifaxan 550 mg- PA Pending  Insurance Company: [employer] - Phone [phone] Fax [phone]  Pharmacy Filling the Rx: [id number] - [place], [place] - [address], [facility]  Filling Pharmacy Phone: [phone]  Filling Pharmacy Fax: [phone]  Start Date: 8/12/2019

## 2019-08-12 NOTE — TELEPHONE ENCOUNTER
"Requested Prescriptions   Pending Prescriptions Disp Refills     potassium chloride (KLOR-CON) 20 MEQ packet 60 packet 1     Sig: Take potassium 20 meq three times a day for 3 days, then decrease back to 20 meq twice a day       Potassium Supplements Protocol Passed - 8/12/2019 10:14 AM        Passed - Recent (12 mo) or future (30 days) visit within the authorizing provider's specialty     Patient had office visit in the last 12 months or has a visit in the next 30 days with authorizing provider or within the authorizing provider's specialty.  See \"Patient Info\" tab in inbasket, or \"Choose Columns\" in Meds & Orders section of the refill encounter.              Passed - Medication is active on med list        Passed - Patient is age 18 or older        Passed - Normal serum potassium in past 12 months     Recent Labs   Lab Test 08/12/19  0923   POTASSIUM 3.9                    potassium chloride (KLOR-CON) 20 MEQ packet  Last Written Prescription Date:  6/19/19  Last Fill Quantity: 60,  # refills: 1   Last office visit: 8/1/2019 with prescribing provider:  Dr. Tirado   Future Office Visit:      "

## 2019-08-12 NOTE — LETTER
2019      RE: Roslyn Palmer  [address] [facility] [address]       REASON FOR CONSULTATION: hepatitis c and alcoholic cirrhosis with decompensation  REFERRING PROVIDER: Dr. Monica Tirado     A/P  Roslyn Palmer is a 63 year old female with Hepatitis C and alcoholic cirrhosis, with decompensation, last ETOH about 40 days ago. Some component of alcoholic hepatitis likely present. MELD 23.     Ascites Explained why she has this and how it is treated. Low Na diet, diuretics, prn para in place. Continue same diuretics while CT resolves.  Microcytic anemia. Iron low. On iron. EGD scheudled for this week  N/V Has rx for Zofran. Improved EGD as above.  HE Just started lactulose, but having too many stools. WIll add rifaximin and zinc. Discussed lactulose titration.  Diarrhea Common with this scenario, especially ETOH decompensated cirrhosis and just quit drinking. WIll test stool.     HCV GT 2b treatment naive. Will address HCV treatment in the future. Not urgent at this point.     HypoK Ok today. On KCL. Check with next labs.   HypoMg  2/2 diarrhea. On Mg. Mg 1.9 today.     Nutrition. Discussed basics again.   Dentition. Very poor. Will need dental visit.  Alcohol use disorder. Very long standing. Discussed need for help with this and recommended CD eval.     RTC 4 weeks.  This was a 60 minute visit, over 50% counseling and coordination of care.      Pamela Owen MD  Hepatology/Liver Transplant  Medical Director, Liver Transplantation  [facility]  ===================================================================       Subjective  Roslyn Palmer is a 63 year old female referred for hepatitis C and ETOH cirrhosis. Recently decompensated. Still drinking up until 2019. She is here with her sister. THey tell me that her brother  from cirrhosis with HCC.    Complaints today muscle loss, hair loss, skin color is yellow and brain fog. Doing a little better with eating.  Lack of energy and crying fits. She is on Prozac through Dr. Tirado.     Hospitalizations  7/22-7/25 [facility] for HE. Started lactulose. She is on 20 bid. Did not start rifaximin or zinc. Having about 8-10 bowel movements per day. SBP neg. UA neg. CT creat 1.86  6/30/19 ED for ascites seen on US. Had a dx para. Albumin 0.2. . BAL 78. No SBP  6/19-6/21/19 for recurrent R hip dislocation, hypoK (2.1) , hypoMg (0.6), CT (creat 1.4), microcytic anemia (hgb 7.9, MCV 77), thrombocytopenia (120s). BAL was 235.     Ascites  Para 4 days ago. 3200 ml. Scheduled for this Friday for another. This was her 2nd para  Sourav 50 and torsemide 10 bid  Gets lightheaded with position changes.  Fluid build up started  about May.    N/V  She is having vomiting a few times per week. She has nausea. No blood in stool or in  emesis. EGD scheduled for later this week.    Labs   Tbili was normal until June 2019. Peak 7.6. Improving now in the 5 range  Creat peak 1.86    Liver disease diagnosed at least 6 years ago when she had EV GIB. She has known about HCV for many years. Was never treated because she was drinking. She was doing ok over the last few years until about May of this year.     She lives with her sister. Mom also lives with them in separate apartment. Independent in ADLs. She was working full time until recently.    ETOH  6-8 drinks/day. White Russians. For years.   Longest period of sobriety has been 6 months.  CD when she was younger. 4 times.  Last alcohol was July 5, 2019. Weaned off alcohol over several days.  Never been through withdrawal.     HCV  Genotype 2b  Last viral load 2017 3.7 million  Histrory of treatment: none. She was not treated 2/2 she was still drinking. She was seeing an ID doctor for this.  Risk factors/duration of infection  HBV results: HBsAg neg 2010  HIV no record     EV screening   In past had bleeding varices. About 6 years ago. N o EGD since then.     Gout  On colchicine  Last attack was 6  mo ago    Lab Test 08/12/19  0923   PROTTOTAL 7.8   ALBUMIN 2.2*   BILITOTAL 5.7*   ALKPHOS 380*   *   ALT 63*     Lab Test 07/16/19  0921   PROTTOTAL 7.8   ALBUMIN 2.3*   BILITOTAL 7.6*   ALKPHOS 342*   *   ALT 54*      MELD-Na score: 23 at 8/5/2019 11:01 AM  MELD score: 19 at 8/5/2019 11:01 AM  Calculated from:  Serum Creatinine: 1.34 mg/dL at 8/5/2019 11:01 AM  Serum Sodium: 131 mmol/L at 8/5/2019 11:01 AM  Total Bilirubin: 4.6 mg/dL at 8/5/2019 11:01 AM  INR(ratio): 1.52 at 8/5/2019 11:01 AM  Age: 63 years    MELD-Na score: 22 at 7/16/2019  9:21 AM  MELD score: 20 at 7/16/2019  9:21 AM  Calculated from:  Serum Creatinine: 1.20 mg/dL at 7/16/2019  9:21 AM  Serum Sodium: 134 mmol/L at 7/16/2019  9:21 AM  Total Bilirubin: 7.6 mg/dL at 7/16/2019  9:21 AM  INR(ratio): 1.52 at 7/16/2019  9:21 AM  Age: 63 years     Past Medical History  Past Medical History        Past Medical History:   Diagnosis Date     DJD (degenerative joint disease)       Essential hypertension, malignant       ETOH abuse       Hepatitis C        L hip infection on permanent Keflex.  Back pain Takes Flexeril on occasion. This is maybe a few times per year.      Social History    No children  Lives with her sister.  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     Family History         Family History   Problem Relation Age of Onset     Breast Cancer Mother       Cancer Maternal Grandmother       Cerebrovascular Disease Maternal Grandmother           stroke     Cancer Maternal Grandfather           pancreatic cancer     Cancer Paternal Grandmother       Cancer Paternal Grandfather           brain cancer     Arthritis Sister           ROS: 10 point ROS neg other than the symptoms noted above in the HPI.     /83 (BP Location: Right arm, Patient Position: Sitting, Cuff Size: Adult Regular)   Pulse 120   Temp 98.2  F (36.8  C)   Wt 66.1 kg (145 lb 12.8 oz)    SpO2 97%   BMI 23.53 kg/m     Constitutional: alert and no distress. Thin, muscle wasted  Neck: Neck supple. No adenopathy. Thyroid symmetric, normal size  HEENT:Normocephalic. No masses, lesions, tenderness or abnormalities. Temporal muscle wasting present. Poor dentition  ENT exam normal, no neck nodes or sinus tenderness. No oral lesions  Cardiovascular: loud murmur LUSB  Respiratory: negative, Lungs clear. No wheezes or rales. Decreased breath sounds  Gastrointestinal: Abdomen soft, non-tender. BS normal.  No masses. Mod distended.  Skin: Spider angiomata and palmar erythema are present. Nails normal.  Neurologic: Alert and oriented x3. No asterixis or tremor. Gait normal.   Psychiatric:  Appropriate.  Hematologic/Lymphatic/Immunologic: Normal cervical and supraclavicular  lymph nodes         Pamela Owen MD

## 2019-08-12 NOTE — NURSING NOTE
Met with patient and sister along with Dr. Owen. She had her first outpatient paracentesis at [facility] on 8/8, where they drained 3200 ml ascites. She states they told her they could have drained much more, but felt they needed to stop there. Patient did have some relief that day, and ate well. But the fluid and fullness quickly returned. She is scheduled again on 8/16, and has a 4 liter max. Patient and sister report losing muscle mass, hair thinning, skin pigment changes. Patient remains weak and fatigued, no signs of encephalopathy, frequent nausea and poor appetite. Plan to continue weekly paracentesis with labs at next paracentesis, no change to diuretic dosing, start rifaximin 550 mg twice daily,  and zinc 220 mg twice daily, heart echo to evaluate murmer, home health evaluation, titration of lactulose to maintain 4-5 bowel movements daily. Will begin this by removing evening dose lactulose. If after three days, still having > 5 bowel movements, then will cut am dose in half. Will mail out a completed med card to assist in med set up. Plan to check in with patient in 1 week.

## 2019-08-12 NOTE — NURSING NOTE
Chief Complaint   Patient presents with     RECHECK     follow up         /83 (BP Location: Right arm, Patient Position: Sitting, Cuff Size: Adult Regular)   Pulse 120   Temp 98.2  F (36.8  C)   Wt 66.1 kg (145 lb 12.8 oz)   SpO2 97%   BMI 23.53 kg/m        Ranjit Mancera  EMT

## 2019-08-12 NOTE — TELEPHONE ENCOUNTER
"Requested Prescriptions   Pending Prescriptions Disp Refills     buPROPion (WELLBUTRIN) 75 MG tablet 60 tablet 1     Sig: Take 1 tablet (75 mg) by mouth 2 times daily       SSRIs Protocol Failed - 8/12/2019 10:16 AM        Failed - PHQ-9 score less than 5 in past 6 months     Please review last PHQ-9 score.           Failed - Medication is active on med list        Passed - Medication is Bupropion     If the medication is Bupropion (Wellbutrin), and the patient is taking for smoking cessation; OK to refill.          Passed - Patient is age 18 or older        Passed - No active pregnancy on record        Passed - No positive pregnancy test in last 12 months        Passed - Recent (6 mo) or future (30 days) visit within the authorizing provider's specialty     Patient had office visit in the last 6 months or has a visit in the next 30 days with authorizing provider or within the authorizing provider's specialty.  See \"Patient Info\" tab in inbasket, or \"Choose Columns\" in Meds & Orders section of the refill encounter.              buPROPion (WELLBUTRIN) 75 MG tablet (Discontinued)      Last Written Prescription Date:  6/19/19  Last Fill Quantity: 60,   # refills: 1  Last Office Visit: 8/1/19  Future Office visit:       Routing refill request to provider for review/approval because:  Drug not active on patient's medication list    PHQ-9 score:    PHQ-9 SCORE 6/19/2019   PHQ-9 Total Score 10             MAX-7 SCORE 6/19/2019   Total Score 7         "

## 2019-08-12 NOTE — TELEPHONE ENCOUNTER
Prior Authorization Retail Medication Request    Medication/Dose: Xifaxan  500 mg  ICD code (if different than what is on RX):    Previously Tried and Failed: Lactulose   Rationale Xifanan helps to lower the toxins buildup in the blood while Lactulose works by cleansing the toxin in the Liver. Adjunctive Therapy  Insurance:  Insurance ID:     Pharmacy Information (if different than what is on RX)  Name:    Phone:

## 2019-08-13 NOTE — TELEPHONE ENCOUNTER
[facility] unable to process referral as patient's insurance does not cover this. Referral faxed to [facility] at [phone]. Will follow up with this company later this week. Ph: [phone].

## 2019-08-13 NOTE — TELEPHONE ENCOUNTER
Health Call Center    Phone Message    May a detailed message be left on voicemail: yes    Reason for Call: Order(s): Home Care Orders: Other: Dayna from [facility] is calling to say that they do not accept the patient's insurance so the homecare order cannot go through//Patient needs a new homecare provider that accepts her insurance. Dayna's phone number is [phone].     Action Taken: Message routed to:  [facility] hepatology

## 2019-08-13 NOTE — TELEPHONE ENCOUNTER
Prior Authorization Approval    Authorization Effective Date: 8/12/2019  Authorization Expiration Date: 8/11/2020  Medication: Xifaxan 550 mg- APPROVED  Approved Dose/Quantity:   Reference #: [id number]   Insurance Company: [employer] - Phone [phone] Fax [phone]  Expected CoPay:       CoPay Card Available:      Foundation Assistance Needed:    Which Pharmacy is filling the prescription (Not needed for infusion/clinic administered): [id number] - [place], [place] - [address], [facility]  Pharmacy Notified: Yes-Pharmacy will notify patient when ready.  Patient Notified: No

## 2019-08-14 NOTE — TELEPHONE ENCOUNTER
Routing refill request to provider for review/approval because:  Drug not active on patient's medication list    CLEM RuvalcabaN, RN, PHN

## 2019-08-14 NOTE — TELEPHONE ENCOUNTER
[facility]    Phone Message    May a detailed message be left on voicemail: yes    Reason for Call: Other: Alberto called in and they recieved an order for this patient and need a face sheet and also last visit notes faxedd to them at [phone]. Please call Alberto with any questions thank you.     Action Taken: Message routed to:  [facility] hep

## 2019-08-14 NOTE — TELEPHONE ENCOUNTER
Routing refill request to provider for review/approval because:  Provider please advise and update Rx sig line.     CLEM RuvalcabaN, RN, PHN

## 2019-08-15 NOTE — ANESTHESIA PREPROCEDURE EVALUATION
Anesthesia Pre-Procedure Evaluation    Patient: Roslyn Palmer   MRN:     [id number] Gender:   female   Age:    63 year old :      1955        Preoperative Diagnosis: Alcoholic liver disease (H) [K70.9]  w/Brayden w/MAC   Procedure(s):  ESOPHAGOGASTRODUODENOSCOPY (EGD)     Past Medical History:   Diagnosis Date     DJD (degenerative joint disease)      Essential hypertension, malignant      ETOH abuse      Hepatitis C       Past Surgical History:   Procedure Laterality Date     COLONOSCOPY N/A 2018    Procedure: COMBINED COLONOSCOPY, SINGLE OR MULTIPLE BIOPSY/POLYPECTOMY BY BIOPSY;;  Surgeon: Musa Diaz MD;  Location: [facility] OR     COLONOSCOPY WITH CO2 INSUFFLATION N/A 2018    Procedure: COLONOSCOPY WITH CO2 INSUFFLATION;  Colonoscopy, Egd;  Surgeon: Musa Diaz MD;  Location: [facility] OR     COMBINED ESOPHAGOSCOPY, GASTROSCOPY, DUODENOSCOPY (EGD) WITH CO2 INSUFFLATION N/A 2018    Procedure: COMBINED ESOPHAGOSCOPY, GASTROSCOPY, DUODENOSCOPY (EGD) WITH CO2 INSUFFLATION;  Combined, Upper GI bleed Alcoholic liver disease (H) Anemia, unspecified type, Ref By Celestino, BMI 27.55, -[phone];  Surgeon: Musa Diaz MD;  Location:  OR     GYN SURGERY           HC ESOPHAGOSCOPY W BAND LIGATION ESOPHAGEAL VARICIES       ORTHOPEDIC SURGERY  ,    left and right hip replacement          Anesthesia Evaluation     . Pt has had prior anesthetic. Type: General    History of anesthetic complications   - PONV        ROS/MED HX    ENT/Pulmonary:     (+)tobacco use, Current use 1/2 packs/day  , . .    Neurologic:     (+)other neuro h/o alcohol abuse last drink 2019    Cardiovascular:     (+) hypertension----. : . . . :. .       METS/Exercise Tolerance:  1 - Eating, dressing   Hematologic:         Musculoskeletal:         GI/Hepatic:     (+) hepatitis type C, liver disease, Other GI/Hepatic previous ascites tapped, weekly drainage of ascites.       Renal/Genitourinary:         Endo:         Psychiatric:         Infectious Disease:         Malignancy:         Other:    (+) H/O Chronic Pain,                       PHYSICAL EXAM:   Mental Status/Neuro: A/A/O   Airway: Facies: Feasible  Mallampati: II  Mouth/Opening: Full  TM distance: > 6 cm  Neck ROM: Limited   Respiratory: Auscultation: Decreased BS      CV: Rhythm: Regular  Rate: Tachy   Comments: Right Lower lobe reduces breath sounds. Large ascites+, new 4/6 systolic murmur mitral area. Poor dentition, neck full ROM for lateral rotation, limited neck extension.                     LABS:  CBC:   Lab Results   Component Value Date    WBC 8.6 08/12/2019    WBC 6.9 08/05/2019    HGB 12.7 08/12/2019    HGB 10.9 (L) 08/05/2019    HCT 39.0 08/12/2019    HCT 33.3 (L) 08/05/2019     08/12/2019     08/05/2019     BMP:   Lab Results   Component Value Date     (L) 08/12/2019     (L) 08/05/2019    POTASSIUM 3.9 08/12/2019    POTASSIUM 3.9 08/05/2019    CHLORIDE 95 08/12/2019    CHLORIDE 96 08/05/2019    CO2 29 08/12/2019    CO2 26 08/05/2019    BUN 28 08/12/2019    BUN 22 08/05/2019    CR 1.38 (H) 08/12/2019    CR 1.34 (H) 08/05/2019    GLC 98 08/12/2019     (H) 08/05/2019     COAGS:   Lab Results   Component Value Date    INR 1.52 (H) 08/05/2019     POC: No results found for: BGM, HCG, HCGS  OTHER:   Lab Results   Component Value Date    MEGGAN 9.6 08/12/2019    MAG 1.9 08/12/2019    ALBUMIN 2.2 (L) 08/12/2019    PROTTOTAL 7.8 08/12/2019    ALT 63 (H) 08/12/2019     (H) 08/12/2019    ALKPHOS 380 (H) 08/12/2019    BILITOTAL 5.7 (H) 08/12/2019    LIPASE 283 06/27/2019    WILLIS 30 08/05/2019    TSH 3.01 06/19/2019    CRP <2.9 08/09/2018    SED 50 (H) 08/09/2018        Preop Vitals    BP Readings from Last 3 Encounters:   08/12/19 122/83   08/01/19 127/84   07/16/19 114/77    Pulse Readings from Last 3 Encounters:   08/12/19 120   08/01/19 102   07/16/19 85      Resp Readings from Last  "3 Encounters:   08/01/19 16   07/08/19 19   07/02/19 20    SpO2 Readings from Last 3 Encounters:   08/12/19 97%   08/01/19 98%   07/16/19 98%      Temp Readings from Last 1 Encounters:   08/12/19 36.8  C (98.2  F)    Ht Readings from Last 1 Encounters:   08/01/19 1.676 m (5' 6\")      Wt Readings from Last 1 Encounters:   08/12/19 66.1 kg (145 lb 12.8 oz)    Estimated body mass index is 23.53 kg/m  as calculated from the following:    Height as of 8/1/19: 1.676 m (5' 6\").    Weight as of 8/12/19: 66.1 kg (145 lb 12.8 oz).     LDA:        Assessment:   ASA SCORE: 3    H&P: History and physical reviewed and following examination; no interval change.     Smoking Status:  Active Smoker       - patient smoked on day of surgery   NPO Status: NPO Appropriate     Plan:   Anes. Type:  General; MAC   Pre-Medication: None      Airway: Native Airway   Access/Monitoring: PIV   Maintenance: Balanced     Postop Plan:   Postop Pain: Opioids  Postop Sedation/Airway: Sedation likely  Disposition: Outpatient     PONV Management:   Adult Risk Factors: Female, Postop Opioids   Prevention: Ondansetron, Dexamethasone     CONSENT: Direct conversation   Plan and risks discussed with: Patient   Blood Products: Consent Deferred (Minimal Blood Loss)       Comments for Plan/Consent:  Ms. Palmer is a 63 years old female with alcoholic liver cirrhosis progressively worsening ascites. The patient reports that she is having the ascites drained more frequently and she was recently diagnosed with a new 4/6 systolic murmur in the mitral area. Her ECHO was initially scheduled for 8/12 however it was not completed and is rescheduled for next week. The patient has progressive reduction in exercise tolerance and reduce breath sounds at the lung bases. I discussed with case with Dr. Cartwright and it was agreed that the cardiac work up should be completed before proceeding with elective EGD. I discussed the case with Dr. Owen who agreed that the EGD was not " urgent and she can be rescheduled. I also discussed with Dr. Owen to consider having the patient schedule EGD after ascites drainage to reduce risk, improve ventilation status and reduce risk of aspiration. Today the patient will be informed to reschedule on another day after full work up and optimization.                  Bronwyn White MD

## 2019-08-15 NOTE — TELEPHONE ENCOUNTER
Alberto Schneider from [facility] Called and needed the patient's last office visit, a copy of her face sheet and a copy of her insurance information. I faxed it over to [phone] attn:delmi Schneider.    Barbara Babcock LPN

## 2019-08-20 NOTE — PROGRESS NOTES
Patient's sister called to discuss signs of encephalopathy. Patient has been sleeping more, and has developed shaking in her hands in the past two days. She states the patient had decreased lactulose due to too many stools, and over the weekend had skipped a couple of days altogether. Discussed need for 3-4 bowel movements daily and lactulose titration to achieve this. Nickie states that home care did not find needs other than possibly PT/OT, and the agency does not have this capacity. Nickie is concerned about patient being alone all day, and worried about the steps in their home being a potential hazard to her. She feels the patient is getting weaker and weaker, and losing muscle mass. She has no appetite and eating very little, and is drinking Ensure and Boost to supplement her diet. Nickie plans to evaluate the patient when she gets home from work tonight, and if patient is more somnolent and weak, she may bring her to the ER for evaluation. Will touch base with Nickie tomorrow to follow up.  Addendum: Patient was admitted to the hospital on 8/21/19.

## 2019-08-21 PROBLEM — K76.82 HEPATIC ENCEPHALOPATHY (H): Status: ACTIVE | Noted: 2019-01-01

## 2019-08-21 NOTE — PROGRESS NOTES
Social Work: Assessment with Discharge Plan    Patient Name:  Roslyn Palmer  :  1955  Age:  63 year old  MRN:  [id number]  Risk/Complexity Score:     Completed assessment with: Chart review, ED RN, ED MD, patient's sister patient    Presenting Information   Reason for Referral:  Discharge plan  Date of Intake:  2019  Referral Source:  Chart Review  Decision Maker:  patient  Alternate Decision Maker: Sister is closest next of kin- Nickie Duran [phone]  Health Care Directive:  Declined completing - patient has not yet completed as far as her sister is aware.  She has altered mental status at this time so not appropriate for discussion regarding this  Living Situation:  House  Previous Functional Status:  Assistance with Transportation:  sister , Laundry:  sister, Housekeeping:  sister and Medication Management:  sister  Patient and family understanding of hospitalization:  Confusion, fall  Cultural/Language/Spiritual Considerations:  None reported.  Adjustment to Illness: Not assessed as patient with altered mental status    Physical Health  Reason for Admission:    1. Encephalopathy    2. Alcoholic cirrhosis of liver with ascites (H)    3. Shortness of breath      Services Needed/Recommended:  TCU    Mental Health/Chemical Dependency  Diagnosis:  Hx of ETOH  Support/Services in Place:  none  Services Needed/Recommended:  Would benefit from counseling     Support System  Significant relationship at present time:  sister  Family of origin is available for support:  yes  Other support available:  Home care  Gaps in support system:  Essentially home alone during the day (sister works, elderly mother in 'mother-in-law' suite of home but unable to provide caregiving)  Patient is caregiver to:  None     Provider Information   Primary Care Physician:  Monica Tirado   [phone]   Clinic:  [address] [place] [address] N / [place] [address]      :  -    Financial   Income Source:   Not discussed.  Financial Concerns:  None reported.  Insurance:    Payor/Plan Subscriber Name Rel Member # Group #   [employer]* JAMI GUSTAFSON*  [id number]       [id number]       Discharge Plan   Patient and family discharge goal: Not discussed today with patient giving her altered mental status.  Sister thinks she may benefit from TCU  Provided education on discharge plan:  YES  Patient agreeable to discharge plan:  Not assessed today.  A list of Medicare Certified Facilities was provided to the patient and/or family to encourage patient choice. Patient's choices for facility are: If needs TCU, family would prefer facility in [facility]  Will NH provide Skilled rehabilitation or complex medical:  YES  General information regarding anticipated insurance coverage and possible out of pocket cost was discussed. Patient and patient's family are aware patient may incur the cost of transportation to the facility, pending insurance payment: YES  Barriers to discharge:  TBD - Pending patient's progress and further recommendation.  If needs TCU placement, would need a medically necessary 3-day inpatient admission for insurance coverage purposes.     Discharge Recommendations   Anticipated Disposition:  Home with home care versus TCU  Transportation Needs:  Medical:  Wheelchair  Name of Transportation Company and Phone:  -    Additional comments   SW consulted via chart review given patient with increasing confusion and falls, history of EtOH.  Sister is verbalizing concern about patient being home alone all day and she is getting weaker.  NICOLE met with Marquita sister, Nickie, in the ED.  Marquita is confused and unable to participate in conversation.    Marquita is a 63-year-old   female who currently resides with her sister and elderly mother in her sister's home.  Marquita had been fairly mobile and managing some of her ADLs.  Her sister had been helping her  "with medications but noted that Marquita had difficulty taking medication secondary to fatigue.  Marquita's mother is elderly and resides in the \"mother-in-law suite\" attached to the home.  Her brother resides down the street, is a retired EMT and helps as able.  Her father is currently enrolled in hospice and living at a different family residence in [facility].     Candid conversation with Marquita's sister regarding her current living situation.  Marquita sister is working and the caregiver for their elderly mother and also their father who is on hospice and residing in a different family home.  Marquita's sister is concerned that Marquita needs more physical therapy and medication assistance that may be available in the home setting.  She is wondering about transitional care placement when she leaves the hospital.  Marquita has not stayed in a TCU yet.  Agreed that we would discuss with Marquita when she is more alert and oriented, family preference would be for a facility closer to their home in [facility]).    Plan: TBD - Pending patient's progress and further recommendation.  Anticipate either discharge home with increased services (has home care but needs an agency that would be able to provide PT and OT evaluations, unclear if current agency has this capability) versus TCU placement.    Primary family contact-sister My Duran [phone]    KAMILA Palacio, [facility]  Social Work Services, Emergency Dept [facility]  Pager: [phone] Mon-Sat 9 am - 9 pm, on-call/after hours pager [phone]    This note was created in part by the use of Dragon voice recognition dictation system. Inadvertent grammatical errors and typographical errors may still exist.      "

## 2019-08-21 NOTE — PLAN OF CARE
Patient admitted from ER secondary to encephalopathy with a history of liver cirrhosis as a result of alcohol abuse and hep.C. Lethargic. Arouses easily with soft voice. Oriented to name.On bed alarm.  Denies pain. On room air. Currently receives iv bolus fluid. Received lactulose and sips of water swallowed without difficulties. Will need supervision for oral intake as she gets sleep. Plan:Continue care. Complete admission profile.

## 2019-08-21 NOTE — ED TRIAGE NOTES
[facility] at [facility], unsure if pt hit head or other areas, found on floor next to walker. Pt also presents with labnored breathing and disorientation. Hx Liver cirrhosis, sister with pt states pt only took lactulose yesterday, no other medications. In triage, pt 100% on RA.

## 2019-08-21 NOTE — PHARMACY-ADMISSION MEDICATION HISTORY
Admission medication history interview status for the 8/21/2019 admission is complete. See Epic admission navigator for allergy information, pharmacy, prior to admission medications and immunization status.     Medication history interview sources:  Patient's sister, Nickie, patient's pharmacy ([facility] [phone]), pharmacy dispense report, and Care Everywhere.    Changes made to PTA medication list (reason)  Added: Nadolol and sucralfate solution  Deleted: no medications deleted  Changed: no medication changes made    Additional medication history information (including reliability of information, actions taken by pharmacist): The information collected for this med history was obtained from patient's sister, Nickie, due to patient's altered mental status. Per Nickie, patient does not take her medications regularly and often misses a dose, Nickie was only able to remember part of the medications that the patient is taking.  -Patient was initially started with bupropion 75 mg once daily, however, she was switched to fluoxetine shortly after due to bupropion not working. She was prescribed with both fluoxetine 20 mg and 10 mg capsules on 08/01/19 and was instructed to started a total dose of 30 mg once daily for 2 weeks then titrate up to 40 mg once daily. Patient's sister, however, was not able to recall whether or not the patient has been following the instructions or not.    - Patient was prescribed rifaximin 550 mg twice daily, however, due to insurance issues she was not able to  until 08/13/19 per [facility] record. She has not started this prescription yet due to the health complications that has happened recently.  - Patient was prescribed with sumatriptan 100 mg on 03/25/19. Patient's sister states that the patient had not needed the medication for months despite having three refills remaining.     Prior to Admission medications    Medication Sig Last Dose Taking? Auth Provider   allopurinol (ZYLOPRIM) 100 MG  tablet TAKE 1 TABLET (100 MG) BY MOUTH DAILY FOR 7 DAYS, THEN 2 TABLETS (200 MG) DAILY. 8/20/2019 at am Yes Monica Tirado MD   cephALEXin (KEFLEX) 500 MG capsule Take 1 capsule by mouth 2 times daily. 8/20/2019 at pm Yes Manpreet Canela MD   colchicine (COLCYRS) 0.6 MG tablet Take 0.6 mg by mouth daily unknown Yes Unknown, Entered By History   cyclobenzaprine (FLEXERIL) 10 MG tablet Take 10 mg by mouth 3 times daily as needed for muscle spasms as needed Yes Unknown, Entered By History   Ferrous Sulfate 324 (65 Fe) MG TBEC Take 648 mg by mouth daily unknown Yes Unknown, Entered By History   FLUoxetine (PROZAC) 10 MG capsule Take 1 capsule (10 mg) by mouth daily for 14 days, THEN 2 capsules (20 mg) daily. unknown Yes Monica Tirado MD   FLUoxetine (PROZAC) 20 MG capsule Take 20 mg by mouth daily unknown Yes Unknown, Entered By History   folic acid (FOLVITE) 1 MG tablet Take 1 tablet (1 mg) by mouth daily unknown Yes Monica Tirado MD   lactulose 20 GM/30ML SOLN Take 30 mLs by mouth 2 times daily 8/20/2019 at pm Yes Reported, Patient   magnesium oxide 200 MG TABS Take 3 tablets by mouth daily unknown Yes Monica Tirado MD   nadolol (CORGARD) 40 MG tablet Take 40 mg by mouth daily unknown Yes Unknown, Entered By History   ondansetron (ZOFRAN-ODT) 4 MG ODT tab Take 1 tablet (4 mg) by mouth every 12 hours as needed for nausea or vomiting as needed Yes Monica Tirado MD   pantoprazole (PROTONIX) 40 MG EC tablet Take 1 tablet (40 mg) by mouth daily unknown Yes Brooklynn Rosado NP   potassium chloride (KLOR-CON) 20 MEQ packet Take 20 mEq by mouth 2 times daily 8/20/2019 at pm Yes Monica Tirado MD   spironolactone (ALDACTONE) 25 MG tablet Take 2 tablets (50 mg) by mouth daily unknown Yes Pamela Owen MD   sucralfate (CARAFATE) 1 GM/10ML suspension Take 1 g by mouth 4 times daily as needed  as needed Yes Unknown, Entered By History    torsemide (DEMADEX) 10 MG tablet Take 1 tablet (10 mg) by mouth 2 times daily unknown Yes Monica Tirado MD   triamcinolone (KENALOG) 0.1 % external cream Apply topically 2 times daily unknown Yes Brooklynn Rosado, EDSON   zinc sulfate (ZINCATE) 220 (50 Zn) MG capsule Take 1 capsule (220 mg) by mouth 2 times daily unknown Yes Pamela Owen MD   rifaximin (XIFAXAN) 550 MG TABS tablet Take 1 tablet (550 mg) by mouth 2 times daily has not started  Pamela Owen MD         Medication history completed by:   Michelle Barreto   PharmD IV Student

## 2019-08-21 NOTE — ED NOTES
Bed: IN02  Expected date: 8/21/19  Expected time: 8:30 AM  Means of arrival:   Comments:  [phone]  Roslyn Palmer  63 F  Coming by private car for fever and labored breathing today.   Hx of hepatitis C and cirrhosis.

## 2019-08-21 NOTE — ED NOTES
[facility], [place]   ED Nurse to Floor Handoff     Roslyn Palmer is a 63 year old female who speaks English and lives with family members,  in a home  They arrived in the ED by car from home    ED Chief Complaint: Fall; Shortness of Breath; and Altered Mental Status    ED Dx;   Final diagnoses:   Encephalopathy   Alcoholic cirrhosis of liver with ascites (H)   Shortness of breath         Needed?: No    Allergies:   Allergies   Allergen Reactions     Sulfa Drugs    .  Past Medical Hx:   Past Medical History:   Diagnosis Date     DJD (degenerative joint disease)      Essential hypertension, malignant      ETOH abuse      Hepatitis C       Baseline Mental status: WDL  Current Mental Status changes: other altered, confused. Hepatic encephalopathy     Infection present or suspected this encounter: yes other abdominal fluids potentally, cultures pending  Sepsis suspected: No  Isolation type: No active isolations     Activity level - Baseline/Home:  Independent  Activity Level - Current:   Stand with assist of 2    Bariatric equipment needed?: No    In the ED these meds were given:   Medications   lidocaine 1 % 1 mL (has no administration in time range)   lidocaine (LMX4) cream (has no administration in time range)   sodium chloride (PF) 0.9% PF flush 3 mL (has no administration in time range)   sodium chloride (PF) 0.9% PF flush 3 mL (has no administration in time range)   azithromycin (ZITHROMAX) 500 mg in sodium chloride 0.9 % 250 mL intermittent infusion (has no administration in time range)   morphine (PF) injection 2 mg (has no administration in time range)   0.9% sodium chloride BOLUS (0 mLs Intravenous Stopped 8/21/19 1106)   lactulose (CHRONULAC) solution 20 g (20 g Oral Given 8/21/19 6942)   cefTRIAXone (ROCEPHIN) 2 g vial to attach to  ml bag for ADULTS or NS 50 ml bag for PEDS (2 g Intravenous New Bag 8/21/19 1103)   ondansetron (ZOFRAN) injection 8 mg (8 mg  Intravenous Given 8/21/19 1018)       Drips running?  Yes    Home pump  No    Current LDAs  Peripheral IV 08/21/19 Left (Active)   Site Assessment [facility] 8/21/2019  9:17 AM   Number of days: 0       Peripheral IV 08/21/19 Right (Active)   Site Assessment [facility] 8/21/2019 10:08 AM   Line Status Saline locked 8/21/2019 10:08 AM   Number of days: 0       Labs results:   Labs Ordered and Resulted from Time of ED Arrival Up to the Time of Departure from the ED   COMPREHENSIVE METABOLIC PANEL - Abnormal; Notable for the following components:       Result Value    Sodium 127 (*)     Potassium 7.6 (*)     Urea Nitrogen 40 (*)     Creatinine 1.60 (*)     GFR Estimate 34 (*)     GFR Estimate If Black 39 (*)     Calcium 10.4 (*)     Bilirubin Total 9.5 (*)     Albumin 1.9 (*)     Alkaline Phosphatase 300 (*)     ALT 66 (*)     All other components within normal limits   CBC WITH PLATELETS DIFFERENTIAL - Abnormal; Notable for the following components:    RDW 23.0 (*)     All other components within normal limits   INR - Abnormal; Notable for the following components:    INR 1.53 (*)     All other components within normal limits   COMPREHENSIVE METABOLIC PANEL - Abnormal; Notable for the following components:    Sodium 132 (*)     Urea Nitrogen 40 (*)     Creatinine 1.67 (*)     GFR Estimate 32 (*)     GFR Estimate If Black 37 (*)     Calcium 10.6 (*)     Bilirubin Total 9.4 (*)     Albumin 2.0 (*)     Alkaline Phosphatase 306 (*)     ALT 61 (*)      (*)     All other components within normal limits   ISTAT  GASES LACTATE LULU POCT - Abnormal; Notable for the following components:    Ph Venous 7.56 (*)     PCO2 Venous 34 (*)     Bicarbonate Venous 30 (*)     Lactic Acid 3.2 (*)     All other components within normal limits   AMMONIA   LIPASE   AMMONIA   ROUTINE UA WITH MICROSCOPIC   ISTAT CG4 GASES LACTATE LULU NURSING POCT   PERIPHERAL IV CATHETER   PULSE OXIMETRY NURSING   CARDIAC CONTINUOUS MONITORING   NURSING DRAW AND HOLD    NURSING DRAW AND HOLD   NURSING DRAW AND HOLD   BLOOD CULTURE   BLOOD CULTURE   CELL COUNT WITH DIFFERENTIAL FLUID   FLUID CULTURE AEROBIC BACTERIAL   GRAM STAIN   PROTEIN FLUID   ALBUMIN FLUID   URINE CULTURE AEROBIC BACTERIAL   BLOOD CULTURE       Imaging Studies:   Recent Results (from the past 24 hour(s))   POC US GUIDE FOR PARACENTESIS    Impression    Abundant ascites fluid confirmed in RLQ with patient lying on her right side prior to bedside diagnostic paracentesis. Straw colored fluid obtained.  40 ml sent for analysis.    XR Chest Port 1 View    Narrative    Exam: XR CHEST PORT 1 VW, 8/21/2019 9:39 AM    Indication: Shortness of breath    Comparison: None available    Findings:   The cardiac mediastinal silhouette is within normal limits. The  pulmonary vasculature is indistinct. Bilateral interstitial  prominence. No appreciable pleural effusion. No pneumothorax. Mild  asymmetric elevation of the right hemidiaphragm. Patchy right basilar  opacities.      Impression    Impression:   1. Findings suggestive of pulmonary edema or atypical infection.  2. Patchy right basilar opacities, likely atelectasis in the setting  of mild asymmetric elevation of the right hemidiaphragm, though  infection/aspiration would have a similar appearance.    DONLAD RICHARDSON, DO   Radius/Ulna XR,  PA &LAT, left    Narrative    Exam: 4 views of the left forearm dated 8/21/2019.    COMPARISON: None.    CLINICAL HISTORY: Fall.    FINDINGS: 4 views of the left forearm were obtained. IV tubing in  place. There is ulnar positive variance with presumed prior  posttraumatic changes to the left distal radius. A well-corticated  bone fragment is seen along the dorsal aspect of the wrist, also  presumed to be due to prior trauma. No displaced fracture in the  diaphysis of the left radius or ulna.      Impression    IMPRESSION:  1. Presumed prior trauma to the left distal radius and possible remote  triquetral fracture, findings presumed to  be due to remote trauma. If  clinical concern for acute injury to the distal radius/carpal bones,  dedicated views of the left wrist should be performed. Correlate for  clinical history of prior fracture involving the wrist/distal radius.  2. No diaphyseal shaft fracture in the left radius or ulna.    MATTY SALINAS MD   CT Head w/o Contrast    Narrative    CT HEAD W/O CONTRAST 8/21/2019 10:44 AM    Provided History: Altered level of consciousness (LOC), unexplained; ;  and fall    Comparison: No prior similar studies.    Technique: Using multidetector thin collimation helical acquisition  technique, axial, coronal and sagittal CT images from the skull base  to the vertex were obtained without intravenous contrast.     Findings:    No intracranial hemorrhage, mass effect, or midline shift. The  ventricles are proportionate to the cerebral sulci. The gray to white  matter differentiation of the cerebral hemispheres is preserved. The  basal cisterns are patent. Mild generalized cerebral volume loss. Mild  periventricular hypoattenuation, may represent chronic small vessel  ischemic disease    Near complete opacification and chronic osteitis of the right  maxillary sinus. The right mastoid air cells are clear. The left  mastoid air cells are underpneumatized.       Impression    Impression:   1. No acute intracranial pathology.   2. Mild cerebral volume loss and chronic small vessel ischemic  disease.    PERI ELLSWORTH MD       Recent vital signs:   /60   Pulse 122   Temp 97.7  F (36.5  C) (Oral)   Resp 19   SpO2 98%     Serge Coma Scale Score: 14 (08/21/19 0906)       Cardiac Rhythm: Tachycardia  Pt needs tele? Yes  Skin/wound Issues: abrasion left elbow, needs further skin assessment.     Code Status: Full Code    Pain control: fair    Nausea control: fair    Abnormal labs/tests/findings requiring intervention: see lab results    Family present during ED course? Yes   Family Comments/Social Situation  comments: Lives with sister who is the primary care provider    Tasks needing completion: IV antibiotics and oral lactulose.     oN Patel, RN  [phone] [facility] ED

## 2019-08-21 NOTE — ED PROVIDER NOTES
"      [place] EMERGENCY DEPARTMENT ([facility])  August 21, 2019    History     Chief Complaint   Patient presents with     Fall     Shortness of Breath     Altered Mental Status     HPI  Roslyn Palmer is a 63 year old female with history notable for alcoholic liver disease, chronic hepatitis C, and HTN who presents to the ED with her sister after a fall, altered mental status, and shortness of breath.  Her sister reports that last night she heard a loud thump and found the patient on floor of the bedroom.  When the patient was asked about how she fell, she replies, \"I did not fall.\"  Her sister noted last night that the patient was more disoriented, had labored breathing, had subjective fevers, and was moaning and groaning.  Her sister also reports that the patient has had worsening scleral icterus, loss of appetite, and fatigue for the past 2 days, as well as new tremulousness which started yesterday.  Sister also states that patient has had a rash on her lower extremities that has been coming and going.  The patient does have previous history of hepatic encephalopathy and is taking lactulose, but the sister states the patient likely hasn't been taking her other medications for the past 2 days.  Patient has also previously received paracentesis and has an upcoming appointment for paracentesis 2 days. Patient/s sister reports that she has been sober since 7/5/2019 and denies other substance use.  She otherwise denies previous pulmonary history, home O2 use, vomiting, or new diarrhea.  She is a longtime smoker.  Patient's sister also reports that she is confused and has not been giving consistent answers to questions in the last 1 to 2 days.    Of note, patient has previous history of bilateral hip arthroplasties and infected L hip joint, so she is on chronic antibiotics for this.    Social: Smoker. Lives with sister.     PAST MEDICAL HISTORY  Past Medical History:   Diagnosis Date     DJD " (degenerative joint disease)      Essential hypertension, malignant      ETOH abuse      Hepatitis C      PAST SURGICAL HISTORY  Past Surgical History:   Procedure Laterality Date     COLONOSCOPY N/A 2018    Procedure: COMBINED COLONOSCOPY, SINGLE OR MULTIPLE BIOPSY/POLYPECTOMY BY BIOPSY;;  Surgeon: Musa Diaz MD;  Location: [facility] OR     COLONOSCOPY WITH CO2 INSUFFLATION N/A 2018    Procedure: COLONOSCOPY WITH CO2 INSUFFLATION;  Colonoscopy, Egd;  Surgeon: Musa Diaz MD;  Location: [facility] OR     COMBINED ESOPHAGOSCOPY, GASTROSCOPY, DUODENOSCOPY (EGD) WITH CO2 INSUFFLATION N/A 2018    Procedure: COMBINED ESOPHAGOSCOPY, GASTROSCOPY, DUODENOSCOPY (EGD) WITH CO2 INSUFFLATION;  Combined, Upper GI bleed Alcoholic liver disease (H) Anemia, unspecified type, Ref By Celestino, BMI 27.55, -[phone];  Surgeon: Musa Diaz MD;  Location: [facility] OR     GYN SURGERY           HC ESOPHAGOSCOPY W BAND LIGATION ESOPHAGEAL VARICIES       ORTHOPEDIC SURGERY  ,    left and right hip replacement     FAMILY HISTORY  Family History   Problem Relation Age of Onset     Breast Cancer Mother      Cancer Maternal Grandmother      Cerebrovascular Disease Maternal Grandmother         stroke     Cancer Maternal Grandfather         pancreatic cancer     Cancer Paternal Grandmother      Cancer Paternal Grandfather         brain cancer     Arthritis Sister      SOCIAL HISTORY  Social History     Tobacco Use     Smoking status: Current Every Day Smoker     Packs/day: 0.20     Types: Cigarettes     Smokeless tobacco: Never Used     Tobacco comment: down to 4 ciggarettes per day   Substance Use Topics     Alcohol use: Not Currently     Comment: quit      MEDICATIONS  Current Facility-Administered Medications   Medication     0.9% sodium chloride BOLUS     [START ON 2019] azithromycin (ZITHROMAX) 500 mg in sodium chloride 0.9 % 250 mL intermittent infusion     [START ON 2019] cefTRIAXone  (ROCEPHIN) 1 g vial to attach to  mL bag for ADULTS or NS 50 mL bag for PEDS     Daily 2 GRAM acetaminophen limit, unless fulminent liver failure or transaminases greater than or equal to 300 - 400, then none     heparin sodium injection 5,000 Units     lactulose (CHRONULAC) solution 20 g    Or     lactulose (CHRONULAC) solution for enema prep 100 g     lactulose (CHRONULAC) solution 20 g     lidocaine (LMX4) cream     lidocaine (LMX4) cream     lidocaine 1 % 0.1-1 mL     lidocaine 1 % 1 mL     melatonin tablet 1 mg     naloxone (NARCAN) injection 0.1-0.4 mg     ondansetron (ZOFRAN-ODT) ODT tab 4 mg    Or     ondansetron (ZOFRAN) injection 4 mg     rifaximin (XIFAXAN) tablet 550 mg     sodium chloride (PF) 0.9% PF flush 3 mL     sodium chloride (PF) 0.9% PF flush 3 mL     sodium chloride (PF) 0.9% PF flush 3 mL     sodium chloride (PF) 0.9% PF flush 3 mL     ALLERGIES  Allergies   Allergen Reactions     Sulfa Drugs        I have reviewed the Medications, Allergies, Past Medical and Surgical History, and Social History in the Epic system.    Review of Systems   Constitutional: Positive for appetite change (loss of appetite) and fatigue.   Respiratory: Positive for shortness of breath.    Gastrointestinal: Negative for diarrhea and vomiting.   Skin: Positive for rash. Pallor: RUE and bilateral lower extremities.   Neurological: Positive for tremors. Syncope: tremulousness.   Psychiatric/Behavioral: Positive for confusion.   All other systems reviewed and are negative.      Physical Exam   BP: 131/80  Pulse: 135  Heart Rate: 132  Temp: 97.7  F (36.5  C)  Resp: 25  Weight: 67.3 kg (148 lb 4.8 oz)  SpO2: 99 %      Physical Exam  GEN: Is alert, but does not make eye contact, is moaning and frequently changing positions, answers few questions  HEENT: Pupils are equally round and reactive to light, EOMI, Mucous membranes are moist.  Scleral icterus is present.  Cardio: Regular tachycardia, there is a systolic murmur  present  PULM:  Lungs clear, good air movement, no wheezes, rales   Abd:  Soft, normal bowel sounds, distended consistent with known ascites, no focal tenderness  Musculoskeletal:  normal range of motion of all 4 extremities, no bony tenderness in the extremities, there is an abrasion just distal to the left elbow, no lower extremity swelling or calf tenderness.  No C-spine, T-spine or L-spine tenderness.  Neuro:  Alert and not oriented, Follows some commands, moving all extremities spontaneously   Skin:  Warm, dry    ED Course        [facility], [place]    Paracentesis  Date/Time: 8/21/2019 10:20 AM  Performed by: Anali Monzon MD  Authorized by: Anali Monzon MD     ED EVALUATION:      Assessment Time: 8/21/2019 9:20 AM      Provisional Diagnosis: SPB    Difficult Airway?: Yes    ASA Class: Class 4- Severe systemic disease, acute unstable problems    NPO Status: not NPO, emergent situation  UNIVERSAL PROTOCOL   Site Marked: Yes  Prior Images Obtained and Reviewed:  No  Required items: Required blood products, implants, devices and special equipment available    Patient identity confirmed:  Verbally with patient and arm band  NA - No sedation, light sedation, or local anesthesia  Confirmation Checklist:  Patient's identity using two indicators  Time out: Immediately prior to the procedure a time out was called    Preparation: Patient was prepped and draped in usual sterile fashion      PRE-PROCEDURE DETAILS     Procedure purpose:  Diagnostic    SEDATION    Patient Sedated: No      ANESTHESIA (see MAR for exact dosages):     Anesthesia method:  Local infiltration    Local anesthetic:  Lidocaine 2% w/o epi    PROCEDURE DETAILS     Needle gauge:  18    Ultrasound guidance: yes      Puncture site:  R lower quadrant    Fluid removed amount:  40    Fluid appearance:  Clear and yellow  PROCEDURE   Patient Tolerance:  Patient tolerated the procedure well with no  immediate complications    Time of Sedation in Minutes by Physician:  0 (not sedated)     9:04 AM  The patient was seen and examined by Dr. Monzon in Room 10.     Results for orders placed during the hospital encounter of 08/21/19   POC US GUIDE FOR PARACENTESIS    Impression Abundant ascites fluid confirmed in RLQ with patient lying on her right side prior to bedside diagnostic paracentesis. Straw colored fluid obtained.  40 ml sent for analysis.              EKG Interpretation:      Interpreted by Anali Monzon MD  Time reviewed: 09:25  Symptoms at time of EKG: tachycardia   Rhythm: sinus tachycardia  Rate: normal  Axis: normal  Ectopy: none  Conduction: normal  ST Segments/ T Waves: T waves in V2 are upright on today's EKG and were flattened or inverted on the previous EKG done August 1, 2019.  This changes isolated to lead V2 only.  No other changes.  Q Waves: none  Comparison to prior: Unchanged from August 1, 2019 except as noted above and tachycardia is noted today.    Clinical Impression: Sinus tachycardia, otherwise no significant changes.      Critical Care time:  none   The Lactic acid level is elevated due to dehydration, at this time there is no sign of severe sepsis or septic shock.  Patient's heart rate improved after IV fluids.  He was given p.o. lactulose for hepatic encephalopathy.  Given patient's presentation of being quite tired, confused, disoriented, this is consistent with hepatic encephalopathy.    Patient was also given antibiotics for possible SBP as well as possible community-acquired pneumonia.  Chest x-ray was reviewed by me and results are shown below.  Given her confusion and fall last night, head CT was done as well.  Results are shown below.  X-ray of the left forearm was done as well.  Results are shown below.    Results for orders placed or performed during the hospital encounter of 08/21/19   XR Chest Port 1 View    Narrative    Exam: XR CHEST PORT 1 VW, 8/21/2019 9:39  AM    Indication: Shortness of breath    Comparison: None available    Findings:   The cardiac mediastinal silhouette is within normal limits. The  pulmonary vasculature is indistinct. Bilateral interstitial  prominence. No appreciable pleural effusion. No pneumothorax. Mild  asymmetric elevation of the right hemidiaphragm. Patchy right basilar  opacities.      Impression    Impression:   1. Findings suggestive of pulmonary edema or atypical infection.  2. Patchy right basilar opacities, likely atelectasis in the setting  of mild asymmetric elevation of the right hemidiaphragm, though  infection/aspiration would have a similar appearance.    DONALD RICHARDSON, DO   POC US GUIDE FOR PARACENTESIS    Impression    Abundant ascites fluid confirmed in RLQ with patient lying on her right side prior to bedside diagnostic paracentesis. Straw colored fluid obtained.  40 ml sent for analysis.    CT Head w/o Contrast    Narrative    CT HEAD W/O CONTRAST 8/21/2019 10:44 AM    Provided History: Altered level of consciousness (LOC), unexplained; ;  and fall    Comparison: No prior similar studies.    Technique: Using multidetector thin collimation helical acquisition  technique, axial, coronal and sagittal CT images from the skull base  to the vertex were obtained without intravenous contrast.     Findings:    No intracranial hemorrhage, mass effect, or midline shift. The  ventricles are proportionate to the cerebral sulci. The gray to white  matter differentiation of the cerebral hemispheres is preserved. The  basal cisterns are patent. Mild generalized cerebral volume loss. Mild  periventricular hypoattenuation, may represent chronic small vessel  ischemic disease    Near complete opacification and chronic osteitis of the right  maxillary sinus. The right mastoid air cells are clear. The left  mastoid air cells are underpneumatized.       Impression    Impression:   1. No acute intracranial pathology.   2. Mild cerebral volume  loss and chronic small vessel ischemic  disease.    PERI ELLSWORTH MD   Radius/Ulna XR,  PA &LAT, left    Narrative    Exam: 4 views of the left forearm dated 8/21/2019.    COMPARISON: None.    CLINICAL HISTORY: Fall.    FINDINGS: 4 views of the left forearm were obtained. IV tubing in  place. There is ulnar positive variance with presumed prior  posttraumatic changes to the left distal radius. A well-corticated  bone fragment is seen along the dorsal aspect of the wrist, also  presumed to be due to prior trauma. No displaced fracture in the  diaphysis of the left radius or ulna.      Impression    IMPRESSION:  1. Presumed prior trauma to the left distal radius and possible remote  triquetral fracture, findings presumed to be due to remote trauma. If  clinical concern for acute injury to the distal radius/carpal bones,  dedicated views of the left wrist should be performed. Correlate for  clinical history of prior fracture involving the wrist/distal radius.  2. No diaphyseal shaft fracture in the left radius or ulna.    MATTY SALINAS MD          Medications   lidocaine 1 % 1 mL (has no administration in time range)   lidocaine (LMX4) cream (has no administration in time range)   sodium chloride (PF) 0.9% PF flush 3 mL (has no administration in time range)   sodium chloride (PF) 0.9% PF flush 3 mL (has no administration in time range)   lidocaine 1 % 0.1-1 mL (has no administration in time range)   lidocaine (LMX4) cream (has no administration in time range)   sodium chloride (PF) 0.9% PF flush 3 mL (has no administration in time range)   sodium chloride (PF) 0.9% PF flush 3 mL (has no administration in time range)   naloxone (NARCAN) injection 0.1-0.4 mg (has no administration in time range)   melatonin tablet 1 mg (has no administration in time range)   heparin sodium injection 5,000 Units (has no administration in time range)   0.9% sodium chloride BOLUS (has no administration in time range)   ondansetron  (ZOFRAN-ODT) ODT tab 4 mg (has no administration in time range)     Or   ondansetron (ZOFRAN) injection 4 mg (has no administration in time range)   Daily 2 GRAM acetaminophen limit, unless fulminent liver failure or transaminases greater than or equal to 300 - 400, then none (has no administration in time range)   rifaximin (XIFAXAN) tablet 550 mg (has no administration in time range)   lactulose (CHRONULAC) solution 20 g (has no administration in time range)     Or   lactulose (CHRONULAC) solution for enema prep 100 g (has no administration in time range)   cefTRIAXone (ROCEPHIN) 1 g vial to attach to  mL bag for ADULTS or NS 50 mL bag for PEDS (has no administration in time range)   azithromycin (ZITHROMAX) 500 mg in sodium chloride 0.9 % 250 mL intermittent infusion (has no administration in time range)   lactulose (CHRONULAC) solution 20 g (has no administration in time range)   0.9% sodium chloride BOLUS (0 mLs Intravenous Stopped 8/21/19 1106)   lactulose (CHRONULAC) solution 20 g (20 g Oral Given 8/21/19 0953)   cefTRIAXone (ROCEPHIN) 2 g vial to attach to  ml bag for ADULTS or NS 50 ml bag for PEDS (0 g Intravenous Stopped 8/21/19 1339)   azithromycin (ZITHROMAX) 500 mg in sodium chloride 0.9 % 250 mL intermittent infusion (0 mg Intravenous Stopped 8/21/19 1339)   ondansetron (ZOFRAN) injection 8 mg (8 mg Intravenous Given 8/21/19 1018)   morphine (PF) injection 2 mg (2 mg Intravenous Given 8/21/19 1224)   diazepam (VALIUM) injection 2.5 mg (2.5 mg Intravenous Given 8/21/19 1255)     Labs are normal except as shown.  Initial potassium was hemolyzed, recheck was normal.  She is chronic hyponatremia.  Bilirubin is elevated compared to previous values.  LFTs are otherwise close to baseline values.    Labs Ordered and Resulted from Time of ED Arrival Up to the Time of Departure from the ED   COMPREHENSIVE METABOLIC PANEL - Abnormal; Notable for the following components:       Result Value    Sodium  127 (*)     Potassium 7.6 (*)     Urea Nitrogen 40 (*)     Creatinine 1.60 (*)     GFR Estimate 34 (*)     GFR Estimate If Black 39 (*)     Calcium 10.4 (*)     Bilirubin Total 9.5 (*)     Albumin 1.9 (*)     Alkaline Phosphatase 300 (*)     ALT 66 (*)     All other components within normal limits   CBC WITH PLATELETS DIFFERENTIAL - Abnormal; Notable for the following components:    RDW 23.0 (*)     All other components within normal limits   INR - Abnormal; Notable for the following components:    INR 1.53 (*)     All other components within normal limits   COMPREHENSIVE METABOLIC PANEL - Abnormal; Notable for the following components:    Sodium 132 (*)     Urea Nitrogen 40 (*)     Creatinine 1.67 (*)     GFR Estimate 32 (*)     GFR Estimate If Black 37 (*)     Calcium 10.6 (*)     Bilirubin Total 9.4 (*)     Albumin 2.0 (*)     Alkaline Phosphatase 306 (*)     ALT 61 (*)      (*)     All other components within normal limits   ISTAT  GASES LACTATE LULU POCT - Abnormal; Notable for the following components:    Ph Venous 7.56 (*)     PCO2 Venous 34 (*)     Bicarbonate Venous 30 (*)     Lactic Acid 3.2 (*)     All other components within normal limits   AMMONIA   LIPASE   AMMONIA   ROUTINE UA WITH MICROSCOPIC   ISTAT CG4 GASES LACTATE LULU NURSING POCT   PERIPHERAL IV CATHETER   PULSE OXIMETRY NURSING   CARDIAC CONTINUOUS MONITORING   NURSING DRAW AND HOLD   NURSING DRAW AND HOLD   NURSING DRAW AND HOLD   BLOOD CULTURE   BLOOD CULTURE   CELL COUNT WITH DIFFERENTIAL FLUID   FLUID CULTURE AEROBIC BACTERIAL   GRAM STAIN   PROTEIN FLUID   ALBUMIN FLUID   URINE CULTURE AEROBIC BACTERIAL       Consults  Other (Comment): Responded(Caps) (08/21/19 4520)    Assessments & Plan (with Medical Decision Making)   Patient presents with fatigue, confusion, fall last night and shortness of breath.  Her abdomen is quite distended and I suspected that her shortness of breath would be relieved at least partially by getting a  paracentesis.  Patient was given antibiotics for possible abscess SBP, however her cell count does not suggest acute SBP.  Chest x-ray shows possible pneumonia, so the antibiotics she was given will also cover community acquired pneumonia.  Patient however is acutely ill and needs close monitoring and aggressive resuscitation.  Will be admitted to the medicine service.    I have reviewed the nursing notes.    I have reviewed the findings, diagnosis, plan and need for follow up with the patient.    Current Discharge Medication List          Final diagnoses:   Encephalopathy   Alcoholic cirrhosis of liver with ascites (H)   Shortness of breath     I, Nathan Summers, am serving as a trained medical scribe to document services personally performed by Anali Monzon MD, based on the provider's statements to me.      I, Anali Monzon MD, was physically present and have reviewed and verified the accuracy of this note documented by Nathan Summers.     8/21/2019   [facility], [place], EMERGENCY DEPARTMENT     Anali Monzon MD  08/21/19 1396

## 2019-08-21 NOTE — LETTER
Health Information Management Services               Recipient:    [facility]      Sender:    LUZ Glasgow, LGSW   5B   Pager [phone]  Phone [phone]        Date: August 27, 2019  Patient Name:  Roslyn Palmer  Routing Message:      discharge orders      The documents accompanying this notice contain confidential information belonging to the sender.  This information is intended only for the use of the individual or entity named above.  The authorized recipient of this information is prohibited from disclosing this information to any other party and is required to destroy the information after its stated need has been fulfilled, unless otherwise required by state law.      If you are not the intended recipient, you are hereby notified that any disclosure, copy, distribution or action taken in reliance on the contents of these documents is strictly prohibited.  If you have received this document in error, please return it by fax to [phone] with a note on the cover sheet explaining why you are returning it (e.g. not your patient, not your provider, etc.).  If you need further assistance, please call [place] Centralized Transcription at [phone].  Documents may also be returned by mail to [facility], , [facility] [address], [place], [place] [address].

## 2019-08-21 NOTE — H&P
Internal Medicine History and Physical    Roslyn Palmer MRN# [id number]   Age: 63 year old YOB: 1955     Date of Admission:  8/21/2019    Primary care provider: Monica Tirado          Assessment and Plan:   Roslyn Palmer is a 63 year old female with past medical history of decompensated cirrhosis (2/2 alcohol and hepatitis C), chronic hepatitis C and HTN who presents with acute encephalopathy, labs notable for elevated lactate and CT, CXR suggestive of atypical pneumonia versus pulmonary edema.    Acute metabolic encephalopathy  Concern for hepatic encephalopathy   Patient with history of decompensated cirrhosis complicated by recurrent hepatic encephalopathy, most recently admitted from 7/22-7/25 for hepatic encephalopathy at [facility].  She was started on lactulose during prior admission but unclear if she was taking this in the days prior to current admission. Current episode of hepatic encephalopathy likely precipitated by several factors; medication noncompliance, infection (?Atypical vs aspirationpneumonia given CXR findings, asitic fluid analysis negative for SBP), dehydration (CT, tachycardia, elevated lactate). Also possible that acute encephalopathy is 2/2 ongoing infection.     - Start Lactulose 20g QID, titrate to achieve two to three soft stools per day.     - Joint venture between [facility] protocol     - Continue PTA rifaximin 550mg BID (may not be able to take PO due to encephalopathy)    - S/p 1L NS bolus in ED, rebolus with another 1L given persistent tachycardia and low BP.     - Continue IV ceftriaxone and azithromycin for coverage of presumed pneumonia    - Holding off on CT head     - ETOH level, UA pending     - Q4H neuro checks     - Follow up BCx    - PT/OT    Decompensated cirrhosis, MELD 27 on 8/21  Etiology- ETOH and hep C     Complications:     Hepatic encephalopathy: Yes    Ascites: Yes, requiring frequent paracenteses     Varices: last EGD unknown      HCC screening: Last ultrasound 7/24/19 negative   -Abdominal ultrasound liver with Doppler   -Daily CMP, INR  -Continue lactulose and rifaximin as above  -Holding diuretics (torsemide and spironolactone) due to CT  - low-sodium diet (2 g/day)  -Daily weights, strict I's and O's  -Continue PTA PPI once able to take PO    Nonoliguric CT  Creatinine 1.67 on admission, baseline appears to be around 1.  Suspect prerenal etiology given other clinical signs of hypovolemia (tachycardia, soft BP, dry mucous membranes, elevated lactate).  Noted to have CT during previous hospitalizations with improvement in creatinine after IV hydration. S/p 1 L NS bolus in the ED.     -We will give another 1 L NS bolus and continue to monitor vital signs closely    -Trend Cr.  If not improving, will consider albumin challenge.     Elevated lactate  Lactate 3.2 on admission.  Suspect hypovolemia as discussed above.  Afebrile on admission so low suspicion for sepsis despite possible pulmonary source of infection.      - IVF as above   - Trend lactate    Hypercalcemia   Suspect 2/2 dehydration. Will workup further if persists despite IVF    - Trend Ca    -----------------------CHRONIC PROBLEMS----------------------  History of alcohol use disorder  Per chart review, patient had been sober since 7/5/2019.  Unclear if was drinking more recently.   -Chem dep consult once mental status improves   -ETOH level pending. Will place on CIWA protocol if positive     Chronic hepatitis C  Per chart review, patient is treatment naïve.  Follows closely with GI so we will arrange for outpatient follow-up on discharge for continued management    FEN: IVF boluses as above, monitor and replete lytes   Prophylaxis: heparin   Consults: none     Code Status: FULL     Disposition: Admit to Medicine    Patient seen and discussed with Dr. Dawn, who agrees with above plan.    Ortega Lewis MD  Internal Medicine, PGY-2  [facility] 1 Service  P: [phone]           Chief Complaint:   Confusion, increased work of breathing         History of Present Illness:   Roslyn Palmer is a 63 year old female with history notable for alcoholic liver disease, chronic hepatitis C, and HTN who presents to the ED with her sister after a fall, altered mental status, and shortness of breath. History obtained from chart review as patient was quite encephalopathic during encounter.    In the ED, patient's sister reported that she heard a loud thump last night and found patient in the floor of her bedroom.  Sister also noted that patient has been generally unwell over the last few days, symptoms including labored breathing, subjective fevers, confusion, poor appetite and fatigue.  Patient does have a history of hepatic encephalopathy for which she was started on lactulose during recent admission, but sister suspects patient was not taking lactulose in the last 2 days.  She does have a history of alcohol  use disorder but sister  believes her last drink was on 07/05/19.     In the ED, she was noted to be afebrile and tachycardiac to the 120s with normal O2 sats on room air.  Initial labs were notable for potassium of 7.6 but this was down to 4.5 on recheck an hour later without any interventions. Lactate elevated at 3.2. CXR showed bilateral interstitial prominence concerning for pulmonary edema vs atypical infection. She received 1L NS bolus, doses of ceftriaxone and azithromycin, zofran 8 mg x1, morphine x1 and lactulose 20g PO x1. She is being admitted to medicine for further evaluation and management.     Of note, patient was admitted from 7/22-7/25 at [facility] for hepatic encephalopathy.  She was started on lactulose during this admission and continued on this on discharge.  She followed up with GI on 8/12/2019, at which time  she was also started on rifaximin and zinc.  EGD was scheduled for 8/15 but does not appear to have been completed.           Review of Systems:      Comprehensive Review of Systems negative except otherwise noted in HPI.          Past Medical History:   Medical History reviewed.   Past Medical History:   Diagnosis Date     DJD (degenerative joint disease)      Essential hypertension, malignant      ETOH abuse      Hepatitis C              Past Surgical History:   Surgical History reviewed.   Past Surgical History:   Procedure Laterality Date     COLONOSCOPY N/A 2018    Procedure: COMBINED COLONOSCOPY, SINGLE OR MULTIPLE BIOPSY/POLYPECTOMY BY BIOPSY;;  Surgeon: Musa Diaz MD;  Location: [facility] OR     COLONOSCOPY WITH CO2 INSUFFLATION N/A 2018    Procedure: COLONOSCOPY WITH CO2 INSUFFLATION;  Colonoscopy, Egd;  Surgeon: Musa Diaz MD;  Location: [facility] OR     COMBINED ESOPHAGOSCOPY, GASTROSCOPY, DUODENOSCOPY (EGD) WITH CO2 INSUFFLATION N/A 2018    Procedure: COMBINED ESOPHAGOSCOPY, GASTROSCOPY, DUODENOSCOPY (EGD) WITH CO2 INSUFFLATION;  Combined, Upper GI bleed Alcoholic liver disease (H) Anemia, unspecified type, Ref By Celestino, BMI 27.55, [phone];  Surgeon: Musa Diaz MD;  Location: [facility] OR     GYN SURGERY           HC ESOPHAGOSCOPY W BAND LIGATION ESOPHAGEAL VARICIES       ORTHOPEDIC SURGERY  ,    left and right hip replacement             Social History:   ETOH use history :  Consumes 6-8 drinks/day. White Russians. For years.   Longest period of sobriety has been 6 months.  CD when she was younger. 4 times.  Last alcohol was 2019. Weaned off alcohol over several days.  Never been through withdrawal.         Family History:   Family History reviewed.  Family History   Problem Relation Age of Onset     Breast Cancer Mother      Cancer Maternal Grandmother      Cerebrovascular Disease Maternal Grandmother         stroke     Cancer Maternal Grandfather         pancreatic cancer     Cancer Paternal Grandmother      Cancer Paternal Grandfather         brain cancer     Arthritis Sister               Allergies:     Allergies   Allergen Reactions     Sulfa Drugs              Medications:   Medications Reviewed.   Current Facility-Administered Medications   Medication     azithromycin (ZITHROMAX) 500 mg in sodium chloride 0.9 % 250 mL intermittent infusion     lidocaine (LMX4) cream     lidocaine 1 % 1 mL     sodium chloride (PF) 0.9% PF flush 3 mL     sodium chloride (PF) 0.9% PF flush 3 mL     Current Outpatient Medications   Medication Sig     allopurinol (ZYLOPRIM) 100 MG tablet TAKE 1 TABLET (100 MG) BY MOUTH DAILY FOR 7 DAYS, THEN 2 TABLETS (200 MG) DAILY.     cephALEXin (KEFLEX) 500 MG capsule Take 1 capsule by mouth 2 times daily.     cyclobenzaprine (FLEXERIL) 10 MG tablet Take 10 mg by mouth 3 times daily as needed for muscle spasms     Ferrous Sulfate 324 (65 Fe) MG TBEC Take 648 mg by mouth daily     FLUoxetine (PROZAC) 10 MG capsule Take 1 capsule (10 mg) by mouth daily for 14 days, THEN 2 capsules (20 mg) daily.     FLUoxetine (PROZAC) 20 MG capsule Take 20 mg by mouth daily     folic acid (FOLVITE) 1 MG tablet Take 1 tablet (1 mg) by mouth daily     lactulose 20 GM/30ML SOLN Take 30 mLs by mouth 2 times daily     magnesium oxide 200 MG TABS Take 3 tablets by mouth daily     nadolol (CORGARD) 40 MG tablet Take 40 mg by mouth daily     ondansetron (ZOFRAN-ODT) 4 MG ODT tab Take 1 tablet (4 mg) by mouth every 12 hours as needed for nausea or vomiting     pantoprazole (PROTONIX) 40 MG EC tablet Take 1 tablet (40 mg) by mouth daily     potassium chloride (KLOR-CON) 20 MEQ packet Take 20 mEq by mouth 2 times daily     spironolactone (ALDACTONE) 25 MG tablet Take 2 tablets (50 mg) by mouth daily     sucralfate (CARAFATE) 1 GM/10ML suspension Take 1 g by mouth 4 times daily as needed      torsemide (DEMADEX) 10 MG tablet Take 1 tablet (10 mg) by mouth 2 times daily     triamcinolone (KENALOG) 0.1 % external cream Apply topically 2 times daily     zinc sulfate (ZINCATE) 220 (50 Zn) MG capsule Take 1 capsule  (220 mg) by mouth 2 times daily     rifaximin (XIFAXAN) 550 MG TABS tablet Take 1 tablet (550 mg) by mouth 2 times daily             Physical Exam:   Vitals were reviewed.  Blood pressure 103/74, pulse 124, temperature 97.7  F (36.5  C), temperature source Oral, resp. rate 17, SpO2 92 %, not currently breastfeeding.    General: Ill-appearing, lying in bed, no acute distress. Dozing off intermittently during encounter  Skin: Warm, not jaundiced, spider angiomata noted on torso   HEENT: EOMI, PERRL, mild scleral icterus, somewhat dry mucous membranes, poor dentition  CV: Tachycardic, loud S1, no murmurs  Resp: Clear to auscultation bilaterally in anterior lung fields, no increased work of breathing, no wheezes or crackles  Abd: Moderately distended but not tense, no grimace to palpation, BS+  Extremities: warm and well perfused, palpable pulses, no LE edema  Neuro: Somnolent, opens eyes to verbal stimulus but doses frequently during interview, unable to cooperate with motor and sensory testing. Moving all extremities. unable to evaluate for asterixis         Data:     ROUTINE LABS (Last four results)  CMP  Recent Labs   Lab 08/21/19  1003 08/21/19  0854   * 127*   POTASSIUM 4.5 7.6*   CHLORIDE 95 94   CO2 27 26   ANIONGAP 10 8   GLC 86 88   BUN 40* 40*   CR 1.67* 1.60*   GFRESTIMATED 32* 34*   GFRESTBLACK 37* 39*   MEGGAN 10.6* 10.4*   PROTTOTAL 7.8 7.8   ALBUMIN 2.0* 1.9*   BILITOTAL 9.4* 9.5*   ALKPHOS 306* 300*   * Canceled, Test credited   ALT 61* 66*     CBC  Recent Labs   Lab 08/21/19  0854   WBC 8.4   RBC 3.94   HGB 12.0   HCT 36.5   MCV 93   MCH 30.5   MCHC 32.9   RDW 23.0*        INR  Recent Labs   Lab 08/21/19  0854   INR 1.53*     Imaging- reviewed

## 2019-08-22 NOTE — PROGRESS NOTES
08/22/19 0845   Quick Adds   Type of Visit Initial PT Evaluation   Living Environment   Lives With sibling(s);parent(s)   Living Arrangements house   Home Accessibility stairs within home;stairs to enter home   Number of Stairs, Main Entrance 1   Stair Railings, Main Entrance none   Number of Stairs, Within Home, Primary other (see comments)  (7 + 7)   Stair Railings, Within Home, Primary railing on right side (ascending)   Transportation Anticipated family or friend will provide   Living Environment Comment pt lives with her sister who tpcially drives, her mother is in a suite attached to the house. Pt has 7 stairs down to her bed/bath and additional 7 steps to living room    Self-Care   Usual Activity Tolerance moderate   Current Activity Tolerance moderate   Regular Exercise No   Equipment Currently Used at Home none   Activity/Exercise/Self-Care Comment pt reports being generally IND but has a cane and  walker at home, reports her sister helps with her medications    Functional Level Prior   Ambulation 0-->independent   Transferring 0-->independent   Toileting 0-->independent   Bathing 0-->independent   Fall history within last six months yes   Number of times patient has fallen within last six months 2   Which of the above functional risks had a recent onset or change? ambulation;fall history   Prior Functional Level Comment pt reports 2 recent falls, one before admission and one where her hip dislocated   General Information   Onset of Illness/Injury or Date of Surgery - Date 08/21/19   Referring Physician Ortega Lewis MD   Patient/Family Goals Statement to get better and stronger   Pertinent History of Current Problem (include personal factors and/or comorbidities that impact the POC) 63 year old female with past medical history of decompensated cirrhosis (2/2 alcohol and hepatitis C), chronic hepatitis C and HTN who presents with acute encephalopathy, labs notable for elevated lactate and  CT, CXR suggestive of atypical pneumonia versus pulmonary edema.   Precautions/Limitations fall precautions   Heart Disease Risk Factors Medical history;Lack of physical activity;High blood pressure   General Observations pt supine, pleasant   General Info Comments activity orders; up ad [place]   Cognitive Status Examination   Orientation orientation to person, place and time   Level of Consciousness alert   Follows Commands and Answers Questions 100% of the time   Personal Safety and Judgment intact   Memory intact   Cognitive Comment pts memory appears intact   Pain Assessment   Patient Currently in Pain No   Integumentary/Edema   Integumentary/Edema Comments abdominal distension, skin yellowing    Posture    Posture Forward head position;Protracted shoulders   Range of Motion (ROM)   ROM Quick Adds No deficits were identified   Strength   Strength Comments >3/5 per functional mobility    Bed Mobility   Bed Mobility Comments IND with use of bed rails   Transfer Skills   Transfer Comments STS with SBA   Gait   Gait Comments ambulates 300ft with FWW and SBA-CGA   Balance   Balance Comments needing UE support on walker for balance    Sensory Examination   Sensory Perception no deficits were identified   General Therapy Interventions   Planned Therapy Interventions progressive activity/exercise;home program guidelines;risk factor education;gait training;neuromuscular re-education   Clinical Impression   Criteria for Skilled Therapeutic Intervention yes, treatment indicated   PT Diagnosis impaired mobility    Influenced by the following impairments impaired balance, strength, endurance   Functional limitations due to impairments impaired safety with gait    Clinical Presentation Stable/Uncomplicated   Clinical Presentation Rationale pt with clear presentation    Clinical Decision Making (Complexity) Low complexity   Therapy Frequency 6x/week   Predicted Duration of Therapy Intervention (days/wks) 1 week    Anticipated  "Discharge Disposition Transitional Care Facility   Risk & Benefits of therapy have been explained Yes   Patient, Family & other staff in agreement with plan of care Yes   Clinical Impression Comments at this time pt below her baseline mobility, may require TCU at discharge pending LOS and progress   [facility] AM-PAC TM \"6 Clicks\"   2016, Trustees of [facility], under license to [employer].  All rights reserved.   6 Clicks Short Forms Basic Mobility Inpatient Short Form   [facility] AM-PAC  \"6 Clicks\" V.2 Basic Mobility Inpatient Short Form   1. Turning from your back to your side while in a flat bed without using bedrails? 4 - None   2. Moving from lying on your back to sitting on the side of a flat bed without using bedrails? 4 - None   3. Moving to and from a bed to a chair (including a wheelchair)? 3 - A Little   4. Standing up from a chair using your arms (e.g., wheelchair, or bedside chair)? 4 - None   5. To walk in hospital room? 3 - A Little   6. Climbing 3-5 steps with a railing? 3 - A Little   Basic Mobility Raw Score (Score out of 24.Lower scores equate to lower levels of function) 21   Total Evaluation Time   Total Evaluation Time (Minutes) 9     "

## 2019-08-22 NOTE — PROGRESS NOTES
"CLINICAL NUTRITION SERVICES - ASSESSMENT NOTE     Nutrition Prescription    RECOMMENDATIONS FOR MDs/PROVIDERS TO ORDER:  1. Highly recommend consideration of an appetite stimulant (Remeron, Marinol, or Megace) to assist with improvement of PO intake.     Malnutrition Status:    Severe malnutrition in the context of chronic illness.     Recommendations already ordered by Registered Dietitian (RD):  Thera-Vit-M   Boost Plus (vary flavors) TID between meals    Future/Additional Recommendations:  If patient consuming </= 50% of meals, consider starting calorie counts and variation in snacks/supplements.      REASON FOR ASSESSMENT  Roslyn Palmer is a/an 63 year old female assessed by the dietitian for Admission Nutrition Risk Screen for reduced oral intake over the last month    NUTRITION HISTORY  Per chart review:   MD visit on 7/16: \"She has had para 1 time, only diagnostic. It is bad some days. It affects her eating. She is not eating much if at all. She is having vomiting about every 2-3 days. She has nausea. She does not have anything for nausea.\"     MD visit on 8/12: \"Complaints today muscle loss, hair loss. Doing a little better with eating.\"     Information obtained from patient:   - Reports having little to no appetite, which is consistent with her appetite trends over the past 2-3 months.   - Expressed consuming bites of small/frequent meals daily. States that she will eat applesauce and a muffin in the morning before work @ 5 am; a snack around 2:30pm when she gets home; a small meal made by her sister in the evening. She reports consuming 1-2 Boost per day recently to aid in meeting her needs.     CURRENT NUTRITION ORDERS  Diet: Regular   Intake: no intake since admit as diet recently advanced from NPO.     LABS  Alk Phos: 280 (H)   ALT/AST: 56/114 (H)   GFR: 33   BUN/Cr: 35/1.62 (H)     MEDICATIONS  Lactulose     ANTHROPOMETRICS  Height: 167.6 cm (5' 6\")  Most Recent Weight: 67.3 kg (148 lb 4.8 oz)  "   IBW: 59 kg  BMI: Normal BMI  Weight History: 11.6% weight loss in 6 months, suspect fluid related, at least in part.   Wt Readings from Last 15 Encounters:   08/21/19 67.3 kg (148 lb 4.8 oz)   08/15/19 66.2 kg (145 lb 14.4 oz)   08/12/19 66.1 kg (145 lb 12.8 oz)   08/01/19 68.9 kg (152 lb)   07/16/19 67.9 kg (149 lb 9.6 oz)   07/08/19 69.9 kg (154 lb)   07/02/19 70.7 kg (155 lb 12.8 oz)   06/27/19 71.2 kg (157 lb)   06/19/19 68 kg (150 lb)   03/25/19 76.2 kg (168 lb)   02/05/19 76.2 kg (168 lb)   06/04/18 73.9 kg (163 lb)       Dosing Weight: 67 kg (current BW on admit)     ASSESSED NUTRITION NEEDS  Estimated Energy Needs: 1675 - 2010 kcals/day (25 - 30 kcals/kg)  Justification: Maintenance  Estimated Protein Needs: 80 - 101 grams protein/day (1.2 - 1.5 grams of pro/kg)  Justification: Hypercatabolism with acute illness  Estimated Fluid Needs: 1 mL/kcal   Justification: Maintenance and Per provider pending fluid status    PHYSICAL FINDINGS  See malnutrition section below.  Ascites     MALNUTRITION  % Intake: </= 50% for >/= 5 days (severe)  % Weight Loss: > 10% in 6 months (severe)  Subcutaneous Fat Loss: Facial region, Upper arm, Lower arm and Thoracic/intercostal: Moderate - severe   Muscle Loss: Temporal, Facial & jaw region, Scapular bone, Thoracic region (clavicle, acromium bone, deltoid, trapezius, pectoral), Upper arm (bicep, tricep), Lower arm  (forearm), Dorsal hand, Upper leg (quadricep, hamstring), Patellar region and Posterior calf: Moderate - severe   Fluid Accumulation/Edema: None noted  Malnutrition Diagnosis: Severe malnutrition in the context of chronic illness.     NUTRITION DIAGNOSIS  Inadequate oral intake related to decreased appetite as evidenced by patient consuming </= 50% of est needs over the past 2-3 months, 11.6% weight loss in 6 months and observed muscle/fat loss.      INTERVENTIONS  Implementation  Nutrition Education: Provided education on encouraged 5-6 small/frequent meals,  encouraged intake of oral nutrition supplements when diet advances, offered available snacks/supplements and role of RD.   Multivitamin/mineral supplement therapy - see above   Medical food supplement therapy - see above    Goals  Patient to consume % of nutritionally adequate meal trays TID, or the equivalent with supplements/snacks.     Monitoring/Evaluation  Progress toward goals will be monitored and evaluated per protocol.      Nereyda Grier RD, LD   5A ([phone])/7B floor pager [phone]

## 2019-08-22 NOTE — PLAN OF CARE
Admission/Transfer from:  2 RN skin assessment completed by: Parish.   Red rash noted on arms, legs and back. Right  lower abdominal dressing clean, dry and intact ( old paracentesis site) Small open area noted on right butt cheek.

## 2019-08-22 NOTE — PLAN OF CARE
Patient lethargic and confused. Arouses to voice. Vitals stable on room air. Received extra lactulose. Had watery BM x 2. Recheck lactic acid 2.2. MD notified. No new orders at this time. Continue to monitor and follow plan of care.  Up with assist to BSC.Bed alarm on for safety

## 2019-08-22 NOTE — PROGRESS NOTES
WO Nurse Inpatient Wound Assessment   Reason for consultation: Evaluate and treat R buttock wound     Assessment  R buttock wound due to Moisture Associated Skin Damage (MASD) vs excoriated bug bites   Status: initial assessment    Treatment Plan  buttock wound: BID and prn to maintain barrier apply Critic-aid barrier paste over wounds and to perianal skin to protect from loose stools.     Orders Written    WOC Nurse follow-up plan:weekly  Nursing to notify the Provider(s) and re-consult the WOC Nurse if wound(s) deteriorates or new skin concern.    Patient History  According to provider note(s):  Roslyn Palmer is a 63 year old female with past medical history of decompensated cirrhosis (2/2 alcohol and hepatitis C), chronic hepatitis C and HTN who presents with acute encephalopathy, labs notable for elevated lactate and CT, CXR suggestive of atypical pneumonia versus pulmonary edema.    Objective Data  Containment of urine/stool: Continent of bowel and Continent of bladder    Active Diet Order  Orders Placed This Encounter      Regular Diet Adult      Output:   I/O last 3 completed shifts:  In: 120 [P.O.:120]  Out: 375 [Urine:375]    Risk Assessment:   Sensory Perception: 3-->slightly limited  Moisture: 4-->rarely moist  Activity: 3-->walks occasionally  Mobility: 3-->slightly limited  Nutrition: 2-->probably inadequate  Friction and Shear: 2-->potential problem  Leonard Score: 17                          Labs:   Recent Labs   Lab 08/22/19  0648   ALBUMIN 1.6*   HGB 11.7   INR 1.64*   WBC 8.7       Physical Exam  Skin inspection: focused buttocks    Wound Location:  R side of buttock  Wound History: found on admission, patient with frequent loose stools due to lactulose   Measurements (length x width x depth, in cm) three areas in a line, each measuring about 0.5 cm x 0.5 cm  x  0.1 cm   Wound Base: 100 % dermis  Tunneling N/A  Undermining N/A  Palpation of the wound bed: normal   Periwound skin:  intact  Color: pink  Temperature: normal   Drainage:, none  Description of drainage: none  Odor: none  Pain: denies , none    Interventions  Current support surface: Standard  Atmos Air mattress  Current off-loading measures:n/a  Visual inspection of wound(s) completed  Wound Care: done per plan of care  Supplies: floor stock  Education provided: plan of care  Discussed plan of care with Patient    Lima Mitchell RN, BSN, CWON

## 2019-08-22 NOTE — PLAN OF CARE
Patient alert, up with assist x 1 with walker to the bathroom had a total of 5 watery stools mixed with urine.

## 2019-08-22 NOTE — PROGRESS NOTES
[facility], [place]    Progress Note - Rosa 1 Service        Date of Admission:  8/21/2019    Assessment & Plan   Roslyn Palmer is a 63 year old female with past medical history of decompensated cirrhosis (2/2 alcohol and hepatitis C), chronic hepatitis C and HTN who presents with acute encephalopathy, labs notable for elevated lactate and CT, CXR suggestive of atypical pneumonia versus pulmonary edema.    Today  - Continue lactulose, titrate to 2-3 BMs daily   - Discontinue IV ceftriaxone and azithromycin given marked improvement in symptoms, suggestive of lactulose non-compliance as etiology of encephalopathy  - 1L NS bolus, then maintenance fluid with NS @ 75 ml/hr   - PTH, vitamin D, phos, albumin in AM     Hepatic encephalopathy, improving    Current episode of hepatic encephalopathy likely due to decreased intake of lactulose in the days prior to admission. There was some concern for possible pneumonia  vs. UTI based on CXR findings and UA results, so patient was started on ceftriaxone and azithromycin on admission. Ascitic fluid analysis negative for SBP, no concern for GI bleed. Patient's mental status markedly improved after initiation of PO lactulose.   - Continue Lactulose 20g QID + PRN, titrate to achieve two to three soft stools per day.   - Continue PTA rifaximin 550mg BID  - Discontinue IV ceftriaxone and azithromycin given marked improvement in symptoms, suggestive of lactulose non-compliance as etiology of encephalopathy  - Follow up BCx and UCx   - PT/OT evals pending     Non-oliguric CT  Creatinine 1.67 on admission, baseline appears to be around 1.  Suspect prerenal etiology given other clinical signs of hypovolemia on exam.  Noted to have CT during previous hospitalizations with improvement in creatinine after IV hydration.   - Giving IVF boluses PRN in addition to mIVF, being cognoscente of history of cirrhosis and risk of hypervolemia   - trend  Cr     Decompensated cirrhosis, MELD 27 on 8/21  Etiology- ETOH and hep C     Complications:     Hepatic encephalopathy: Yes    Ascites: Yes, requiring frequent paracenteses     Varices: last EGD unknown     HCC screening: Last ultrasound 7/24/19 negative   -Abdominal ultrasound liver with Doppler   -Daily CMP, INR  -Continue lactulose and rifaximin as above  -Holding PTA diuretics (torsemide and spironolactone) due to CT  - low-sodium diet (2 g/day)  -Daily weights, strict I's and O's  -Continue PTA PPI once able to take PO     Elevated lactate, improving   Lactate 3.2 on admission.  Suspect hypovolemia as discussed above, improved with IV hydration    - IVF as above   - Trend lactate PRN      Hypercalcemia   Corrected Ca 12.1 on admission. Persistently hyercalcemic despite IV hydration, although patient still exhibiting some signs of dehydration (tachycardia, dry mucous membranes).   - Trend Ca  - PTH, vitamin D, phos, albumin in AM      -----------------------CHRONIC PROBLEMS----------------------  History of alcohol use disorder  States that she's been sober since 7/5/2019. Serum ETOH negative on admission.   - Will offer chem dep consult if agreeable      Chronic hepatitis C  Per chart review, patient is treatment naïve.  Follows closely with GI so we will arrange for outpatient follow-up on discharge for continued management     FEN: IVF boluses as above, monitor and replete lytes   Prophylaxis: heparin   Consults: none      Code Status: FULL      Disposition: Discharge home in the next 1-2 days pending improvement in mental status        Patient discussed with Dr. Dawn, who agrees with above plan.     Ortega Lewis MD  Internal Medicine, PGY-2  [facility] 1 Service  P: [phone]  ______________________________________________________________________    Interval History   Nursing notes reviewed. Noted to be lethargic and confused overnight, although arousable. Alert this AM, up with assist x1 to commode.  Has had 5 BMs from midnight to 7am.     Data reviewed today: I reviewed all medications, new labs and imaging results over the last 24 hours.     Physical Exam   Vital Signs: Temp: 96.2  F (35.7  C) Temp src: Oral BP: (!) 141/87 Pulse: 122 Heart Rate: 122 Resp: 16 SpO2: 98 % O2 Device: None (Room air)    Weight: 148 lbs 4.8 oz    General: lying in bed, no acute distress   Skin: Warm, not jaundiced, spider angiomata noted on torso   HEENT: EOMI, PERRL, mild scleral icterus, dry mucous membranes, poor dentition  CV: Tachycardic, loud S1, no murmurs  Resp: Clear to auscultation bilaterally with mild bi-basilar crackles, no increased work of breathing, no wheezes or crackles  Abd: Moderately distended but not tense, no grimace to palpation, BS+  Extremities: warm and well perfused, palpable pulses, no LE edema  Neuro: Awake and alert, oriented to person, place time and situation, mild asterixis, 5/5 strength in all 4 extremities     Data   Recent Labs   Lab 08/21/19  2328 08/21/19  1719 08/21/19  1003 08/21/19  0854   WBC  --   --   --  8.4   HGB  --   --   --  12.0   MCV  --   --   --  93     --   --  180   INR  --   --   --  1.53*   NA  --  133 132* 127*   POTASSIUM  --  4.1 4.5 7.6*   CHLORIDE  --  98 95 94   CO2  --  25 27 26   BUN  --  43* 40* 40*   CR  --  1.70* 1.67* 1.60*   ANIONGAP  --  9 10 8   MEGGAN  --  10.3* 10.6* 10.4*   GLC  --  85 86 88   ALBUMIN  --   --  2.0* 1.9*   PROTTOTAL  --   --  7.8 7.8   BILITOTAL  --   --  9.4* 9.5*   ALKPHOS  --   --  306* 300*   ALT  --   --  61* 66*   AST  --   --  113* Canceled, Test credited   LIPASE  --   --   --  220

## 2019-08-22 NOTE — PLAN OF CARE
5A: PT evaluation completed, treatment initiated.   Discharge Planner PT   Patient plan for discharge: home   Current status: pt IND with bed mobility and transfers, needs CGA for gait x 300ft with FWW. HR up to 125 with activity, SpO2 >90% on room air.   Barriers to return to prior living situation: medical status, assist for mobility, stairs at home   Recommendations for discharge: TCU at this time  Rationale for recommendations: pt currently below baseline mobility needing continued therapy to maximize safety. Pending LOS and progress, anticipate return home with assist from family as needed and possible HHPT. PT will continue to update recommendations.        Entered by: Sydney Meneses 08/22/2019 9:46 AM

## 2019-08-22 NOTE — PLAN OF CARE
OT/5A: Hold. Per PT, pt may only require one discipline and may not have IP OT needs. Will hold OT eval.

## 2019-08-22 NOTE — PLAN OF CARE
Time 8666-7317     Reason for admission: Encephalopathy  Vitals: Tachycardic. OVSS on RA  Activity: SBA with walker  Pain: Denies pain   Neuro: A&Ox4. Forgetful   Cardiac: Sinus tachycardia   Respiratory: WNL. Denies SOB  GI/: Frequent loose BM. Evening lactulose held due to > 4BM. Good urine output   Diet: Regular diet. Fair appetite   Lines: PIV infusing @ 75mL/hr  Wounds: R buttock wound; apply barrier cream   Labs/imaging: Ca 10.5      New changes this shift: Pt alert and orientated the whole shift. Using call light appropriately. Bed alarm still in place for pt safety. High BM output. Evening lactulose held. Fair appetite; not wanting to eat much. IVF infusing @ 75mL. Tele continues to be tachycardia -125.      Plan: Plan to draw more labs to investigate hypercalcemia.       Continue to monitor and follow POC

## 2019-08-23 NOTE — PROGRESS NOTES
2748-0749: Afebrile. VSS on RA ex tachycardia (90s-100s.) A/Ox4. Up with A1 + walker. Scoring 0 on [place]. Scheduled lactulose given per orders; no PRN doses needed. Had 5 BMs today. Regular diet with fair appetite. Denies pain or nausea. Skin on bottom with blanchable redness + small pea sized blister wound; cleansed and barrier cream applied per WOC orders. Repositioning independently in bed. Calls appropriately. Continue to monitor.    Minnie Wade RN on 8/23/2019 at 6:32 PM

## 2019-08-23 NOTE — PROCEDURES
Consult and Procedure Service - Procedure Note    Attending: Calos  Resident: Raiza Oneill  Procedure: Therapeutic paracentesis  Indication: ascites  Risk Assessment: low  Pre-procedure diagnosis: ascites  Post-procedure diagnosis: ascites    The risks and benefits of the procedure were explained to patient who expressed understanding and opted to proceed.  Consent was obtained and placed in the chart.  A time out was performed.  An area of ascites was located and marked using ultrasound guidance in the R lower quadrant; the area was prepped and draped in the usual sterile fashion.  8 ml of 1% lidocaine was instilled and ascites located.  The paracentesis catheter and needle were inserted under real-time guidance until ascites obtained then the needle removed and the catheter advanced.  The apparatus was connected to vacuum bottles and a total of 4000 ml of straw colored fluid removed.   A specimen was not sent for analysis. The catheter was withdrawn and the area dressed.  Patient tolerated the procedure well with no immediate complications.  Please contact the Consult and Procedure Service if any complications or concerns arise.     Gasper Live MD on 8/23/2019 at 4:55 PM

## 2019-08-23 NOTE — PROGRESS NOTES
[facility], [place]    Progress Note - Rosa 1 Service        Date of Admission:  8/21/2019    Assessment & Plan   Roslyn Palmer is a 63 year old female with past medical history of decompensated cirrhosis (2/2 alcohol and hepatitis C), chronic hepatitis C and HTN who presents with acute encephalopathy likely secondary to HE and CT, currently improving.      Today  -therapeutic paracentesis  -SPEP/UPEP  -resume nadolol at half home dose     Hepatic encephalopathy, improving    Current episode of hepatic encephalopathy likely due to decreased intake of lactulose in the days prior to admission. There was some concern for possible pneumonia  vs. UTI based on CXR findings and UA results, so patient was started on ceftriaxone and azithromycin on admission. Ascitic fluid analysis negative for SBP, no concern for GI bleed. Patient's mental status markedly improved after initiation of PO lactulose but still noting some confusion.  - Continue Lactulose 20g QID + PRN, titrate to achieve two to three soft stools per day.   - Continue PTA rifaximin 550mg BID  - Discontinue IV ceftriaxone and azithromycin given marked improvement in symptoms, suggestive of lactulose non-compliance as etiology of encephalopathy  - Follow up BCx and UCx   - PT/OT evals pending      Non-oliguric CT  Creatinine 1.67 on admission, baseline appears to be around 1.  Suspect prerenal etiology given other clinical signs of hypovolemia on exam.  Noted to have CT during previous hospitalizations with improvement in creatinine after IV hydration.   - Giving IVF boluses PRN in addition to mIVF, being cognoscente of history of cirrhosis and risk of hypervolemia   - trend Cr     Decompensated cirrhosis, MELD 27 on 8/21  Etiology- ETOH and hep C     Complications:     Hepatic encephalopathy: Yes    Ascites: Yes, requiring frequent paracenteses     Varices: last EGD unknown     HCC screening: Last ultrasound 7/24/19  negative   -Abdominal ultrasound liver with Doppler   -Daily CMP, INR  -Continue lactulose and rifaximin as above  -Holding PTA diuretics (torsemide and spironolactone) due to CT  - low-sodium diet (2 g/day)  -Daily weights, strict I's and O's  -Continue PTA PPI once able to take PO  -therapeutic para on 8/23     Elevated lactate, improving   Lactate 3.2 on admission.  Suspect hypovolemia as discussed above, improved with IV hydration    - IVF as above   - Trend lactate PRN      Hypercalcemia   Protein gap  Corrected Ca 12.1 on admission. Persistently hyercalcemic despite IV hydration, although patient still exhibiting some signs of dehydration (tachycardia, dry mucous membranes). PTH upper limit of normal. Ionized calcium WNL on 8/23. Concern for malignancy due to protein gap >4.  - Trend Ca  - SPEP/UPEP ordered    Gout  -consider restarting colchicine in AM     -----------------------CHRONIC PROBLEMS----------------------  History of alcohol use disorder  States that she's been sober since 7/5/2019. Serum ETOH negative on admission.   - Will offer chem dep consult if agreeable      Chronic hepatitis C  Per chart review, patient is treatment naïve.  Follows closely with GI so we will arrange for outpatient follow-up on discharge for continued management    HTN  -Resume nadolol at half home dose     Diet: Regular Diet Adult  Snacks/Supplements Adult: Boost Plus; Between Meals    Fluids: MIVF 75 ml/hr  Lines: PIV  DVT Prophylaxis: Heparin SQ  Cameron Catheter: not present  Code Status: Full Code      Disposition Plan   Expected discharge: today or tomorrow, recommended to transitional care unit once mental status at baseline, renal function improved and safe disposition plan/ TCU bed available.  Entered: Jasvir De Jesus MD 08/23/2019, 6:21 AM       The patient's care was discussed with the Attending Physician, Dr. Dawn, Bedside Nurse, Care Coordinator/ and Patient.    Jasvir De Jesus,  MD [facility] 1 Service  [facility], [place]  Pager: [phone]  Please see sticky note for cross cover information  __________________________    Interval History   Pt notes confusion described as word finding difficulties. She is eating, with normal bowel and bladder function. Her abdomen feels the same and is without pain.    4 point ROS assessed and unremarkable unless stated above.    Data reviewed today: I reviewed all medications, new labs and imaging results over the last 24 hours. I personally reviewed no images or EKG's today.    Physical Exam   Vital Signs: Temp: 96.5  F (35.8  C) Temp src: Oral BP: 110/70 Pulse: 113 Heart Rate: 120 Resp: 16 SpO2: 97 % O2 Device: None (Room air)    Weight: 148 lbs 4.8 oz  Constitutional: awake, alert, cooperative, no apparent distress, and appears stated age  HENT: Normocephalic, without obvious abnormality, atraumatic, pupils equal, round and reactive to light, extra ocular muscles intact, sclera clear  Respiratory: No increased work of breathing, clear to auscultation bilaterally, no crackles or wheezing, no cough  Cardiovascular: regular rate and rhythm, no murmurs  GI: distended without fluid wave, soft, non-tender  Skin: no bruising or bleeding, normal skin color, texture, turgor, no redness, warmth, or swelling, no rashes, no lesions and no jaundice  Musculoskeletal: There is no redness, warmth, or swelling of the joints. Tone is normal.  Neurologic: Awake, alert, oriented to person and time.     Data   Recent Labs   Lab 08/22/19  0901 08/22/19  0648 08/21/19  2328 08/21/19  1719 08/21/19  1003 08/21/19  0854   WBC  --  8.7  --   --   --  8.4   HGB  --  11.7  --   --   --  12.0   MCV  --  93  --   --   --  93   PLT  --  153 156  --   --  180   INR  --  1.64*  --   --   --  1.53*   NA  --  136  --  133 132* 127*   POTASSIUM  --  4.2  --  4.1 4.5 7.6*   CHLORIDE  --  104  --  98 95 94   CO2  --  17*  --  25 27 26   BUN  --  35*  --  43* 40*  40*   CR  --  1.62*  --  1.70* 1.67* 1.60*   ANIONGAP  --  15*  --  9 10 8   MEGGAN 10.5* Canceled, Test credited  --  10.3* 10.6* 10.4*   GLC  --  82  --  85 86 88   ALBUMIN  --  1.6*  --   --  2.0* 1.9*   PROTTOTAL  --  7.0  --   --  7.8 7.8   BILITOTAL  --  9.3*  --   --  9.4* 9.5*   ALKPHOS  --  280*  --   --  306* 300*   ALT  --  56*  --   --  61* 66*   AST  --  114*  --   --  113* Canceled, Test credited   LIPASE  --   --   --   --   --  220     No results found for this or any previous visit (from the past 24 hour(s)).  Medications     - MEDICATION INSTRUCTIONS -       sodium chloride 75 mL/hr at 08/22/19 1634       heparin  5,000 Units Subcutaneous Q12H     lactulose  20 g Oral 4x Daily     multivitamin w/minerals  1 tablet Oral Daily     rifaximin  550 mg Oral BID     sodium chloride (PF)  3 mL Intravenous Q8H     sodium chloride (PF)  3 mL Intracatheter Q8H

## 2019-08-23 NOTE — PLAN OF CARE
5A: Discharge Planner PT   Patient plan for discharge: TCU  Current status: pt SBA for bed mobility and STS transfers, ambulates 400ft with straight cane and CGA, mild instability and one LOB that pt able to self correct. Vitals stable on room air, HR up to 125. Pt completes 3 stairs with railing and cane and SBA.   Barriers to return to prior living situation: medical status, limited support at home, assist for safe mobility   Recommendations for discharge: TCU  Rationale for recommendations: pt currently with below baseline mobility needing continued therapy to maximize safety. Pending LOS and progress, pt may be able to return home with assist and HHPT.        Entered by: Sydney Meneses 08/23/2019 9:57 AM

## 2019-08-23 NOTE — PROGRESS NOTES
Social Work Services Progress Note    Hospital Day: 2  Date of Initial Social Work Evaluation:  8/21/2019  Collaborated with:  Medical team, pt    Data:  Pt is a 63 year old female admitted to [facility] on 8/21/2019 for hepatic encephalopathy.    PT/OT have recommended TCU upon discharge.     Intervention:  SW participated in interdisciplinary rounds this morning to assess for needs and discharge planning. Team expressed pt could be ready for discharge as early as 8/24/2019.     SW met w/ the pt at bedside to discuss PT/OT recommendations. Pt is agreeable to TCU placement. SW provided the Medicare list of options near the [facility]. SW will meet with the pt this afternoon to allow time for the pt to look over the list.       Assessment:  Pt understanding of SW intervention    Plan:    Anticipated Disposition:  Facility:  TCU    Barriers to d/c plan:  Medical stability    Follow Up:  SW will remain available and continue to follow, assess for needs, and assist in discharge planning.     LUZ Glasgow, CANDIDA   5B   Pager [phone]  Phone [phone]    Addendum 2:30p: SW met w/ the pt at bedside to discuss TCU options. This writer sent the following referrals per pt's request w/ discharge expected to take place Saturday, 8/24/2019.     1) [facility] (ph: [phone])    2) [facility] (ph: [phone])    3) [facility] (ph: [phone])    4) [facility] (ph: [phone])    5) [facility] (ph: [phone])    6) [facility] in [place] ([phone])      SW will place pt on weekend list in case she is ready for discharge and has placement.     LUZ Glasgow, CANDIDA   5B   Pager [phone]  Phone [phone]

## 2019-08-23 NOTE — PLAN OF CARE
"OT 5A  Discharge Planner OT   Patient plan for discharge: open to TCU  Current status: OT evaluation completed and treatment initiated.  Pt reporting increased difficulty with ADL and IADL 2/2 weakness and poor endurance.  Pt SBA to bathroom, able to stand and complete light g/h for ~8 minutes with SBA, verbal cues.  Pt reporting fatigue with activity, HR up to 120s and O2 98% on RA.  Pt notes her vision has been \"off\" the past few days; she notes that she reaches for targets but does not make contact despite visual cues.   Barriers to return to prior living situation: medical, deconditioning, possible changes in cognition, multiple stairs and limited A at home  Recommendations for discharge: TCU, though may progress to home with A and HH therapies  Rationale for recommendations: Pt below baseline in functional mobility, ADL, and IADL.  Pt has many stairs to manage, currently limited by endurance.  Pt would benefit from short rehab stay to progress safety and I with ADL/IADL prior to discharge home.         Entered by: Megan Metzger 08/23/2019 2:34 PM       "

## 2019-08-23 NOTE — PROGRESS NOTES
08/23/19 1011   Quick Adds   Type of Visit Initial Occupational Therapy Evaluation   Living Environment   Lives With sibling(s);parent(s)   Living Arrangements house   Home Accessibility stairs within home;stairs to enter home   Number of Stairs, Main Entrance 1   Stair Railings, Main Entrance none   Number of Stairs, Within Home, Primary other (see comments)  (7, 8)   Transportation Anticipated family or friend will provide   Living Environment Comment Pt lives with sister, mom lives in mother-in-law suite.  Pt has 1 SHANA, kitchen on the main level, bedroom/bathroom down 7 steps, living room down 8 more.  Pt has walk-in shower, no grab bars or chair.  Let's mom know she is showering.     Self-Care   Usual Activity Tolerance moderate   Current Activity Tolerance moderate   Regular Exercise No   Equipment Currently Used at Home none   Activity/Exercise/Self-Care Comment pt reports being generally IND but has a cane and  walker at home, reports her sister helps with her medications    Functional Level   Ambulation 0-->independent   Transferring 0-->independent   Toileting 0-->independent   Bathing 0-->independent   Dressing 0-->independent   Eating 0-->independent   Communication 0-->understands/communicates without difficulty   Swallowing 0-->swallows foods/liquids without difficulty   Cognition 0 - no cognition issues reported   Number of times patient has fallen within last six months 2   Which of the above functional risks had a recent onset or change? ambulation;transferring;toileting;bathing;dressing;cognition;fall history   Prior Functional Level Comment Pt normally ind with ADL though reports has been having more difficulty and needing to rest. Pt reports 2 recent falls, one before admission and one where her hip dislocated   General Information   Onset of Illness/Injury or Date of Surgery - Date 08/21/19   Referring Physician Ortega Lewis MD   Patient/Family Goals Statement To get stronger    Additional Occupational Profile Info/Pertinent History of Current Problem 63 year old female with past medical history of decompensated cirrhosis (2/2 alcohol and hepatitis C), chronic hepatitis C and HTN who presents with acute encephalopathy, labs notable for elevated lactate and CT, CXR suggestive of atypical pneumonia versus pulmonary edema.   Precautions/Limitations fall precautions   Cognitive Status Examination   Orientation orientation to person, place and time   Level of Consciousness alert   Cognitive Comment Pt notes difficulty getting thoughts out, organized in head but does not always able to say.    Visual Perception   Visual Perception Comments Pt notes vision has been off.  Will reach for things but then be too far away.     Sensory Examination   Sensory Comments denies   Pain Assessment   Patient Currently in Pain No   Integumentary/Edema   Integumentary/Edema Comments Abdominal edema   Range of Motion (ROM)   ROM Comment WFL BUE   Strength   Strength Comments Generalized deconditioning   Mobility   Bed Mobility Comments SBA   Transfer Skills   Transfer Comments CGA/SBA   Instrumental Activities of Daily Living (IADL)   Previous Responsibilities meal prep;housekeeping;laundry;shopping;medication management;finances;driving;work   IADL Comments Pt reports she normally does but was not able to manage recently.  Was working full time.  Manages meds but cannot take because of intenstinal issues.  Dives.    Activities of Daily Living Analysis   Impairments Contributing to Impaired Activities of Daily Living balance impaired;cognition impaired;strength decreased   General Therapy Interventions   Planned Therapy Interventions ADL retraining;IADL retraining;balance training;cognition;strengthening;visual perception;transfer training   Clinical Impression   Criteria for Skilled Therapeutic Interventions Met yes, treatment indicated   OT Diagnosis decreased I with ADL   Influenced by the following  "impairments deconditioning, limited activity tolerance, medical, cognition   Assessment of Occupational Performance 1-3 Performance Deficits   Identified Performance Deficits dressing, bathing, IADL   Clinical Decision Making (Complexity) Low complexity   Therapy Frequency 6x/week   Predicted Duration of Therapy Intervention (days/wks) 5 days   Anticipated Equipment Needs at Discharge bathing equipment   Anticipated Discharge Disposition Transitional Care Facility;Home with Home Therapy   Risks and Benefits of Treatment have been explained. Yes   Patient, Family & other staff in agreement with plan of care Yes   Clinical Impression Comments Pt presents with decreased I with ADL related to deconditioning, cognition, balanc deficits.  Pt would benefit from skilled OT to progress safety and I with ADL/IADL   [facility] AM-PAC TM \"6 Clicks\"   2016, Trustees of [facility], under license to [email or web address].  All rights reserved.   6 Clicks Short Forms Daily Activity Inpatient Short Form   [facility] AM-PAC  \"6 Clicks\" Daily Activity Inpatient Short Form   1. Putting on and taking off regular lower body clothing? 2 - A Lot   2. Bathing (including washing, rinsing, drying)? 2 - A Lot   3. Toileting, which includes using toilet, bedpan or urinal? 3 - A Little   4. Putting on and taking off regular upper body clothing? 4 - None   5. Taking care of personal grooming such as brushing teeth? 4 - None   6. Eating meals? 4 - None   Daily Activity Raw Score (Score out of 24.Lower scores equate to lower levels of function) 19   Total Evaluation Time   Total Evaluation Time (Minutes) 10     "

## 2019-08-23 NOTE — PLAN OF CARE
3088-4712. Patient admitted with encephalopathy. Room air. Tachycardic- given Nadolol. Patient is alert and oriented. Celeste score of 0. SBA with walker. Regular diet. PIV is saline locked. Given 2 doses of scheduled lactulose on day shift-- continue to monitor stool. UA collected and sent to lab. 1 BM on day shift.     Paracentesis rescheduled for later this weekend.  Continue to encourage fluids.      Keysah Garcia RN on 8/23/2019 at 2:57 PM

## 2019-08-24 NOTE — PLAN OF CARE
5246-1703. Tachycardic. Room air. Alert and oriented. Swink score= 0. Regular diet. SBA. Patient has been having abdominal pain and fullness. 4L of fluid taken off last night from paraesthesias. Morning laculose was held. Urinating WDL. Potassium recheck today was 3.7.    Keysha Garcia RN on 8/24/2019 at 3:31 PM

## 2019-08-24 NOTE — PLAN OF CARE
Discharge Planner OT   Patient plan for discharge: TCU; does not want to be burden to family  Current status: Patient alert and oriented and pleasant.  Patient supine to sit, SBA/CGA for ambulation to/from bathroom with walker.  Completed toilet task and standing shower task with SBA/CGA and did have one slight LOB in bathroom.  At this point, patient tells therapist that she has history of multiple R hip dislocations with most recent 3 weeks ago and is supposed to be wearing hip abduction brace but declines wearing it.  Patient had not following hip precautions despite cues and review of precautions- nursing alerted.  Completed MOCA (Hipolito Cognitive Assessment) 8.1 with score of 18/30 (score of 26 and above indicates normal cognition).  Barriers to return to prior living situation: Weakness, fall risk, R hip precautions?  Recommendations for discharge: TCU  Rationale for recommendations: Continued OT for maximum independence in ADLs/mobility       Entered by: Natalia Shrestha 08/24/2019 9:22 AM

## 2019-08-24 NOTE — PROGRESS NOTES
1723-2088: Afebrile. VSS on RA ex intermittent tachycardia. Tele sinus rhythm/sinus tach.  A/Ox4 but forgetful. Calls appropriately. Up to BR with A1 + walker. Scoring 0 on [place], neuros intact. Voiding WNL. Patient has had 2BMs so far today (goal 3-4.) Buttocks and groin area with blanchable redness; cleansed and barrier cream applied per order. Regular diet with fair appetite. Denies pain or nausea. Potassium 3.7 today. Plan to discharge to TCU once placement is found. Continue to monitor.    Minnie Wade RN on 8/24/2019 at 7:06 PM

## 2019-08-24 NOTE — PLAN OF CARE
Time: 7546-2541    Reason for admission: hepatic encephalopathy  Vitals: VSS on RA  Activity: assist x1 with walker   Pain: denies pain  Neuro: A&O x4, WH score 0, calls appropriately   Cardiac: WDL, on cardiac tele, NSR.    Respiratory: WDL  GI/: 3 loose/watery BM this shift, scheduled lactulose given.    Diet: regular diet.  Denies nausea, C/O of abdominal discomfort.    Lines: R PIV, SL  Skin: blanchable redness on bottom, small blister to bottom.  Barrier cream applied.  Scant drainage from paracentesis site, dressing changed x2.    Labs: K 3.0 at 0730, rechecked at 0100, 2.7.    New this shift:  Pt up a few times to the bathroom.  Slept between trips.  Reported abdominal discomfort/cramping decreased after having bowel movements.  MD notified of low K in the AM, requested K recheck at 0000, MD ordered 40 mEq of K, and K recheck.  Recheck was 2.7, 40mEq given.  MD notified of K value, ordered and additional 60mEq of IV K over 6 hours.  EKG completed at 0630  Plan: discharge to TCU following placement.  Will continue to monitor and follow POC.

## 2019-08-24 NOTE — PROGRESS NOTES
[facility], [place]    Progress Note - Rosa 1 Service        Date of Admission:  8/21/2019    Assessment & Plan   Roslyn Palmer is a 63 year old female with past medical history of decompensated cirrhosis (2/2 alcohol and hepatitis C), chronic hepatitis C and HTN who presents with acute encephalopathy likely secondary to HE and CT, currently improving.      Today  - Resume PTA PO meds (allopurinol, colchicine, fluoxetine, torsemide and spironolactone)  - Trend K  - SW working on TCU placement      Hepatic encephalopathy, improving    Current episode of hepatic encephalopathy likely due to decreased intake of lactulose in the days prior to admission. There was some concern for possible pneumonia  vs. UTI based on CXR findings and UA results, so patient was started on ceftriaxone and azithromycin on admission. Ascitic fluid analysis negative for SBP, no concern for GI bleed. Patient's mental status markedly improved after initiation of PO lactulose but still noting some confusion.  - Continue Lactulose 20g QID + PRN, titrate to achieve two to three soft stools per day.   - Continue PTA rifaximin 550mg BID  - Discontinued IV ceftriaxone and azithromycin given marked improvement in symptoms, suggestive of lactulose non-compliance as etiology of encephalopathy  - Follow up BCx and UCx   - PT/OT recommending discharge to TCU     Non-oliguric CT, resolved  Creatinine 1.67 on admission, baseline appears to be around 1.  Suspect prerenal etiology given other clinical signs of hypovolemia on exam.     - Giving IVF boluses PRN in addition to mIVF, being cognoscente of history of cirrhosis and risk of hypervolemia   - trend Cr     Decompensated cirrhosis, MELD 27 on 8/21  Etiology- ETOH and hep C     Complications:     Hepatic encephalopathy: Yes    Ascites: Yes, requiring frequent paracenteses     Varices: last EGD unknown     HCC screening: Last ultrasound 7/24/19 negative     -Daily MELD labs   -Continue lactulose and rifaximin as above  -Resume PTA diuretics (torsemide and spironolactone) now that CT resolved   -low-sodium diet (2 g/day)  -Daily weights, strict I's and O's  -Resume PTA PPI  -S/p therapeutic para on 8/23     Elevated lactate, improving   Lactate 3.2 on admission.  Suspect hypovolemia as discussed above, improved with IV hydration    - IVF as above   - Trend lactate PRN      Hypercalcemia   Protein gap  Corrected Ca 12.1 on admission. Persistently hyercalcemic despite IV hydration, although patient still exhibiting some signs of dehydration (tachycardia, dry mucous membranes). PTH upper limit of normal. Ionized calcium WNL on 8/23. Concern for malignancy due to protein gap >4.  - Trend Ca   - SPEP/UPEP pending      -----------------------CHRONIC PROBLEMS----------------------  History of alcohol use disorder  States that she's been sober since 7/5/2019. Serum ETOH negative on admission.   - Will offer chem dep consult if agreeable     Gout  -Resume PTA allopurinol and colchicine      Chronic hepatitis C  Per chart review, patient is treatment naïve.  Follows closely with GI so we will arrange for outpatient follow-up on discharge for continued management    HTN  -Continue nadolol at half home dose    MDD  - Resume PTA fluoxetine      Diet: Regular Diet Adult  Snacks/Supplements Adult: Boost Plus; Between Meals    Fluids: Encourage PO  Lines: PIV  DVT Prophylaxis: Heparin SQ  Cameron Catheter: not present  Code Status: Full Code      Disposition Plan   Expected discharge: 1-2 days, recommended to transitional care unit once mental status at baseline, renal function improved and safe disposition plan/ TCU bed available.  Entered: Ortega Lewis MD 08/24/2019, 7:27 AM       Staffed with MD Rosa Perez 1 Service  [facility]  Pager: [phone]  Please see sticky note for cross cover  information  __________________________    Interval History   Nursing notes reviewed, no acute events overnight. Given KCl 40 mg PO overnight, followed by another 60 mg IV x 6 hours overnight and this AM. Mental status still improved compared to admission, although still intermittently confused. No concerns this AM.     4 point ROS assessed and unremarkable unless stated above.    Data reviewed today: I reviewed all medications, new labs and imaging results over the last 24 hours. I personally reviewed no images or EKG's today.    Physical Exam   Vital Signs: Temp: 96.2  F (35.7  C) Temp src: Oral BP: 113/69   Heart Rate: 97 Resp: 16 SpO2: 97 % O2 Device: None (Room air)    Weight: 148 lbs 4.8 oz  Constitutional: awake, alert, cooperative, no apparent distress   HENT: EOMI, MMM  Respiratory: No increased work of breathing, clear to auscultation bilaterally, no crackles or wheezing, no cough  Cardiovascular: regular rate and rhythm, no murmurs  GI: moderately distended without fluid wave, soft, non-tender  Skin: no worrisome lesions   Neurologic: Awake, alert, oriented to person and time. No asterixis. Grossly non-focal exam    Data   Recent Labs   Lab 08/24/19  0611 08/24/19  0105 08/23/19  0758 08/23/19  0730 08/22/19  0901 08/22/19  0648  08/21/19  0854   WBC 6.8  --   --  7.9  --  8.7  --  8.4   HGB 11.1*  --   --  10.8*  --  11.7  --  12.0   MCV 97  --   --  99  --  93  --  93   *  --   --  123*  --  153   < > 180   INR  --   --  1.59*  --   --  1.64*  --  1.53*   *  --   --  134  --  136   < > 127*   POTASSIUM 3.6 2.7*  --  3.0*  --  4.2   < > 7.6*   CHLORIDE 102  --   --  102  --  104   < > 94   CO2 PENDING  --   --  23  --  17*   < > 26   BUN PENDING  --   --  33*  --  35*   < > 40*   CR PENDING  --   --  1.26*  --  1.62*   < > 1.60*   ANIONGAP PENDING  --   --  10  --  15*   < > 8   MEGGAN PENDING  --   --  9.2 10.5* Canceled, Test credited   < > 10.4*   GLC PENDING  --   --  99  --  82   < > 88    ALBUMIN  --   --   --  1.9*  --  1.6*   < > 1.9*   PROTTOTAL  --   --   --  7.3  --  7.0   < > 7.8   BILITOTAL  --   --   --  8.6*  --  9.3*   < > 9.5*   ALKPHOS  --   --   --  312*  --  280*   < > 300*   ALT  --   --   --  62*  --  56*   < > 66*   AST  --   --   --  100*  --  114*   < > Canceled, Test credited   LIPASE  --   --   --   --   --   --   --  220    < > = values in this interval not displayed.     No results found for this or any previous visit (from the past 24 hour(s)).  Medications     - MEDICATION INSTRUCTIONS -         heparin  5,000 Units Subcutaneous Q12H     lactulose  20 g Oral 4x Daily     multivitamin w/minerals  1 tablet Oral Daily     nadolol  20 mg Oral Daily     potassium chloride with lidocaine  10 mEq Intravenous Q1H     rifaximin  550 mg Oral BID     sodium chloride (PF)  3 mL Intravenous Q8H     sodium chloride (PF)  3 mL Intracatheter Q8H

## 2019-08-24 NOTE — PROGRESS NOTES
Social Work Services Progress Note    Hospital Day: 4  Date of Initial Social Work Evaluation:  8/21/19  Collaborated with:  Primary team Rosa Shrestha, TCU facilities    Data:  Pt is a 63-year-old female admitted to [facility] 8/21/19. TCU is recommended at discharge.    Intervention:  Per primary team pt is medically stable for discharge today. Following up on TCU referrals.     Referral status:  1) [facility] (ph [phone])- no beds available this weekend; potentially Monday, can call back then  2) [facility] (ph [phone])- unable to reach admissions, phone rang and then gave busy signal  3) [facility] (ph [phone])- left  for admissions-  states admissions only available [facility]  4) [facility] (ph [phone])- admissions has not assessed referral yet; faxed updated PT/OT notes as requested  5) [facility] (ph [phone])- spoke with admissions, no openings until Monday, referral not assessed yet- will keep referral open  6) [facility] (ph [phone])- declined, no beds available    Assessment:  Pursuing TCU placement    Plan:    Anticipated Disposition:  Facility:  TCU    Barriers to d/c plan:  Placement    Follow Up:  SW to follow for discharge planning    LUZ Sandoval, [facility]  Weekend Pager [phone]

## 2019-08-25 NOTE — PLAN OF CARE
2992-0054: Afebrile. VSS on RA ex intermittent tachycardia. Tele sinus rhythm.  A/Ox4 but forgetful. Calls appropriately. Denies pain or nausea. Up to BR with SBA. Scoring 0 on [place], neuros intact. Voiding WNL. Patient has had 5BMs so far today (goal 3-4.) Held afternoon lactulose doses.  Regular diet with fair appetite. Dressing changed x1 on old paracentesis site. RPIV SL. Plan to discharge to TCU tomorrow. Continue to monitor and follow POC.    Minnie Wade RN on 8/25/2019 at 6:39 PM

## 2019-08-25 NOTE — PLAN OF CARE
Time: 2260-2678     Reason for admission: hepatic encephalopathy  Vitals: VSS on RA  Activity: SBA with walker   Pain: denies pain  Neuro: A&O x4, WH score 0, calls appropriately   Cardiac: WDL, on cardiac tele, NSR. intermittent tachycardia  Respiratory: WDL  GI/: 3 loose stools this shift, scheduled lactulose given.  Voiding spontaneously without difficulty.   Diet: regular diet, tolerating well  Lines: R PIV, SL  Skin: blanchable redness on bottom, small blister to bottom.  Barrier cream applied. Paracentesis site dressing CDI  Labs: K 3.7  Plan: discharge to TCU following placement.  Will continue to monitor and follow POC.

## 2019-08-25 NOTE — PROGRESS NOTES
[facility], [place]    Progress Note - [place] 1 Service        Date of Admission:  8/21/2019    Assessment & Plan   Roslyn Palmer is a 63 year old female with past medical history of decompensated cirrhosis (2/2 alcohol and hepatitis C), chronic hepatitis C and HTN who presents with acute encephalopathy likely secondary to HE and CT, currently improving.      Today  - No acute concerns   - SW working on TCU placement      Hepatic encephalopathy, improving    Current episode of hepatic encephalopathy likely due to decreased intake of lactulose in the days prior to admission. There was some concern for possible pneumonia  vs. UTI based on CXR findings and UA results, so patient was started on ceftriaxone and azithromycin on admission. Ascitic fluid analysis negative for SBP, no concern for GI bleed. Patient's mental status markedly improved after initiation of PO lactulose but still noting some confusion. Previously on ceftriaxone and azithromycin due to concern for CAP but these were discontinued given rapid improvement in symptoms after lactulose started.   - Continue Lactulose 20g QID + PRN, titrate to achieve two to three soft stools per day.   - Continue PTA rifaximin 550mg BID  - Follow up BCx and UCx   - PT/OT recommending discharge to TCU; SW working on placement      Non-oliguric CT, resolved  Creatinine 1.67 on admission, baseline appears to be around 1.  Suspect prerenal etiology given other clinical signs of hypovolemia on exam.     - Giving IVF boluses PRN in addition to mIVF, being cognoscente of history of cirrhosis and risk of hypervolemia   - trend Cr    Severe malnutrition in the context of chronic illness  - Thera-Vit-M   - Boost Plus (vary flavors) TID between meals     Decompensated cirrhosis, MELD 27 on 8/21  Etiology- ETOH and hep C     Complications:     Hepatic encephalopathy: Yes    Ascites: Yes, requiring frequent paracenteses     Varices: last EGD  unknown     HCC screening: Last ultrasound 7/24/19 negative    -Daily MELD labs   -Continue lactulose and rifaximin as above  -Continue PTA diuretics (torsemide and spironolactone) now that CT resolved   -low-sodium diet (2 g/day)  -Daily weights, strict I's and O's  -Continue PTA PPI  -S/p therapeutic para on 8/23     Elevated lactate, improving   Lactate 3.2 on admission.  Suspect hypovolemia as discussed above, improved with IV hydration    - IVF as above   - Trend lactate PRN      Hypercalcemia   Protein gap  Corrected Ca 12.1 on admission. Persistently hyercalcemic despite IV hydration, although patient still exhibiting some signs of dehydration (tachycardia, dry mucous membranes). PTH upper limit of normal. Ionized calcium WNL on 8/23. Concern for malignancy due to protein gap >4.  - Trend Ca   - SPEP/UPEP pending      -----------------------CHRONIC PROBLEMS----------------------  History of alcohol use disorder  States that she's been sober since 7/5/2019. Serum ETOH negative on admission.    -Not open to chem dep consult at this time    Gout  -Continue PTA allopurinol and colchicine      Chronic hepatitis C  Per chart review, patient is treatment naïve.  Follows closely with GI so we will arrange for outpatient follow-up on discharge for continued management  - follow with GI as outpatient     HTN  -Continue nadolol at half home dose    MDD  - Continue PTA fluoxetine      Diet: Regular Diet Adult  Snacks/Supplements Adult: Boost Plus; Between Meals    Fluids: Encourage PO  Lines: PIV  DVT Prophylaxis: Heparin SQ  Cameron Catheter: not present  Code Status: Full Code      Disposition Plan   Expected discharge: 1-2 days, recommended to transitional care unit once mental status at baseline, renal function improved and safe disposition plan/ TCU bed available.  Entered: Ortega Lewis MD 08/25/2019, 6:59 AM       Staffed with MD Rosa Perez 1 Service  [facility]  [place]  Pager: [phone]  Please see sticky note for cross cover information  __________________________    Interval History   Nursing notes reviewed, no acute events overnight. Forgetful at times per nursing staff but no other concerns pertaining to mental status. Having good stool output with lactulose. Eating breakfast this AM, denies nausea, vomiting or abdominal pain. No other concerns.     4 point ROS assessed and unremarkable unless stated above.    Data reviewed today: I reviewed all medications, new labs and imaging results over the last 24 hours.     Physical Exam   Vital Signs: Temp: 98.2  F (36.8  C) Temp src: Oral BP: 92/57   Heart Rate: 93 Resp: 16 SpO2: 95 % O2 Device: None (Room air)    Weight: 148 lbs 4.8 oz  Constitutional: Sitting in bed eating breakfast, no acute distress   HENT: EOMI, MMM, mild scleral icterus   Respiratory: CTAB, no wheezes, crackles or increased work of breathing   Cardiovascular: RRR, S1/S2 with no murmurs  GI: moderately distended without fluid wave, soft, non-tender  Skin: mildly jaundiced, no worrisome lesions   Neurologic: Awake, alert, oriented to person, place, time and situation. No asterixis. Grossly non-focal exam    Data   Recent Labs   Lab 08/24/19  1136 08/24/19  0611 08/24/19  0105 08/23/19  0758 08/23/19  0730 08/22/19  0901 08/22/19  0648  08/21/19  0854   WBC  --  6.8  --   --  7.9  --  8.7  --  8.4   HGB  --  11.1*  --   --  10.8*  --  11.7  --  12.0   MCV  --  97  --   --  99  --  93  --  93   PLT  --  145*  --   --  123*  --  153   < > 180   INR  --   --   --  1.59*  --   --  1.64*  --  1.53*   NA  --  132*  --   --  134  --  136   < > 127*   POTASSIUM 3.7 3.6 2.7*  --  3.0*  --  4.2   < > 7.6*   CHLORIDE  --  102  --   --  102  --  104   < > 94   CO2  --  21  --   --  23  --  17*   < > 26   BUN  --  26  --   --  33*  --  35*   < > 40*   CR  --  0.95  --   --  1.26*  --  1.62*   < > 1.60*   ANIONGAP  --  9  --   --  10  --  15*   < > 8   MEGGAN   --  8.8  --   --  9.2 10.5* Canceled, Test credited   < > 10.4*   GLC  --  85  --   --  99  --  82   < > 88   ALBUMIN  --   --   --   --  1.9*  --  1.6*   < > 1.9*   PROTTOTAL  --   --   --   --  7.3  --  7.0   < > 7.8   BILITOTAL  --   --   --   --  8.6*  --  9.3*   < > 9.5*   ALKPHOS  --   --   --   --  312*  --  280*   < > 300*   ALT  --   --   --   --  62*  --  56*   < > 66*   AST  --   --   --   --  100*  --  114*   < > Canceled, Test credited   LIPASE  --   --   --   --   --   --   --   --  220    < > = values in this interval not displayed.     No results found for this or any previous visit (from the past 24 hour(s)).  Medications     - MEDICATION INSTRUCTIONS -         allopurinol  200 mg Oral Daily     colchicine  0.6 mg Oral Daily     ferrous sulfate  650 mg Oral Daily     FLUoxetine  20 mg Oral Daily     folic acid  1 mg Oral Daily     heparin  5,000 Units Subcutaneous Q12H     lactulose  20 g Oral 4x Daily     zz extemporaneous template  600 mg Oral Daily     multivitamin w/minerals  1 tablet Oral Daily     nadolol  20 mg Oral Daily     pantoprazole  40 mg Oral Daily     rifaximin  550 mg Oral BID     sodium chloride (PF)  3 mL Intravenous Q8H     sodium chloride (PF)  3 mL Intracatheter Q8H     spironolactone  50 mg Oral Daily     torsemide  10 mg Oral BID

## 2019-08-26 NOTE — PLAN OF CARE
Time: 6834-1602     Reason for admission: hepatic encephalopathy  Vitals: VSS on RA, BP soft  Activity: SBA with walker   Pain: denies pain  Neuro: A&O x4, WH score 0, calls appropriately   Cardiac: WDL, on cardiac tele, NSR.  Respiratory: WDL  GI/: 2 loose stools this shift, scheduled lactulose given.  Voiding spontaneously without difficulty.   Diet: regular diet  Lines: R PIV, SL  Skin: blanchable redness on bottom barrier cream applied per WOC orders. Paracentesis site dressing CDI  Labs: K 3.9,   New this shift: pt had episode of emesis this am, 700 mL out.  Prn IV zofran given with relief.    Plan: plan to discharge to TCU today.  Will continue to monitor and follow POC

## 2019-08-26 NOTE — PROGRESS NOTES
[facility], [place]    Progress Note - Rosa 1 Service        Date of Admission:  8/21/2019    Assessment & Plan   Roslyn Palmer is a 63 year old female with past medical history of decompensated cirrhosis (2/2 alcohol and hepatitis C), chronic hepatitis C and HTN who presents with acute encephalopathy likely secondary to HE and CT, who was medically stable for discharge but developed slight CT today along with large episode of non-bloody emesis.      Today  -- Stop spironolactone  -- Decrease torsemide  -- 250 ml fluid bolus  -- Stop colchicine  -- resume home dose nadalol     Hepatic encephalopathy, resolved  Current episode of hepatic encephalopathy likely due to decreased intake of lactulose in the days prior to admission. There was some concern for possible pneumonia  vs. UTI based on CXR findings and UA results, so patient was started on ceftriaxone and azithromycin on admission. Ascitic fluid analysis negative for SBP, no concern for GI bleed. Patient's mental status markedly improved after initiation of PO lactulose but still noting some confusion. Previously on ceftriaxone and azithromycin due to concern for CAP but these were discontinued given rapid improvement in symptoms after lactulose started.   - Continue Lactulose 20g QID + PRN, titrate to achieve two to three soft stools per day.   - Continue PTA rifaximin 550mg BID  - Follow up BCx and UCx   - PT/OT recommending discharge to TCU; SW working on placement      Non-oliguric CT, worsened today  Creatinine had improved, rise up to 1.32 today. Also noted to have large emesis.     - Back off on diuresis. Reduce torsemide to once daily and hold spironolactone  - trend Cr    Severe malnutrition in the context of chronic illness  - Thera-Vit-M   - Boost Plus (vary flavors) TID between meals     Decompensated cirrhosis, MELD 27 on 8/21  Etiology- ETOH and hep C     Complications:     Hepatic encephalopathy:  Yes    Ascites: Yes, requiring frequent paracenteses last done on Friday     Varices: last EGD unknown     HCC screening: Last ultrasound 7/24/19 negative    -Daily MELD labs   -Continue lactulose and rifaximin as above  -Reduced diuretcs as discussed above. Ascites already diuretic resistant  -low-sodium diet (2 g/day)  -Daily weights, strict I's and O's  -Continue PTA PPI  -S/p therapeutic para on 8/23     Elevated lactate, resolved  Lactate 3.2 on admission.  Suspect hypovolemia as discussed above, improved with IV hydration    - IVF as above   - Trend lactate PRN      Hypercalcemia, normalized   Protein gap  Corrected Ca 12.1 on admission. Persistently hyercalcemic despite IV hydration, although patient still exhibiting some signs of dehydration (tachycardia, dry mucous membranes). PTH upper limit of normal. Ionized calcium WNL on 8/23. Concern for malignancy due to protein gap >4.  - Trend Ca   - SPEP/UPEP pending      -----------------------CHRONIC PROBLEMS----------------------  History of alcohol use disorder  States that she's been sober since 7/5/2019. Serum ETOH negative on admission.    -Not open to chem dep consult at this time    Gout  -Continue PTA allopurinol. Was on cholchine prior to admission but given some new GI SE, stop colchicine today (8/26) given absence of any acute flare      Chronic hepatitis C  Per chart review, patient is treatment naïve.  Follows closely with GI so we will arrange for outpatient follow-up on discharge for continued management  - follow with GI as outpatient     HTN  -Given anticipated discharge soon, will resume home nadolol dose    MDD  - Continue PTA fluoxetine      Diet: Regular Diet Adult  Snacks/Supplements Adult: Boost Plus; Between Meals    Fluids: Encourage PO  Lines: PIV  DVT Prophylaxis: Heparin SQ  Cameron Catheter: not present  Code Status: Full Code      Disposition Plan   Expected discharge: TOMORROW, recommended to transitional care unit once we have seen  cr stable and improved nausea and vomiting. Was acceptied for admission on Monday but given acute status change, we delayed discharge by one day.  Entered: Ortega Lewis MD 08/26/2019, 12:40 PM       Staffed with MD Viridiana Perez[address], [place]  Pager: [phone]  Please see sticky note for cross cover information  __________________________    Interval History   Nursing notes reviewed, patient had a large episode of emesis this morning.  When I saw her she appeared somewhat tired and generally weakened compared to yesterday.  There is no increase in her jaundice or scleral icterus.  No significant asterixis.  She felt mildly confused compared to yesterday.  She did not feel like she was going to be able to eat very much today.  She did report that this sometimes happens to her periodically where she has an episode of emesis rest for a few hours and then feels better.  No fevers.  Continues to have ongoing stools.  No issues with urination.  No lightheadedness or dizziness.  No chest pain.  No changes in her breathing status.    4 point ROS assessed and unremarkable unless stated above.    Data reviewed today: I reviewed all medications, new labs and imaging results over the last 24 hours.     Physical Exam   Vital Signs: Temp: 96.4  F (35.8  C) Temp src: Oral BP: 106/67   Heart Rate: 88 Resp: 16 SpO2: 98 % O2 Device: None (Room air)    Weight: 150 lbs 0 oz  Constitutional: Lying in bed, fatigued  HENT: EOMI, MMM, mild scleral icterus   Respiratory: CTAB, no wheezes, crackles or increased work of breathing   Cardiovascular: RRR, S1/S2 with no murmurs  GI: moderately distended without fluid wave, soft, non-tender  Skin: mildly jaundiced, no worrisome lesions   Neurologic: Awake, alert, oriented to person, place, time and situation. No asterixis. Grossly non-focal exam    Data   Recent Labs   Lab 08/26/19  0542 08/25/19  0724 08/24/19  1134  08/24/19  0611  08/23/19  0758 08/23/19  0730  08/22/19  0648  08/21/19  0854   WBC  --  8.5  --  6.8  --   --  7.9  --  8.7  --  8.4   HGB  --  11.1*  --  11.1*  --   --  10.8*  --  11.7  --  12.0   MCV  --  97  --  97  --   --  99  --  93  --  93   PLT  --  151  --  145*  --   --  123*  --  153   < > 180   INR  --  1.75*  --   --   --  1.59*  --   --  1.64*  --  1.53*   * 132*  --  132*  --   --  134  --  136   < > 127*   POTASSIUM 3.6 3.9 3.7 3.6   < >  --  3.0*  --  4.2   < > 7.6*   CHLORIDE 100 101  --  102  --   --  102  --  104   < > 94   CO2 20 23  --  21  --   --  23  --  17*   < > 26   BUN 34* 28  --  26  --   --  33*  --  35*   < > 40*   CR 1.32* 1.11*  --  0.95  --   --  1.26*  --  1.62*   < > 1.60*   ANIONGAP 8 7  --  9  --   --  10  --  15*   < > 8   MEGGAN 8.6 8.6  --  8.8  --   --  9.2   < > Canceled, Test credited   < > 10.4*   * 113*  --  85  --   --  99  --  82   < > 88   ALBUMIN  --  1.6*  --   --   --   --  1.9*  --  1.6*   < > 1.9*   PROTTOTAL  --  6.7*  --   --   --   --  7.3  --  7.0   < > 7.8   BILITOTAL  --  9.1*  --   --   --   --  8.6*  --  9.3*   < > 9.5*   ALKPHOS  --  304*  --   --   --   --  312*  --  280*   < > 300*   ALT  --  56*  --   --   --   --  62*  --  56*   < > 66*   AST  --  103*  --   --   --   --  100*  --  114*   < > Canceled, Test credited   LIPASE  --   --   --   --   --   --   --   --   --   --  220    < > = values in this interval not displayed.     No results found for this or any previous visit (from the past 24 hour(s)).  Medications     - MEDICATION INSTRUCTIONS -         allopurinol  200 mg Oral Daily     cephALEXin  500 mg Oral Q12H     colchicine  0.6 mg Oral Daily     ferrous sulfate  650 mg Oral Daily     FLUoxetine  20 mg Oral Daily     folic acid  1 mg Oral Daily     lactated ringers  250 mL Intravenous Once     lactulose  20 g Oral 4x Daily     zz extemporaneous template  600 mg Oral Daily     multivitamin w/minerals  1 tablet Oral Daily      nadolol  20 mg Oral Daily     pantoprazole  40 mg Oral Daily     rifaximin  550 mg Oral BID     sodium chloride (PF)  3 mL Intravenous Q8H     sodium chloride (PF)  3 mL Intracatheter Q8H     torsemide  10 mg Oral Daily

## 2019-08-26 NOTE — PLAN OF CARE
Time 8957-2651     Reason for admission: Hepatic encephalopathy   Vitals: VSS on RA  Activity: SBA  Pain: Denies pain   Neuro: A&Ox4. [place]= 0  Cardiac: NSR. Tele discontinued   Respiratory: Dyspnea with exertion. Denies SOB at rest   GI/: Frequent BM. 4 BM since 0700; 6 total for the whole day. Voiding without difficulties   Diet: Regular diet. Boost supplements   Lines: PIV saline locked.   Wounds: Redness on bottom.   Labs/imaging: Creat 1.32. Ca 8.6 Na 128       New changes this shift: Pt nauseated this AM. Creatinine elevated as well. Discharge pushed off until tomorrow. Spironolactone discontinued. 250mL bolus given. Noon and 8Pm lactulose held due to pt being over BM goal. Pt feeling better this afternoon; denies nausea and pain.     Plan: Ride with [facility] @ 1PM.       Continue to monitor and follow POC

## 2019-08-26 NOTE — PLAN OF CARE
Discharge Planner PT   Patient plan for discharge: TCU  Current status: pt IND bed mobility and sit<>stand to no AD. She ambulates without AD x500' with SBA and general unsteadiness noted though no overt LOB. Pt also completes standing LE ex. Per chart review, pt with last KATINA dislocation at end of May 2018 and was given brace to prevent subsequent dislocations, but she refuses to wear it. PT educates on rec to continue mobility within posterior hip precautions to reduce change of future dislocations though 2/2 to hx of cognitive deficits, will need ongoing education with this.  Barriers to return to prior living situation: medical needs, reduced activity tolerance, impaired balance, weakness, high falls risk, cognition  Recommendations for discharge: TCU  Rationale for recommendations: Pt currently below baseline level of function and would benefit from ongoing therapy to address the above deficits in order to progress towards PLOF and promote IND mobility.       Entered by: Liliana Elder 08/26/2019 11:45 AM

## 2019-08-26 NOTE — PROGRESS NOTES
Social Work Services Discharge Note      Patient Name:  Roslyn Palmer     Anticipated Discharge Date: 8/26/2019    Discharge Disposition:   TCU:  [facility]     RN to RN: [phone]    Following MD:  Dr. Lewis      Pre-Admission Screening (PAS) online form has been completed.  The Level of Care (LOC) is:  Determined  Confirmation Code is: [id number]  Patient/caregiver informed of referral to [facility] Line for Pre-Admission Screening for skilled nursing facility (SNF) placement and to expect a phone call post discharge from SNF.     Additional Services/Equipment Arranged:  HE w/c transport arranged for 2:30pm - pt is aware this will cost anywhere between $80-$90     Patient / Family response to discharge plan:  in agreement     Persons notified of above discharge plan:  Pt, medical team, bedside RN, charge RN, TCU    Staff Discharge Instructions:  SW will fax discharge orders and signed hard scripts for any controlled substances.    Please print a packet and send with patient.     CTS Handoff completed:  YES    Medicare Notice of Rights provided to the patient/family:  YES    Pt is a 63 year old female admitted to [facility] on 8/21/2019 for hepatic encephalopathy. PT/OT have recommended TCU placement upon discharge. Pt has been declined to all referred to facilities due to lack of bed availability other than [facility] (ph: [phone]; f: [phone]). Pt's brother requested a facility closer to home, however all other facilities chosen by the pt have no bed availability. Pt is ok w/ current choice for placement. Pt's family is unable to transport. HE w/c transport has been arranged and pt is aware and ok w/ the  transport bill.     SW has updated [facility] admissions - they are aware of pt discharging at 2:30pm today.     Dr. Lewis was paged to complete orders and summary. Bedside RN notified as well as charge.     Stephy Lucia, LUZ, LGSW   5B Social  Worker  Pager [phone]  Phone [phone]    Addendum 10:25a: Pt will no longer be discharging today as she is not medically stable. HE w/c transport arranged for 1pm tomorrow (8/26/2019) instead. Bedside RN updated and pt updated.     LUZ Glasgow, [facility]   5B   Pager [phone]  Phone [phone]

## 2019-08-27 NOTE — PLAN OF CARE
Physical Therapy Discharge Summary    Reason for therapy discharge:    Discharged to transitional care facility.    Progress towards therapy goal(s). See goals on Care Plan in [facility] electronic health record for goal details.  Goals partially met.  Barriers to achieving goals:   discharge from facility.    Therapy recommendation(s):    Continued therapy is recommended.  Rationale/Recommendations:  to improve safety with mobility and functional strength.

## 2019-08-27 NOTE — PLAN OF CARE
Occupational Therapy Discharge Summary    Reason for therapy discharge:    Discharged to transitional care facility.    Progress towards therapy goal(s). See goals on Care Plan in [facility] electronic health record for goal details.  Goals partially met.  Barriers to achieving goals:   discharge from facility.    Therapy recommendation(s):    Continued therapy is recommended.  Rationale/Recommendations:  Pt will benefit from continued skilled OT services to increase independence and safety with ADL.

## 2019-08-27 NOTE — PLAN OF CARE
Alert and oriented x 4. Used call light appropriately. WH score is 0. Denies pain and nausea. Soft BP's. Up to the bathroom with walker and standby assist. For possible discharge today to TCU pending Creatinine result. Ride set up for 1 pm. Will continue to monitor and follow plan of care

## 2019-08-27 NOTE — PROGRESS NOTES
Patient will be discharging to TCU via [facility] chair ride. Report called to receiving RN. PIV removed. Ensured patient had all belongings. Will send patient with packet. Adequate for discharge.    Keysha Garcia RN on 8/27/2019 at 12:41 PM

## 2019-08-27 NOTE — PROGRESS NOTES
Social Work Services Discharge Note      Patient Name:  Roslyn Palmer     Anticipated Discharge Date: 8/27/2019    Discharge Disposition:   TCU:  [facility]     RN to RN: [phone]    Following MD: Zohaib Dawn MD     Pre-Admission Screening (PAS) online form has been completed.  The Level of Care (LOC) is:  Determined  Confirmation Code is: [id number]  Patient/caregiver informed of referral to [facility] Line for Pre-Admission Screening for skilled nursing facility (SNF) placement and to expect a phone call post discharge from SNF.     Additional Services/Equipment Arranged:  HE w/c transport arranged for 1pm     Patient / Family response to discharge plan:  in agreement - pt understanding that she will likely receive a bill from  for the transport anywhere between $80-$90     Persons notified of above discharge plan:  Bedside RN, medicine team, pt, TCU    Staff Discharge Instructions:  SW to fax discharge orders and signed hard scripts for any controlled substances.    Please print a packet and send with patient.     CTS Handoff completed:  YES    Medicare Notice of Rights provided to the patient/family:  YES    Pt is a 63 year old female admitted to [facility] on 8/21/2019 for hepatic encephalopathy. PT/OT have recommended TCU upon discharge.     Pt will be discharged today (8/27/2019) to [facility] in [place], [place]. Pt will be transported via  w/c transport and is aware of the possibility of receiving a bill for this ride.     Team aware that orders need to be in by 11am. SW will send orders to the facility as soon as they are available.     LUZ Glasgow, CANDIDA   5B   Pager [phone]  Phone [phone]    Addendum 9:58a: discharge orders sent to facility    LUZ Glasgow LGSW 5B   Pager [phone]  Phone [phone]

## 2019-08-27 NOTE — DISCHARGE SUMMARY
[facility], [place]  Discharge Summary - Medicine & Pediatrics       Date of Admission:  8/21/2019  Date of Discharge:  8/27/2019  1:32 PM  Discharging Provider: Elsy Malave  Discharge Service: Rosa Shrestha    Discharge Diagnoses   - Hepatic encephalopathy   - Non-oliguric CT  - Severe malnutrition in the context of chronic illness  - Decompensated alcoholic and hepatitis C cirrhosis   - Elevated lactate  - Hypercalcemia  - Alcohol use disorder   - Gout   - Hypertension   - Major depressive disorder    Follow-ups Needed After Discharge   Follow-up Appointments     Follow Up and recommended labs and tests      Follow up with primary care provider in 1 weeks.  The following   labs/tests are recommended: BMP.  Hold the spironoloctone and torsemide. You'll have a repeat kidney   function test tomorrow before your paracentesis. Your doctors may want to   change how much fluid they take off depending on your creatinine.           Unresulted Labs Ordered in the Past 30 Days of this Admission     Date and Time Order Name Status Description    8/23/2019 1019 Protein electrophoresis In process       These results will be followed up by PCP.    Discharge Disposition   Discharged to short-term care facility  Condition at discharge: Good    Hospital Course   Roslyn Palmer is a 63 year old female with past medical history of decompensated cirrhosis (2/2 alcohol and hepatitis C), chronic hepatitis C and HTN who was admitted for acute hepatic encephalopathy and CT. The following problems were addressed during her hospitalization.      Hepatic encephalopathy in setting of cirrhosis 2/2 ETOH and Hep C    Ascites refractory to diuretics  Likely due to decreased intake of lactulose in the days prior to admission. Initially started on ceftriaxone and azithromycin 2/2 concern for infection on admission but no source identified. Ascitic fluid analysis negative for SBP, no concern for GI bleed. Mental  status rapidly improved after lactulose and rifaximin. Her diuretics were also discontinued on discharge as she receives weekly paracentesis.       Non-oliguric CT  Creatinine 1.67 on admission, baseline appears to be around 1. Creatinine appears to wax and wane. We have planned for close outpatient follow up of her creatinine tomorrow prior to her scheduled therapeutic paracentesis. Diuretics discontinued on discharge.      Severe malnutrition in the context of chronic illness  - Thera-Vit-M   - Boost Plus (vary flavors) TID between meals     Elevated lactate, improving   Lactate 3.2 on admission.  Suspect hypovolemia as discussed above, improved with IV hydration    - IVF as above   - Trend lactate PRN      Hypercalcemia   Protein gap  Corrected Ca 12.1 on admission. PTH upper limit of normal. Ionized calcium WNL on 8/23. Concern for malignancy due to protein gap >4.  - SPEP/UPEP pending      History of alcohol use disorder  States that she's been sober since 7/5/2019. Serum ETOH negative on admission.   - Not open to chem dep consult at this time     Gout  - Continue PTA allopurinol and colchicine      Chronic hepatitis C, treatment naive   Follows closely with GI so we will arrange for outpatient follow-up on discharge for continued management  - Follow with GI as outpatient as planned     HTN  - Continue nadolol     MDD  - Continue PTA fluoxetine     Consultations This Hospital Stay   PHYSICAL THERAPY ADULT IP CONSULT  OCCUPATIONAL THERAPY ADULT IP CONSULT  MEDICATION HISTORY IP PHARMACY CONSULT  WOUND OSTOMY CONTINENCE NURSE  IP CONSULT  VASCULAR ACCESS CARE ADULT IP CONSULT  INTERNAL MEDICINE PROCEDURE TEAM ADULT IP CONSULT EAST BANK - DIALYSIS NON TUNNELED CENTRAL LINE PLACEMENT    Code Status   Full Code     The patient was discussed with Dr. Barry.     MD Rosa Stone 1 Service  [facility], [place]  Pager:  [phone]   ______________________________________________________________________    Physical Exam   Vital Signs: Temp: 96  F (35.6  C) Temp src: Oral BP: 99/56 Pulse: 89 Heart Rate: 83 Resp: 18 SpO2: 99 % O2 Device: None (Room air)    Weight: 150 lbs 0 oz  Constitutional: awake, alert, cooperative, no apparent distress, and appears stated age  Respiratory: No increased work of breathing, good air exchange, clear to auscultation bilaterally, no crackles or wheezing  Cardiovascular: Normal apical impulse, regular rate and rhythm, normal S1 and S2, no S3 or S4, and no murmur noted  GI: Moderately distended but soft. Bowel sounds present.   Musculoskeletal: There is no redness, warmth, or swelling of the joints.  Full range of motion noted.  Motor strength is 5 out of 5 all extremities bilaterally.  Tone is normal.  Neuro: No asterixis.       Primary Care Physician   Monica Tirado    Discharge Orders      General info for SNF    Length of Stay Estimate: Short Term Care: Estimated # of Days <30  Condition at Discharge: Improving  Level of care:skilled   Rehabilitation Potential: Good  Admission H&P remains valid and up-to-date: Yes  Recent Chemotherapy: N/A  Use Nursing Home Standing Orders: Yes     Mantoux instructions    Give two-step Mantoux (PPD) Per Facility Policy Yes     Reason for your hospital stay    Hepatic encephalopathy     Daily weights    Call Provider for weight gain of more than 2 pounds per day or 5 pounds per week.     Activity - Up ad geneva     Follow Up and recommended labs and tests    Follow up with primary care provider in 1 weeks.  The following labs/tests are recommended: BMP.  Hold the spironoloctone and torsemide. You'll have a repeat kidney function test tomorrow before your paracentesis. Your doctors may want to change how much fluid they take off depending on your creatinine.     Full Code     Advance Diet as Tolerated    Follow this diet upon discharge: Orders Placed This  Encounter      Snacks/Supplements Adult: Boost Plus; Between Meals      Regular Diet Adult       Significant Results and Procedures   Most Recent 3 CBC's:  Recent Labs   Lab Test 08/27/19  0720 08/25/19  0724 08/24/19  0611 08/23/19  0730   WBC  --  8.5 6.8 7.9   HGB  --  11.1* 11.1* 10.8*   MCV  --  97 97 99    151 145* 123*       Discharge Medications   Discharge Medication List as of 8/27/2019 12:13 PM      CONTINUE these medications which have CHANGED    Details   lactulose 20 GM/30ML SOLN Take 30 mLs by mouth 4 times daily Titrate to 3-4 bowel movements per day, Transitional         CONTINUE these medications which have NOT CHANGED    Details   allopurinol (ZYLOPRIM) 100 MG tablet TAKE 1 TABLET (100 MG) BY MOUTH DAILY FOR 7 DAYS, THEN 2 TABLETS (200 MG) DAILY., Disp-60 tablet, R-11, E-Prescribe      cephALEXin (KEFLEX) 500 MG capsule Take 1 capsule by mouth 2 times daily., 500 mg, Oral, 2 TIMES DAILY Starting 9/26/2012, Until Discontinued, Disp-60 capsule, R-6, E-Prescribe      cyclobenzaprine (FLEXERIL) 10 MG tablet Take 10 mg by mouth 3 times daily as needed for muscle spasms, Historical      Ferrous Sulfate 324 (65 Fe) MG TBEC Take 648 mg by mouth daily, Historical      FLUoxetine (PROZAC) 20 MG capsule Take 20 mg by mouth daily, Historical      folic acid (FOLVITE) 1 MG tablet Take 1 tablet (1 mg) by mouth daily, Disp-90 tablet, R-3, E-Prescribe      magnesium oxide 200 MG TABS Take 3 tablets by mouth daily, Disp-90 tablet, R-3, E-Prescribe      nadolol (CORGARD) 40 MG tablet Take 40 mg by mouth daily, Historical      ondansetron (ZOFRAN-ODT) 4 MG ODT tab Take 1 tablet (4 mg) by mouth every 12 hours as needed for nausea or vomiting, Disp-20 tablet, R-0, E-Prescribe      pantoprazole (PROTONIX) 40 MG EC tablet Take 1 tablet (40 mg) by mouth daily, Disp-90 tablet, R-0, Historical      sucralfate (CARAFATE) 1 GM/10ML suspension Take 1 g by mouth 4 times daily as needed , Historical      triamcinolone  (KENALOG) 0.1 % external cream Apply topically 2 times dailyDisp-80 g, [id number]      zinc sulfate (ZINCATE) 220 (50 Zn) MG capsule Take 1 capsule (220 mg) by mouth 2 times daily, Disp-60 capsule, R-3, E-Prescribe      rifaximin (XIFAXAN) 550 MG TABS tablet Take 1 tablet (550 mg) by mouth 2 times daily, Disp-60 tablet, R-11, E-Prescribe      SUMAtriptan (IMITREX) 100 MG tablet Take 100 mg by mouth at onset of headache for migraine, Historical         STOP taking these medications       colchicine (COLCYRS) 0.6 MG tablet Comments:   Reason for Stopping:         potassium chloride (KLOR-CON) 20 MEQ packet Comments:   Reason for Stopping:         spironolactone (ALDACTONE) 25 MG tablet Comments:   Reason for Stopping:         torsemide (DEMADEX) 10 MG tablet Comments:   Reason for Stopping:             Allergies   Allergies   Allergen Reactions     Sulfa Drugs

## 2019-08-27 NOTE — PLAN OF CARE
Discharge Planner PT 5A  Patient plan for discharge: TCU  Current status: Pt IND in all bed mobility and SBA for transfers. Ambulated 900 ft with SBA. Completed standing LE exercises and SLS. Reviewed hip precautions with pt. She is not adherent to precautions, observed crossing legs and bending over multiple times. VSS on RA.   Barriers to return to prior living situation: balance, stairs, weakness  Recommendations for discharge: TCU  Rationale for recommendations: Pt below baseline and will benefit from therapy services to continue working on strength, balance, and safe functional mobility.       Entered by: Lorrie Hawkins 08/27/2019 9:50 AM

## 2019-08-28 NOTE — PROGRESS NOTES
[place] [facility]  PRIMARY CARE PROVIDER AND CLINIC:  Monica Tirado MD, [address] / [facility] [address]  Chief Complaint   Patient presents with     Hospital F/U     [place] Medical Record Number:  [id number]  Place of Service where encounter took place:  [facility] [[id number]]    Roslyn Palmer  is a 63 year old  (1955), admitted to the above facility from  [facility]. Hospital stay 8/21/19 through 8/27/19..  Admitted to this facility for  rehab, medical management and nursing care.    Orders written by provider at facility and transcribed by : Dorota Villagran MA***  1. ***

## 2019-08-28 NOTE — PROGRESS NOTES
[place] [facility]  PRIMARY CARE PROVIDER AND CLINIC:  Monica Tirado MD, [address] [facility] [address]  Chief Complaint   Patient presents with     Hospital F/U     [place] [facility] Record Number:  [id number]  Place of Service where encounter took place:  [facility] ([facility] [[id number]]    Roslyn Palmer  is a 63 year old  (1955), admitted to the above facility from  [facility]. Hospital stay 8/21/19 through 8/27/19..  Admitted to this facility for  rehab, medical management and nursing care.    HPI information obtained from: facility chart records, patient report and [facility] chart review.   Brief Summary of Hospital Course: treated for decompensated cirrhosis secondary to alcohol and hepatitis C.   MEDICATION CHANGES: increase in lactulose. Discontinued colchicine, potassium, spironolactone and torsemide.   RECOMMENDED FOLLOW UP: GI Dr. Owen on 9/9.   Updates on Status Since Skilled nursing Admission: hypotension noted.     CODE STATUS/ADVANCE DIRECTIVES DISCUSSION:   CPR/Full code     ALLERGIES: Sulfa drugs  PAST MEDICAL HISTORY:  has a past medical history of DJD (degenerative joint disease), Essential hypertension, malignant, ETOH abuse, and Hepatitis C.  PAST SURGICAL HISTORY:   has a past surgical history that includes orthopedic surgery (2006,2010); GYN surgery (1992); ESOPHAGOSCOPY W BAND LIGATION ESOPHAGEAL VARICIES; Colonoscopy with CO2 insufflation (N/A, 7/30/2018); Combined Esophagoscopy, Gastroscopy, Duodenoscopy (Egd) With Co2 Insufflation (N/A, 7/30/2018); and Colonoscopy (N/A, 7/30/2018).  FAMILY HISTORY: family history includes Arthritis in her sister; Breast Cancer in her mother; Cancer in her maternal grandfather, maternal grandmother, paternal grandfather, and paternal grandmother; Cerebrovascular Disease in her maternal grandmother.  SOCIAL HISTORY:   reports that she has been smoking cigarettes.  She  has been smoking about 0.20 packs per day. She has never used smokeless tobacco. She reports that she drank alcohol. She reports that she does not use drugs.    Post Discharge Medication Reconciliation Status: discharge medications reconciled, continue medications without change  Current Outpatient Medications   Medication Sig Dispense Refill     allopurinol (ZYLOPRIM) 100 MG tablet TAKE 1 TABLET (100 MG) BY MOUTH DAILY FOR 7 DAYS, THEN 2 TABLETS (200 MG) DAILY. 60 tablet 11     cephALEXin (KEFLEX) 500 MG capsule Take 1 capsule by mouth 2 times daily. 60 capsule 6     cyclobenzaprine (FLEXERIL) 10 MG tablet Take 10 mg by mouth 3 times daily as needed for muscle spasms       Ferrous Sulfate 324 (65 Fe) MG TBEC Take 648 mg by mouth daily       FLUoxetine (PROZAC) 20 MG capsule Take 20 mg by mouth daily       folic acid (FOLVITE) 1 MG tablet Take 1 tablet (1 mg) by mouth daily 90 tablet 3     lactulose 20 GM/30ML SOLN Take 30 mLs by mouth 4 times daily Titrate to 3-4 bowel movements per day       magnesium oxide 200 MG TABS Take 3 tablets by mouth daily 90 tablet 3     nadolol (CORGARD) 40 MG tablet Take 40 mg by mouth daily       ondansetron (ZOFRAN-ODT) 4 MG ODT tab Take 1 tablet (4 mg) by mouth every 12 hours as needed for nausea or vomiting 20 tablet 0     pantoprazole (PROTONIX) 40 MG EC tablet Take 1 tablet (40 mg) by mouth daily 90 tablet 0     rifaximin (XIFAXAN) 550 MG TABS tablet Take 1 tablet (550 mg) by mouth 2 times daily 60 tablet 11     sucralfate (CARAFATE) 1 GM/10ML suspension Take 1 g by mouth 4 times daily as needed        SUMAtriptan (IMITREX) 100 MG tablet Take 100 mg by mouth at onset of headache for migraine       triamcinolone (KENALOG) 0.1 % external cream Apply topically 2 times daily 80 g 1     zinc sulfate (ZINCATE) 220 (50 Zn) MG capsule Take 1 capsule (220 mg) by mouth 2 times daily 60 capsule 3       REVIEW OF SYSTEMS:   10 point ROS of systems including Constitutional, Eyes, Respiratory,  Cardiovascular, Gastroenterology, Genitourinary, Integumentary, Musculoskeletal, Psychiatric were all negative except for pertinent positives noted in my HPI.    PHYSICAL EXAM:  BP 94/66   Pulse 107   Temp 97.7  F (36.5  C)   Resp 16   Wt 73.2 kg (161 lb 6.4 oz)   SpO2 94%   BMI 26.05 kg/m    GENERAL APPEARANCE:  Alert, in no distress  RESP:  respiratory effort and palpation of chest normal, no respiratory distress, lungs sounds clear  CV:  Palpation and auscultation of heart done , regular rate and rhythm, no murmur, rub, or gallop,  Trace left lower extrem edema.   ABDOMEN:  normal bowel sounds, soft, nontender, no hepatosplenomegaly or other masses, edema mild abd ascitis.  M/S:  Gait and station obs in wheelchair. no tenderness or swelling of the joints   SKIN:  Inspection and palpation of skin and subcutaneous tissue at baseline - lethery brown with some petichia around ankles. Scleral jaundice.  PSYCH:  insight and judgement, memory seems intact to our conversation, affect and mood normal    ASSESSMENT/PLAN:  Generalized weakness  Admitted to tcu for therapy following hospital stay. Will continue with Physical Therapy / Ocupational Therapy for deconditioning.    Alcoholic liver disease (H) / / ascities  Diuretics discontinued and will have weekly paracentesis. She just got back from her paracentisis and states 2 L were removed.     CT (acute kidney injury) (H)  Creatinine   Date Value Ref Range Status   08/27/2019 1.49 (H) 0.52 - 1.04 mg/dL Final   baseline around 1. Monitor.     Severe malnutrition (H)  Continue boost supplements. Continue vitamins.     Gout  Currently on allopurinol. Denies pain at this time.     Hypotension   Currently on nadolol for esophageal varices. Not symptomatic.    MDD (major depressive disorder)  Continues on prozac.       Total time spent with patient visit at the skilled nursing facility was 35 min including patient visit and review of past records. Greater than 50% of  total time spent with counseling and coordinating care due to coordinating care with nurse on admission orders, coordinating care with follow up labs and appointments and counseling patient/resident for 20 minutes on: the reason for their hospitalization and what the treatment is, the plan of SNF stay and projected length of stay and current medications (treatments) reconciled from the hospital.    Electronically signed by:  Zarina ARGUETA CNP    No new orders

## 2019-09-04 NOTE — PROGRESS NOTES
[place] [facility] DISCHARGE SUMMARY    PATIENT'S NAME: Roslyn Palmer  YOB: 1955  MEDICAL RECORD NUMBER:  [id number]  Place of Service where encounter took place:  [facility] ([facility] [[id number]]    PRIMARY CARE PROVIDER AND CLINIC RESPONSIBLE AFTER TRANSFER: RafatanyaMonica echeverria [address] / [place] [address]    [facility]     Transferring providers: Zarina Hurd NP, Clemencia Villalobos MD  Recent Hospitalization/ED:  [facility] stay 8/21 to 8/27.  Date of SNF Admission: 8/27  Date of SNF (an ticipated) Discharge: 9/4  Discharged to: previous independent home  Cognitive Scores: BIMS 15/15;   Physical Function: Ambulating walker ft with 150  DME: none    CODE STATUS/ADVANCE DIRECTIVES DISCUSSION:  Full Code  ALLERGIES: Sulfa drugs    DISCHARGE DIAGNOSIS/NURSING FACILITY COURSE:   Brief Summary of Hospital Course: treated for decompensated cirrhosis secondary to alcohol and hepatitis C.   MEDICATION CHANGES: increase in lactulose. Discontinued colchicine, potassium, spironolactone and torsemide.   RECOMMENDED FOLLOW UP: GI Dr. Owen on 9/9.   Updates on Status Since Skilled nursing Admission: hypotension noted.     Generalized weakness  Admitted to tcu for therapy following hospital stay. Will continue with home care Physical Therapy / Ocupational Therap    Alcoholic liver disease (H) / / ascities  Diuretics discontinued and will have weekly paracentesis. Due for paracentesis today. States having 15 - 18 bms per day. Will decrease lactulose from 4 times daily to 3 times daily. Recommend she increase back to 4 times per day if < 4 bms in 1 day. She will followup with her GI doctor on Monday    CT (acute kidney injury) (H)  Creatinine   Date Value Ref Range Status   08/27/2019 1.49 (H) 0.52 - 1.04 mg/dL Final   baseline around 1. Monitor.     Severe malnutrition (H)  Continue boost supplements. Continue vitamins.      Gout  Currently on allopurinol. Denies pain at this time.     Hypotension  Currently on nadolol for esophageal varices. Not symptomatic.    MDD (major depressive disorder)  Continues on prozac.     PAST MEDICAL HISTORY:  has a past medical history of DJD (degenerative joint disease), Essential hypertension, malignant, ETOH abuse, and Hepatitis C.    DISCHARGE MEDICATIONS:  Current Outpatient Medications   Medication Sig Dispense Refill     allopurinol (ZYLOPRIM) 100 MG tablet TAKE 1 TABLET (100 MG) BY MOUTH DAILY FOR 7 DAYS, THEN 2 TABLETS (200 MG) DAILY. 60 tablet 11     cephALEXin (KEFLEX) 500 MG capsule Take 1 capsule by mouth 2 times daily. 60 capsule 6     cyclobenzaprine (FLEXERIL) 10 MG tablet Take 10 mg by mouth 3 times daily as needed for muscle spasms       Ferrous Sulfate 324 (65 Fe) MG TBEC Take 648 mg by mouth daily       FLUoxetine (PROZAC) 20 MG capsule Take 20 mg by mouth daily       folic acid (FOLVITE) 1 MG tablet Take 1 tablet (1 mg) by mouth daily 90 tablet 3     lactulose 20 GM/30ML SOLN Take 30 mLs by mouth 4 times daily Titrate to 3-4 bowel movements per day       magnesium oxide 200 MG TABS Take 3 tablets by mouth daily 90 tablet 3     nadolol (CORGARD) 40 MG tablet Take 40 mg by mouth daily       ondansetron (ZOFRAN-ODT) 4 MG ODT tab Take 1 tablet (4 mg) by mouth every 12 hours as needed for nausea or vomiting 20 tablet 0     pantoprazole (PROTONIX) 40 MG EC tablet Take 1 tablet (40 mg) by mouth daily 90 tablet 0     rifaximin (XIFAXAN) 550 MG TABS tablet Take 1 tablet (550 mg) by mouth 2 times daily 60 tablet 11     sucralfate (CARAFATE) 1 GM/10ML suspension Take 1 g by mouth 4 times daily as needed        SUMAtriptan (IMITREX) 100 MG tablet Take 100 mg by mouth at onset of headache for migraine       triamcinolone (KENALOG) 0.1 % external cream Apply topically 2 times daily 80 g 1     zinc sulfate (ZINCATE) 220 (50 Zn) MG capsule Take 1 capsule (220 mg) by mouth 2 times daily 60  capsule 3       MEDICATION CHANGES/RATIONALE:   9/4 lactulose decreased to 30 g PO TID.     ROS:    4 point ROS including Respiratory, CV, GI and , other than that noted in the HPI,  is negative    Physical Exam:   Vitals: BP (!) 74/49   Pulse 69   Temp 95.8  F (35.4  C)   Resp 16   Wt 71.2 kg (157 lb)   SpO2 91%   BMI 25.34 kg/m    BMI= Body mass index is 25.34 kg/m .  General: alert, in no distress. Large rounded abdomen,     DISCHARGE PLAN:  Occupational Therapy, Physical Therapy, Registered Nurse and Home Health Aide  Patient instructed to follow-up with:  PCP in 7 days      Current [place] scheduled appointments:  Future Appointments   Date Time Provider Department Center   9/4/2019  1:00 PM Zarina Hurd NP [facility]   9/9/2019  9:00 AM  LAB [facility]   9/9/2019 10:00 AM Pamela Owen MD [facility]   9/9/2019 11:20 AM Monica Tirado MD [facility] referral needed and placed by this provider: No    Pending labs: none  SNF labs: none  Discharge Treatments:  - Ok to discharge to home with current medications and treatments  - Home Physical Therapy / Ocupational Therapy / RN / HHA  - Follow up with Primary care provider in 1 week  - Scripts sent to pharmacy: none   - Scripts written: none    TOTAL DISCHARGE TIME:   Greater than 30 minutes  Electronically signed by:  Zarina Hurd NP

## 2019-09-05 PROBLEM — K74.60 CIRRHOSIS (H): Status: ACTIVE | Noted: 2019-01-01

## 2019-09-05 NOTE — TELEPHONE ENCOUNTER
"Returned My's phone call to discuss pt's symptoms. My stated that pt had paracentesis at [facility] on 9/4 (6350 mL of ascites removed) and pt lost consciousness directly after procedure. Per My, pt was given oral fluids, VSS were stable, and pt was discharged to home. Pt discharged from TCU on 9/4 and currently lives with My. My noticed that pt's mentation is altered and pt is having increased lower extremity swelling, pruritis, and appears more jaundiced. My stated pt is taking lactulose twice daily and is going \"a lot\". My was not sure of quantity of bowel movements. Per chart review, pt was hospitalized 8/21-8/27 for acute encephalopathy and CT and diuretics were discontinued on discharge with plan for labs prior to next paracentesis. My stated that pt has not gotten any labs drawn since discharge from the hospital. Pt has follow up appointment with Dr. Owen on 9/9. Reviewed pt's symptoms with Dr. Owen and Dr. Owen recommended pt go to ED to be further evaluated. Reviewed recommendations with My and My stated that she would bring pt to an ED today. Will continue to follow plan of care.   "

## 2019-09-05 NOTE — LETTER
[address] [facility]  [address] [facility] [address]  [phone]    FACSIMILE TRANSMITTAL SHEET    TO: [facility] [phone], Fax [phone].     NOTES/COMMENTS: Attached please find initial clinical for Roslyn Palmer.  Requesting home RN, Pt, OT, HHA services.  Final discharge orders will be faxed once completed.  Anticipate possible discharge late today or tomorrow.    Brie Bahena RN BSN, PHN RN Care Coordinator  Internal Medicine   [phone]  Pager: [phone]  Weekend RN Care Coordinator job code * * * [id number]  9/16/2019 2:17 PM                          IF YOU DID NOT RECEIVE THE CORRECT NUMBER OF PAGES OR THE FAX DID NOT COME THROUGH CLEARLY, PLEASE CALL THE SENDER     CONFIDENTIALITY STATEMENT: Confidential information that may accompany this transmission contains protected health information under state and federal law and is legally privileged. This information is intended only for the use of the individual or entity named above and may be used only for carrying out treatment, payment or other healthcare operations. The recipient or person responsible for delivering this information is prohibited by law from disclosing this information without proper authorization to any other party, unless required to do so by law or regulation. If you are not the intended recipient, you are hereby notified that any review, dissemination, distribution, or copying of this message is strictly prohibited. If you have received this communication in error, please destroy the materials and contact us immediately by calling the number listed above. No response indicates that the information was received by the appropriate authorized party

## 2019-09-05 NOTE — TELEPHONE ENCOUNTER
[facility] Call Center    Phone Message    May a detailed message be left on voicemail: yes    Reason for Call: Other: My is calling to speak with Dr.Julie Owen or a nurse. My states her sister is worsening and has a list of other concerns would say what they were. Please call the My back ASA, Swathi's number is [phone], thank you     Action Taken: Message routed to:  [facility] ([facility] Hepatology

## 2019-09-06 NOTE — PLAN OF CARE
Neuro: A&Ox4.   Cardiac: SR. VSS. BP's 100s/60s  Respiratory: Sating  on RA.  GI/: Small urine output. BM X1 small  Diet/appetite: Tolerating gen diet. Eating well.  Activity:  Assist of x1 with cane, up to chair and in halls.  Pain: At acceptable level on current regimen.   Skin: Redness on bottom, cream applied   LDA's: PIV x1     Plan: Continue with POC. Notify primary team with changes.

## 2019-09-06 NOTE — PROGRESS NOTES
[facility] cross cover notified BP 86/55. MD stated they are ok with BP >80/50

## 2019-09-06 NOTE — PROGRESS NOTES
"SPIRITUAL HEALTH SERVICES  SPIRITUAL ASSESSMENT Progress Note  [facility] 6B     PRIMARY FOCUS:     Emotional/spiritual/Yarsani distress    Support for coping    REFERRAL SOURCE: Admission Referral    ILLNESS CIRCUMSTANCES:   Reviewed documentation. Reflective conversation shared with Marquita which integrated elements of illness and family narratives.     Context of Serious Illness/Symptom(s) - Marquita shares she is being hospitalized again and didn't think they would \"need to keep me.\" She shared she has had fluid drained from her and shared feeling exhausted by all of the appointments she is having.     Resources for Support - Marquita shares her family is very supportive. Her family includes her brother, sister and nieces and she now has two dogs at her home.     DISTRESS:     Emotional/Spiritual/Existential Distress - Marquita expressed being very stressed about her illness and that emotionally is a lot for her to handle. Marquita shared her dad  three days ago and expressed significant grief over this and that she wasn't able to be there when he . She shared he is in [place] and the  is on Tuesday. Marquita shared it is very important for her to be out of the hospital by then so she can attend his  and say goodbye.     Adventism Distress - Not Applicable    Social/Cultural/Economic Distress - Marquita expressed being stressed and sad that she is in the hospital when her siblings are coming in for her father's .     SPIRITUAL/Anabaptist COPING:     Yarsani/Maxine - Marquita shared she doesn't really identify with a maxine tradition but referenced at some point catholicism being a part of her journey. She shared her younger brother is Yarsani and that she was glad he has been there with her dad to help pray. She shared that her dad received anointing from a  which was important for him.     Spiritual Practice(s) - Marquita shared that she does pray every night for the people who have  " "in her life as a way of thinking of them. She shared that during visit, it was just \"nice to have someone to talk to.\" Marquita shared not being interested in prayer or ritual at this time.     Emotional/Relational/Existential Connections - Marquita shared that her family is a big support for her and her dogs at home. She shared one of the dogs was her dad's whom she is now taking care of. Marquita was smiling a lot when talking about her siblings and her nieces and shared her family takes her to appointments.     GOALS OF CARE:    Goals of Care - Marquita wants to be out of the hospital by Tuesday so she can attend her dad's .     Meaning/Sense-Making - Marquita shared she was grieving that her dad  but was glad he wasn't in pain anymore. She shared she felt he was \"ready to go.\" Marquita shared that she is tired of her illness but that she isn't ready to go and that there are things she still plans to do in her life. She also shared that the is strong and that she \"keeps going.\"    PLAN: I asked Marquita permission to share with her nurse that her dad  and that the  is Tuesday to see if it would be possible for her to attend it. Marquita shared she would appreciate that and would ask for a  again if needed. Marquita said I could stop by on Tuesday if she is still in the hospital at that time.     Dee Nascimento  Chaplain Resident  Pager [phone]    "

## 2019-09-06 NOTE — H&P
[facility], [place]    History and Physical - [facility] Swing/Night Service        Date of Admission:  9/5/2019    Assessment & Plan   Roslyn Palmer is a 63 year old female with past medical history of decompensated cirrhosis (2/2 alcohol and hepatitis C), chronic hepatitis C and HTN who presents as a transfer from an OSH due to her recent admission here for HE, who presents this admission following a syncopal episode s/p paracentesis yesterday removing 6.3 L. Labs from OSH reviewed and concerning for CT, hyponatremia, hypochloremia, anemia, and abnormal liver enzymes suggesting hepatocellular injury, cholestasis, and poor synthetic function. Our goal is to treat her CT and hypotension with albumin to increase fluid retention in her intravascular space.    # Orthostatic Hypotension S/p Large Volume Paracentesis  Patient's syncopal episode occurred immediately following a large paracentesis. She likely had an acute drop in her intravascular volume.  - Orthostatic BP's  - 100g Albumin for 3 days  - cont Nadolol to prevent reflex tachycardia    # CT  # Hyponatremia  # Hypochloremia  Baseline ~1. Admission Cre 4.01. Diuretics discontinued last admission. Likely pre-renal induced. Also considering hepatorenal syndrome.  - 100 g Albumin for 3 days  - Trend BMP  - monitor I/O's, daily weights    # Etoh-induced cirrhosis c/b ascites and EV  Last seen in GI/hepatology on 08/12/19 and MELD was 23 that visit. Cirrhosis diagnosed 6 years ago when she had EV GIB. No EGD since then. Ascites started to become appreciable in 05/19. 6.350 L removed on paracentesis today. Per patient, abdomen remains with increased amount of ascites compared to baseline. MELD-Na score on this admission: 37 points. 65-66% estimated 90-day mortality.  - Low Na diet  - Not on diuretics (was previously on Spironolactone and Torsemide)  - Consider diagnostic and therapeutic paracentesis this admission  - Track  weights, I/O's  - Zofran prn for nausea  - No SBP prophylaxis  - Ordered duplex US of liver to r/o portal vein thrombosis    # Stage 1 Hepatic Encephalopathy  Mild asterixis appreciable b/l in hands. Ammonia at OSH was 12. Neuro exam normal besides asterixis.  - Cont Rifaximin, Lactulose and Zinc  - Melatonin for insomnia  - CTM, serial neuro exams    # Chronic hepatitis C, treatment naive  Per last GI/hepatology visit with Dr. Owen on 08/12/19, patient is GT 2b treatment naive. Will address HCV treatment in the future. Not urgent at this point    # Pruritis  - Order Triamcinolone    # Malnutrition in setting of chronic illness  - Thera-Vit-M   - Boost Plus (vary flavors) TID between meals    # MDD  - cont Fluoxetine      # GALINA  Hgb 10.6 on admission. Was in 11's last admission.  - Cont iron    # Thrombocytopenia  Likely 2/2 etoh-cirrhosis. Ranges from 120-240 over the past year.  - CTM    # Subtherapeutic INR  Likely 2/2 etoh-cirrhosis. Not on anticoagulant.  - CTM    Diet: Na restricted diet  Fluids: PO intake and albumin  DVT Prophylaxis: Low Risk/Ambulatory with no VTE prophylaxis indicated  Cameron Catheter: not present  Code Status: Full Code    Disposition Plan   Expected discharge: 2 - 3 days, recommended to prior living arrangement once HDS.  Entered: Nir Lynne Jr., MD 09/05/2019, 8:08 PM     The patient's care was discussed with the Attending Physician, Dr. Soto.    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     She felt dizzy prior to the episode and had an accompanied fall. She did not hit her head throughout the episode. She denies previous syncopal episodes following paracentesis. Denies fevers, chills, chest pain, SOB. Denies headache, sore, throat, cough, sneeze, constipation, sick contacts, recent travel. Denies aches/pains. Patient has loose, frequent BM's at baseline in setting of lactulose. Procedure note reviewed and notable for 6.3 L removed, light yellow and serous, without immediate complications.    At OSH, she was given 25g IV Albumin and 1L bolus NS. Labs from OSH reviewed and concerning for CT, hyponatremia, hypochloremia, anemia, and abnormal liver enzymes suggesting hepatocellular injury, cholestasis, and poor synthetic function. Our goal is to treat her CT and hypotension with albumin to increase fluid retention in her intravascular space.    Recent admission from 08/21 - 08/27 for HE.    Review of Systems    The 10 point Review of Systems is negative other than noted in the HPI or here.     Past Medical History    I have reviewed this patient's medical history and updated it with pertinent information if needed.   Past Medical History:   Diagnosis Date     DJD (degenerative joint disease)      Essential hypertension, malignant      ETOH abuse      Hepatitis C       Past Surgical History   I have reviewed this patient's surgical history and updated it with pertinent information if needed.  Past Surgical History:   Procedure Laterality Date     COLONOSCOPY N/A 7/30/2018    Procedure: COMBINED COLONOSCOPY, SINGLE OR MULTIPLE BIOPSY/POLYPECTOMY BY BIOPSY;;  Surgeon: Musa Diaz MD;  Location: [facility] OR     COLONOSCOPY WITH CO2 INSUFFLATION N/A 7/30/2018    Procedure: COLONOSCOPY WITH CO2 INSUFFLATION;  Colonoscopy, Egd;  Surgeon: Musa Diaz MD;  Location:  OR      COMBINED ESOPHAGOSCOPY, GASTROSCOPY, DUODENOSCOPY (EGD) WITH CO2 INSUFFLATION N/A 2018    Procedure: COMBINED ESOPHAGOSCOPY, GASTROSCOPY, DUODENOSCOPY (EGD) WITH CO2 INSUFFLATION;  Combined, Upper GI bleed Alcoholic liver disease (H) Anemia, unspecified type, Ref By Celestino, BMI 27.55, -[phone];  Surgeon: Musa Diaz MD;  Location: [facility] OR     GYN SURGERY           HC ESOPHAGOSCOPY W BAND LIGATION ESOPHAGEAL VARICIES       ORTHOPEDIC SURGERY  ,    left and right hip replacement      Social History   I have reviewed this patient's social history and updated it with pertinent information if needed. Roslyn Palmer  reports that she has been smoking cigarettes.  She has been smoking about 0.20 packs per day. She has never used smokeless tobacco. She reports that she drank alcohol. She reports that she does not use drugs.    Family History   I have reviewed this patient's family history and updated it with pertinent information if needed.   Family History   Problem Relation Age of Onset     Breast Cancer Mother      Cancer Maternal Grandmother      Cerebrovascular Disease Maternal Grandmother         stroke     Cancer Maternal Grandfather         pancreatic cancer     Cancer Paternal Grandmother      Cancer Paternal Grandfather         brain cancer     Arthritis Sister      - Brother  of HCC 2/2 cirrhosis    Prior to Admission Medications   Prior to Admission Medications   Prescriptions Last Dose Informant Patient Reported? Taking?   FLUoxetine (PROZAC) 20 MG capsule   Yes No   Sig: Take 20 mg by mouth daily   Ferrous Sulfate 324 (65 Fe) MG TBEC   Yes No   Sig: Take 648 mg by mouth daily   SUMAtriptan (IMITREX) 100 MG tablet   Yes No   Sig: Take 100 mg by mouth at onset of headache for migraine   allopurinol (ZYLOPRIM) 100 MG tablet   No No   Sig: TAKE 1 TABLET (100 MG) BY MOUTH DAILY FOR 7 DAYS, THEN 2 TABLETS (200 MG) DAILY.   cephALEXin (KEFLEX) 500 MG capsule   No No    Sig: Take 1 capsule by mouth 2 times daily.   cyclobenzaprine (FLEXERIL) 10 MG tablet   Yes No   Sig: Take 10 mg by mouth 3 times daily as needed for muscle spasms   folic acid (FOLVITE) 1 MG tablet   No No   Sig: Take 1 tablet (1 mg) by mouth daily   lactulose 20 GM/30ML SOLN   No No   Sig: Take 30 mLs by mouth 4 times daily Titrate to 3-4 bowel movements per day   magnesium oxide 200 MG TABS   No No   Sig: Take 3 tablets by mouth daily   nadolol (CORGARD) 40 MG tablet   Yes No   Sig: Take 40 mg by mouth daily   ondansetron (ZOFRAN-ODT) 4 MG ODT tab   No No   Sig: Take 1 tablet (4 mg) by mouth every 12 hours as needed for nausea or vomiting   pantoprazole (PROTONIX) 40 MG EC tablet   Yes No   Sig: Take 1 tablet (40 mg) by mouth daily   rifaximin (XIFAXAN) 550 MG TABS tablet   No No   Sig: Take 1 tablet (550 mg) by mouth 2 times daily   sucralfate (CARAFATE) 1 GM/10ML suspension   Yes No   Sig: Take 1 g by mouth 4 times daily as needed    triamcinolone (KENALOG) 0.1 % external cream   No No   Sig: Apply topically 2 times daily   zinc sulfate (ZINCATE) 220 (50 Zn) MG capsule   No No   Sig: Take 1 capsule (220 mg) by mouth 2 times daily      Facility-Administered Medications: None     Allergies   Allergies   Allergen Reactions     Sulfa Drugs      Physical Exam   Vital Signs: Temp: 97.6  F (36.4  C) Temp src: Oral BP: 91/50 Pulse: 90 Heart Rate: 81 Resp: 18 SpO2: 94 % O2 Device: None (Room air)    Weight: 0 lbs 0 oz    GEN: pleasant woman, in bed, in NAD  SKIN: jaundiced, ecchymoses on forearms and shins  HEENT: scleral icterus, poor dentition, NC, AT, MMM, non-erythematous oropharynx  NECK: supple, no cervical LAD  CARDS: regular rate,   PULM: CTAB, breathing comfortably on RA, no wheeze or rales, no dullness to percussion  ABD: obese, distended, + fluid wave, no guarding or rebound, + BS  EXT: moving all spontaneously, non-edematous  NEURO: CN 2-12, AOx4, + mild asterixis    Data   Data reviewed today: I reviewed  all medications, new labs and imaging results over the last 24 hours.    Lactate: 1.6  Ammonia: 12    PT: 17.7  INR: 1.6    ALT: 128  AP: 513  AST: 240  PT: 6.7  Alb: 1.8  DB: 12.59  TB: 17.0    NA: 122  K: 4.5  CL: 90  HCO3: 19  BUN: 88  Cre: 4.01  Glucose: 109  Ca: 8.7  A.0    WBC: 10.6  Hgb: 10.6  Plt: 125    US Venous lower Ext L  - No DVT

## 2019-09-06 NOTE — PROCEDURES
Consult and Procedure Service - Procedure Note    Attending: Andrei  Resident: Lerman  Indication: SBP evaluation  Risk Assessment: Low, INR 2.0, Plt 138  Pre-procedure diagnosis: Alcoholic Cirrhosis with Ascites  Post-procedure diagnosis: Alcoholic Cirrhosis with Ascites  Procedure name:  Abdominal Paracentesis    The risks and benefits of the procedure were explained to patient who expressed understanding and opted to proceed.  Consent was obtained and placed in the chart.  A time out was performed.  An area of ascites was located with ultrasound and marked in the right quadrant; the area was prepped and draped in the usual sterile fashion.  10 ml of 1% lidocaine was instilled with a 18 gauge needle and ascites located under real-time guidance. 20 ml of straw colored fluid was removed and sent for analysis and the area dressed.     Patient tolerated the procedure well. Please contact the Consult and Procedure Service if any complications or concerns arise.     Dontae Forbes MD  Attending Physician    DOS:  9/6/2019

## 2019-09-06 NOTE — PROGRESS NOTES
Admission          9/5/2019  7:32 PM  -----------------------------------------------------------  Reason for admission:  Primary team notified of pt arrival.  Admitted from: OSH  Via: stretcher  Accompanied by: family  Belongings: Placed in closet; valuables sent home with family  Admission Profile: complete  Teaching: orientation to unit and call light- call light within reach, call don't fall, use of console, meal times, when to call for the RN, and enforced importance of safety   Access: PIV  Telemetry:Placed on pt  Ht./Wt.: complete  2 RN Skin Assessment Completed By: Irma LOPEZ and Lesa SHI Blanchable and non blanachable redness  Redness on coccyx. Scabbing and bruising on arms and legs.   Pt status:    Temp:  [97.6  F (36.4  C)] 97.6  F (36.4  C)  Pulse:  [90] 90  Heart Rate:  [81] 81  Resp:  [18] 18  BP: (91)/(50) 91/50  SpO2:  [94 %] 94 %

## 2019-09-06 NOTE — PLAN OF CARE
Neuro: A&Ox4.   Cardiac: SR. VSS.   Respiratory: Sating 99 on RA.  GI/: Adequate urine output. BM X1  Diet/appetite: Tolerating cho, 2g na diet. Eating well.  Activity:  Assist of 1 with cane, up to bathroom  Pain: At acceptable level on current regimen. No PRN given  Skin: No new deficits noted.  LDA's: No changes. PIV SL     Plan: Continue with POC. Notify primary team with changes.

## 2019-09-06 NOTE — PROGRESS NOTES
Physician Attestation   I, Lori Castro MD, saw this patient with the resident and agree with the resident/fellow's findings and plan of care as documented in the note.      I personally reviewed vital signs, medications, labs and imaging.    Lori Castro MD  Date of Service (when I saw the patient): 09/06/19    [facility], [place]    Progress Note - [place] [facility] Service        Date of Admission:  9/5/2019    Assessment & Plan   Roslyn Palmer is a 63 year old female with past medical history of decompensated cirrhosis (2/2 alcohol and hepatitis C), chronic hepatitis C and HTN who presents as a transfer from an OSH due to her recent admission here for HE, who presents this admission following a syncopal episode s/p paracentesis yesterday removing 6.3 L. Labs from OSH reviewed and concerning for CT, hyponatremia, hypochloremia, anemia, and abnormal liver enzymes suggesting hepatocellular injury, cholestasis, and poor synthetic function.     Changes today:   - Diagnostic paracentesis ordered   - US pending   - Albumin bolus ordered   - UA, serum and urine osms, Na osms pending   - NS IVF for hyponatremia  - Q4H Na checks today      # Orthostatic Hypotension S/p Large Volume Paracentesis  Patient's syncopal episode occurred immediately following a large paracentesis. She likely had an acute drop in her intravascular volume.  - Orthostatic BP's  - 100g Albumin for 3 days  - cont Nadolol to prevent reflex tachycardia     # CT  # Hyponatremia  # Hypochloremia  Baseline ~1. Admission Cre 4.01. Diuretics discontinued last admission. Likely pre-renal induced. Also considering hepatorenal syndrome.  - 100 g Albumin for 3 days  - Trend BMP  - monitor I/O's, daily weights     # Etoh-induced cirrhosis c/b ascites and EV  Last seen in GI/hepatology on 08/12/19 and MELD was 23 that visit. Cirrhosis diagnosed 6 years ago when she had EV GIB. No EGD since then. Ascites started to become  appreciable in 05/19. 6.350 L removed on paracentesis today. Per patient, abdomen remains with increased amount of ascites compared to baseline. MELD-Na score on this admission: 37 points. 65-66% estimated 90-day mortality.  - Low Na diet  - Not on diuretics (was previously on Spironolactone and Torsemide)  - Track weights, I/O's  - Zofran prn for nausea  - No SBP prophylaxis  - Duplex US of liver negative for thrombosis      # Stage 1 Hepatic Encephalopathy, improving   Mild asterixis appreciable b/l in hands. Ammonia at OSH was 12. Very minimal asterixis on exam. Patient having excessive bowel movements at her TCU likely contributing to hypovolemia.   - Cont Rifaximin, Lactulose and Zinc  - Titrate lactulose for 3-4 bowel movements a day  - Melatonin for insomnia  - Diagnostic paracentesis pending 09/06/19     # Chronic hepatitis C, treatment naive  Per last GI/hepatology visit with Dr. Owen on 08/12/19, patient is GT 2b treatment naive. Will address HCV treatment in the future. Not urgent at this point     # Pruritis  Likely related to underlying hepatic dysfunction.   - Discontinue triamcinolone and start sarna lotion      #Severe malnutrition in the context of acute on chronic illness  % Intake: </=75% for >/= 1 month (severe)  % Weight Loss: > 10% in 6 months (severe)  Subcutaneous Fat Loss: Facial region, Upper arm:, Lower arm:and Thoracic/intercostal: Severe  Muscle Loss: Temporal,  Facial & jaw region, Scapular bone, Thoracic region (clavicle, acromium bone, deltoid, trapezius, pectoral), Upper arm (bicep, tricep),  Lower arm  (forearm), Dorsal hand, Upper leg (quadricep, hamstring),  and Posterior calf: Severe   Fluid Accumulation/Edema: Mild  - Thera-Vit-M   - Boost Plus (vary flavors) TID between meals  -Nutrition consult, apprec recs     # MDD  - cont Fluoxetine        # GALINA  Hgb 10.6 on admission. Was in 11's last admission.  - Cont iron     # Thrombocytopenia  Likely 2/2 etoh-cirrhosis. Ranges  from 120-240 over the past year.  - CTM     # Subtherapeutic INR  Likely 2/2 etoh-cirrhosis. Not on anticoagulant.  - CTM     Diet: Combination Diet Regular Diet Adult; 2 gm NA Diet  Snacks/Supplements Adult: Boost Plus; Between Meals    Fluids: 1L NS at 125cc/hr for 1L for hyponatremia  Lines: PIV  DVT Prophylaxis: Ambulatory, INR high  Cameron Catheter: not present  Code Status: Full Code      Disposition Plan   Expected discharge: 2 - 3 days, recommended to transitional care unit once renal function improved.  Entered: Elsy Malave MD 09/06/2019, 2:26 PM       The patient's care was discussed with the Attending Physician, Dr. Castro.    Elsy Malave MD  [place] [address] Service  [facility], [place]  Pager: [phone]  Please see sticky note for cross cover information  ______________________________________________________________________    Interval History   Patient re-admitted to the hospital overnight. C/o frequent diarrhea at her TCU and she thinks she was getting way too much. Thinks her bowel movements are slowing down a bit now though. Denies shortness of breath, chest pain today. Overall feels a little better but she is concerned about her kidneys. Says her last drink was this July and she doesn't have any cravings.     4pt ROS negative except as stated above    Data reviewed today: I reviewed all medications, new labs and imaging results over the last 24 hours.     Physical Exam   Vital Signs: Temp: 94.5  F (34.7  C) Temp src: Oral BP: 103/65 Pulse: 87 Heart Rate: 87 Resp: 18 SpO2: 97 % O2 Device: None (Room air)    Weight: 148 lbs 9.44 oz  GEN: pleasant woman, in bed, in NAD  SKIN: jaundiced, ecchymoses on forearms and shins, scattered spider angiomas, and palmar erythema present   HEENT: scleral icterus, poor dentition, NC, AT, MMM, non-erythematous oropharynx  NECK: supple, no cervical LAD  CARDS: regular rate,   PULM: CTAB, breathing comfortably on RA, no wheeze or  rales, no dullness to percussion  ABD: obese, distended, + fluid wave, no guarding or rebound, + BS  EXT: moving all spontaneously, non-edematous  NEURO: CN 2-12, AOx4, + mild asterixis

## 2019-09-06 NOTE — TELEPHONE ENCOUNTER
Health Call Center    Phone Message    May a detailed message be left on voicemail: yes    Reason for Call: Other: Liz briones RN Case Manager for patient is calling from [employer] requesting a Treatment Plan be faxed to [phone] from Dr. Owen's team. For any questions, she can be reached at [phone] ext. [phone].     Action Taken: Message routed to:  [facility] ([facility]): hepatology

## 2019-09-06 NOTE — TELEPHONE ENCOUNTER
Spoke with My to confirm pt appt. Pt is in hospital. My will call to notify staff if pt needs to cancel appt in clinic.  Nohemi Galvin CMA

## 2019-09-06 NOTE — CONSULTS
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    #.  Acute kidney injury  Hyponatremia  Patient presents with significant rising creatinine, associated with hyponatremia and metabolic acidosis.  Picture is likely related to low effective arterial blood volume secondary to fluid shifts from paracentesis, gastrointestinal fluid loss from lactulose use, as well as diuretic to cues [torsemide and spironolactone.  Other possibilities include ongoing infection, and we always have to consider new hepatorenal syndrome.  We will plan on intravascular repletion of fluid, and continue to monitor kidney function, and urine output.    #. Liver cirrhosis:      Worsening hyperbilirubinemia  Decompensated disease, based on the presence of HE, ascites. Hyperbilirubinemia may be related to active alcohol use, severe dehydration, infection, etc  Etiology: HCV, ETOH (last drink 07/05/2019)  MELD-Na of  39    Hepatic encephalopathy: Type C, Grade 0 PSE   Ascites: Noted on examination/ultrasound   TIPSS: None  Esophageal/Gastric varices: No EGD yet  Hepatocellular carcinoma: Last USS was 9/5/2019 with no HCC  Transplant: Deferred, needs 6 months sobriety   Nutrition: Weight is stable  Coagulopathy: INR 2.03  Thrombocytopenia: Related to CLD  RECOMMENDATIONS:  -- IV Albumin 25% 100 g daily for at least 2-3 days  -- Hold diuretics  -- Infection surveillance if febrile:   - Blood cultures   - Urine cultures   - Chest XR   - Diagnostic paracentesis     -- Obtain UA with microscopy  -- Avoid nephrotoxins  -- Monitor UOP, daily I/O  -- Daily BMP  --  Nephrology consultation if worsening  -- Rifaximin 550 mg BID and Zinc  -- Lactulose PO, titrate to 3-4 BMs per day   Avoid diarrhea  -- Monitor neurologic status   -- Diagnostic paracentesis  -- Obtain PEth and ETG  -- Hold beta-blockers  -- Ensure sodium restriction to 2000 mg per day   -- Adequate protein diet (1.2 - 1.5 g/kg/day)     Thank you for involving us in this patient's care. Please do not hesitate to contact the GI service with any questions or concerns.     Patient care plan discussed with Dr. Gloria, GI staff physician.    Dom Mathis MD  Gastroenterology - Hepatology  PGY 5 ([phone])            Chief Complaint:   We were asked by Dr. Malave of medicine to evaluate this patient with CT  History is obtained from the patient and the medical record.          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    Patient was last seen in hepatology clinic on 8/12/2019.  At that time she had a MELD score of 23, she was continued on her diuretics, with plans for as needed paracentesis [3 L removed approximately each time] and she was also planned for an upper endoscopy for variceal surveillance.  However this EGD was deferred given findings of a heart murmur on physical exam, and she was planned to have an echocardiogram prior to this.  However she has not gotten around to doing this because she has been in the hospital.      She was admitted later in August for hepatic encephalopathy, with improvement, and discharged to TCU.  While she was in her rehab unit, she was noted to have multiple bowel movements with lactulose, and patient states that sometimes she had  up to 18 bowel movements/day.  On the day of discharge from her TCU, she underwent paracentesis at an outside center with removal of 6.3 L of fluid.  She lost consciousness briefly and received IV fluids.  Labs obtained the following day revealed low sodium, and elevated creatinine so she was asked to go into the emergency room.  Patient denies any fevers or chills, hematemesis, melena, abdominal pain, chest pain, or difficulty breathing.  She denies any sick contacts, however it is unclear if she had any contact with sick patients at her rehab center.            Past Medical History:   Reviewed and edited as appropriate  Past Medical History:   Diagnosis Date     DJD (degenerative joint disease)      Essential hypertension, malignant      ETOH abuse      Hepatitis C           Past Surgical History:   Reviewed and edited as appropriate   Past Surgical History:   Procedure Laterality Date     COLONOSCOPY N/A 2018    Procedure: COMBINED COLONOSCOPY, SINGLE OR MULTIPLE BIOPSY/POLYPECTOMY BY BIOPSY;;  Surgeon: Musa Diaz MD;  Location: [facility] OR     COLONOSCOPY WITH CO2 INSUFFLATION N/A 2018    Procedure: COLONOSCOPY WITH CO2 INSUFFLATION;  Colonoscopy, Egd;  Surgeon: Musa Diaz MD;  Location: [facility] OR     COMBINED ESOPHAGOSCOPY, GASTROSCOPY, DUODENOSCOPY (EGD) WITH CO2 INSUFFLATION N/A 2018    Procedure: COMBINED ESOPHAGOSCOPY, GASTROSCOPY, DUODENOSCOPY (EGD) WITH CO2 INSUFFLATION;  Combined, Upper GI bleed Alcoholic liver disease (H) Anemia, unspecified type, Ref By Celestino, BMI 27.55, [phone];  Surgeon: Musa Diaz MD;  Location: [facility] OR     GYN SURGERY           HC ESOPHAGOSCOPY W BAND LIGATION ESOPHAGEAL VARICIES       ORTHOPEDIC SURGERY  ,    left and right hip replacement          Previous Endoscopy:     Results for orders placed or performed during the hospital encounter of 18   COLONOSCOPY   Result Value Ref Range    COLONOSCOPY       [facility] [facility]  Endoscopy Department-[place]  _______________________________________________________________________________  Patient Name: Roslyn Palmer        Procedure Date: 7/30/2018 11:41 AM  MRN: [id number]                       YOB: 1955  Admit Type: Outpatient                Age: 62  Gender: Female                        Note Status: Finalized  Attending MD: Musa Diaz MD      Instrument Name: [id number]  _______________________________________________________________________________     Procedure:                Colonoscopy  Indications:              Screening for colorectal malignant neoplasm,                             Positive stool test (per patient - unclear what                             test was done). Anemia, GI bleed.  Providers:                Musa Diaz MD, Madhuri Mchugh RN  Patient Profile:          This is a 62 year old female.  Referring MD:             Monica Tirado MD  Complications:             No immediate complications.  _______________________________________________________________________________  Procedure:                Pre-Anesthesia Assessment:                            - Prior to the procedure, a History and Physical                             was performed, and patient medications, allergies                             and sensitivities were reviewed. The patient's                             tolerance of previous anesthesia was reviewed.                            - The risks and benefits of the procedure and the                             sedation options and risks were discussed with the                             patient. All questions were answered and informed                             consent was obtained.                            - Patient identification and proposed procedure                             were verified prior to the procedure by the                             physician and the nurse. The  procedure was verified                              in the pre-procedure area in the procedure room.                            - Pre-procedure physical examination revealed no                             contraindications to sedation.                            After obtaining informed consent, the colonoscope                             was passed under direct vision. Throughout the                             procedure, the patient's blood pressure, pulse, and                             oxygen saturations were monitored continuously. The                             Colonoscope was introduced through the anus and                             advanced to the terminal ileum. The colonoscopy was                             performed without difficulty. The patient tolerated                             the procedure fairly well. The quality of the bowel                             preparation was excellent.                                                                                   Findings:       The perianal and digit al rectal examinations were normal. Pertinent        negatives include normal sphincter tone and no palpable rectal lesions.       The terminal ileum appeared normal.       A 3 mm polyp was found in the proximal ascending colon. The polyp was        sessile. Biopsies were taken with a cold forceps for histology.       A 5 mm polyp was found in the sigmoid colon. The polyp was sessile. The        polyp was removed with a cold snare. Resection and retrieval were        complete.       Many small-mouthed diverticula were found in the sigmoid colon.       The exam was otherwise without abnormality on direct and retroflexion        views.                                                                                   Moderate Sedation:       Moderate (conscious) sedation was administered by the endoscopy nurse        and supervised by the endoscopist. The following parameters were        monitored:  oxygen saturation, heart rate, blood pressure, and response        to care. Total physician  intraservice time was 25 minutes.  Impression:               - The examined portion of the ileum was normal.                            - One 3 mm polyp in the proximal ascending colon.                             Biopsied.                            - One 5 mm polyp in the sigmoid colon, removed with                             a cold snare. Resected and retrieved.                            - Diverticulosis in the sigmoid colon.                            - The examination was otherwise normal on direct                             and retroflexion views.                            - She did not tolerate the colonoscopy well enough                             to believe she can tolerate the upper endoscopy                             today, when she required monitored anesthesia care                             to complete it in the past. This procedure does not                             explain anemia; she has a history of varcies and                             variceal band liga tion is not available in this                             facility.  Recommendation:           - Discharge patient to home.                            - Await pathology results.                            - Repeat colonoscopy in 5 years for surveillance.                            - Perform an upper GI endoscopy at appointment to                             be scheduled; under monitored anesthesia care and                             at a facility were variceal band ligation is                             available.                                                                                     __________________  Musa Diaz MD  7/30/2018 12:54:26 PM  I was physically present for the entire viewing portion of the exam.Musa Diaz MD  Number of Addenda: 0    Note Initiated On: 7/30/2018 11:41 AM  Scope Withdrawal Time: 0 hours 12  minutes 54 seconds   Total Procedure Duration: 0 hours 26 minutes 34 seconds   Scope In: 12:19:11 PM  Scope Out: 12:45:45 PM              Social History:   Reviewed and edited as appropriate   ; No children, lives with her sister.  Smokes 1 PPD  Drinks 6-8 drinks/day (White Russians) for years. Longest period of sobriety has been 6 months.  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 Days. 40 hours per week. OUt on ST disability now.         Family History:   Reviewed and edited as appropriate  No known history of gastrointestinal/liver disease or  gastrointestinal malignancies       Allergies:   Reviewed and edited as appropriate     Allergies   Allergen Reactions     Sulfa Drugs           Medications:     Medications Prior to Admission   Medication Sig Dispense Refill Last Dose     allopurinol (ZYLOPRIM) 100 MG tablet TAKE 1 TABLET (100 MG) BY MOUTH DAILY FOR 7 DAYS, THEN 2 TABLETS (200 MG) DAILY. 60 tablet 11 9/4/2019 at Unknown time     cephALEXin (KEFLEX) 500 MG capsule Take 1 capsule by mouth 2 times daily. 60 capsule 6 9/4/2019 at Unknown time     cyclobenzaprine (FLEXERIL) 10 MG tablet Take 10 mg by mouth 3 times daily as needed for muscle spasms   Past Week at Unknown time     Ferrous Sulfate 324 (65 Fe) MG TBEC Take 648 mg by mouth daily   9/4/2019 at Unknown time     FLUoxetine (PROZAC) 20 MG capsule Take 20 mg by mouth daily   9/4/2019 at Unknown time     folic acid (FOLVITE) 1 MG tablet Take 1 tablet (1 mg) by mouth daily 90 tablet 3 9/4/2019 at Unknown time     lactulose 20 GM/30ML SOLN Take 30 mLs by mouth 4 times daily Titrate to 3-4 bowel movements per day (Patient taking differently: Take 30 mLs by mouth 2 times daily Titrate to 3-4 bowel movements per day)   9/4/2019 at Unknown time     magnesium oxide 200 MG TABS Take 3 tablets by mouth daily 90 tablet 3 9/4/2019 at Unknown time     ondansetron (ZOFRAN-ODT) 4 MG ODT tab  Take 1 tablet (4 mg) by mouth every 12 hours as needed for nausea or vomiting 20 tablet 0 Past Month at Unknown time     pantoprazole (PROTONIX) 40 MG EC tablet Take 1 tablet (40 mg) by mouth daily 90 tablet 0 9/4/2019 at Unknown time     triamcinolone (KENALOG) 0.1 % external cream Apply topically 2 times daily 80 g 1 9/4/2019 at Unknown time     zinc sulfate (ZINCATE) 220 (50 Zn) MG capsule Take 1 capsule (220 mg) by mouth 2 times daily 60 capsule 3 9/4/2019 at Unknown time     nadolol (CORGARD) 40 MG tablet Take 40 mg by mouth daily   Unknown at Unknown time     rifaximin (XIFAXAN) 550 MG TABS tablet Take 1 tablet (550 mg) by mouth 2 times daily 60 tablet 11 Unknown at Unknown time     sucralfate (CARAFATE) 1 GM/10ML suspension Take 1 g by mouth 4 times daily as needed    Unknown at Unknown time     SUMAtriptan (IMITREX) 100 MG tablet Take 100 mg by mouth at onset of headache for migraine   More than a month at Unknown time             Review of Systems:     A complete review of systems was performed and is negative except as noted in the HPI           Physical Exam:   /66 (BP Location: Left arm)   Pulse 89   Temp 95.6  F (35.3  C) (Tympanic)   Resp 18   Wt 67.4 kg (148 lb 9.4 oz)   SpO2 100%   BMI 23.98 kg/m    Wt:   Wt Readings from Last 2 Encounters:   09/06/19 67.4 kg (148 lb 9.4 oz)   09/04/19 71.2 kg (157 lb)      Constitutional: cooperative, pleasant   Eyes: Sclera icteric   Ears/nose/mouth/throat: Normal oropharynx without ulcers or exudate, mucus membranes moist, hearing intact  Neck: supple,    CV: Systolic murmur heard on physical exam, bilateral pedal edema    Respiratory: Unlabored breathing  Lymph: No axillary lymphadenopathy  Abdomen: Soft, distended, nontender, no peritoneal signs  Skin: warm, perfused, jaundice  Neuro: AAO x 3, No asterixis  Psych: Normal affect  MSK: In bed         Data:   Labs and imaging below were independently reviewed and interpreted    MELD-Na score: 39 at  9/6/2019  4:25 PM  MELD score: 38 at 9/6/2019 12:54 PM  Calculated from:  Serum Creatinine: 3.81 mg/dL at 9/6/2019  5:05 AM  Serum Sodium: 123 mmol/L (Rounded to 125 mmol/L) at 9/6/2019  4:25 PM  Total Bilirubin: 17.7 mg/dL at 9/6/2019  5:05 AM  INR(ratio): 2.03 at 9/6/2019 12:54 PM  Age: 63 years     BMP  Recent Labs   Lab 09/06/19  1625 09/06/19  1106 09/06/19  0505   * 122* 120*   POTASSIUM  --   --  4.4   CHLORIDE  --   --  91*   MEGGAN  --   --  9.2   CO2  --   --  14*   BUN  --   --  95*   CR  --   --  3.81*   GLC  --   --  82     CBC  Recent Labs   Lab 09/06/19  0505   WBC 9.6   RBC 3.22*   HGB 10.2*   HCT 29.7*   MCV 92   MCH 31.7   MCHC 34.3   RDW 22.3*   *     INR  Recent Labs   Lab 09/06/19  1254   INR 2.03*     LFTs  Recent Labs   Lab 09/06/19  0505   ALKPHOS 458*   *   *   BILITOTAL 17.7*   PROTTOTAL 6.6*   ALBUMIN 2.1*      PANCNo lab results found in last 7 days.    Imaging:  US ABDOMEN LIMITED WITH DOPPLER COMPLETE 9/6/2019 10:29  AM   Findings:  Liver: Cirrhotic configuration of the liver. There is a 2.4 x 1.8 x  2.0 cm anechoic cyst in the left lobe. Liver measures 15.1 cm in  craniocaudal dimension.    Extrahepatic portal vein flow is antegrade at 15 cm/s.  Right portal vein flow is antegrade, measuring 14 cm/s.  Left portal vein flow is antegrade, measuring 9 cm/s.   Flow in the hepatic artery is towards the liver and:  110 cm/s peak systolic  0.89 resistive index.    Retrograde flow in the splenic vein with peak systolic velocity  measuring 25 cm/s The left, middle, and right hepatic veins are patent  with flow towards the IVC. The IVC is patent with flow towards the  heart. Patent umbilical vein noted.   Gallbladder: Layering stones versus sludge in the gallbladder. Mild  thickening of the gallbladder wall measuring up to 5 mm.   Bile Ducts: Both the intra- and extrahepatic biliary system are of  normal caliber.  The common bile duct measures 4 mm.   Pancreas: Pancreas  is partially obscured. Visualized portions are  unremarkable.   Kidneys: Right kidney measures 11.8 cm in length. No echogenic stones,  hydronephrosis or focal mass.   Fluid: Moderate volume ascites.                                                                    Impression:   1.  Patent hepatic vasculature with velocities as detailed above. No  evidence of portal vein thrombosis. There is reversal of flow in the  splenic vein and a patent umbilical vein, which are suggestive of  portal venous hypertension.  2.  Cirrhotic appearance of the liver. There is simple appearing  anechoic cyst in the left lobe, not appreciated on prior exam. No  suspicious hepatic lesion.  3.  Layering stones versus sludge in the gallbladder. There is mild  wall thickening, likely reactive to ascites and adjacent parenchymal  disease.  4.  Moderate volume ascites.

## 2019-09-07 PROBLEM — E53.8 LOW FOLATE: Status: RESOLVED | Noted: 2017-03-18 | Resolved: 2019-01-01

## 2019-09-07 NOTE — PROGRESS NOTES
[facility] Nurse Inpatient Pressure Injury Assessment   Reason for consultation: Evaluate and treat suspected coccyx pressure injury      ASSESSMENT  Moisture/incontinence associated dermatitis with hemorrhoids and skin tags.   No Pressure Injury   TREATMENT PLAN  Cleanse with periwipes and apply criticaid paste after all bowel movements   Orders Written  [facility] Nurse follow-up plan:signing off    Patient History  According to provider note(s):  63 year old female with past medical history of decompensated cirrhosis (2/2 alcohol and hepatitis C), chronic hepatitis C and HTN who presents as a transfer from an OSH due to her recent admission here for HE, who presents this admission following a syncopal episode s/p paracentesis yesterday removing 6.3 L. Labs from OSH reviewed and concerning for CT, hyponatremia, hypochloremia, anemia, and abnormal liver enzymes suggesting hepatocellular injury, cholestasis, and poor synthetic function.      Objective Data  Containment of urine/stool: brief, using bathroom    Current Diet/ Nutrition:  Orders Placed This Encounter      Combination Diet Regular Diet Adult; 2 gm NA Diet      Output:   I/O last 3 completed shifts:  In: 400   Out: 125 [Urine:125]    Risk Assessment:   Sensory Perception: 4-->no impairment  Moisture: 3-->occasionally moist  Activity: 3-->walks occasionally  Mobility: 3-->slightly limited  Nutrition: 3-->adequate  Friction and Shear: 3-->no apparent problem  Leonard Score: 19      Labs:   Recent Labs   Lab 09/06/19  1254 09/06/19  0505   ALBUMIN  --  2.1*   HGB  --  10.2*   INR 2.03*  --    WBC  --  9.6       Physical Exam  Skin inspection: focused buttocks   History:  Pt is independent with ambulation and bed mobility.  Several loose stools 2/2 lactulose.  Goal is 2-4 stools per day  Skin intact, blanchable over bony prominences.   Pink erythema on perineum with small hemorrhoids.  Skin tags approximately 1 cm from anus  Temperature: normal   Drainage:, none  Pain:  denies ,     Interventions  Current support surface: Standard  Atmos Air mattress  Wound Care: was done per plan of care.  Supplies: gathered  Educated provided: plan of care  Education provided to: patient   Discussed importance of:moisture management  Discussed plan of care with Nurse

## 2019-09-07 NOTE — PLAN OF CARE
Patient is A&Ox3, forgetful at times. Calls appropriately. Vitally has been stable, with soft blood pressures. Denies pain. Up to BR to for bowel/bladder. Tolerating a 2 gram sodium diet. Scheduled lactulose given with small BMs. Will continue current plan of care and update MDs with any changes.

## 2019-09-07 NOTE — PLAN OF CARE
Discharge Planner OT   Patient plan for discharge: Unstated.   Current status: Pt SBA for bed mobility, ambulated bed <> BR using CGA and FWW with unsteadiness noted. S/SBA while seated to don/doff socks, briefs, and pants, v/c for technique. Pt completed g/h tasks standing at sink using SBA, FWW, and min v/c to initiate and sequence through tasks. Pt completed toileting/toileting hygiene with S/SBA 2/2 impulsivity and safety concerns. Pt distractible throughout session and had difficulty shifting/dividing attention with extra time needed for processing. VSS on RA. Alarm in use.  Barriers to return to prior living situation: Medical status, strength, functional endurance, cognition, balance, and decreased ADL I.  Recommendations for discharge: TCU currently but may progress to home pending amount of assist sister can provide.   Rationale for recommendations: Pt would benefit from further therapy to improve balance, strength, cognition, and endurance to promote ADL I as she is currently needing supervision/verbal cues to complete above ADLs safely. Typically ambulates only in the community with SPC, currently using FWW for stability. May progress to home pending LOS and assist that can be provided at home.        Entered by: Ana Cifuentes 09/07/2019 10:00 AM

## 2019-09-07 NOTE — PLAN OF CARE
Neuro: A&Ox4.   Cardiac: SR. VSS.   Respiratory: Sating WNL on RA.  GI/: Multiple BMs overnight. Small urine output. MD notifed. Pt straight cathed for 50 ml.  Diet/appetite: Tolerating gen diet. Eating well.  Activity:  Assist of x1 with walker to bathroom.  Pain: At acceptable level on current regimen.   Skin: Redness on bottom, cream applied  Multiple scratches with some bleeding.  LDA's: PIV x1      Plan: Continue with POC. Notify primary team with changes.

## 2019-09-07 NOTE — PROGRESS NOTES
Physician Attestation   I, Lori Castro MD, saw this patient with the resident and agree with the resident/fellow's findings and plan of care as documented in the note.      I personally reviewed vital signs, medications and labs.    Lori Castro MD  Date of Service (when I saw the patient): 09/07/19      [facility], Portal    Progress Note - Rosa 1 Service        Date of Admission:  9/5/2019    Assessment & Plan   Roslyn Palmer is a 63 year old female with past medical history of decompensated cirrhosis (2/2 alcohol and hepatitis C), chronic hepatitis C and HTN who presents as a transfer from an OSH due to her recent admission here for HE, who presents this admission following a syncopal episode s/p paracentesis yesterday removing 6.3 L. Labs from OSH reviewed and concerning for CT, hyponatremia, hypochloremia, anemia, and abnormal liver enzymes suggesting hepatocellular injury, cholestasis, and poor synthetic function. Now with general improvement in hyponatremia, liver markers, and stable renal function (expect slow improvement and likely ATN).     Changes today:   - Cont albumin  - Discontinued nadolol per GI  - UA equivocal for infection, UA culture pending  - Discontinued IVF for hyponatremia  - Liberalize diet (no Na restriction) for today  - Space to Q6H Na checks today, sodium goal by 09/08 AM = 130-132     # Orthostatic Hypotension S/p Large Volume Paracentesis  Patient's syncopal episode occurred immediately following a large paracentesis. She likely had an acute drop in her intravascular volume.  - 100g Albumin for 3 days  - Discontinue nadolol per GI     # CT  # Hyponatremia  # Hypochloremia  Baseline ~1. Admission Cre 4.01. Diuretics discontinued last admission. Likely pre-renal induced. Also considering hepatorenal syndrome. 09/07-> 09/08 Na goal = 132  - 100 g Albumin for 3 days  - Trend BMP  - Trial without IVF  - Na q6h, restart fluids if worsening  -  monitor I/O's, daily weights     # Etoh-induced cirrhosis c/b ascites and EV  Last seen in GI/hepatology on 08/12/19 and MELD was 23 that visit. Cirrhosis diagnosed 6 years ago when she had EV GIB. No EGD since then. Ascites started to become appreciable in 05/19. 6.350 L removed on paracentesis today. Per patient, abdomen remains with increased amount of ascites compared to baseline. MELD-Na score on this admission: 37 points. 65-66% estimated 90-day mortality. Workup for thrombosis and SBP negative.  - Holding off on low Na diet, will restart when hyponatremia resolves  - Not on diuretics (was previously on Spironolactone and Torsemide)  - Track weights, I/O's  - Zofran prn for nausea  - No SBP prophylaxis  - Duplex US of liver negative for thrombosis      # Stage 1 Hepatic Encephalopathy, improving   Improving with minimal signs of encephalopathy. Mild asterixis appreciable b/l in hands on admission. Ammonia at OSH was 12. Very minimal asterixis on exam. Patient having excessive bowel movements at her TCU likely contributing to hypovolemia. Paracentesis without evidence of SBP.  - Cont Rifaximin, Lactulose and Zinc  - Titrate lactulose for 3-4 bowel movements a day  - Melatonin for insomnia     # Chronic hepatitis C, treatment naive  Per last GI/hepatology visit with Dr. Owen on 08/12/19, patient is GT 2b treatment naive. Will address HCV treatment in the future. Not urgent at this point     # Pruritis  Likely related to underlying hepatic dysfunction.   - Discontinue triamcinolone and start sarna lotion      #Severe malnutrition in the context of acute on chronic illness  % Intake: </=75% for >/= 1 month (severe)  % Weight Loss: > 10% in 6 months (severe)  Subcutaneous Fat Loss: Facial region, Upper arm:, Lower arm:and Thoracic/intercostal: Severe  Muscle Loss: Temporal,  Facial & jaw region, Scapular bone, Thoracic region (clavicle, acromium bone, deltoid, trapezius, pectoral), Upper arm (bicep,  tricep),  Lower arm  (forearm), Dorsal hand, Upper leg (quadricep, hamstring),  and Posterior calf: Severe   Fluid Accumulation/Edema: Mild  - Thera-Vit-M   - Boost Plus (vary flavors) TID between meals  -Nutrition consult, apprec recs     # MDD  - cont Fluoxetine     # GALINA  Hgb 10.6 on admission. Was in 11's last admission.  - Cont iron     # Thrombocytopenia  Likely 2/2 etoh-cirrhosis. Ranges from 120-240 over the past year.  - CTM     # Subtherapeutic INR  Likely 2/2 etoh-cirrhosis. Not on anticoagulant.  - CTM     Diet: Snacks/Supplements Adult: Boost Plus; Between Meals  2 Gram Sodium Diet    Fluids: None  Lines: PIV  DVT Prophylaxis: Ambulatory, INR high  Cameron Catheter: not present  Code Status: Full Code      Disposition Plan   Expected discharge: 2 - 3 days, recommended to transitional care unit once renal function improved.  Entered: Ru Holt MD 09/07/2019, 7:47 AM       The patient's care was discussed with the Attending Physician, Dr. Castro.    Ru Holt MD  [place] [id number] Service  [facility]  Pager: [phone]  Please see sticky note for cross cover information  ______________________________________________________________________    Interval History   Bladder scan overnight showing ~1L, straight cath with only 50ml output, likely due to ascites. Feeling relatively well this AM. No complaints. No dyspnea, N/V/D, or pain.    Data reviewed today: I reviewed all medications, new labs and imaging results over the last 24 hours. I personally reviewed no images or EKG's today.    Physical Exam   Vital Signs: Temp: 97.2  F (36.2  C) Temp src: Oral BP: 114/64 Pulse: 84 Heart Rate: 96 Resp: 16 SpO2: 95 % O2 Device: None (Room air)    Weight: 153 lbs 0 oz  GEN: pleasant woman, in bed, in NAD  SKIN: jaundiced, ecchymoses on forearms and shins, scattered spider angiomas, and palmar erythema present   HEENT: scleral icterus, poor dentition, NC, AT,  MMM, non-erythematous oropharynx  NECK: supple, no cervical LAD  CARDS: regular rate and rhythm, normal S1, S2, no m/r/g.  PULM: CTAB, breathing comfortably on RA, no wheeze or rales, no dullness to percussion  ABD: obese, distended, + fluid wave, no guarding or rebound, + BS  EXT: moving all spontaneously, non-edematous  NEURO: CN 2-12, AOx4

## 2019-09-07 NOTE — PROGRESS NOTES
"   09/07/19 0848   Quick Adds   Type of Visit Initial Occupational Therapy Evaluation   Living Environment   Lives With sibling(s)   Living Arrangements house   Home Accessibility stairs within home;stairs to enter home   Number of Stairs, Main Entrance 1   Stair Railings, Main Entrance none   Number of Stairs, Within Home, Primary 8   Stair Railings, Within Home, Primary railing on right side (ascending)   Transportation Anticipated family or friend will provide   Living Environment Comment Pt lives with sister who completes driving. Reported bedroom and BR located downstairs with eight steps down stairs. Reported having a walk in shower with no grab bars or chair.    Self-Care   Usual Activity Tolerance moderate   Current Activity Tolerance fair   Regular Exercise No   Equipment Currently Used at Home walker, standard;cane, straight   Activity/Exercise/Self-Care Comment Pt reported using SPC to ambulate in community. Pt enjoys gardening and being outdoors.    Functional Level   Ambulation 1-->assistive equipment   Transferring 1-->assistive equipment   Toileting 3-->assistive equipment and person   Bathing 1-->assistive equipment   Dressing 0-->independent   Eating 0-->independent   Communication 0-->understands/communicates without difficulty   Swallowing 0-->swallows foods/liquids without difficulty   Cognition 0 - no cognition issues reported   Fall history within last six months yes   Number of times patient has fallen within last six months 3   Which of the above functional risks had a recent onset or change? ambulation;transferring;toileting;bathing   Prior Functional Level Comment Pt reported needing physical asssist from sister occasionally while toileting but otherwise mod I for all self-cares.   General Information   Onset of Illness/Injury or Date of Surgery - Date 09/05/19   Referring Physician Elsy Malave MD   Patient/Family Goals Statement Pt reported wanting to return home and \"get " "stronger\"   Additional Occupational Profile Info/Pertinent History of Current Problem  63 year old female with past medical history of decompensated cirrhosis (2/2 alcohol and hepatitis C), chronic hepatitis C and HTN who presents as a transfer from an OSH due to her recent admission here for HE, who presents this admission following a syncopal episode s/p paracentesis yesterday removing 6.3 L. Labs from OSH reviewed and concerning for CT, hyponatremia, hypochloremia, anemia, and abnormal liver enzymes suggesting hepatocellular injury, cholestasis, and poor synthetic function.    Precautions/Limitations fall precautions   Weight-Bearing Status - LUE full weight-bearing   Weight-Bearing Status - RUE full weight-bearing   Weight-Bearing Status - LLE full weight-bearing   Weight-Bearing Status - RLE full weight-bearing   General Observations Pt appeared with jaundice-like coloring 2/2 cirrhosis.    General Info Comments Activity: Up ad [place].    Cognitive Status Examination   Orientation orientation to person, place and time   Level of Consciousness alert   Follows Commands (Cognition) increased processing time needed   Attention Distractible during evaluation;Divided attention impaired, difficulty with simultaneous tasks   Cognitive Comment Pt distractible throughout eval, for example would frequently go off topic from questions being asked of her. Pt reported difficulty word finding and expressing thoughts.    Visual Perception   Visual Perception Comments Pt reported no vision problems at this time. Wear glasses for reading.    Sensory Examination   Sensory Quick Adds No deficits were identified   Integumentary/Edema   Integumentary/Edema Comments Abdominal edema 2/2 cirrhosis.    Range of Motion (ROM)   ROM Comment BUE WNL    Strength   Strength Comments BUE 4-/5 for shoulder and elbow flex/ext.    Hand Strength   Hand Strength Comments 4/5 for B  strength.    Instrumental Activities of Daily Living (IADL) " "  Previous Responsibilities yardwork;meal prep;housekeeping;laundry;shopping;finances   IADL Comments Pt reported being I in all IADLs. Sister drives for pt.    Activities of Daily Living Analysis   Impairments Contributing to Impaired Activities of Daily Living balance impaired;cognition impaired   General Therapy Interventions   Planned Therapy Interventions ADL retraining;IADL retraining;cognition;strengthening;transfer training;home program guidelines;progressive activity/exercise   Clinical Impression   Criteria for Skilled Therapeutic Interventions Met yes, treatment indicated   OT Diagnosis Decreased ADL/IADL I.   Influenced by the following impairments Activity tolerance, strength, cognition, and balance.    Assessment of Occupational Performance 3-5 Performance Deficits   Identified Performance Deficits g/h, bathing, dressing, toileting, home management   Clinical Decision Making (Complexity) Low complexity   Therapy Frequency 5x/week   Predicted Duration of Therapy Intervention (days/wks) 1 week.    Anticipated Equipment Needs at Discharge shower chair   Anticipated Discharge Disposition Transitional Care Facility;Home with Assist   Risks and Benefits of Treatment have been explained. Yes   Patient, Family & other staff in agreement with plan of care Yes   Clinical Impression Comments Pt to benefit from skilled OT to improve strength, functional endurance, and cognition to promote ADL/IADL I. See daily flowsheet for treatment details.    [facility] AM-PAC  \"6 Clicks\" Daily Activity Inpatient Short Form   1. Putting on and taking off regular lower body clothing? 3 - A Little   2. Bathing (including washing, rinsing, drying)? 3 - A Little   3. Toileting, which includes using toilet, bedpan or urinal? 3 - A Little   4. Putting on and taking off regular upper body clothing? 3 - A Little   5. Taking care of personal grooming such as brushing teeth? 3 - A Little   6. Eating meals? 4 - None   Daily " Activity Raw Score (Score out of 24.Lower scores equate to lower levels of function) 19   Total Evaluation Time   Total Evaluation Time (Minutes) 10

## 2019-09-07 NOTE — PROGRESS NOTES
[facility]    Hepatology Follow-up    CC: Syncope     Dx: Syncope    Pre-renal acute kidney injury   Hyponatremia    Decompensated ETOH/HCV cirrhosis    24 hour events: Nil     Subjective:  Feels better after more sleep last night  Denies abdominal pain or discomfort  Not passing much urine  Sitting out of bed earlier  Patient denies fevers, sweats or chills.    Medications  Current Facility-Administered Medications   Medication Dose Route Frequency     albumin human  100 g Intravenous Daily     ferrous sulfate  650 mg Oral Daily     FLUoxetine  20 mg Oral Daily     folic acid  1 mg Oral Daily     lactulose  30 mL Oral BID     zz extemporaneous template  600 mg Oral Daily     multivitamin w/minerals  1 tablet Oral Daily     pantoprazole  40 mg Oral Daily     rifaximin  550 mg Oral BID     sodium chloride (PF)  3 mL Intracatheter Q8H     zinc sulfate  220 mg Oral BID       Review of systems  A 10-point review of systems was negative.    Examination  /56 (BP Location: Left arm)   Pulse 84   Temp 97.7  F (36.5  C) (Oral)   Resp 20   Wt 69.4 kg (153 lb)   SpO2 97%   BMI 24.69 kg/m      Intake/Output Summary (Last 24 hours) at 9/7/2019 1634  Last data filed at 9/7/2019 1534  Gross per 24 hour   Intake 3892 ml   Output 535 ml   Net 3357 ml       Gen- well, NAD, A+Ox3, jaundiced, frail  CVS- RRR  RS- CTA  Abd- distended, dull to percuss, SNT, BS  Extr- minimal SRIDHAR  Neuro- mild asterixis bilaterally  Skin- ecchymoses  Psych- normal mood  Lym- no LAD    Laboratory  Lab Results   Component Value Date     09/07/2019    POTASSIUM 4.3 09/07/2019    CHLORIDE 97 09/07/2019    CO2 12 09/07/2019     09/07/2019    CR 3.92 09/07/2019       Lab Results   Component Value Date    BILITOTAL 18.2 09/07/2019    ALT 78 09/07/2019     09/07/2019    ALKPHOS 335 09/07/2019       Lab Results   Component Value Date    WBC 10.0 09/07/2019    HGB 8.0 09/07/2019    MCV 95 09/07/2019    PLT  102 09/07/2019       Lab Results   Component Value Date    INR 2.18 09/07/2019         Radiology  Nil new    Assessment  63 year old female with decompensated ETOH/HCV cirrhosis admitted to hospital with syncopal episode after large volume paracentesis complicated with acute kidney injury (pre-renal vs ATN) and hyponatremia.  MELD-Na= 39 (stable).  Last ETOH= 7/3/19.  Renal function stable.  Would continue albumin challenge for acute kidney injury but otherwise continue supportive care for now.    Recommendations  1.  Continue albumin 25% IV daily  2.  Continue to hold diuretics  3.  Low Na diet  4.  Continue lactulose and rifaximin  5.  Ambulate     Please page GI fellow on call with questions    Jonn Gloria MD  Hepatology staff

## 2019-09-08 NOTE — PLAN OF CARE
ICU End of Shift Summary. See flowsheets for vital signs and detailed assessment.    Changes this shift: 4 units of PRBC's, 2 units of platelets, 2 units of plasma, and vitamin k given. EGD done at bedside. Pt obtunded after procedure after being given 250 mcgs of fentanyl and 5 of versed. Given narcan x2. Pt becoming more arousalable but still somnolent. BP and HR improving. Multiple skin tears on bilateral arms dressed with vaseline gauze/dressing.     Plan:  Continue to monitor and follow plan of care  Problem: Adult Inpatient Plan of Care  Goal: Plan of Care Review  9/8/2019 0637 by Minnie Knowles, RN  Outcome: No Change  9/7/2019 1907 by Nereida Waters, RN  Outcome: Improving

## 2019-09-08 NOTE — PROGRESS NOTES
[facility]    Hepatology Follow-up  9/7/19  11:30 PM  Overnight call    CC: Hematemesis    Dx: Decompensated ETOH + HCV   Hyponatremia   CT, prerenal   Syncope    24 hour events:   Received a call from medicine regarding hematemesis  She had two episodes of large, 500 ml, maroon emesis  Followed by one episode of maroon colored stools  Vitals with mild tachycardia to 100, BP systolic 100  Not encephalopathic currently  No chest pain, denies shortness of breath      Subjective:    Patient denies fevers, sweats or chills.    Medications  Current Facility-Administered Medications   Medication Dose Route Frequency     albumin human  100 g Intravenous Daily     cefTRIAXone  1 g Intravenous Q24H     FLUoxetine  20 mg Oral Daily     folic acid  1 mg Oral Daily     zz extemporaneous template  600 mg Oral Daily     multivitamin w/minerals  1 tablet Oral Daily     rifaximin  550 mg Oral BID     sodium chloride (PF)  3 mL Intracatheter Q8H     zinc sulfate  220 mg Oral BID       Review of systems  A 10-point review of systems was negative.    Examination  BP 90/56   Pulse 98   Temp 95.7  F (35.4  C) (Oral)   Resp 20   Wt 69.4 kg (153 lb)   SpO2 99%   BMI 24.69 kg/m      Intake/Output Summary (Last 24 hours) at 9/7/2019 2328  Last data filed at 9/7/2019 2300  Gross per 24 hour   Intake 2712 ml   Output 1885 ml   Net 827 ml       Gen- anxious appearing  CVS- Tachycardic, systolic murmur present  RS- CTA  Abd- Distended abdomen, soft, non tender to palpation  Extr- No edema present  Neuro- awake and alert  Skin- no rash  Psych- normal mood  Lym- no LAD    Laboratory  Lab Results   Component Value Date     09/07/2019    POTASSIUM 4.3 09/07/2019    CHLORIDE 97 09/07/2019    CO2 12 09/07/2019     09/07/2019    CR 3.92 09/07/2019       Lab Results   Component Value Date    BILITOTAL 18.2 09/07/2019    ALT 78 09/07/2019     09/07/2019    ALKPHOS 335 09/07/2019       Lab Results    Component Value Date    WBC 10.0 09/07/2019    HGB 6.4 09/07/2019    MCV 95 09/07/2019     09/07/2019       Lab Results   Component Value Date    INR 2.18 09/07/2019         Radiology  None new    Assessment  63 year old female with decompensated ETOH/HCV cirrhosis admitted for syncopal episode after large volume paracentesis. Was doing well this morning, however, in the evening had acute onset massive hematemesis X 2 followed by maroon colored stool. Drop in hemoglobin noted, which still underestimates the value as this was drawn after her first emesis. Vitals unstable with tachycardia, BP holding ok for now, but high for instability.   No prior EGD.  This is likely a massive variceal bleed (esophageal vs gastric)    Recommendations  - Transfer to the ICU immediately for further critical care.  - Intubation for airway protection as she is at high risk of aspiration with massive hematemesis  - Ensure at least two large bore IVs  - Type and cross  - Transfuse to a goal of 7  - Octreotide drip initiation, continue for at least 72 hours  - PPI drip initiation  - Give 1g Ceftriaxone for SBP prophylaxis  - Will need emergent EGD tonight. Will do this in the ICU after she is stabilized.    Further recommendations to follow after EGD.    Patient discussed with Dr Faizan PORTILLO MD  GI Fellow  [phone]

## 2019-09-08 NOTE — PROGRESS NOTES
[facility]      MICU Progress Note  Roslyn Palmer MRN: [id number]  1955  Date of Admission:9/5/2019  Primary care provider: Monica Tirado      Assessment and Plan:     Roslyn Palmer is a 63 year old female with past medical history of decompensated cirrhosis (2/2 alcohol and hepatitis C), chronic hepatitis C and HTN who presents as a transfer from an OSH due to her recent admission here for HE, who presents this admission following a syncopal episode s/p paracentesis yesterday removing 6.3 L. Labs from OSH reviewed and concerning for CT, hyponatremia, hypochloremia, anemia, and abnormal liver enzymes suggesting hepatocellular injury, cholestasis, and poor synthetic function. Complicated by massive hematemesis 9/7/19 s/p banding x6 of EV.    PLAN:  Today:  - on room air   - no further hematemesis  - urine studies for TC  - begin goals of care discussions     ===NEURO===  # Sedation: NA  # Paralysis: NA  # Pain: NA     # MDD  - cont Fluoxetine     ===CARDIOVASCULAR===  # Hypotension related to hypovolemia 2/2 acute blood loss  MAP 65-68 at MICU arrival. No sign of shock. Received 4 units PRBC.   - Fluid: PRBC transfusions.  - Pressors: none  - Prefusion: normal lactate, monitor UOP  - Lines: large bore PIV, no CVC     ===PULMONARY===  No acute issue. On room air.      ===GASTROINTESTINAL===  # Massive UGIB 9/7/19  Hematemsis 500 mL 9/7 with hypotension and acute anemia Hgb 6.3, c/w likely variceal sources. Underwent EGD at bedside in the MICU, s/p banding x6 of esophageal varices. Transfused PRBC x4, Plasma x1, platelet x1 overnight. Has been vitally stable today without further evidence of bleeding. If she re-bleeds will need TIPS with IR.   - PPI gtt  - Octreotide gtt for 72 hours   - NPO  - Trend H/H q6h   - Vitamin K 10 mg IV every day x 3 days (9/8-10)  - CTX 1g IV daily for 7 days (9/7->)     # Etoh-induced cirrhosis c/b ascites and EV  Last seen in GI/hepatology on  08/12/19 and MELD was 23 that visit. Cirrhosis diagnosed 6 years ago when she had EV GIB. No EGD since then. Ascites started to become appreciable in 05/19. 6.350 L removed on paracentesis 9/7. MELD-Na score on this admission: 37 points. 65-66% estimated 90-day mortality. Workup for thrombosis and SBP negative. Per GI, would start to address goals of care given clinical trajectory as patient would not be considered for liver transplantation due to frailty and not having met minimum sobriety requirement.  - Hold Rifaximin, Lactulose and Zincwhile NPO  - Holding off on low Na diet, will restart when hyponatremia resolves  - Not on diuretics (was previously on Spironolactone and Torsemide)  - No SBP prophylaxis     MELD-Na score: 39 at 9/7/2019  4:51 PM  MELD score: 39 at 9/7/2019  6:11 AM  Calculated from:  Serum Creatinine: 3.92 mg/dL at 9/7/2019  6:11 AM  Serum Sodium: 128 mmol/L at 9/7/2019  4:51 PM  Total Bilirubin: 18.2 mg/dL at 9/7/2019  6:11 AM  INR(ratio): 2.18 at 9/7/2019  6:11 AM  Age: 63 years     # Chronic hepatitis C, treatment naive  Per last GI/hepatology visit with Dr. Owen on 08/12/19, patient is GT 2b treatment naive. Will address HCV treatment in the future. Not urgent at this point     # Severe malnutrition in the context of acute on chronic illness  - Thera-Vit-M   - Boost Plus (vary flavors) TID between meals  -Nutrition consult, apprec recs     ===RENAL===  # CT  # Oliguira  # Metabolic anion gap acidosis  # Hyponatremia  Baseline ~1. Admission Cre 4.01. Minimal urine output. Diuretics discontinued last admission. Likely pre-renal in setting of slow GI variceal bleed (s/p banding) versus HRS. Completed albumin challenge 9/6-9/8 and has received multiple units additional colloid in setting of acute GIB.   - Trend BMP  - Na q6h, restart fluids if worsening  - urine studies  - renal US  - if HRS, patient already receiving albumin and octreotide, unable to give midodrine while NPO  - monitor  I/O's, daily weights  - will get the above studies and likely consult renal tomorrow pending labs, goals of care      ===HEME/ONC===  # Acute anemia from blood loss (Hgb10->6.3)  # Thrombocytopenia, coagulopathy  Likely 2/2 etoh-cirrhosis and acute UGIB. Patient banded x6. No further bleeding noted since though GI commented there was ongoing oozing from esophagitis. Most recent hgb 8.6.  - Trend hemoglobin q6  -  transfuse to goal Hgb 7-8, Plt >50, fibrinogen >150     ===ENDOCRINE===  No acute issue     ===INFECTIOUS DISEASE===  # Antimicrobials:  CTX 1g IV daily for UGIB ppx (->)       Prophylaxis:  DVT: mechanical   GI: IV PPI  Family: Updated   Disposition: Critically Ill     Code Status: Full code     Patient was seen and discussed with attending physician Dr. Asif, who agrees with above assessment and plan.    Fabiola Olea MD  Medicine-Pediatrics, PGY-1  [phone]        Interval History:     Patient transferred to the MICU after large hematemesis. EGD at bedside. Pt obtunded after procedure after being given 250 mcgs of fentanyl and 5 of versed. Given narcan x2. Pt becoming more arousalable but still somnolent. BP and HR improving.     4 Point Review of systems including CV, Respiratory, GI and  were negative unless otherwise noted.    PHYSICAL EXAM  Temp  Av.3  F (36.3  C)  Min: 94.5  F (34.7  C)  Max: 98.4  F (36.9  C)      Pulse  Av.1  Min: 54  Max: 120 Resp  Av  Min: 10  Max: 38  FiO2 (%)  Av %  Min: 100 %  Max: 100 %  SpO2  Av.9 %  Min: 86 %  Max: 100 %         Intake/Output Summary (Last 24 hours) at 2019 1539  Last data filed at 2019 1400  Gross per 24 hour   Intake 4262.33 ml   Output 1200 ml   Net 3062.33 ml       General: AAOx3, NAD on RA. No asterixis. Positive signs of CLD  Skin: Marked jundiced, no rash, no ecchymoses  HEENT: MMM, PERRLA, EOM intact  CV: RRR, normal S1S2, no murmur, clicks, rubs  Resp: Clear to auscultation bilaterally, no wheezes,  rhonchi  Abd: Soft, non-tender, BS+, no masses appreciated. Distended with ascites.  Extremities: warm and well perfused, palpable pulses, trace LE edema  Neuro: No lateralizing symptoms or focal neurologic deficits    DIAGNOSTIC STUDIES  ROUTINE ICU LABS (Last four results)  CMP  Recent Labs   Lab 09/08/19  1038 09/08/19  0414 09/08/19  0200 09/07/19 2148 09/07/19  0611  09/06/19  0505   * 129* 129* Canceled, Test credited   < > 126*   < > 120*   POTASSIUM 4.8 4.7 6.3*  --   --  4.3  --  4.4   CHLORIDE 100 101 99  --   --  97  --  91*   CO2 15* 12* 13*  --   --  12*  --  14*   ANIONGAP 14 16* 16*  --   --  16*  --  15*   GLC 86 90 79  --   --  81  --  82   * 107* 111*  --   --  101*  --  95*   CR 4.08* 4.12* 4.04*  --   --  3.92*  --  3.81*   GFRESTIMATED 11* 11* 11*  --   --  11*  --  12*   GFRESTBLACK 13* 13* 13*  --   --  13*  --  14*   MEGGAN 9.7 8.9 8.6  --   --  9.0  --  9.2   PROTTOTAL  --  5.7* 5.4*  --   --  5.7*  --  6.6*   ALBUMIN  --  3.2* 2.8*  --   --  2.8*  --  2.1*   BILITOTAL  --  16.0* 15.0*  --   --  18.2*  --  17.7*   ALKPHOS  --  255* 236*  --   --  335*  --  458*   AST  --  96* Canceled, Test credited  --   --  141*  --  227*   ALT  --  56* 57*  --   --  78*  --  117*    < > = values in this interval not displayed.     CBC  Recent Labs   Lab 09/08/19  1038 09/08/19  0414 09/08/19  0200 09/07/19 2148 09/07/19  0611 09/06/19  0505   WBC  --  8.3 8.1  --  10.0 9.6   RBC  --  3.06* 2.57*  --  2.50* 3.22*   HGB 8.6* 9.5* 8.1* 6.4* 8.0* 10.2*   HCT  --  29.4* 24.1*  --  23.7* 29.7*   MCV  --  96 94  --  95 92   MCH  --  31.0 31.5  --  32.0 31.7   MCHC  --  32.3 33.6  --  33.8 34.3   RDW  --  18.7* 18.9*  --  22.6* 22.3*   PLT  --  68* 67*  --  102* 138*     INR  Recent Labs   Lab 09/08/19  0414 09/08/19  0200 09/07/19  0611 09/06/19  1254   INR 2.32* 2.30* 2.18* 2.03*     Arterial Blood GasNo lab results found in last 7 days.

## 2019-09-08 NOTE — PROVIDER NOTIFICATION
09/07/19 2300   Call Information   Date of Call 09/07/19   Time of Call 2246   Name of person requesting the team Jese   Title of person requesting team RN   RRT Arrival time 2252   Time RRT ended 0050   Reason for call   Type of RRT Adult   Primary reason for call Uncontrolled excessive bleeding   Was patient transferred from the ED, ICU, or PACU within last 24 hours prior to RRT call? Yes   SBAR   Situation 500 mL bloody emiesis with HGB drop   Background cirrhosis (2/2 alcohol and hepatitis C), chronic hepatitis C and HTN who presents as a transfer from an OSH due to her recent admission here for HE, who presents this admission following a syncopal episode s/p paracentesis yesterday removing 6.3 L   Notable History/Conditions Organ failure   Assessment hypotensive, slightly tachycardic, tachypnea, reports headache, interrmittent nausea, A/OX4   Interventions Other (describe);Fluid bolus;Labs  (blood products)   Patient Outcome   Patient Outcome Transferred to  (MICU)   RRT Team   Attending/Primary/Covering Physician Rosa 1   Date Attending Physician notified 09/08/19   Time Attending Physician notified 2245   Physician(s) Lea Jauregui RN Yamila Faust

## 2019-09-08 NOTE — PROGRESS NOTES
[facility]    Hepatology Follow-up  09/08/19    CC: Syncope    Dx: Decompensated alcoholic/HCV cirrhosis   Hematemesis secondary to esophageal varices   Syncope   Acute kidney injury, likely prerenal   Hyponatremia    24 hour events:   -Massive hematemesis overnight, requiring emergent EGD with banding of varices  -Transfusion of 4 units PRBC, 3 units plasma    Subjective:  Pt lethargic and not interactive this morning  Patient denies fevers, sweats or chills.    Medications  Current Facility-Administered Medications   Medication Dose Route Frequency     albumin human  100 g Intravenous Daily     cefTRIAXone  1 g Intravenous Q24H     FLUoxetine  20 mg Oral Daily     folic acid  1 mg Oral Daily     lactulose  30 mL Oral BID     zz extemporaneous template  600 mg Oral Daily     multivitamin w/minerals  1 tablet Oral Daily     phytonadione  10 mg Intravenous Q24H     rifaximin  550 mg Oral BID     sodium chloride (PF)  3 mL Intracatheter Q8H     zinc sulfate  220 mg Oral BID       Review of systems  A 10-point review of systems was negative.    Examination  BP 96/61   Pulse 95   Temp 97.6  F (36.4  C)   Resp 13   Wt 70.4 kg (155 lb 3.3 oz)   SpO2 100%   BMI 25.05 kg/m      Intake/Output Summary (Last 24 hours) at 9/8/2019 0633  Last data filed at 9/8/2019 0610  Gross per 24 hour   Intake 5020.33 ml   Output 1700 ml   Net 3320.33 ml       Gen- ill appearing, not interactive  CVS- RRR  RS- CTA  Abd- soft abdomen, distended, no rebound or guarding  Extr- no edema  Neuro-altered  Skin- no rash   Psych- flat affect  Lym- no LAD    Laboratory  Lab Results   Component Value Date     09/08/2019    POTASSIUM 4.7 09/08/2019    CHLORIDE 101 09/08/2019    CO2 12 09/08/2019     09/08/2019    CR 4.12 09/08/2019       Lab Results   Component Value Date    BILITOTAL 16.0 09/08/2019    ALT 56 09/08/2019    AST 96 09/08/2019    ALKPHOS 255 09/08/2019       Lab Results   Component Value Date     WBC 8.3 09/08/2019    HGB 9.5 09/08/2019    MCV 96 09/08/2019    PLT 68 09/08/2019       Lab Results   Component Value Date    INR 2.32 09/08/2019         Radiology  No new imaging studies    Assessment  63 year old woman with decompensated alcoholic/HCV cirrhosis admitted to hospital with syncopal episode following a large volume paracentesis.  This was complicated by acute kidney injury, and hyponatremia for which she was undergoing conservative management.  On the night of 9/7, patient had massive hematemesis requiring emergent endoscopy showing bleeding esophageal varices that were banded x6.  Hemostasis obtained after banding.  Patient still tenuous and is being monitored currently in the ICU.  Suspect that her CT will continue to worsen because of hypovolemia in the setting of GI bleed.    Recommendations  -Continue monitoring in the ICU  -Continue every 6 hours hemoglobin checks with transfusion goal of 7  -Continue octreotide drip, continue for 72 hours  -Continue PPI drip  -Continue ceftriaxone 1 g daily for SBP prophylaxis in the setting of GI bleed, continue for 7 days  -Please ensure 2 large-bore IVs  -If additional massive bleeding, may consider calling IR for TIPS  -Given her clinical status, we should have goals of care discussions with the family. Overall prognosis is guarded.    Patient seen and discussed with Dr Faizan PORTILLO MD  GI Fellow

## 2019-09-08 NOTE — H&P
[facility]      MICU History and Physicial  Roslyn aPlmer MRN: [id number]  1955  Date of Admission:9/5/2019  Primary care provider: Monica Tirado     Assessment and Plan:     Roslyn Palmer is a 63 year old female with past medical history of decompensated cirrhosis (2/2 alcohol and hepatitis C), chronic hepatitis C and HTN who presents as a transfer from an OSH due to her recent admission here for HE, who presents this admission following a syncopal episode s/p paracentesis yesterday removing 6.3 L. Labs from OSH reviewed and concerning for CT, hyponatremia, hypochloremia, anemia, and abnormal liver enzymes suggesting hepatocellular injury, cholestasis, and poor synthetic function. Complicated by massive hematemesis 9/7/19 and transferred to MICU.    PLAN:  - Transfusing PRBC x4, Plasma x1 overnight  - PPI/octreotide bolus and gtt  - GI called, EGD per GI  - NPO  - Trend H/H q6h (2am then 4 am)  - Vitamin K 10 mg IV every day x 3 days (9/8-10)  - CTX 1g IV every day (9/7->)    ===NEURO===  # Sedation: NA  # Paralysis: NA  # Pain: NA    # MDD  - cont Fluoxetine    ===CARDIOVASCULAR===  # Hypotension related to hypovolemia 2/2 acute blood loss  MAP 65-68 at MICU. No sign of shock. Getting 4 units PRBC.  - Fluid: PRBC transfusions.  - Pressors: none  - Prefusion: normal lactate, monitor UOP  - Lines: large bore PIV, no CVC    ===PULMONARY===  No acute issue    ===GASTROINTESTINAL===  # Massive UGIB 9/7/19  Hematemsis 500 mL 9/7 with hypotension and acute anemia Hgb 6.3, c/w likely variceal sources.  - Transfusing PRBC x4, Plasma x1, platelet x1 overnight  - PPI/octreotide bolus and gtt  - GI called, EGD per GI  - NPO  - Trend H/H q6h (2am then 4 am)  - Vitamin K 10 mg IV every day x 3 days (9/8-10)  - CTX 1g IV every day (9/7->)    # Etoh-induced cirrhosis c/b ascites and EV  Last seen in GI/hepatology on 08/12/19 and MELD was 23 that visit. Cirrhosis diagnosed 6 years ago  when she had EV GIB. No EGD since then. Ascites started to become appreciable in 05/19. 6.350 L removed on paracentesis today. Per patient, abdomen remains with increased amount of ascites compared to baseline. MELD-Na score on this admission: 37 points. 65-66% estimated 90-day mortality. Workup for thrombosis and SBP negative.  - Cont Rifaximin, Lactulose and Zinc  - Holding off on low Na diet, will restart when hyponatremia resolves  - Not on diuretics (was previously on Spironolactone and Torsemide)  - No SBP prophylaxis    MELD-Na score: 39 at 9/7/2019  4:51 PM  MELD score: 39 at 9/7/2019  6:11 AM  Calculated from:  Serum Creatinine: 3.92 mg/dL at 9/7/2019  6:11 AM  Serum Sodium: 128 mmol/L at 9/7/2019  4:51 PM  Total Bilirubin: 18.2 mg/dL at 9/7/2019  6:11 AM  INR(ratio): 2.18 at 9/7/2019  6:11 AM  Age: 63 years     # Chronic hepatitis C, treatment naive  Per last GI/hepatology visit with Dr. Owen on 08/12/19, patient is GT 2b treatment naive. Will address HCV treatment in the future. Not urgent at this point     # Severe malnutrition in the context of acute on chronic illness  - Thera-Vit-M   - Boost Plus (vary flavors) TID between meals  -Nutrition consult, apprec recs    ===RENAL===  # CT  # Hyponatremia  Baseline ~1. Admission Cre 4.01. Diuretics discontinued last admission. Likely pre-renal versus HRS.  - Challenge 100 g Albumin IV every day for 3 days  - Trend BMP  - Na q6h, restart fluids if worsening  - monitor I/O's, daily weights    ===HEME/ONC===  # Acute anemia from blood loss (Hgb10->6.3)  # Thrombocytopenia, coagulopathy  Likely 2/2 etoh-cirrhosis and acute UGIB.  - Trend labs, transfuse to goal Hgb 7-8, Plt >50, fibrinogen >150    ===ENDOCRINE===  No acute issue    ===INFECTIOUS DISEASE===  # Antimicrobials:  CTX 1g IV every day for UGIB ppx (9/7->)    ===SKIN/MSK===  No acute issue    Prophylaxis:  DVT: SCD   GI: PPI gtt  Disposition: Critically Ill  Code Status: FULL    Patient was seen  "and discussed with attending physician Dr. Anand, who agrees with above assessment and plan.    Natthapon \"Mint\" MD oZey  PGY-2 IM  [id number]         Chief Complaint:   Hematemesis         History of Present Illness:   Roslyn Palmer is a 63 year old female with past medical history of decompensated cirrhosis (2/2 alcohol and hepatitis C), chronic hepatitis C and HTN who presents as a transfer from an OSH due to her recent admission here for HE, who presents this admission following a syncopal episode s/p paracentesis yesterday removing 6.3 L. Labs from OSH reviewed and concerning for CT, hyponatremia, hypochloremia, anemia, and abnormal liver enzymes suggesting hepatocellular injury, cholestasis, and poor synthetic function. Complicated by massive hematemesis 9/7/19 and transferred to MICU.    Patient was admitted for hyponatremia and CT. She felt dizzy on admit. No evidence of UGIB at that time. Around 8.30pm, per RN, she had 550 ml dark red bloody emesis. Became more hypotensive /70-->70/40. No new symptom except for mild nausea. No abd pain, fever, chills. Breathing is good and she is on RA.         Review of Systems:   Otherwise negative except stated in HPI         Past Medical History:   Medical History reviewed.   Past Medical History:   Diagnosis Date     Cirrhosis (H)      Depression      DJD (degenerative joint disease)      Essential hypertension, malignant      ETOH abuse      Gout      Hepatitis C              Past Surgical History:   Surgical History reviewed.   Past Surgical History:   Procedure Laterality Date     COLONOSCOPY N/A 7/30/2018    Procedure: COMBINED COLONOSCOPY, SINGLE OR MULTIPLE BIOPSY/POLYPECTOMY BY BIOPSY;;  Surgeon: Musa Diaz MD;  Location:  OR     COLONOSCOPY WITH CO2 INSUFFLATION N/A 7/30/2018    Procedure: COLONOSCOPY WITH CO2 INSUFFLATION;  Colonoscopy, Egd;  Surgeon: Musa Diaz MD;  Location:  OR     COMBINED ESOPHAGOSCOPY, GASTROSCOPY, " DUODENOSCOPY (EGD) WITH CO2 INSUFFLATION N/A 2018    Procedure: COMBINED ESOPHAGOSCOPY, GASTROSCOPY, DUODENOSCOPY (EGD) WITH CO2 INSUFFLATION;  Combined, Upper GI bleed Alcoholic liver disease (H) Anemia, unspecified type, Ref By Celestino, BMI 27.55, -183-1513;  Surgeon: Musa Diaz MD;  Location: [facility] OR     GYN SURGERY           HC ESOPHAGOSCOPY W BAND LIGATION ESOPHAGEAL VARICIES       ORTHOPEDIC SURGERY  ,    left and right hip replacement             Social History:   Social History reviewed.  Social History     Tobacco Use     Smoking status: Current Every Day Smoker     Packs/day: 0.20     Types: Cigarettes     Smokeless tobacco: Never Used     Tobacco comment: down to 4 ciggarettes per day   Substance Use Topics     Alcohol use: Not Currently     Comment: quit              Family History:   Family History reviewed.   Family History   Problem Relation Age of Onset     Breast Cancer Mother      Cancer Maternal Grandmother      Cerebrovascular Disease Maternal Grandmother         stroke     Cancer Maternal Grandfather         pancreatic cancer     Cancer Paternal Grandmother      Cancer Paternal Grandfather         brain cancer     Cirrhosis Brother      Liver Cancer Brother      Arthritis Sister              Allergies:     Allergies   Allergen Reactions     Sulfa Drugs              Medications:   Medications Reviewed.   Current Facility-Administered Medications   Medication     acetaminophen (TYLENOL) tablet 650 mg     albumin human 25 % injection 100 g     camphor-menthol (DERMASARRA) lotion     cefTRIAXone (ROCEPHIN) 1 g vial to attach to  mL bag for ADULTS or NS 50 mL bag for PEDS     FLUoxetine (PROzac) capsule 20 mg     folic acid (FOLVITE) tablet 1 mg     lidocaine (LMX4) cream     lidocaine 1 % 0.1-1 mL     magnesium oxide (MAG-OX) 200 mg, magnesium oxide (MAG-OX) 400 mg     melatonin tablet 1 mg     multivitamin w/minerals (THERA-VIT-M) tablet 1  tablet     naloxone (NARCAN) injection 0.1-0.4 mg     octreotide (sandoSTATIN) 1,250 mcg in sodium chloride 0.9 % 250 mL     ondansetron (ZOFRAN-ODT) ODT tab 4 mg    Or     ondansetron (ZOFRAN) injection 4 mg     pantoprazole (PROTONIX) 80 mg in sodium chloride 0.9 % 100 mL infusion     rifaximin (XIFAXAN) tablet 550 mg     sodium chloride (PF) 0.9% PF flush 3 mL     sodium chloride (PF) 0.9% PF flush 3 mL     sucralfate (CARAFATE) suspension 1 g     SUMAtriptan (IMITREX) tablet 100 mg     zinc sulfate (ZINCATE) capsule 220 mg             Physical Exam:   Vitals were reviewed.  Blood pressure 90/56, pulse 98, temperature 95.7  F (35.4  C), temperature source Oral, resp. rate 20, weight 69.4 kg (153 lb), SpO2 99 %, not currently breastfeeding.    General: AAOx3, NAD on RA. No asterixis. Positive signs of CLD  Skin: Marked jundiced, no rash, no ecchymoses  HEENT: MMM, PERRLA, EOM intact  CV: RRR, normal S1S2, no murmur, clicks, rubs  Resp: Clear to auscultation bilaterally, no wheezes, rhonchi  Abd: Soft, non-tender, BS+, no masses appreciated. Distended with ascites.  Extremities: warm and well perfused, palpable pulses, trace LE edema  Neuro: No lateralizing symptoms or focal neurologic deficits         Data:   I/O last 3 completed shifts:  In: 2712 [P.O.:1452; I.V.:860]  Out: 1860 [Urine:410; Emesis/NG output:1250; Other:200]    ROUTINE LABS (Last four results)  CMP  Recent Labs   Lab 09/07/19  1651 09/07/19  1347 09/07/19  0611 09/06/19  2320  09/06/19  0505   * 125* 126* 125*   < > 120*   POTASSIUM  --   --  4.3  --   --  4.4   CHLORIDE  --   --  97  --   --  91*   CO2  --   --  12*  --   --  14*   ANIONGAP  --   --  16*  --   --  15*   GLC  --   --  81  --   --  82   BUN  --   --  101*  --   --  95*   CR  --   --  3.92*  --   --  3.81*   GFRESTIMATED  --   --  11*  --   --  12*   GFRESTBLACK  --   --  13*  --   --  14*   MEGGAN  --   --  9.0  --   --  9.2   PROTTOTAL  --   --  5.7*  --   --  6.6*   ALBUMIN   --   --  2.8*  --   --  2.1*   BILITOTAL  --   --  18.2*  --   --  17.7*   ALKPHOS  --   --  335*  --   --  458*   AST  --   --  141*  --   --  227*   ALT  --   --  78*  --   --  117*    < > = values in this interval not displayed.     CBC  Recent Labs   Lab 09/07/19  2148 09/07/19  0611 09/06/19  0505   WBC  --  10.0 9.6   RBC  --  2.50* 3.22*   HGB 6.4* 8.0* 10.2*   HCT  --  23.7* 29.7*   MCV  --  95 92   MCH  --  32.0 31.7   MCHC  --  33.8 34.3   RDW  --  22.6* 22.3*   PLT  --  102* 138*     INR  Recent Labs   Lab 09/07/19  0611 09/06/19  1254   INR 2.18* 2.03*     Arterial Blood GasNo lab results found in last 7 days.    Microbiology: NA    IMAGING  US 9/6/19  Impression:   1.  Patent hepatic vasculature with velocities as detailed above. No  evidence of portal vein thrombosis. There is reversal of flow in the  splenic vein and a patent umbilical vein, which are suggestive of  portal venous hypertension.  2.  Cirrhotic appearance of the liver. There is simple appearing  anechoic cyst in the left lobe, not appreciated on prior exam. No  suspicious hepatic lesion.  3.  Layering stones versus sludge in the gallbladder. There is mild  wall thickening, likely reactive to ascites and adjacent parenchymal  disease.  4.  Moderate volume ascites.

## 2019-09-08 NOTE — PROVIDER NOTIFICATION
-------------------CRITICAL LAB VALUE-------------------    Lab Value: Hemoglobin 6.4  Time of notification: 2220  MD notified: Lea Abebe MD  Patient status:  Temp:  [95.7  F (35.4  C)-98.4  F (36.9  C)] 98  F (36.7  C)  Pulse:  [] 106  Heart Rate:  [90-96] 96  Resp:  [16-20] 18  BP: ()/(41-88) 71/53  SpO2:  [86 %-100 %] 100 %  Orders received: Pantoprazole and octreatide drips, blood transfusion

## 2019-09-08 NOTE — PROVIDER NOTIFICATION
Had 550 ml dark red bloody emesis at 2045. Vitals assessed. Lea Abebe MD notified. Said she would come see patient.

## 2019-09-08 NOTE — PLAN OF CARE
Problem: Adult Inpatient Plan of Care  Goal: Plan of Care Review  9/8/2019 1758 by Keila Colmenares RN  Outcome: Improving  9/8/2019 0637 by Minnie Knowles RN  Outcome: No Change  Goal: Patient-Specific Goal (Individualization)  Outcome: Improving  Goal: Absence of Hospital-Acquired Illness or Injury  Outcome: Improving  Goal: Optimal Comfort and Wellbeing  Outcome: Improving  Goal: Readiness for Transition of Care  Outcome: Improving  Goal: Rounds/Family Conference  Outcome: Improving     Problem: Renal Function Impairment (Acute Kidney Injury/Impairment)  Goal: Effective Renal Function  9/8/2019 1758 by Keila Colmenares RN  Outcome: No Change  9/8/2019 0637 by Minnie Knowles RN  Outcome: No Change   ICU End of Shift Summary. See flowsheets for vital signs and detailed assessment.    Changes this shift: Pt. Neurological status has improved slowly today.  She is able to make eye contact, follow commands, and make conversation with some confusion.  Large amounts of maroon stool continue.  Recheck hgb for 18:00.  Elevated creatinine and K is rising as well.  Pt. Made 325 caught urine and one incontinent this morning on the day shift.      Plan:  Monitor Hgb.  Monitor orientation.  Watch electrolytes and renal labs.

## 2019-09-08 NOTE — PLAN OF CARE
Neuro: A&Ox4.   Cardiac: SR. VSS.   Respiratory: Sating 98 % on RA.  GI/: Adequate urine output. Had frequent loose stools , taking schedule lactulose.   Diet/appetite: Tolerating regular diet. Eating well.  Activity:  stand by assist up to chair and in halls.  Pain: At acceptable level on current regimen.   Skin: No new deficits noted.  LDA's: PIV sl'd .   Plan: Continue with POC. Notify primary team with changes.

## 2019-09-08 NOTE — SIGNIFICANT EVENT
Significant Event Note    Time of event: 4:45 AM September 8, 2019    Description of event:  Hypoxic, hypotensive (MAP 60-65), and encephalopathic following EGD/EV banding. Per nursing, pt received 250 mcg IV fentanyl and 5 mg IV Versed during procedure. Narcan IV/IM was given and patient recovering gradually and maintaining MAP above 65. Patient received totally 4x PRBC, 3x plasma, 1x platelet overnight and now hemodynamically improving.    Plan:  Continue to observe RS/neuro in the MICU. On oxygen mask, weaning off FiO2  Hgb q6h, NPO, continue PPI/octreotide gtt  Appreciate GI recs and cares    Discussed with: supervising physician, Dr. Cheng Greer MD

## 2019-09-08 NOTE — BRIEF OP NOTE
[facility], [place]    Brief Operative Note    Pre-operative diagnosis: GI Bleed  Post-operative diagnosis * No post-op diagnosis entered *  Procedure: Procedure(s):  ESOPHAGOGASTRODUODENOSCOPY, WITH BANDING OF VARICES  Surgeon: Surgeon(s) and Role:     * Jonn Gloria MD - Primary, John PORTILLO, Fellow  Anesthesia: Conscious Sedation   Estimated blood loss: Active bleeding from underlying issue evident  Drains: None  Specimens: * No specimens in log *  Findings:   Bleeding varices which were banded X 6. Esophagitis..  Complications: None.  Implants:  * No implants in log *     Recommendations:  - Supportive cares per ICU team  - Continue octreotide, PPI, Ceftriaxone  - Closely monitor Hgb, transfuse to goal of 7  - Agree with monitoring fibrinogen and correct with cryo if <100  - monitor respiratory status. If worsening, may need intubation.  - If evidence of ongoing massive bleeding, discuss with IR for possible TIPS procedure    John PORTILLO MD  GI Fellow

## 2019-09-08 NOTE — PROGRESS NOTES
Admitted/transferred from: 6B  Reason for admission/transfer: Gi bleed  Patient status upon admission/transfer: A&O x4, RA, hypotensive, tachycardic, hgb 6.4  Interventions: 4 units PRBC, 2 units of plasma, 2 units platelets, EGD  Plan: Per MICU  2 RN skin assessment: completed by ESTEFANY Knowles RN and ESTEFANY Freitas RN  Result of skin assessment and interventions/actions: gen bruising, gen skin tears, open area and erythema on coccyx, bilat heels blanchable redness-mepilex palced  Height, weight, drug calc weight: done  Patient belongings (see Flowsheet - Adult Profile for details): In room  MDRO education (if applicable):

## 2019-09-08 NOTE — PROVIDER NOTIFICATION
At 2230, had 500 ml bloody emesis. Lea Abebe MD notified at 2235. Ordered transfer to ICU. ICU Float Float notified to stay with patient until transfer complete. Frequent vitals assessed.  on unit to see patient. GI consult came to see patient. ICU transfer delayed due to bed availability. Report called to ICU RN, patient transported at 0030 with 2 Float Float RN's, tele. Lea Abebe MD said she would notify family of ICU transfer.

## 2019-09-09 NOTE — PROGRESS NOTES
"Brief progress note    S: No recurrent hematemesis.    O: Vitally stable, on RA. Na improved to 130 (from 120, three days ago). Hgb stable in the 7s.    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

## 2019-09-09 NOTE — PLAN OF CARE
OT 4C: acknowledge pt was transferred to higher level of care; however, no re-eval completed as pt overall functional status and POC remain appropriate.  Discharge Planner OT   Patient plan for discharge: not discussed  Current status: Pt is alert, oriented, tangential and forgetful,  benefits from safety cues, easily redirectable.  Min A for bed mobility, CGA and FWW sit to stand transfers, Min A for toileting and LE dressing, CGA and FWW ambulating approx 2 x 125ft with cues for posture, proper walker use and environmental navigation.    Barriers to return to prior living situation: acute medical needs, weakness, deconditioning, impaired balance, stairs, cognitive deficits  Recommendations for discharge: TCU  Rationale for recommendations: Pt is currently below baseline with regards to mobility and independence with self cares and will benefit from continued skilled therapy intervention to address deficits.         Entered by: Shelley Dunbar 09/09/2019 1:19 PM

## 2019-09-09 NOTE — PLAN OF CARE
ICU End of Shift Summary. See flowsheets for vital signs and detailed assessment.    Changes this shift:     Improved neuro status. A&O x4, patient states she still feels foggy but is aware. Ambulated in halls 2x. LS clear/diminished on RA. VSS, SBP 90-100s. Skin tears to bilateral arms and L) shin. Advanced to clear liquid diet. Transfer orders to Med/Surg. 3x small/medium red/maroon stools, GI aware. Hgb stable.     Plan: Discussed having HD line placed tomorrow and start HD. Patient states that if HD doesn't go well, her and her family will discuss further care.       Problem: Adult Inpatient Plan of Care  Goal: Plan of Care Review  Outcome: Improving     Problem: Adult Inpatient Plan of Care  Goal: Readiness for Transition of Care  Outcome: Improving     Problem: Renal Function Impairment (Acute Kidney Injury/Impairment)  Goal: Effective Renal Function  9/9/2019 1718 by Trudy Carpenter, RN  Outcome: Declining

## 2019-09-09 NOTE — PLAN OF CARE
Discharge Planner PT   Patient plan for discharge: not discussed   Current status:  STS x2 with CGAx1.  Amb 250ft with FWW, w/c follow and CGAx1. Gait is impaired and thus at elevated falls risk. No sitting rest breaks needed.  No LOB.    Barriers to return to prior living situation: Medical status, strength, functional endurance, cognition, balance, and decreased ADL I.  Recommendations for discharge: TCU currently but may progress to home pending amount of assist sister can provide.   Rationale for recommendations: Pt would benefit from further therapy to improve balance, strength, cognition as she is currently needing supervision/verbal cues to complete above mobility safely. Typically ambulates only in the community with SPC, currently using FWW for stability. May progress to home pending LOS and assist that can be provided at home.       Entered by: Zach Kenny 09/09/2019 4:33 PM

## 2019-09-09 NOTE — PROGRESS NOTES
[facility]      MICU Progress Note  Roslyn Palmer MRN: [id number]  1955  Date of Admission:9/5/2019  Primary care provider: Monica Tirado      Assessment and Plan:     Roslyn Palmer is a 63 year old female with past medical history of decompensated cirrhosis (2/2 alcohol and hepatitis C), chronic hepatitis C and HTN who presents as a transfer from an OSH due to her recent admission here for HE, who presents this admission following a syncopal episode s/p paracentesis yesterday removing 6.3 L. Labs from OSH reviewed and concerning for CT, hyponatremia, hypochloremia, anemia, and abnormal liver enzymes suggesting hepatocellular injury, cholestasis, and poor synthetic function. Complicated by massive hematemesis 9/7/19 s/p banding x6 of EV.    PLAN:  Today:  - transfer to medicine   - start clear liquid diet after talking with GI  - renal consult, concern for HRS  - consider starting midodrine 10 mg TID, defer to renal     ===NEURO===  # Sedation: NA  # Paralysis: NA  # Pain: NA     Encephalopathy, improved  Patient now oriented to self, place, but forgetful.     # MDD  - cont Fluoxetine     ===CARDIOVASCULAR===  # Hypotension related to hypovolemia 2/2 acute blood loss  MAP 65-68 at MICU arrival. No sign of shock. Received 4 units PRBC.   - Fluid: PRBC transfusions.  - Pressors: none  - Perfusion: normal lactate  - Lines: large bore PIV, no CVC     ===PULMONARY===  No acute issue. On room air.      ===GASTROINTESTINAL===  # Massive UGIB 9/7/19  Hematemsis 500 mL 9/7 with hypotension and acute anemia Hgb 6.3, c/w likely variceal sources. Underwent EGD at bedside in the MICU, s/p banding x6 of esophageal varices. Transfused PRBC x4, Plasma x1, platelet x1 overnight. Has been vitally stable without further evidence of bleeding. If she re-bleeds will need TIPS with IR.   - IV PPI BID through evening of 9/10 (72 hours of coverage), then PO  - Octreotide gtt for 72 hours (to finish  evening of 9/10)  - Clear today  - one more hemoglobin check this afternoon, then daily CBC  - Vitamin K 10 mg IV every day x 3 days (9/8-10)  - CTX 1g IV daily for 7 days (9/7->)     # Etoh-induced cirrhosis c/b ascites and EV  Last seen in GI/hepatology on 08/12/19 and MELD was 23 that visit. Cirrhosis diagnosed 6 years ago when she had EV GIB. No EGD since then. Ascites started to become appreciable in 05/19. 6.350 L removed on paracentesis 9/7. MELD-Na score on this admission: 37 points. 65-66% estimated 90-day mortality. Workup for thrombosis and SBP negative. Per GI, would start to address goals of care given clinical trajectory as patient would not be considered for liver transplantation due to frailty and not having met minimum sobriety requirement.  - Restart Rifaximin, Lactulose and Zinc   - Holding Spironolactone and Torsemide  - No SBP prophylaxis     MELD-Na score: 37 at 9/9/2019  4:21 AM  MELD score: 37 at 9/9/2019  4:21 AM  Calculated from:  Serum Creatinine: 4.64 mg/dL (Rounded to 4 mg/dL) at 9/9/2019  4:21 AM  Serum Sodium: 132 mmol/L at 9/9/2019  4:21 AM  Total Bilirubin: 15.9 mg/dL at 9/9/2019  4:21 AM  INR(ratio): 1.90 at 9/9/2019  4:21 AM  Age: 63 years       # Chronic hepatitis C, treatment naive  Per last GI/hepatology visit with Dr. Owen on 08/12/19, patient is GT 2b treatment naive. Will address HCV treatment in the future. Not urgent at this point     # Severe malnutrition in the context of acute on chronic illness  - Thera-Vit-M   - Boost Plus (vary flavors) TID between meals  -Nutrition consult, apprec recs     ===RENAL===  # CT  # Oliguira  # Metabolic anion gap acidosis  # Hyponatremia  Baseline ~1. Admission Cre 4.01. Minimal urine output. Diuretics discontinued last admission. Likely pre-renal in setting of slow GI variceal bleed (s/p banding) versus HRS. Completed albumin challenge 9/6-9/8 and has received multiple units additional colloid in setting of acute GIB. Creat  continues to worsen today to 4.4. FENa <0.2%. UOP decreasing.   - renal consult, consider goals of care conversation prior to initiating dialysis   - if HRS, patient already receiving albumin and octreotide  - start midodrine 10 mg TID   - monitor I/O's, daily weights  - Trend BMP daily      ===HEME/ONC===  # Acute anemia from blood loss (Hgb10->6.3)  # Thrombocytopenia, coagulopathy  Likely 2/2 etoh-cirrhosis and acute UGIB. Patient banded x6. No further bleeding noted since though GI commented there was ongoing oozing from esophagitis. Hemoglobin stable >7 in last 24 hours without further transfusion.   - hemoglobin check this afternoon then space to daily  -  transfuse to goal Hgb 7-8, Plt >50, fibrinogen >150     ===ENDOCRINE===  No acute issue     ===INFECTIOUS DISEASE===  # Antimicrobials:  CTX 1g IV daily for UGIB ppx (->)       Prophylaxis:  DVT: mechanical   GI: IV PPI  Family: Updated   Disposition: Critically Ill     Code Status: Full code     Patient was seen and discussed with attending physician Dr. Asif, who agrees with above assessment and plan.    Fabiola Olea MD  Medicine-Pediatrics, PGY-1  [phone]        Interval History:     No acute events overnight. Has had a couple small maroon/bloody stools, not unexpected, per GI. Mental status continues to clear.     4 Point Review of systems including CV, Respiratory, GI and  were negative unless otherwise noted.    PHYSICAL EXAM  Temp  Av.3  F (36.3  C)  Min: 94.5  F (34.7  C)  Max: 98.4  F (36.9  C)      Pulse  Av.1  Min: 54  Max: 120 Resp  Av  Min: 10  Max: 38  FiO2 (%)  Av %  Min: 100 %  Max: 100 %  SpO2  Av.9 %  Min: 86 %  Max: 100 %         Intake/Output Summary (Last 24 hours) at 2019 1539  Last data filed at 2019 1400  Gross per 24 hour   Intake 4262.33 ml   Output 1200 ml   Net 3062.33 ml       General: AAOx2, NAD on RA. No asterixis. Positive signs of CLD  Skin: Marked jundice, no rash, no  ecchymoses  HEENT: MMM, PERRLA, EOM intact, icteric sclera  CV: RRR, normal S1S2, no murmur, clicks, rubs  Resp: Clear to auscultation bilaterally, no wheezes, rhonchi  Abd: Soft, non-tender, BS+, no masses appreciated. Mildly distended.   Extremities: warm and well perfused, palpable pulses, trace LE edema  Neuro: No lateralizing symptoms or focal neurologic deficits    DIAGNOSTIC STUDIES  ROUTINE ICU LABS (Last four results)  CMP  Recent Labs   Lab 09/09/19  0421 09/08/19  2143 09/08/19  1808 09/08/19  1038 09/08/19  0414 09/08/19  0200  09/07/19  0611   * 130* 130* 129* 129* 129*   < > 126*   POTASSIUM 4.4  --  4.5 4.8 4.7 6.3*  --  4.3   CHLORIDE 102  --   --  100 101 99  --  97   CO2 12*  --   --  15* 12* 13*  --  12*   ANIONGAP 18*  --   --  14 16* 16*  --  16*   GLC 78  --   --  86 90 79  --  81   *  --   --  107* 107* 111*  --  101*   CR 4.64*  --  4.31* 4.08* 4.12* 4.04*  --  3.92*   GFRESTIMATED 9*  --  10* 11* 11* 11*  --  11*   GFRESTBLACK 11*  --  12* 13* 13* 13*  --  13*   MEGGAN 9.8  --   --  9.7 8.9 8.6  --  9.0   PROTTOTAL 5.9*  --   --   --  5.7* 5.4*  --  5.7*   ALBUMIN 4.0  --   --   --  3.2* 2.8*  --  2.8*   BILITOTAL 15.9*  --   --   --  16.0* 15.0*  --  18.2*   ALKPHOS 174*  --   --   --  255* 236*  --  335*   AST 74*  --   --   --  96* Canceled, Test credited  --  141*   ALT 43  --   --   --  56* 57*  --  78*    < > = values in this interval not displayed.     CBC  Recent Labs   Lab 09/09/19  0958 09/09/19  0421 09/08/19  2143 09/08/19  1808  09/08/19  0414 09/08/19  0200  09/07/19  0611   WBC  --  9.8  --   --   --  8.3 8.1  --  10.0   RBC  --  2.38*  --   --   --  3.06* 2.57*  --  2.50*   HGB 7.3* 7.3* 7.2* 7.5*   < > 9.5* 8.1*   < > 8.0*   HCT  --  22.2*  --   --   --  29.4* 24.1*  --  23.7*   MCV  --  93  --   --   --  96 94  --  95   MCH  --  30.7  --   --   --  31.0 31.5  --  32.0   MCHC  --  32.9  --   --   --  32.3 33.6  --  33.8   RDW  --  20.1*  --   --   --  18.7* 18.9*   --  22.6*   PLT  --  97*  --   --   --  68* 67*  --  102*    < > = values in this interval not displayed.     INR  Recent Labs   Lab 09/09/19  0421 09/08/19  0414 09/08/19  0200 09/07/19  0611   INR 1.90* 2.32* 2.30* 2.18*     Arterial Blood GasNo lab results found in last 7 days.

## 2019-09-09 NOTE — PROGRESS NOTES
[facility]    Hepatology Follow-up    CC:      Syncope     Dx:      Decompensated alcoholic/HCV cirrhosis              Hematemesis secondary to esophageal varices              Syncope              Acute kidney injury, likely prerenal              Hyponatremia    24 hour events:   Stable, no new symptoms    Subjective:  Feels much better this morning, still having small amounts of red stools.  No new abdominal pain or vomiting  Patient denies fevers, sweats or chills.    Medications  Current Facility-Administered Medications   Medication Dose Route Frequency     cefTRIAXone  1 g Intravenous Q24H     FLUoxetine  20 mg Oral Daily     folic acid  1 mg Oral Daily     lactulose  30 mL Oral BID     zz extemporaneous template  600 mg Oral Daily     multivitamin w/minerals  1 tablet Oral Daily     pantoprazole (PROTONIX) IV  40 mg Intravenous BID     phytonadione  10 mg Intravenous Q24H     rifaximin  550 mg Oral BID     sodium chloride (PF)  3 mL Intracatheter Q8H     zinc sulfate  220 mg Oral BID       Review of systems  A 10-point review of systems was negative, unless stated above    Examination  /68   Pulse 87   Temp 97.5  F (36.4  C) (Oral)   Resp 14   Wt 70.4 kg (155 lb 3.3 oz)   SpO2 98%   BMI 25.05 kg/m      Intake/Output Summary (Last 24 hours) at 9/9/2019 1309  Last data filed at 9/9/2019 1300  Gross per 24 hour   Intake 1155 ml   Output 425 ml   Net 730 ml     General: Jaundiced  CVS: RRR  Resp: CTA  Abdomen: distended, soft, not tender  Extremities: mild edema  Neuro: AAO X 3, asterixis absent    Laboratory  Lab Results   Component Value Date     09/09/2019    POTASSIUM 4.4 09/09/2019    CHLORIDE 102 09/09/2019    CO2 12 09/09/2019     09/09/2019    CR 4.64 09/09/2019       Lab Results   Component Value Date    BILITOTAL 15.9 09/09/2019    ALT 43 09/09/2019    AST 74 09/09/2019    ALKPHOS 174 09/09/2019       Lab Results   Component Value Date    WBC 9.8  09/09/2019    HGB 7.3 09/09/2019    MCV 93 09/09/2019    PLT 97 09/09/2019       Lab Results   Component Value Date    INR 1.90 09/09/2019       MELD-Na score: 37 at 9/9/2019  4:21 AM  MELD score: 37 at 9/9/2019  4:21 AM  Calculated from:  Serum Creatinine: 4.64 mg/dL (Rounded to 4 mg/dL) at 9/9/2019  4:21 AM  Serum Sodium: 132 mmol/L at 9/9/2019  4:21 AM  Total Bilirubin: 15.9 mg/dL at 9/9/2019  4:21 AM  INR(ratio): 1.90 at 9/9/2019  4:21 AM  Age: 63 years      Assessment  63 year old woman with decompensated alcoholic/HCV cirrhosis admitted to hospital with syncopal episode following a large volume paracentesis.  This was complicated by acute kidney injury, and hyponatremia for which she was undergoing conservative management.  On the night of 9/7, patient had massive hematemesis requiring emergent endoscopy showing bleeding esophageal varices that were banded x6.  Hemostasis obtained after banding.    She has also developed CT likely secondary to low EABV from GI bleed. Fortunately she remains lucid with no evidence of HE and her Hgb has remained stable.     Overall, prognosis is guarded    Recommendations  -- Continue close monitoring  -- Ensure two large bore IVs at all times  -- Hgb transfusion threshold of 7  -- Continue Octreotide gtt for 72 hours  -- Continue IV PPI 40 mg BID  -- SBP ppc with 1 g daily CTX for 7 days  -- IR consultation if re-bleeds  -- Nephrology consultation  -- Recommend goals of care consultation, palliative consult recommended    Patient seen and discussed with attending physician, Dr. Faizan Mathis MD  PGY 5  Hepatology

## 2019-09-09 NOTE — PLAN OF CARE
ICU End of Shift Summary. See flowsheets for vital signs and detailed assessment.    Changes this shift: Pt became more alert and oriented throughout shift. Hgb stable throughout night. Pt able to get some sleep. One small dark red BM- MD notified. Denies pain and nausea.    Plan:  Continue to monitor and follow plan of care  Problem: Renal Function Impairment (Acute Kidney Injury/Impairment)  Goal: Effective Renal Function  9/9/2019 0545 by Minnie Knowles, RN  Outcome: No Change  9/8/2019 1758 by Keila Colmenares, RN  Outcome: No Change

## 2019-09-09 NOTE — PROGRESS NOTES
09/09/19 1600   Quick Adds   Type of Visit Initial PT Evaluation   Living Environment   Lives With sibling(s);parent(s)  (lives with sister and mother)   Living Arrangements house   Home Accessibility stairs within home;stairs to enter home   Number of Stairs, Main Entrance 1   Stair Railings, Main Entrance none   Number of Stairs, Within Home, Primary 8   Stair Railings, Within Home, Primary railings safe and in good condition   Transportation Anticipated family or friend will provide   Living Environment Comment Pt lives with sister who completes driving. Reported bedroom and BR located downstairs with eight steps down stairs. Reported having a walk in shower with no grab bars or chair.    Self-Care   Usual Activity Tolerance moderate   Current Activity Tolerance fair   Regular Exercise No   Equipment Currently Used at Home walker, standard;cane, straight   Activity/Exercise/Self-Care Comment Pt reported using SPC to ambulate in community. Pt enjoys gardening and being outdoors.    Functional Level Prior   Ambulation 1-->assistive equipment   Transferring 1-->assistive equipment   Toileting 3-->assistive equipment and person   Bathing 1-->assistive equipment   Communication 0-->understands/communicates without difficulty   Swallowing 0-->swallows foods/liquids without difficulty   Cognition 0 - no cognition issues reported   Fall history within last six months yes   Number of times patient has fallen within last six months 3   Which of the above functional risks had a recent onset or change? ambulation;transferring;toileting;bathing   Prior Functional Level Comment Pt reported needing physical asssist from sister occasionally while toileting but otherwise mod I for all self-cares.   General Information   Onset of Illness/Injury or Date of Surgery - Date 09/05/19   Referring Physician Elsy Malave MD   Patient/Family Goals Statement return home   Pertinent History of Current Problem (include personal  factors and/or comorbidities that impact the POC) 63 year old woman with decompensated alcoholic/HCV cirrhosis admitted to hospital with syncopal episode following a large volume paracentesis.  This was complicated by acute kidney injury, and hyponatremia for which she was undergoing conservative management.   Precautions/Limitations fall precautions   Heart Disease Risk Factors Age;Medical history;Lack of physical activity   General Observations pt pleasant   Cognitive Status Examination   Orientation orientation to person, place and time   Level of Consciousness alert   Follows Commands and Answers Questions 100% of the time   Personal Safety and Judgment intact   Memory intact   Posture    Posture Forward head position;Protracted shoulders;Kyphosis   Range of Motion (ROM)   ROM Comment WFL   Strength   Strength Comments 5/5 throughout BLE   Bed Mobility   Bed Mobility Comments not assessed today   Gait   Gait Comments Amb 250ft with FWW and w/c follow with CGA   Balance   Balance Comments standing balance without AD in compromised positiongs moderatley impaired    General Therapy Interventions   Planned Therapy Interventions balance training;bed mobility training;gait training;motor coordination training;neuromuscular re-education;strengthening;transfer training;risk factor education;home program guidelines;progressive activity/exercise   Clinical Impression   Criteria for Skilled Therapeutic Intervention yes, treatment indicated   PT Diagnosis impaired mobility and balance   Influenced by the following impairments impaired balance, strength, endurance   Functional limitations due to impairments IND mobility    Clinical Presentation Evolving/Changing   Clinical Presentation Rationale clinical judgement    Clinical Decision Making (Complexity) Moderate complexity   Therapy Frequency 3x/week   Predicted Duration of Therapy Intervention (days/wks) 1 week    Anticipated Discharge Disposition Transitional Care  "Facility   Risk & Benefits of therapy have been explained Yes   Patient, Family & other staff in agreement with plan of care Yes   [facility] TM \"6 Clicks\"   2016, Trustees of [facility], under license to [employer].  All rights reserved.   6 Clicks Short Forms Basic Mobility Inpatient Short Form   [facility]  \"6 Clicks\" V.2 Basic Mobility Inpatient Short Form   1. Turning from your back to your side while in a flat bed without using bedrails? 3 - A Little   2. Moving from lying on your back to sitting on the side of a flat bed without using bedrails? 3 - A Little   3. Moving to and from a bed to a chair (including a wheelchair)? 3 - A Little   4. Standing up from a chair using your arms (e.g., wheelchair, or bedside chair)? 3 - A Little   5. To walk in hospital room? 3 - A Little   6. Climbing 3-5 steps with a railing? 2 - A Lot   Basic Mobility Raw Score (Score out of 24.Lower scores equate to lower levels of function) 17   [facility]  \"6 Clicks\" Daily Activity Inpatient Short Form   1. Putting on and taking off regular lower body clothing? 3 - A Little   2. Bathing (including washing, rinsing, drying)? 3 - A Little   3. Toileting, which includes using toilet, bedpan or urinal? 3 - A Little   4. Putting on and taking off regular upper body clothing? 3 - A Little   5. Taking care of personal grooming such as brushing teeth? 3 - A Little   6. Eating meals? 4 - None   Daily Activity Raw Score (Score out of 24.Lower scores equate to lower levels of function) 19   Total Evaluation Time   Total Evaluation Time (Minutes) 10     "

## 2019-09-09 NOTE — PROGRESS NOTES
"SPIRITUAL HEALTH SERVICES  SPIRITUAL ASSESSMENT Progress Note  [facility] ([place]) 4C     PRIMARY FOCUS:     Goals of care    Emotional/spiritual/Episcopal distress    Support for coping    ILLNESS CIRCUMSTANCES:   Reviewed documentation. Reflective conversation shared with patient Marquita Palmer, which integrated elements of illness and family narratives.     Context of Serious Illness/Symptom(s) - pt shared in depth about her history of illness and events leading up to this hospitalization. She said she feels very motivated \"to do what I have to do to get better\".     Resources for Support - pt lives with her sister; she reported that she feels she has strong support from her siblings. Pt also spoke lovingly of her dog.    DISTRESS:     Emotional/Spiritual/Existential Distress - pt talked about death of her father last week, with the  tomorrow: \"I'm sad that I wasn't able to be with him when he , and I probably won't be able to be at the . But I was with him not long before he .\"     Mu-ism Distress - Not Discussed     Social/Cultural/Economic Distress - Not Discussed     SPIRITUAL/Mosque COPING:     Pentecostal/Maxine - pt raised Caodaism (\"I was so grateful my father was able to receive the Last Rites before he \"), not presently active in a maxine community.    Spiritual Practice(s) - didn't discuss doctrine or Episcopal practices    Emotional/Relational/Existential Connections - we mostly talked about pt's family, her dog, and her desire to get back home.    GOALS OF CARE:    Goals of Care - \"I want to do whatever I can to get better\"    Meaning/Sense-Making - \"just talking about what's going on helps me feel better\"    PLAN: continue to follow, will check in on pt again this week.    Дмитрий Brito M.Div (Bill)., [facility]  Staff   Pager [phone]      "

## 2019-09-09 NOTE — CONSULTS
Nephrology Initial Consult  September 9, 2019      Roslyn Palmer MRN:[id number] YOB: 1955  Date of Admission:9/5/2019  Primary care provider: Monica Tirado  Requesting physician: Lori Castro MD    ASSESSMENT AND RECOMMENDATIONS:   Roslyn Palmer is a 63 year old w/PMHx of cirrhosis secondary to hepatitis C and alcohol use, hypertension, depression, gout who was transferred from OSH on 9/5/2019 following a syncopal episode now with worsening renal function and decreasing urinary output.     # Hepatorenal syndrome  # Cirrhosis secondary to hepatitis C and EtOH  # Acute kidney injury  # Hyponatremia, improved  Differential for patient's worsening CT includes HRS, hypovolemia (but albumin challenge failed), SBP/UTI (ruled out). We suspect that the patient's renal injury may have began with prerenal azotemia in the setting of hypotension but current studies are most consistent with HRS in the setting of decompensated liver disease. Communicated with patient and POA that patient is quite sick and that due to liver disease (MELD-Na on 9/9 is 37) there is a low likelihood that kidney function will improve in the long term. No need for emergent dialysis but if patient's Cr continues on current trajectory, she will need dialysis to prevent worsening uremia and hyperkalemia.     RECOMMENDATIONS:   - NPO at midnight for possible tunneled dialysis catheter line (pending patient/family decision  and AM labs)   - Continue octreotide   - Consider midodrine (initial dosing 5 mg TID)   - Avoid NSAIDS   - Avoid hypotension    Recommendations were communicated to primary team (MICU) in person.    Patient seen and plan of care discussed with Dr. YOAN Pride.    Soha Stewart MD, PhD  Internal Medicine, PGY-2  Pager: [phone]  09/09/2019      REASON FOR CONSULT: Cirrhotic w/ worsening CT, failed albumin challenge. To assist with management of HRS.    HISTORY OF PRESENT ILLNESS:  Admitting  provider and nursing notes reviewed.    Roslyn Palmer is a 63 year old w/PMHx of cirrhosis secondary to hepatitis C and alcohol use, hypertension, depression, gout who was transferred from OSH on 9/5/2019 due to a syncopal episode following a large volume paracentesis and was found to have CT and hypotension, in addition to hyponatremia, hypochloremia.    Her current hospitalization has been complicated by worsening CT, initial creatinine 3.8 (baseline 1.0-1.3) and decreasing urine output.  On 9/6, albumin challenge of 100 g x 3 days was initiated and normal saline was administered for hyponatremia.  On 9/7, patient began having hematemesis (total of 1 L) and underwent EGD for banding of esophageal varices (x6).  She also underwent paracentesis of 6.3 L.      Studies including ultrasound with Doppler on 9/6, diagnostic paracentesis on 9/6, urine studies on 9/6, renal ultrasound on 9/8 have been negative for etiology of abnormal renal function.    Patient reports that she has a history of poor renal function in the setting of staph infection approximately 9 years ago.  And this was thought to be due to to antibiotic use.  Patient reports today that she is feeling fatigued.  She denies dysuria and hematuria (prior to hospitalization and currently). She denies abdominal pain.  She has not been able to void spontaneously yesterday or today and has required straight intermittent catheterization.      PAST MEDICAL HISTORY:    Past Medical History:   Diagnosis Date     Cirrhosis (H)      Depression      DJD (degenerative joint disease)      Essential hypertension, malignant      ETOH abuse      Gout      Hepatitis C        Past Surgical History:   Procedure Laterality Date     COLONOSCOPY N/A 7/30/2018    Procedure: COMBINED COLONOSCOPY, SINGLE OR MULTIPLE BIOPSY/POLYPECTOMY BY BIOPSY;;  Surgeon: Musa Diaz MD;  Location: [facility] OR     COLONOSCOPY WITH CO2 INSUFFLATION N/A 7/30/2018    Procedure: COLONOSCOPY WITH  CO2 INSUFFLATION;  Colonoscopy, Egd;  Surgeon: Musa Diaz MD;  Location: [facility] OR     COMBINED ESOPHAGOSCOPY, GASTROSCOPY, DUODENOSCOPY (EGD) WITH CO2 INSUFFLATION N/A 2018    Procedure: COMBINED ESOPHAGOSCOPY, GASTROSCOPY, DUODENOSCOPY (EGD) WITH CO2 INSUFFLATION;  Combined, Upper GI bleed Alcoholic liver disease (H) Anemia, unspecified type, Ref By Celestino, BMI 27.55, [phone];  Surgeon: Musa Diaz MD;  Location: [facility] OR     GYN SURGERY           HC ESOPHAGOSCOPY W BAND LIGATION ESOPHAGEAL VARICIES       ORTHOPEDIC SURGERY  ,    left and right hip replacement        MEDICATIONS:  PTA Meds  Prior to Admission medications    Medication Sig Last Dose Taking? Auth Provider   allopurinol (ZYLOPRIM) 100 MG tablet TAKE 1 TABLET (100 MG) BY MOUTH DAILY FOR 7 DAYS, THEN 2 TABLETS (200 MG) DAILY. 2019 at Unknown time Yes Monica Tirado MD   cephALEXin (KEFLEX) 500 MG capsule Take 1 capsule by mouth 2 times daily. 2019 at Unknown time Yes Manpreet Canela MD   cyclobenzaprine (FLEXERIL) 10 MG tablet Take 10 mg by mouth 3 times daily as needed for muscle spasms Past Week at Unknown time Yes Unknown, Entered By History   Ferrous Sulfate 324 (65 Fe) MG TBEC Take 648 mg by mouth daily 2019 at Unknown time Yes Unknown, Entered By History   FLUoxetine (PROZAC) 20 MG capsule Take 20 mg by mouth daily 2019 at Unknown time Yes Unknown, Entered By History   folic acid (FOLVITE) 1 MG tablet Take 1 tablet (1 mg) by mouth daily 2019 at Unknown time Yes Monica Tirado MD   lactulose 20 GM/30ML SOLN Take 30 mLs by mouth 4 times daily Titrate to 3-4 bowel movements per day  Patient taking differently: Take 30 mLs by mouth 2 times daily Titrate to 3-4 bowel movements per day 2019 at Unknown time Yes Elsy Malave MD   magnesium oxide 200 MG TABS Take 3 tablets by mouth daily 2019 at Unknown time Yes Monica Tirado MD    ondansetron (ZOFRAN-ODT) 4 MG ODT tab Take 1 tablet (4 mg) by mouth every 12 hours as needed for nausea or vomiting Past Month at Unknown time Yes Monica Tirado MD   pantoprazole (PROTONIX) 40 MG EC tablet Take 1 tablet (40 mg) by mouth daily 9/4/2019 at Unknown time Yes Brooklynn Rosado NP   triamcinolone (KENALOG) 0.1 % external cream Apply topically 2 times daily 9/4/2019 at Unknown time Yes Brooklynn Rosado NP   zinc sulfate (ZINCATE) 220 (50 Zn) MG capsule Take 1 capsule (220 mg) by mouth 2 times daily 9/4/2019 at Unknown time Yes Pamela Owen MD   nadolol (CORGARD) 40 MG tablet Take 40 mg by mouth daily Unknown at Unknown time  Unknown, Entered By History   rifaximin (XIFAXAN) 550 MG TABS tablet Take 1 tablet (550 mg) by mouth 2 times daily Unknown at Unknown time  Pamela Owen MD   sucralfate (CARAFATE) 1 GM/10ML suspension Take 1 g by mouth 4 times daily as needed  Unknown at Unknown time  Unknown, Entered By History   SUMAtriptan (IMITREX) 100 MG tablet Take 100 mg by mouth at onset of headache for migraine More than a month at Unknown time  Unknown, Entered By History      Current Meds    cefTRIAXone  1 g Intravenous Q24H     FLUoxetine  20 mg Oral Daily     folic acid  1 mg Oral Daily     lactulose  30 mL Oral BID     zz extemporaneous template  600 mg Oral Daily     multivitamin w/minerals  1 tablet Oral Daily     phytonadione  10 mg Intravenous Q24H     rifaximin  550 mg Oral BID     sodium chloride (PF)  3 mL Intracatheter Q8H     zinc sulfate  220 mg Oral BID     Infusion Meds    octreotide (sandoSTATIN) infusion ADULT 50 mcg/hr (09/09/19 0800)     pantoprazole (PROTONIX) infusion ADULT/PEDS GREATER than or EQUAL to 45 kg 8 mg/hr (09/09/19 0800)       ALLERGIES:    Allergies   Allergen Reactions     Sulfa Drugs        REVIEW OF SYSTEMS:  A 10-point comprehensive of systems was negative except as noted above.    SOCIAL HISTORY:   Social History     Socioeconomic  History     Marital status:      Spouse name: Not on file   Tobacco Use     Smoking status: Current Every Day Smoker     Packs/day: 0.20     Types: Cigarettes     Smokeless tobacco: Never Used     Tobacco comment: down to 4 ciggarettes per day   Substance and Sexual Activity     Alcohol use: Not Currently     Comment: quit      Drug use: No     Comment:      Sexual activity: Not Currently     Partners: Male   Other Topics Concern     Parent/sibling w/ CABG, MI or angioplasty before 65F 55M? No   Social History Narrative     Not on file     FAMILY MEDICAL HISTORY:   Family History   Problem Relation Age of Onset     Breast Cancer Mother      Cancer Maternal Grandmother      Cerebrovascular Disease Maternal Grandmother         stroke     Cancer Maternal Grandfather         pancreatic cancer     Cancer Paternal Grandmother      Cancer Paternal Grandfather         brain cancer     Cirrhosis Brother      Liver Cancer Brother      Arthritis Sister        PHYSICAL EXAM:   Temp  Av.3  F (36.3  C)  Min: 94.5  F (34.7  C)  Max: 98.4  F (36.9  C)      Pulse  Av.3  Min: 54  Max: 120 Resp  Av.7  Min: 10  Max: 38  FiO2 (%)  Av %  Min: 100 %  Max: 100 %  SpO2  Av.5 %  Min: 86 %  Max: 100 %       BP 90/59   Pulse 100   Temp 97  F (36.1  C) (Axillary)   Resp 20   Wt 70.4 kg (155 lb 3.3 oz)   SpO2 94%   BMI 25.05 kg/m     Date 19 07 - 09/10/19 0659   Shift 0402-8932 2051-3735 4864-1921 24 Hour Total   INTAKE   I.V. 45   45   Shift Total(mL/kg) 45(0.64)   45(0.64)   OUTPUT   Shift Total(mL/kg)       Weight (kg) 70.4 70.4 70.4 70.4      Admit Weight: 67.4 kg (148 lb 9.4 oz)     GENERAL: sitting in bed, NAD  EYES: EOMI, severe scleral icterus bilaterally   HENT: NCAT, MMM, nose and oropharynx without ulcers or lesions  NECK: no cervical lymphadenopathy  RESP: normal respiratory effort on room air, CTAB - no rales, rhonchi or wheezes  CV: regular rate and rhythm, normal S1 S2,  no S3 or S4, 3/6 NANO at RUSB, JVD to jawline; pedal  pulses +2 bilaterally   ABDOMEN: normoactive bowel sounds, soft, nontender, distended but not tense   MS/Ext: WWP, scattered ecchymoses on UE and LE bilaterally   SKIN: as above   NEURO: AOx3, spontaneous movement of all extremities  PSYCH: interactive, full range of affect normal/bright, speech sometimes slow     LABS:   CMP  Recent Labs   Lab 09/09/19  0421 09/08/19  2143 09/08/19  1808 09/08/19  1038 09/08/19  0414 09/08/19  0200  09/07/19  0611   * 130* 130* 129* 129* 129*   < > 126*   POTASSIUM 4.4  --  4.5 4.8 4.7 6.3*  --  4.3   CHLORIDE 102  --   --  100 101 99  --  97   CO2 12*  --   --  15* 12* 13*  --  12*   ANIONGAP 18*  --   --  14 16* 16*  --  16*   GLC 78  --   --  86 90 79  --  81   *  --   --  107* 107* 111*  --  101*   CR 4.64*  --  4.31* 4.08* 4.12* 4.04*  --  3.92*   GFRESTIMATED 9*  --  10* 11* 11* 11*  --  11*   GFRESTBLACK 11*  --  12* 13* 13* 13*  --  13*   MEGGAN 9.8  --   --  9.7 8.9 8.6  --  9.0   PROTTOTAL 5.9*  --   --   --  5.7* 5.4*  --  5.7*   ALBUMIN 4.0  --   --   --  3.2* 2.8*  --  2.8*   BILITOTAL 15.9*  --   --   --  16.0* 15.0*  --  18.2*   ALKPHOS 174*  --   --   --  255* 236*  --  335*   AST 74*  --   --   --  96* Canceled, Test credited  --  141*   ALT 43  --   --   --  56* 57*  --  78*    < > = values in this interval not displayed.     CBC  Recent Labs   Lab 09/09/19 0421 09/08/19  2143 09/08/19  1808 09/08/19  1038 09/08/19  0414 09/08/19  0200  09/07/19  0611   HGB 7.3* 7.2* 7.5* 8.6* 9.5* 8.1*   < > 8.0*   WBC 9.8  --   --   --  8.3 8.1  --  10.0   RBC 2.38*  --   --   --  3.06* 2.57*  --  2.50*   HCT 22.2*  --   --   --  29.4* 24.1*  --  23.7*   MCV 93  --   --   --  96 94  --  95   MCH 30.7  --   --   --  31.0 31.5  --  32.0   MCHC 32.9  --   --   --  32.3 33.6  --  33.8   RDW 20.1*  --   --   --  18.7* 18.9*  --  22.6*   PLT 97*  --   --   --  68* 67*  --  102*    < > = values in this interval not  displayed.     INR  Recent Labs   Lab 09/09/19  0421 09/08/19  0414 09/08/19  0200 09/07/19  0611   INR 1.90* 2.32* 2.30* 2.18*     ABGNo lab results found in last 7 days.     URINE STUDIES  Recent Labs   Lab Test 09/06/19  1558 08/21/19  2122   COLOR Dark Yellow Dark Yellow   APPEARANCE Slightly Cloudy Clear   URINEGLC Negative Negative   URINEBILI Small* Small*   URINEKETONE Negative Negative   SG 1.010 1.011   UBLD Small* Negative   URINEPH 5.0 5.5   PROTEIN Negative Negative   NITRITE Negative Negative   LEUKEST Moderate* Moderate*   RBCU 4* 1   WBCU 10* 8*     No lab results found.  PTH  Recent Labs   Lab Test 08/23/19  0730   PTHI 58     IRON STUDIES  Recent Labs   Lab Test 06/19/19  0933 03/25/19  1141 11/27/17  0900 10/27/17  1346 07/25/11  0933   IRON 26* 16* 26* 32* 36    341 377 370 439*   IRONSAT 9* 5* 7* 9* 8*   YOAN 24 23 20 15 11       IMAGING:  US ABDOMEN LIMITED WITH DOPPLER COMPLETE 9/6/2019 10:29  AM   Impression:   1.  Patent hepatic vasculature with velocities as detailed above. No  evidence of portal vein thrombosis. There is reversal of flow in the  splenic vein and a patent umbilical vein, which are suggestive of  portal venous hypertension.  2.  Cirrhotic appearance of the liver. There is simple appearing  anechoic cyst in the left lobe, not appreciated on prior exam. No  suspicious hepatic lesion.  3.  Layering stones versus sludge in the gallbladder. There is mild  wall thickening, likely reactive to ascites and adjacent parenchymal  disease.  4.  Moderate volume ascites.      EXAMINATION: Renal ultrasound complete, 9/8/2019 6:31 PM   IMPRESSION:  1.  Normal kidneys.  2.  Moderate to large ascites with partially visualized cirrhotic  liver.

## 2019-09-10 NOTE — PROGRESS NOTES
WO Nurse Inpatient Wound Assessment   Reason for consultation: Evaluate and treat Bilateral arm wound     Assessment  Bilateral arm wound due to Skin Tear  Status: initial assessment    Treatment Plan  Bilateral arm wound: Every 3 days cleanse with saline and pat dry. Apply single layer of Vaseline Gauze to wound base. Cover with mepilex dressing. May use Kerlix to hold in place.   Orders Written  Recommended provider order: NA  WOC Nurse follow-up plan:signing off  Nursing to notify the Provider(s) and re-consult the WOC Nurse if wound(s) deteriorates or new skin concern.    Patient History  According to provider note(s):  Roslyn Palmer is a 63 year old female with a PMHx significant for cirrhosis secondary to hepatitis C and EtOH dependecne, hypertension, depression, gout who was transferred from [facility] on 9/5/2019 following a syncopal episode now with worsening renal function and decreasing urinary output.     Objective Data  Containment of urine/stool: Bowel Program and anuric     Active Diet Order  Orders Placed This Encounter      Clear Liquid Diet      NPO for Medical/Clinical Reasons Except for: Meds    Output:   I/O last 3 completed shifts:  In: 1465 [P.O.:980; I.V.:485]  Out: 300 [Urine:100; Other:200]    Risk Assessment:   Sensory Perception: 4-->no impairment  Moisture: 4-->rarely moist  Activity: 3-->walks occasionally  Mobility: 3-->slightly limited  Nutrition: 2-->probably inadequate  Friction and Shear: 2-->potential problem  Leonard Score: 18                          Labs:   Recent Labs   Lab 09/10/19  0259   ALBUMIN 3.6   HGB 7.2*   INR 1.85*   WBC 8.6     Physical Exam  Skin inspection: focused bilateral arms     Wound Location:  Bilateral arms     Left arm    Right arm   Date of last photo 9/10  Wound History: Pt has very fragile skin with scattered open areas.   Measurements (length x width x depth, in cm)   Right 10 cm x 4.5 cm  x  0.1 cm   Left 1 cm x 1 cm  x  0.1 cm   Wound Base: 100 %  dermis  Tunneling N/A  Undermining N/A  Palpation of the wound bed: normal   Periwound skin: intact, ecchymosis   Color: purple and red  Temperature: normal   Drainage:, scant  Description of drainage: bloody  Odor: none  Pain: no grimacing or signs of discomfort, none    Interventions  Current support surface: Standard  Atmos Air mattress  Current off-loading measures: Pillows under calves  Visual inspection of wound(s) completed  Wound Care: done per plan of care  Supplies: floor stock and discussed with RN  Education provided: plan of care and wound progress  Discussed plan of care with Patient and Nurse    Ana Hamilton RN CWOCN

## 2019-09-10 NOTE — PLAN OF CARE
Arrived from  @ 1345. A&Ox4. Intermittently forgetful. VSS on RA. Up A1 in room, bed alarm on for safety. Pt reports a BM on 4C. Oliguric. R PIV running octreotide gtt. L PIV SL. Bilateral skin tears on forearms - WOC consulted.

## 2019-09-10 NOTE — PROGRESS NOTES
Nephrology Progress Note  09/10/2019         Assessment & Recommendations:   Roslyn Palmer is a 63 year old female with a PMHx significant for cirrhosis secondary to hepatitis C and EtOH dependecne, hypertension, depression, gout who was transferred from OSH on 9/5/2019 following a syncopal episode now with worsening renal function and decreasing urinary output.      Oliguric acute kidney injury  Baseline Cr ~1.3-1.6, and cody to 5 today. The etiology for her CT is multifactorial, including hypovolemia and HRS-like physiology. She may have progressed to an ATN-like process with her initial hypotension and hypoperfusion. She is not an ideal liver transplant candidate given her recent EtOH use (June) and frailty. We do not have confidence that her renal function will improve, and her overall 6 month mortality given her high MELD and poor renal function high. We have discussed the balance between quality of life on HD and mortality risk without HD, and the family have decided to start HD as a trial to see how Roslyn tolerates it. We do not have an urgent need for HD today. IR can place a tunneled HD line on Wednesday, and we will plan for first HD session tomorrow.   - , 2hrs, .  - Continue octreotide, recommend midodrine 5mg TID today    Recommendations were communicated to primary team via phone     Patient seen and plan of care discussed with Dr. Pride.     Kevin Bell  Nephrology Fellow  [phone]    Interval History :   Nursing and provider notes from last 24 hours reviewed.    Roslyn Palmer was seen and examined this morning. She had no overnight issues. She denies chest pain or shortness of breath. She has been waling around the unit with assistance.    Review of Systems:   I reviewed the following systems:  GI: no loss of appetite. mild nausea or vomiting or diarrhea.   Neuro:  no confusion  Constitutional:  no fever or chills  CV: no dyspnea or edema.  no chest pain.    Physical  Exam:   I/O last 3 completed shifts:  In: 1465 [P.O.:980; I.V.:485]  Out: 300 [Urine:100; Other:200]   /62 (BP Location: Left arm)   Pulse 92   Temp 97.6  F (36.4  C) (Oral)   Resp 16   Wt 74.5 kg (164 lb 3.9 oz)   SpO2 97%   BMI 26.51 kg/m       GENERAL APPEARANCE: NAD  EYES:  + scleral icterus, pupils equal  HENT: mouth without ulcers or lesions  PULM: lungs clear to auscultation bilaterally, equal air movement, no clubbing  CV: regular rhythm, normal rate, no rub     -JVD no     -edema +1 in le   GI: soft, NON tender, + distended, bowel sounds are NORMAL  INTEGUMENT: no cyanosis, NO rash  NEURO:  NO asterixis   Access NONE    Labs:   All labs reviewed by me  Electrolytes/Renal -   Recent Labs   Lab Test 09/10/19  0259 09/09/19  0421 09/08/19  2143 09/08/19  1808 09/08/19  1038  08/23/19  0730  08/12/19  0923 08/05/19  1101  07/08/19  1529   * 132* 130* 130* 129*   < > 134   < > 131* 131*   < > 136   POTASSIUM 4.1 4.4  --  4.5 4.8   < > 3.0*   < > 3.9 3.9   < > 3.6   CHLORIDE 101 102  --   --  100   < > 102   < > 95 96   < > 98   CO2 12* 12*  --   --  15*   < > 23   < > 29 26   < > 30   * 124*  --   --  107*   < > 33*   < > 28 22   < > 17   CR 4.98* 4.64*  --  4.31* 4.08*   < > 1.26*   < > 1.38* 1.34*   < > 1.13*   GLC 95 78  --   --  86   < > 99   < > 98 134*   < > 105*   MEGGAN 9.3 9.8  --   --  9.7   < > 9.2   < > 9.6 9.3   < > 9.1   MAG  --   --   --   --   --   --   --   --  1.9 1.8  --  1.4*   PHOS  --   --   --   --   --   --  2.3*  --   --   --   --   --     < > = values in this interval not displayed.       CBC -   Recent Labs   Lab Test 09/10/19  0259 09/09/19  2011 09/09/19  1633  09/09/19  0421  09/08/19  0414   WBC 8.6  --   --   --  9.8  --  8.3   HGB 7.2* 7.2* 7.3*   < > 7.3*   < > 9.5*   PLT 98*  --   --   --  97*  --  68*    < > = values in this interval not displayed.       LFTs -   Recent Labs   Lab Test 09/10/19  0259 09/09/19  0421 09/08/19  0414   ALKPHOS 181* 174* 255*    BILITOTAL 16.0* 15.9* 16.0*   ALT 42 43 56*   AST 76* 74* 96*   PROTTOTAL 5.5* 5.9* 5.7*   ALBUMIN 3.6 4.0 3.2*       Iron Panel -   Recent Labs   Lab Test 06/19/19  0933 03/25/19  1141 11/27/17  0900   IRON 26* 16* 26*   IRONSAT 9* 5* 7*   YOAN 24 23 20     Imaging:  All imaging studies reviewed by me.     Current Medications:    cefTRIAXone  1 g Intravenous Q24H     FLUoxetine  20 mg Oral Daily     folic acid  1 mg Oral Daily     lactulose  30 mL Oral BID     zz extemporaneous template  600 mg Oral Daily     multivitamin w/minerals  1 tablet Oral Daily     pantoprazole (PROTONIX) IV  40 mg Intravenous BID     rifaximin  550 mg Oral BID     sodium chloride (PF)  3 mL Intracatheter Q8H     zinc sulfate  220 mg Oral BID       octreotide (sandoSTATIN) infusion ADULT 50 mcg/hr (09/10/19 1400)     Kevin Bell MD

## 2019-09-10 NOTE — PLAN OF CARE
OT 4C: Discharge Planner OT   Patient plan for discharge: Pt is hopeful to return to home with sisters assist  Current status: Pt is alert, conversational, scored 20/30 on MOCA indicating mild cognitive impairment with greatest difficulty with both immediate and delayed recall.  Min A for dressing and toileting, CGA with FWW transfers and hallway ambulation.  VSS on RA  Barriers to return to prior living situation: stairs, weakness, deconditioning, impaired cognition  Recommendations for discharge: TCU; however, pending continued progress and level of assist available at home; pt may be able to return to home with home OT/PT and family assist  Rationale for recommendations: Pt is currently below baseline with regards to mobility and independence with self cares and will benefit from continued skilled therapy intervention to address deficits.         Entered by: Shelley Dunbar 09/10/2019 10:40 AM

## 2019-09-10 NOTE — PLAN OF CARE
Transferred to: 5A  Status at time of transfer: VSS, A&Ox4.   Belongings: purse and clothing sent with patient.  Cameron removed? (if no, why?): NA  Chart and medications: sent with patient.  Family notified: Yes, left a voice message with My (sister).      Problem: Adult Inpatient Plan of Care  Goal: Plan of Care Review  9/10/2019 1259 by Trudy Carpenter RN  Outcome: Improving     Problem: Adult Inpatient Plan of Care  Goal: Readiness for Transition of Care  Outcome: Improving     Problem: Renal Function Impairment (Acute Kidney Injury/Impairment)  Goal: Effective Renal Function  Outcome: No Change     Problem: Gastrointestinal Condition Comorbidity  Goal: Gastrointestinal Condition  Description  Patient comorbidity will be monitored for signs and symptoms of Gastrointestinal condition.  Problems will be absent, minimized or managed by discharge/transition of care.  Outcome: No Change

## 2019-09-10 NOTE — TELEPHONE ENCOUNTER
Returned call to provided number, message left stating patient is currently inpatient and plan of care will be addressed upon discharge. Number to clinic provided.

## 2019-09-10 NOTE — PLAN OF CARE
ICU End of Shift Summary. See flowsheets for vital signs and detailed assessment.    Changes this shift: Pt alert and oriented, promising to use call light, but found several times to getting OOB independently, so bed alarm in use.     Plan: Continue to monitor and update MD with changes

## 2019-09-10 NOTE — PROGRESS NOTES
[facility]    Hepatology Follow-up    CC:      Syncope     Dx:      Decompensated alcoholic/HCV cirrhosis              Hematemesis secondary to esophageal varices              Syncope              Acute kidney injury, likely prerenal              Hyponatremia    24 hour events:   NAEON    Subjective:  Feels well today, but has some anxiety about dialysis.  Denies abdominal pain, no vomiting  Patient denies fevers, sweats or chills.    Medications  Current Facility-Administered Medications   Medication Dose Route Frequency     cefTRIAXone  1 g Intravenous Q24H     FLUoxetine  20 mg Oral Daily     folic acid  1 mg Oral Daily     lactulose  30 mL Oral BID     zz extemporaneous template  600 mg Oral Daily     multivitamin w/minerals  1 tablet Oral Daily     pantoprazole (PROTONIX) IV  40 mg Intravenous BID     rifaximin  550 mg Oral BID     sodium chloride (PF)  3 mL Intracatheter Q8H     zinc sulfate  220 mg Oral BID       Review of systems  A 10-point review of systems was negative, unless stated above    Examination  /62 (BP Location: Left arm)   Pulse 92   Temp 97.6  F (36.4  C) (Oral)   Resp 16   Wt 74.5 kg (164 lb 3.9 oz)   SpO2 97%   BMI 26.51 kg/m      Intake/Output Summary (Last 24 hours) at 9/10/2019 1311  Last data filed at 9/10/2019 1300  Gross per 24 hour   Intake 810 ml   Output 350 ml   Net 460 ml       General: jaundiced  CVS: RRR  Resp: CTA  Abdomen: distended, not tender  Extremities:edema  Neuro: AAO X 3, asterixis absent    Laboratory  Lab Results   Component Value Date     09/10/2019    POTASSIUM 4.1 09/10/2019    CHLORIDE 101 09/10/2019    CO2 12 09/10/2019     09/10/2019    CR 4.98 09/10/2019       Lab Results   Component Value Date    BILITOTAL 16.0 09/10/2019    ALT 42 09/10/2019    AST 76 09/10/2019    ALKPHOS 181 09/10/2019       Lab Results   Component Value Date    WBC 8.6 09/10/2019    HGB 7.2 09/10/2019    MCV 93 09/10/2019    PLT 98  09/10/2019       Lab Results   Component Value Date    INR 1.85 09/10/2019       MELD-Na score: 38 at 9/10/2019  2:59 AM  MELD score: 37 at 9/10/2019  2:59 AM  Calculated from:  Serum Creatinine: 4.98 mg/dL (Rounded to 4 mg/dL) at 9/10/2019  2:59 AM  Serum Sodium: 130 mmol/L at 9/10/2019  2:59 AM  Total Bilirubin: 16.0 mg/dL at 9/10/2019  2:59 AM  INR(ratio): 1.85 at 9/10/2019  2:59 AM  Age: 63 years        Assessment  63 year old woman with decompensated alcoholic/HCV cirrhosis admitted to hospital with syncopal episode following a large volume paracentesis.  This was complicated by acute kidney injury, and hyponatremia for which she was undergoing conservative management.  On the night of 9/7, patient had massive hematemesis requiring emergent endoscopy showing bleeding esophageal varices that were banded x6.  Hemostasis obtained after banding.    She has also developed CT likely secondary to low EABV from GI bleed. Fortunately she has remained lucid with no evidence of HE and her Hgb has remained stable.       Recommendations  -- Continue close monitoring  -- Ensure two large bore IVs at all times  -- Hgb transfusion threshold of 7  -- Continue Octreotide gtt for 72 hours end 9/11/2019   -- Continue IV PPI 40 mg BID  -- SBP ppx with 1 g daily CTX for 7 days  -- IR consultation if re-bleeds  -- Nephrology consultation  -- Recommend goals of care consultation, palliative consult recommended    Patient seen and discussed with attending physician, Dr. Faizan Mathis MD  PGY 5  Hepatology

## 2019-09-10 NOTE — PROGRESS NOTES
[facility], [place]    Progress Note - Rosa 1 Service        Date of Admission:  9/5/2019    Assessment & Plan   Roslyn Palmer is a 63 year old female with past medical history of decompensated cirrhosis (2/2 alcohol and hepatitis C), chronic hepatitis C and HTN who presents as a transfer from an OSH due to her recent admission here for HE, who presents this admission following a syncopal episode s/p paracentesis removing 6.3 L. Labs from OSH reviewed and concerning for CT, hyponatremia, hypochloremia, anemia, and abnormal liver enzymes suggesting hepatocellular injury, cholestasis, and poor synthetic function. Complicated by massive hematemesis 9/7/19 s/p banding x6 of EV.    Changes today:   - Plan for tunneled HD line tomorrow; NPO at MN   - Continue clear liquid diet for now   - Octreotide gtt to discontinue this evening after 72 hours   - Hepatitis B and quant gold pending      # Massive UGIB 9/7/19  Hematemsis 500 mL 9/7 with hypotension and acute anemia Hgb 6.3, c/w likely variceal sources. Underwent EGD at bedside in the MICU, s/p banding x6 of esophageal varices. Transfused PRBC x4, Plasma x1, platelet x1 overnight. Has been vitally stable without further evidence of bleeding. If she re-bleeds will need TIPS with IR.   - IV PPI BID through evening of 9/10 (72 hours of coverage), then PO  - Octreotide gtt for 72 hours (to finish evening of 9/10)  - Clear liquid diet today  - Monitor Hgb   - Vitamin K 10 mg IV every day x 3 days (9/8-10)  - CTX 1g IV daily for 7 days (9/7->)    # Hepatic encephalopathy, improved  Patient now oriented to self, place, but forgetful.   - Continue lactulose BID w/ prns available to titrate to 3-4 BM's per day      # Hypotension related to hypovolemia 2/2 acute blood loss  MAP 65-68 at MICU arrival. No sign of shock. Received 4 units PRBC. Now HD stable on transfer back to medicine.    - Fluid: PRBC transfusions.  - Pressors: none  -  Perfusion: normal lactate  - Lines: large bore PIV, no CVC     # Etoh-induced cirrhosis c/b ascites and EV  Last seen in GI/hepatology on 08/12/19 and MELD was 23 that visit. Cirrhosis diagnosed 6 years ago when she had EV GIB. No EGD since then. Ascites started to become appreciable in 05/19. 6.350 L removed on paracentesis 9/7. MELD-Na score on this admission: 37 points. 65-66% estimated 90-day mortality. Workup for thrombosis and SBP negative. Per GI, would start to address goals of care given clinical trajectory as patient would not be considered for liver transplantation due to frailty and not having met minimum sobriety requirement.  - Restart Rifaximin, Lactulose and Zinc   - Holding Spironolactone and Torsemide  - Continue ceftriaxone 1gm Q24H while patient for SBP ppx      MELD-Na score: 38 at 9/10/2019  2:59 AM  MELD score: 37 at 9/10/2019  2:59 AM  Calculated from:  Serum Creatinine: 4.98 mg/dL (Rounded to 4 mg/dL) at 9/10/2019  2:59 AM  Serum Sodium: 130 mmol/L at 9/10/2019  2:59 AM  Total Bilirubin: 16.0 mg/dL at 9/10/2019  2:59 AM  INR(ratio): 1.85 at 9/10/2019  2:59 AM  Age: 63 years    # Chronic hepatitis C, treatment naive  Per last GI/hepatology visit with Dr. Owen on 08/12/19, patient is GT 2b treatment naive. Will address HCV treatment in the future. Not urgent at this point     # CT  # Oliguira  # Metabolic anion gap acidosis  # Hyponatremia  Baseline ~1. Admission Cre 4.01. Minimal urine output. Diuretics discontinued last admission. Likely pre-renal in setting of slow GI variceal bleed (s/p banding) versus HRS. Completed albumin challenge 9/6-9/8 and has received multiple units additional colloid in setting of acute GIB. Creat continues to worsen today to 4.4. FENa <0.2%. UOP decreasing.   - renal consult; planning for tunneled HD line 09/11 after discussion with patient and family. Patient wants to give dialysis at least a 2 week trial then may decide if she wants to proceed with  additional dialysis in the future.   - if HRS, patient already receiving albumin and octreotide  - Consider midodrine  - monitor I/O's, daily weights  - Trend BMP daily      # Acute anemia from blood loss (Hgb10->6.3)  # Thrombocytopenia, coagulopathy  Likely 2/2 etoh-cirrhosis and acute UGIB. Patient banded x6. No further bleeding noted since though GI commented there was ongoing oozing from esophagitis. Hemoglobin stable >7 in last 48 hours without further transfusion.   - hemoglobin check this afternoon then space to daily  - transfuse to goal Hgb 7-8, Plt >50, fibrinogen >150     # Severe malnutrition in the context of acute on chronic illness  - Thera-Vit-M   - Boost Plus (vary flavors) TID between meals  - Nutrition consult, apprec recs    # MDD  - cont Fluoxetine      Diet: Clear Liquid Diet  Snacks/Supplements Adult: Boost Breeze; Between Meals    Fluids: no mIVF  Lines:   DVT Prophylaxis: Mechanical   Cameron Catheter: not present  Code Status: Full Code      Disposition Plan   Expected discharge: 2 - 3 days, recommended to transitional care unit once hemoglobin stable.  Entered: Elsy Malave MD 09/10/2019, 6:37 AM     The patient's care was discussed with the Attending Physician, Dr. Rodriguez.    Elsy Malave MD  [address], [place]  Pager: [phone]  Please see sticky note for cross cover information  ______________________________________________________________________    Interval History   NAEON. Patient feels ok this morning. She is nervous about HD but after careful consideration wants to pursue this for at least a couple of weeks then decide if it is in line with her goals of care. She feels like she still has the energy to try to live longer and do what she can. Denies shortness of breath. Denies chest pain. No abdominal pain. Able to get up and walk with PT today.     Data reviewed today: I reviewed all medications, new labs and imaging  results over the last 24 hours.     Physical Exam   Vital Signs: Temp: 97.8  F (36.6  C) Temp src: Oral BP: 110/74 Pulse: 87 Heart Rate: 93 Resp: 14 SpO2: 97 % O2 Device: None (Room air)    Weight: 164 lbs 3.88 oz  Constitutional: awake, alert, NAD  Eyes: Scleral icterus present, EOMI  ENT: Normocephalic, without obvious abnormality, atraumatic, poor dentition  Respiratory: On room air, breathing comfortably, CTA b/l  Cardiovascular: RRR, no m/r/g  GI: Distended and taught but non-tender. Bowel sounds present  Skin: Jaundice, spider angiomas, and palmar erythema present  Neurologic: Mild asterixis, A&Ox3

## 2019-09-10 NOTE — CONSULTS
Patient is on IR schedule 9/11/2019 for a Image guided placement of large bore, double lumen, tunneled CVC.   Labs WNL for procedure.   Orders for NPO, scrubs and antibiotics have been entered.  Consent will be done prior to procedure.     Please contact the IR charge RN at [phone] for estimated time of procedure.     Case discussed with Dr. Cisneros from IR and Dr. Malave. This is a 63 year old female with a PMHx significant for cirrhosis secondary to hepatitis C and EtOH dependecne, hypertension, depression, gout who was transferred from OSH on 9/5/2019 following a syncopal episode now with worsening renal function and decreasing urinary output. Nephrology has requested tunneled line placement with plan to dialyze the patient 9/11 after procedure.    Gayla Rivero DNP, APRN  Interventional Radiology  Pager: [phone]

## 2019-09-11 NOTE — PLAN OF CARE
OT 5A: Discharge Planner OT   Patient plan for discharge: pt is hopeful to be able to return to home  Current status: Pt is alert and conversational, remains forgetful.  More anxious today, expressing her concern about upcoming procedures as well as her overall prognosis.  Min A for bed mobility, toileting and sit to stand transfers, CGA with FWW ambulating to/from bathroom and standing at sink for g/h.  VSS on RA  Barriers to return to prior living situation: generalized weakness and deconditioning, impaired memory, acute medical needs  Recommendations for discharge: TCU  Rationale for recommendations: Pt is currently below baseline with regards to mobility and independence with self cares and will benefit from continued skilled therapy intervention to address deficits.         Entered by: Shelley Dunbar 09/11/2019 9:47 AM

## 2019-09-11 NOTE — IR NOTE
Patient Name: Roslyn Palmer  Medical Record Number: [id number]  Today's Date: 9/11/2019    Procedure: tunneled central venous catheter placement  Proceduralist: Denton Lott PA-C    Sedation start time: 1545  Sedation end time: 1420  Sedation medications administered: 25mcg fentanyl, 0.5mg versed   Total sedation time: 35 minutes  Sedation Notes: no complications     Procedure start time: 1540  Puncture time: 1548  Procedure end time: 1630    Report given to: JEFFRY Barahona Dialysis  : n/a    Other Notes: Pt arrived to IR room 2 from . Consent reviewed. Pt denies any questions or concerns regarding procedure. Pt positioned supine and monitored per protocol.     Target vessel identified. Dual lumen palindrome placed. Blood return in both lumens. Lumens flushed with 1.6ml 1000unit(s)/ml heparin. Line sutured in placed. Central line dressing applied.    Pt tolerated procedure without any noted complications. Pt transferred back to  for recovery.

## 2019-09-11 NOTE — PLAN OF CARE
Time: 7788-4900   Reason for admission/Dx: Hepatorenal syndrome, CT, Cirrhosis (H)   [Vitals]: /65 (BP Location: Right arm)   Pulse 92   Temp 97.1  F (36.2  C) (Oral)   Resp 20   Wt 74.5 kg (164 lb 3.9 oz)   SpO2 97%   BMI 26.51 kg/m     [Pain/PRN/s]: WDL, denies pain   [Respiratory]: WDL   [Cardiac]: WDL   [Neuros]: Alert & Oriented X4  [Gi]: Clear Liquid Diet, NPO for Medical/Clinical Reasons Except for Meds starting at midnight, stomach firm and distended, 1 black liquid stool during shift     [Gu]: 50 mL plus 2 urine occurrences throughout day/night, bladder scan and straight cath if needed    [Skin/Wounds]: bruising, yellow sclera and jaundiced skin  [Lines]: Peripheral IV 09/07/19 Right;Anterior Lower forearm (Active)   Site Assessment WDL except;Ecchymotic 9/10/2019  5:00 PM   Line Status Infusing 9/10/2019  5:00 PM   Phlebitis Scale 0-->no symptoms 9/10/2019  5:00 PM   Infiltration Scale 0 9/10/2019  5:00 PM   Infiltration Site Treatment Method  None 9/10/2019  5:00 PM   Extravasation? No 9/10/2019  5:00 PM   Number of days: 3       Peripheral IV 09/10/19 Left;Posterior Lower forearm (Active)   Site Assessment WDL except;Ecchymotic 9/10/2019  8:00 PM   Line Status Saline locked 9/10/2019  8:00 PM   Phlebitis Scale 0-->no symptoms 9/10/2019  8:00 PM   Infiltration Scale 0 9/10/2019  8:00 PM   Infiltration Site Treatment Method  None 9/10/2019  8:00 PM   Extravasation? No 9/10/2019  8:00 PM   Number of days: 0        [Infusions]: Octreotide 50 mcg/hr, 10 mL/hr  [Activity]: Assist of 1, bedside commode in room, pt complained of generalized weakness tonight [Labs/Lactic]:   Hemoglobin (g/dL)   Date Value   09/10/2019 7.2 (L)     Creatinine (mg/dL)   Date Value   09/10/2019 4.98 (H)     Platelet Count (10e9/L)   Date Value   09/10/2019 98 (L)     Hematocrit (%)   Date Value   09/10/2019 21.4 (L)     [Electrolytes]:     Phosphorus (mg/dL)   Date Value   08/23/2019 2.3 (L)      Sodium   Date Value Ref  Range Status   09/10/2019 130 (L) 133 - 144 mmol/L Final      Hemoglobin (g/dL)   Date Value   09/10/2019 7.2 (L)     Creatinine (mg/dL)   Date Value   09/10/2019 4.98 (H)     Platelet Count (10e9/L)   Date Value   09/10/2019 98 (L)     Hematocrit (%)   Date Value   09/10/2019 21.4 (L)     WBC (10e9/L)   Date Value   09/10/2019 8.6       AST (U/L)   Date Value   09/10/2019 76 (H)       ?   Plan: Pt will have a CVC placed on 9/11 and will begin dialysis after.

## 2019-09-11 NOTE — PROGRESS NOTES
Care Coordinator Progress Note    Admission Date/Time:  9/5/2019  Attending MD:  Sona Rodriguez MD    Data  Chart reviewed, discussed with interdisciplinary team.   Patient was admitted for: Data Unavailable.    Concerns with insurance coverage for discharge needs: None.  Current Living Situation: Patient lives with family.  Support System: Supportive  Services Involved: Dialysis Services  Transportation at Discharge: Family or friend will provide  Transportation to Medical Appointments:   - Not applicable  Barriers to Discharge: medically stable    Coordination of Care and Referrals: Provided patient/family with options for Dialysis Services.        Assessment  Met with patient in room to discuss discharge plans. Patient was set up with home care RN, PT, OT, HHA services through [facility] ([phone]) prior to admission, however, services never started due to admission. Therapies currently are recommending TCU but will continue to follow if patient progresses to home.     Discussed outpatient dialysis with patient and she is agreeable to this. Patient's preferred location is [facility] and prefers a TTS morning schedule. Referral faxed to [facility] (Hep B, quant gold still pending). [facility] will continue to follow.     Plan  Anticipated Discharge Date:  TBD  Anticipated Discharge Plan:  TBD    Catia Gannon RN Care Coordinator  Phone: [phone] Pager: [phone]

## 2019-09-11 NOTE — PROCEDURES
[facility], [place]    Procedure: IR Procedure Note  Date/Time: 9/11/2019 4:23 PM  Performed by: Denton Lott PA-C  Authorized by: Denton Lott PA-C   IR Fellow Physician:  Other(s) attending procedure: Assist: TAYLOR Oliva    UNIVERSAL PROTOCOL   Site Marked: NA  Prior Images Obtained and Reviewed:  Yes  Required items: Required blood products, implants, devices and special equipment available    Patient identity confirmed:  Verbally with patient  Patient was reevaluated immediately before administering moderate or deep sedation or anesthesia  Confirmation Checklist:  Relevant allergies, procedure was appropriate and matched the consent or emergent situation and correct equipment/implants were available  Time out: Immediately prior to the procedure a time out was called    Preparation: Patient was prepped and draped in usual sterile fashion    ESBL (mL):  10     ANESTHESIA    Anesthesia: Local infiltration  Local Anesthetic:  Lidocaine 1% without epinephrine  Anesthetic Total (mL):  10      SEDATION    Patient Sedated: Yes    Sedation Type:  Moderate (conscious) sedation  Sedation:  Fentanyl and midazolam  Vital signs: Vital signs monitored during sedation    See dictated procedure note for full details.  Findings: Sedation time 35 minutes.    Image guided placement of right internal jugular, 14.5 Fr., 19 cm dual lumen tunneled central venous catheter. Catheter is ready for use.    Specimens: none    Complications: None    Condition: Stable    Plan: Fluoro time: 0.4 minutes.    Follow up per primary team. Return to IR for catheter removal when indicated.    PROCEDURE   Patient Tolerance:  Patient tolerated the procedure well with no immediate complications    Time of Sedation in Minutes by Physician:  0   Time of sedation in minutes by PA: 35.

## 2019-09-11 NOTE — PLAN OF CARE
"Temp: 96.8  F (36  C) Temp src: Oral BP: 102/55 Pulse: 92 Heart Rate: 90 Resp: 20 SpO2: 96 % O2 Device: None (Room air)        Time: 2082-3827  Reason for admission: Cirrhosis, CT (admitted 9-8)  Activity: 2A  Pain: Denies  Neuro: A/o x4- set bed alarm off multiple times overnight- doesn't use call light appropriately   Cardiac: WDL  Respiratory: Dyspnea on exertion  GI/: Watery brown stools mixed with urine overnight used bedside commode  Diet: NPO  Lines: R PIV, L PIV  Skin/Wounds: Jaundice, skin tears on arms, bruising noted all over body, abd distended  Labs/Imaging: Creatinine 4.98, Hgb 7.2    New changes this shift: Pt very tearful throughout night, set off bed alarm many times, pt states she \"forgets where she is when she wakes up and is scared.\" Using bedside commode for bathroom due to weakness.   Plan: CVC placement today with dialysis to follow     Continue to monitor and follow POC.    "

## 2019-09-11 NOTE — PROGRESS NOTES
Nephrology Progress Note  09/11/2019         Assessment & Recommendations:   Roslyn Palmer is a 63 year old female with a PMHx significant for cirrhosis secondary to hepatitis C and EtOH dependecne, hypertension, depression, gout who was transferred from OSH on 9/5/2019 following a syncopal episode now with worsening renal function and decreasing urinary output.      Oliguric acute kidney injury  Baseline Cr ~1.3-1.6, and cody to 5 today. The etiology for her TC is multifactorial, including hypovolemia and HRS-like physiology. She may have progressed to an ATN-like process with her initial hypotension and hypoperfusion. She is not an ideal liver transplant candidate given her recent EtOH use (June) and frailty. We do not have confidence that her renal function will improve, and her overall 6 month mortality given her high MELD and poor renal function high. We have discussed the balance between quality of life on HD and mortality risk without HD, and the family have decided to start HD as a trial to see how Roslyn tolerates it.   -Renal function is stable and no sign of improvement. IR can place a tunneled HD line on today, and we will plan for first HD session this afternoon. , 2hrs, .   - Continue octreotide, midodrine 5mg TID    Electrolytes/Acid-Base status  Stable electrolytes. HCO3 12, will improve on HD. Likley being mediated by her kidney disease and diarrhea. No lactic acid. If HCO3 remains <22 after first sessions of HD, we would recommend starting sodium bicarb 1300mg BID    Recommendations were communicated to primary team via phone     Patient seen and plan of care discussed with Dr. Pride.     Kevin Bell  Nephrology Fellow  [phone]    Interval History :   Nursing and provider notes from last 24 hours reviewed.    Roslyn Palmer was seen and examined this morning. She had no overnight issues. Tearful and confused. She denies chest pain and denies SOB. HD catheter to be placed  today.    Review of Systems:   I reviewed the following systems:  GI: no loss of appetite. mild nausea, no vomiting, + diarrhea.   Neuro:  mild confusion  Constitutional:  no fever or chills  CV: no dyspnea or edema.  no chest pain.    Physical Exam:   I/O last 3 completed shifts:  In: 90 [I.V.:90]  Out: 150 [Urine:50; Other:100]   /63 (BP Location: Right arm)   Pulse 92   Temp 95.8  F (35.4  C) (Oral)   Resp 16   Wt 74.5 kg (164 lb 3.9 oz)   SpO2 97%   BMI 26.51 kg/m       GENERAL APPEARANCE: NAD, tearful and confused  EYES:  + scleral icterus, pupils equal  HENT: mouth without ulcers or lesions  PULM: lungs clear to auscultation bilaterally, equal air movement, no clubbing  CV: regular rhythm, normal rate, no rub     -JVD no     -edema +1 in LE  GI: soft, Non tender, + distended, bowel sounds are normal  INTEGUMENT: no cyanosis, No rash  NEURO:  No asterixis   Access: Plan for TDC today    Labs:   All labs reviewed by me  Electrolytes/Renal -   Recent Labs   Lab Test 09/11/19  0637 09/10/19  0259 09/09/19  0421  08/23/19  0730  08/12/19  0923 08/05/19  1101  07/08/19  1529    130* 132*   < > 134   < > 131* 131*   < > 136   POTASSIUM 4.1 4.1 4.4   < > 3.0*   < > 3.9 3.9   < > 3.6   CHLORIDE 104 101 102   < > 102   < > 95 96   < > 98   CO2 12* 12* 12*   < > 23   < > 29 26   < > 30   * 122* 124*   < > 33*   < > 28 22   < > 17   CR 5.53* 4.98* 4.64*   < > 1.26*   < > 1.38* 1.34*   < > 1.13*   GLC 98 95 78   < > 99   < > 98 134*   < > 105*   MEGGAN 9.2 9.3 9.8   < > 9.2   < > 9.6 9.3   < > 9.1   MAG  --   --   --   --   --   --  1.9 1.8  --  1.4*   PHOS  --   --   --   --  2.3*  --   --   --   --   --     < > = values in this interval not displayed.     CBC -   Recent Labs   Lab Test 09/11/19  0637 09/10/19  1820 09/10/19  0259  09/09/19  0421   WBC 8.2  --  8.6  --  9.8   HGB 7.6* 7.2* 7.2*   < > 7.3*   PLT 91*  --  98*  --  97*    < > = values in this interval not displayed.     LFTs -   Recent  Labs   Lab Test 09/11/19  0637 09/10/19  0259 09/09/19  0421   ALKPHOS 192* 181* 174*   BILITOTAL 16.0* 16.0* 15.9*   ALT 44 42 43   AST 80* 76* 74*   PROTTOTAL 5.6* 5.5* 5.9*   ALBUMIN 3.4 3.6 4.0     Iron Panel -   Recent Labs   Lab Test 06/19/19  0933 03/25/19  1141 11/27/17  0900   IRON 26* 16* 26*   IRONSAT 9* 5* 7*   YOAN 24 23 20     Imaging:  All imaging studies reviewed by me.     Current Medications:    sodium chloride 0.9%  250 mL Intravenous Once in dialysis     sodium chloride 0.9%  300 mL Hemodialysis Machine Once     cefTRIAXone  1 g Intravenous Q24H     FLUoxetine  20 mg Oral Daily     folic acid  1 mg Oral Daily     gelatin absorbable  1 each Topical During Hemodialysis (from stock)     lactulose  30 mL Oral BID     zz extemporaneous template  600 mg Oral Daily     midodrine  5 mg Oral TID w/meals     multivitamin w/minerals  1 tablet Oral Daily     - MEDICATION INSTRUCTIONS -   Does not apply Once     octreotide  100 mcg Subcutaneous TID     omeprazole  20 mg Oral BID AC     rifaximin  550 mg Oral BID     sodium chloride (PF)  3 mL Intracatheter Q8H     sodium chloride (PF)  9 mL Intracatheter During Hemodialysis (from stock)     sodium chloride (PF)  9 mL Intracatheter During Hemodialysis (from stock)     zinc sulfate  220 mg Oral BID       Kevin Bell MD

## 2019-09-11 NOTE — PLAN OF CARE
Discharge Planner PT   Patient plan for discharge: not stated.   Current status: pt on bed alarms, pt needing assist for room set for path for amb due to small room. Pt needing SBA for all bed mobility and CGA for sit to stand to WW for standing support. Pt amb up to 350'x 1 with heavy use of WW for support. Pt needing V.c for up right posture. Pt demo increased instability near end of amb due to fatigue. Pt demo supine seated and standing there ex program needing V.c for tech.   Barriers to return to prior living situation: weakness, fatigue   Recommendations for discharge: PT - per plan established by the Physical Therapist, according to functional mobility the  discharge recommendation is TCU   Rationale for recommendations: pt is still below baseline, pt benefit for cont skilled PT.        Entered by: Rashaun Goldberg 09/11/2019 11:11 AM

## 2019-09-11 NOTE — PLAN OF CARE
A&Ox4 but forgetful. VSS on RA. No C/O pain or N/V. R and L PIV SL. Jaundice skin with skin tears on both forearms. Distended abdomen. Up A1 with walker to BSC. 2 watery, brown BMs this shift mixed with urine. Bed alarm on for forgetfulness. NPO before getting a CVC placed @ 1400. Went to dialysis right after getting line placed.

## 2019-09-11 NOTE — PRE-PROCEDURE
GENERAL PRE-PROCEDURE:   Procedure:  Tunneled CVC    Verbal consent obtained?: Yes    Written consent obtained?: Yes    Risks and benefits: Risks, benefits and alternatives were discussed    Consent given by:  Patient  Patient states understanding of procedure being performed: Yes    Patient's understanding of procedure matches consent: Yes    Procedure consent matches procedure scheduled: Yes    Expected level of sedation:  Moderate  Appropriately NPO:  Yes  ASA Class:  Class 3- Severe systemic disease, definite functional limitations  Mallampati  :  Grade 2- soft palate, base of uvula, tonsillar pillars, and portion of posterior pharyngeal wall visible  Lungs:  Lungs clear with good breath sounds bilaterally  Heart:  Normal heart sounds and rate  History & Physical reviewed:  History and physical reviewed and no updates needed  Statement of review:  I have reviewed the lab findings, diagnostic data, medications, and the plan for sedation

## 2019-09-11 NOTE — PROGRESS NOTES
[facility], [place]    Progress Note - Rosa 1 Service        Date of Admission:  9/5/2019    Assessment & Plan   Roslyn Palmer is a 63 year old female with past medical history of decompensated cirrhosis (2/2 alcohol and hepatitis C), chronic hepatitis C and HTN who presents as a transfer from an OSH due to her recent admission here for HE, who presents this admission following a syncopal episode s/p paracentesis removing 6.3 L. Labs from OSH reviewed and concerning for CT, hyponatremia, hypochloremia, anemia, and abnormal liver enzymes suggesting hepatocellular injury, cholestasis, and poor synthetic function. Complicated by massive hematemesis 9/7/19 s/p banding x6 of EV.    Changes today:   - Plan for tunneled CVC today, will start HD per nephrology  - Discontinue IV Octreotide, switch to subcutaneous for HRS  - Switch to PO PPI  - Hepatitis B panel and quant gold pending      Massive UGIB 9/7/19  Hypotension related to hypovolemia 2/2 acute blood loss  Acute on chronic anemia secondary from blood loss  Hematemesis 500 mL 9/7 with hypotension and acute anemia with Hgb at 6.3 (from baseline ~10-11 g/dL), c/w likely variceal sources. Underwent EGD at bedside in the MICU, s/p banding x6 of esophageal varices. Transfused PRBC x4, Plasma x1, platelet x1 overnight. Has been vitally stable without further evidence of bleeding. Hgb stable at 7.6 g/dL. Completed Octreotide gtt, IV PPI, 3 days of IV VitK. If she re-bleeds will need TIPS with IR.   - Switch to PO PPI  - Step diet as tolerated  - Monitor CBC (transfuse if Hgb <7 g/dL, Plt <50, Fibrinogen <150)  - CTX 1g IV daily for 7 days (9/7->) (SBP prophylaxis)    EtOH-induced cirrhosis c/b ascites, HE, and EV bleeding  Thrombocytopenia, coagulopathy secondary to cirrhosis  Hepatic encephalopathy, improved  Last seen in GI/hepatology on 08/12/19 and MELD was 23 that visit. Cirrhosis diagnosed 6 years ago when she had EV  GIB. No EGD since then. Ascites started to become appreciable in 05/19. 6.350 L removed on paracentesis 9/7. MELD-Na score on this admission: 37 points. 65-66% estimated 90-day mortality. Workup for thrombosis and SBP negative. Per GI, would start to address goals of care given clinical trajectory as patient would not be considered for liver transplantation due to frailty and not having met minimum sobriety requirement. Patient is now alert & oriented to self, place, and time but forgetful.   - Continue Rifaximin, Lactulose (BID w/ prns available to titrate to 3-4 BM's per day) and Zinc   - Holding Spironolactone and Torsemide (acute bleeding and CT)     MELD-Na score: 37 at 9/11/2019  6:37 AM  MELD score: 37 at 9/11/2019  6:37 AM  Calculated from:  Serum Creatinine: 5.53 mg/dL (Rounded to 4 mg/dL) at 9/11/2019  6:37 AM  Serum Sodium: 133 mmol/L at 9/11/2019  6:37 AM  Total Bilirubin: 16.0 mg/dL at 9/11/2019  6:37 AM  INR(ratio): 1.90 at 9/11/2019  6:37 AM  Age: 63 years    Chronic hepatitis C, treatment naive  Per last GI/hepatology visit with Dr. Owen on 08/12/19, patient is GT 2b treatment naive. Will address HCV treatment in the future. Not urgent at this point     CT  Oliguira  Metabolic anion gap acidosis  Hyponatremia  Baseline ~1. Admission Cre 4.01. Minimal urine output. Diuretics discontinued last admission. Likely pre-renal in setting of slow GI variceal bleed (s/p banding) versus HRS. Completed albumin challenge 9/6-9/8 and has received multiple units additional colloid in setting of acute GIB. Creat continues to worsen today to 5.53. FENa <0.2%. UOP decreasing.   - Renal consult; tunneled HD line 09/11. HD plan discussed with patient and family, patient wants to give dialysis at least a 2 week trial then may decide if she wants to proceed with additional dialysis in the future.   - Octreotide 100 mcg subcutaneous TID  - Midodrine 5mg TID  - monitor I/O's, daily weights  - Trend BMP daily     #  Severe malnutrition in the context of acute on chronic illness  - Thera-Vit-M   - Boost Plus (vary flavors) TID between meals  - Nutrition consult, apprec recs    # MDD  - cont Fluoxetine      Diet: Snacks/Supplements Adult: Boost Breeze; Between Meals  NPO for Medical/Clinical Reasons Except for: Meds    Fluids: no mIVF  Lines: Plan for tunneled CVC today  DVT Prophylaxis: Mechanical   Cameron Catheter: not present  Code Status: Full Code      Disposition Plan   Expected discharge: 2 - 3 days, recommended to transitional care unit once hemoglobin stable.  Entered: Ceferino Swenson MD 09/11/2019, 12:25 PM     The patient's care was discussed with the Attending Physician, Dr. Rodriguez.    Ceferino Swenson MD  [address], [place]  Pager: [phone]  Please see sticky note for cross cover information  ______________________________________________________________________    Interval History   NAEON. Patient feels ok this morning.   She is feeling anxious about the procedure and HD but after careful consideration wants to pursue this for at least a couple of weeks then decide if it is in line with her goals of care.   Denies shortness of breath. Denies chest pain. No abdominal pain.     Data reviewed today: I reviewed all medications, new labs and imaging results over the last 24 hours.    Results for orders placed or performed during the hospital encounter of 09/05/19 (from the past 24 hour(s))   Hemoglobin   Result Value Ref Range    Hemoglobin 7.2 (L) 11.7 - 15.7 g/dL   Glucose by meter   Result Value Ref Range    Glucose 135 (H) 70 - 99 mg/dL   Comprehensive metabolic panel   Result Value Ref Range    Sodium 133 133 - 144 mmol/L    Potassium 4.1 3.4 - 5.3 mmol/L    Chloride 104 94 - 109 mmol/L    Carbon Dioxide 12 (L) 20 - 32 mmol/L    Anion Gap 16 (H) 3 - 14 mmol/L    Glucose 98 70 - 99 mg/dL    Urea Nitrogen 129 (H) 7 - 30 mg/dL    Creatinine 5.53 (H) 0.52 - 1.04  mg/dL    GFR Estimate 8 (L) >60 mL/min/[1.73_m2]    GFR Estimate If Black 9 (L) >60 mL/min/[1.73_m2]    Calcium 9.2 8.5 - 10.1 mg/dL    Bilirubin Total 16.0 (HH) 0.2 - 1.3 mg/dL    Albumin 3.4 3.4 - 5.0 g/dL    Protein Total 5.6 (L) 6.8 - 8.8 g/dL    Alkaline Phosphatase 192 (H) 40 - 150 U/L    ALT 44 0 - 50 U/L    AST 80 (H) 0 - 45 U/L   INR   Result Value Ref Range    INR 1.90 (H) 0.86 - 1.14   CBC with platelets differential   Result Value Ref Range    WBC 8.2 4.0 - 11.0 10e9/L    RBC Count 2.48 (L) 3.8 - 5.2 10e12/L    Hemoglobin 7.6 (L) 11.7 - 15.7 g/dL    Hematocrit 23.4 (L) 35.0 - 47.0 %    MCV 94 78 - 100 fl    MCH 30.6 26.5 - 33.0 pg    MCHC 32.5 31.5 - 36.5 g/dL    RDW 20.9 (H) 10.0 - 15.0 %    Platelet Count 91 (L) 150 - 450 10e9/L    Diff Method Automated Method     % Neutrophils 72.0 %    % Lymphocytes 11.9 %    % Monocytes 8.6 %    % Eosinophils 6.1 %    % Basophils 0.2 %    % Immature Granulocytes 1.2 %    Nucleated RBCs 2 (H) 0 /100    Absolute Neutrophil 5.9 1.6 - 8.3 10e9/L    Absolute Lymphocytes 1.0 0.8 - 5.3 10e9/L    Absolute Monocytes 0.7 0.0 - 1.3 10e9/L    Absolute Eosinophils 0.5 0.0 - 0.7 10e9/L    Absolute Basophils 0.0 0.0 - 0.2 10e9/L    Abs Immature Granulocytes 0.1 0 - 0.4 10e9/L    Absolute Nucleated RBC 0.1    Hepatitis B Surface Antibody   Result Value Ref Range    Hepatitis B Surface Antibody 0.69 <8.00 m[IU]/mL   Hepatitis B core antibody   Result Value Ref Range    Hepatitis B Core [place] Nonreactive NR^Nonreactive   Hepatitis B surface antigen   Result Value Ref Range    Hep B Surface Agn Nonreactive NR^Nonreactive   IR Procedure Note    Narrative    Denton Lott PA-C     9/11/2019  4:25 PM  [facility]    Procedure: IR Procedure Note  Date/Time: 9/11/2019 4:23 PM  Performed by: Denton Lott PA-C  Authorized by: Denton Lott PA-C   IR Fellow Physician:  Other(s) attending procedure: Assist: Thor Gipson  PA-S    UNIVERSAL PROTOCOL   Site Marked: NA  Prior Images Obtained and Reviewed:  Yes  Required items: Required blood products, implants, devices and special   equipment available    Patient identity confirmed:  Verbally with patient  Patient was reevaluated immediately before administering moderate or deep   sedation or anesthesia  Confirmation Checklist:  Relevant allergies, procedure was appropriate and   matched the consent or emergent situation and correct equipment/implants   were available  Time out: Immediately prior to the procedure a time out was called    Preparation: Patient was prepped and draped in usual sterile fashion    ESBL (mL):  10     ANESTHESIA    Anesthesia: Local infiltration  Local Anesthetic:  Lidocaine 1% without epinephrine  Anesthetic Total (mL):  10      SEDATION    Patient Sedated: Yes    Sedation Type:  Moderate (conscious) sedation  Sedation:  Fentanyl and midazolam  Vital signs: Vital signs monitored during sedation    See dictated procedure note for full details.  Findings: Sedation time 35 minutes.    Image guided placement of right internal jugular, 14.5 Fr., 19 cm dual   lumen tunneled central venous catheter. Catheter is ready for use.    Specimens: none    Complications: None    Condition: Stable    Plan: Fluoro time: 0.4 minutes.    Follow up per primary team. Return to IR for catheter removal when   indicated.    PROCEDURE   Patient Tolerance:  Patient tolerated the procedure well with no immediate   complications    Time of Sedation in Minutes by Physician:  0   Time of sedation in minutes by PA: 35.       Physical Exam   Vital Signs: Temp: 95.8  F (35.4  C) Temp src: Oral BP: 107/63 Pulse: 92 Heart Rate: 87 Resp: 16 SpO2: 97 % O2 Device: None (Room air)    Weight: 164 lbs 3.88 oz  Constitutional: awake, alert, NAD  Eyes: Scleral icterus present, EOMI  ENT: Normocephalic, without obvious abnormality, atraumatic, poor dentition  Respiratory: On room air, breathing  comfortably, CTA b/l  Cardiovascular: RRR, no m/r/g  GI: Distended and taught but non-tender. Bowel sounds present  Skin: Jaundice, spider angiomas, and palmar erythema present  Neurologic: Mild asterixis, A&Ox3

## 2019-09-12 NOTE — PROGRESS NOTES
[facility]    Hepatology Follow-up    CC:      Syncope     Dx:      Decompensated alcoholic/HCV cirrhosis              Hematemesis secondary to esophageal varices              Syncope              Acute kidney injury, likely prerenal              Hyponatremia     24 hour events:   No major issues, planning to start HD    Subjective:  Feels well, no new symptoms today  Patient denies fevers, sweats or chills.    Medications  Current Facility-Administered Medications   Medication Dose Route Frequency     cefTRIAXone  1 g Intravenous Q24H     fentaNYL  50 mcg Intravenous Once within 24 hrs     FLUoxetine  20 mg Oral Daily     folic acid  1 mg Oral Daily     gelatin absorbable  1 each Topical During Hemodialysis (from stock)     lactulose  30 mL Oral BID     zz extemporaneous template  600 mg Oral Daily     midazolam  2 mg Intravenous Once within 24 hrs     midodrine  5 mg Oral TID w/meals     multivitamin w/minerals  1 tablet Oral Daily     octreotide  100 mcg Subcutaneous TID     omeprazole  20 mg Oral BID AC     rifaximin  550 mg Oral BID     sodium chloride (PF)  3 mL Intracatheter Q8H     sodium chloride (PF)  9 mL Intracatheter During Hemodialysis (from stock)     sodium chloride (PF)  9 mL Intracatheter During Hemodialysis (from stock)     zinc sulfate  220 mg Oral BID       Review of systems  A 10-point review of systems was negative, unless stated above    Examination  BP 96/65   Pulse 87   Temp 97.8  F (36.6  C) (Oral)   Resp 20   Wt 75.3 kg (166 lb 0.1 oz)   SpO2 97%   BMI 26.79 kg/m      Intake/Output Summary (Last 24 hours) at 9/12/2019 1354  Last data filed at 9/11/2019 1851  Gross per 24 hour   Intake 0 ml   Output 0 ml   Net 0 ml       General:  Jaundiced  CVS: RRR  Resp: CTA  Abdomen: soft, not tender  Extremities: edema  Neuro: Oriented    Laboratory  Lab Results   Component Value Date     09/12/2019    POTASSIUM 3.8 09/12/2019    CHLORIDE 104 09/12/2019    CO2  15 09/12/2019    BUN 93 09/12/2019    CR 4.51 09/12/2019       Lab Results   Component Value Date    BILITOTAL 15.9 09/12/2019    ALT 41 09/12/2019    AST 76 09/12/2019    ALKPHOS 209 09/12/2019       Lab Results   Component Value Date    WBC 8.2 09/12/2019    HGB 7.7 09/12/2019    MCV 97 09/12/2019    PLT 74 09/12/2019       Lab Results   Component Value Date    INR 1.97 09/12/2019       MELD-Na score: 38 at 9/12/2019  7:47 AM  MELD score: 38 at 9/12/2019  7:47 AM  Calculated from:  Serum Creatinine: 4.51 mg/dL (Rounded to 4 mg/dL) at 9/12/2019  7:47 AM  Serum Sodium: 132 mmol/L at 9/12/2019  7:47 AM  Total Bilirubin: 15.9 mg/dL at 9/12/2019  7:47 AM  INR(ratio): 1.97 at 9/12/2019  7:47 AM  Age: 63 years      Assessment  63 year old woman with decompensated alcoholic/HCV cirrhosis admitted to hospital with syncopal episode following a large volume paracentesis.  This was complicated by acute kidney injury, and hyponatremia for which she was undergoing conservative management.  On the night of 9/7, patient had massive hematemesis requiring emergent endoscopy showing bleeding esophageal varices that were banded x6.  Hemostasis obtained after banding.  Base on AASLD guidelines she should have EVL and beta-blocker therapy but will hold off on beta-blocker given impending HD and possible volume shifts.  She has also developed CT likely secondary to low EABV from GI bleed. Fortunately she has remained lucid with no evidence of HE and her Hgb has remained stable.    No major changes today     Recommendations  -- Continue close monitoring  -- Ensure two large bore IVs at all times  -- Hgb transfusion threshold of 7  -- Continue Octreotide SQ TID per Nephrology  -- Continue Midodrine  -- Complete SBP ppx for 7 days  -- Continue IV PPI 40 mg BID  -- IR consultation if re-bleeds  -- HD per Nephrology   -- Recommend goals of care consultation, palliative consult recommended     Patient seen and discussed with attending  physician, Dr. Leventhal Chimaobi Anugwom, MD  PGY 5  Hepatology

## 2019-09-12 NOTE — PLAN OF CARE
Shift 7956-1480: Pt AOx4 but forgetful at times. VSS on RA. No c/o pain or N/V. R and L PIV SL. R CVC- dressing CDI. Hemodialysis run today with no fluids taken off. Juandice skin and sclera. Skin tears and bruising throughout all extremities. Mepilex in place on forearms to be changed tomorrow (9/13). Up w/ A1 w/ walker and gait belt. Distended firm abdomen. 2 watery brown stools mixed with small amount of urine. Advanced to dialysis diet, tolerating well. Bed alarm active and audible for forgetfulness. Will continue to monitor and follow POC.

## 2019-09-12 NOTE — PROGRESS NOTES
Care Coordinator - Discharge Planning    Admission Date/Time:  9/5/2019  Attending MD:  Sona Rodriguez MD     Data  Chart reviewed, discussed with interdisciplinary team.   Patient was admitted for: No diagnosis found.     Assessment   Notified by  that care team has scheduled a care conference with pt/family on Saturday 9/14 and has requested Nephrology and Hepatology be present.      Paged Dr. Bell Nephrology and Dr. Bingham requesting confirmation of their ability to attend.    9/13: 8:00  received confirming that one of the Nephrology attendings will be present for the care conference on Saturday.  09:35 paged CICI Marrero requesting confirmation of provider being able to attend CC scheduled for tomorrow.     Brie Bahena RN BSN, PHN RN Care Coordinator  Internal Medicine   [phone]  Pager: [phone]  Weekend RN Care Coordinator job code * * * [phone]  9/12/2019 4:10 PM

## 2019-09-12 NOTE — PROGRESS NOTES
HEMODIALYSIS TREATMENT NOTE    Date: 9/11/2019  Time: 7:25 PM    Data:  Pre Wt: 75.3 kg (bed)  Desired Wt: 74.3 kg   Post Wt: 75.3 kg (bed)  Weight change: 0 kg  Ultrafiltration - Post Run Net Total Removed (mL): 0 mL  Vascular Access Status: tunneled RIJ CVC placed immediately prior to HD.  Saline locked post-HD and new Clear Guard caps applied.  Dressing CDI.   Dialyzer Rinse: Moderate, Streaked  Total Blood Volume Processed: 24.6 L Liters  Total Dialysis (Treatment) Time: 2 hours    Lab:   No    Assessment:  Patient sleepy/sedated s/p tunneled RIJ CVC placement in IR for HD initiation.  Verified consent for HD and CVC placement/ok to use.     Interventions:  Patient initiated on HD for 2 hours via RIJ CVC with , , K3/Ca2.25/Na138/Ktipry22 and machine temp 35.  SBP declined to 70's-80's shortly into run and did not improve by turning UF off & giving NS 200ml bolus.  MD contacted and provided VORB parameter to continue HD run if MAP >65, ok for no UF this run.  Patient completed remainder of HD run with parameter of MAP>65 met and no net UF.  Began to wake & become more alert at end of run.       Plan:    Next HD per renal team.  Handoff report given to JEFFRY Kelly on 5A.

## 2019-09-12 NOTE — PROGRESS NOTES
[facility], [place]    Progress Note - Maroon 1 Service        Date of Admission:  9/5/2019    Assessment & Plan   Roslyn Palmer is a 63 year old female with past medical history of decompensated alcoholic cirrhosis, chronic hepatitis C, and HTN who presents as a transfer from an OSH due to her recent admission here for HE, who presents this admission following a syncopal episode s/p paracentesis removing 6.3 L. Her hospital course was complicated by CT, massive hematemesis 9/7/19 s/p banding x6 of EV.    Changes today:   - HD per nephrology plan  - Continue Octreotide and Midodrine for HRS  - Quantiferon gold pending   - Will arrange care conference (requested by her sister), likely on Saturday 1pm     Prerenal CT secondary acute blood loss to HRS  Hypervolemic hyponatremia  Baseline ~1. Admission Cre 4.01. Minimal urine output. Diuretics discontinued last admission. Likely pre-renal in setting of slow GI variceal bleed (s/p banding) versus HRS. Completed albumin challenge 9/6-9/8 and has received multiple units additional colloid in setting of acute GIB. Cr peaked at 5.53.   - Renal consult; tunneled HD line 09/11. Started HD.   Plan discussed with patient and family, patient wants to give dialysis at least a 2 week trial then may decide if she wants to proceed with additional dialysis in the future.   - Octreotide 100 mcg subcutaneous TID  - Midodrine 5mg TID  - monitor I/O's, daily weights  - Trend BMP daily     Massive UGIB 9/7/19  Hypotension related to hypovolemia 2/2 acute blood loss  Acute on chronic anemia secondary from blood loss  Hematemesis 500 mL 9/7 with hypotension and acute anemia with Hgb at 6.3 (from baseline ~10-11 g/dL), c/w likely variceal sources. Underwent EGD at bedside in the MICU, s/p banding x6 of esophageal varices. Transfused PRBC x4, Plasma x1, platelet x1 overnight. Has been vitally stable without further evidence of bleeding. Hgb stable at 7.7  g/dL. Completed Octreotide gtt, IV PPI, 3 days of IV VitK. If she re-bleeds will need TIPS with IR.   - Switch to PO PPI  - Monitor CBC (transfuse if Hgb <7 g/dL, Plt <50, Fibrinogen <150)  - CTX 1g IV daily for 7 days (9/7-9/13) (SBP prophylaxis)    EtOH-induced cirrhosis c/b ascites, HE, and EV bleeding  Thrombocytopenia, coagulopathy secondary to cirrhosis  Hepatic encephalopathy, improved  Last seen in GI/hepatology on 08/12/19 and MELD was 23 that visit. Cirrhosis diagnosed 6 years ago when she had EV GIB. No EGD since then. Ascites started to become appreciable in 05/19. 6.350 L removed on paracentesis 9/7. MELD-Na score on this admission: 37 points. 65-66% estimated 90-day mortality. Workup for thrombosis and SBP negative. Per GI, would start to address goals of care given clinical trajectory as patient would not be considered for liver transplantation due to frailty and not having met minimum sobriety requirement. Patient is now alert & oriented to self, place, and time but forgetful.   - Continue Rifaximin, Lactulose (BID w/ prns available to titrate to 3-4 BM's per day) and Zinc   - Holding Spironolactone and Torsemide (acute bleeding and CT)     MELD-Na score: 38 at 9/12/2019  7:47 AM  MELD score: 38 at 9/12/2019  7:47 AM  Calculated from:  Serum Creatinine: 4.51 mg/dL (Rounded to 4 mg/dL) at 9/12/2019  7:47 AM  Serum Sodium: 132 mmol/L at 9/12/2019  7:47 AM  Total Bilirubin: 15.9 mg/dL at 9/12/2019  7:47 AM  INR(ratio): 1.97 at 9/12/2019  7:47 AM  Age: 63 years    Chronic hepatitis C, treatment naive  Per last GI/hepatology visit with Dr. Owen on 08/12/19, patient is GT 2b treatment naive. Will address HCV treatment in the future. Not urgent at this point.     Severe malnutrition in the context of acute on chronic illness  - Nutrition consult, apprec recs  - Boost Plus (vary flavors) TID between meals    MDD  - cont Fluoxetine      Diet: Snacks/Supplements Adult: Boost Breeze; Between  Meals  Dialysis Diet    Fluids: no IVF  Lines: CVC for HD  DVT Prophylaxis: Mechanical   Cameron Catheter: not present  Code Status: Full Code      Disposition Plan   Expected discharge: 3-5, recommended to transitional care unit once hemoglobin stable, outpatient HD plan established.  Entered: Ceferino Swenson MD 09/12/2019, 8:29 AM     The patient's care was discussed with the Attending Physician, Dr. Rodriguez.    Ceferino Swenson MD  [address], [place]  Pager: [phone]  Please see sticky note for cross cover information  ______________________________________________________________________    Interval History   NAEON. Patient feels ok this morning.   States that the procedure and HD session yesterday went okay.   Denies shortness of breath. Denies chest pain. No abdominal pain.     Data reviewed today: I reviewed all medications, new labs and imaging results over the last 24 hours.    Lab Results   Component Value Date    WBC 8.2 09/12/2019    HGB 7.7 (L) 09/12/2019    HCT 23.3 (L) 09/12/2019    PLT 74 (L) 09/12/2019     (L) 09/12/2019    POTASSIUM 3.8 09/12/2019    CHLORIDE 104 09/12/2019    CO2 15 (L) 09/12/2019    BUN 93 (H) 09/12/2019    CR 4.51 (H) 09/12/2019    GLC 93 09/12/2019    SED 50 (H) 08/09/2018    AST 76 (H) 09/12/2019    ALT 41 09/12/2019    ALKPHOS 209 (H) 09/12/2019    BILITOTAL 15.9 (HH) 09/12/2019    WILLIS 30 09/08/2019    INR 1.97 (H) 09/12/2019       Physical Exam   Vital Signs: Temp: 96.8  F (36  C) Temp src: Oral BP: 106/55 Pulse: 88 Heart Rate: 84 Resp: 16 SpO2: 90 % O2 Device: None (Room air)    Weight: 166 lbs .1 oz    Constitutional: awake, alert, NAD  Eyes: Scleral icterus present, EOMI  ENT: Normocephalic, without obvious abnormality, atraumatic, poor dentition  Respiratory: On room air, breathing comfortably, CTA b/l  Cardiovascular: RRR, no m/r/g  GI: Distended. but non-tender. Bowel sounds present  Skin: Jaundice, spider  angiomas, and palmar erythema present  Neurologic: Mild asterixis, A&Ox3. No focal neurological deficit

## 2019-09-12 NOTE — PROGRESS NOTES
Nephrology Progress Note  09/12/2019         Assessment & Recommendations:   Roslyn Palmer is a 63 year old female with a PMHx significant for cirrhosis secondary to hepatitis C and EtOH dependecne, hypertension, depression, gout who was transferred from OSH on 9/5/2019 following a syncopal episode now with worsening renal function and decreasing urinary output.      Oliguric acute kidney injury  Baseline Cr ~1.3-1.6, and cody to 5 today. The etiology for her CT is multifactorial, including hypovolemia and HRS-like physiology. She may have progressed to an ATN-like process with her initial hypotension and hypoperfusion. She is not an ideal liver transplant candidate given her recent EtOH use (June) and frailty. We do not have confidence that her renal function will improve, and her overall 6 month mortality given her high MELD and poor renal function high. We have discussed the balance between quality of life on HD and mortality risk without HD, and the family have decided to start HD as a trial to see how Roslyn tolerates it.   - HD today, 2.5hrs, , . No UF. She had hypotension and MAP 60's during her run yesterday.   - Will plan for 3rd run on Friday, 3hrs and higher BFR.   - Can give midodrine prior to her HD run.   - After her third run of HD, we can give her a break over the weekend and reevaluate her on Monday. This may be a good time to have a care conference with the family to discuss further goals.     Electrolytes/Acid-Base status  Stable electrolytes. HCO3 15 and improving. Likley being mediated by her kidney disease and diarrhea. No lactic acid.     Anemia  Hgb 7.7, stable. Likely from chronic disease. Get iron panel.    Recommendations were communicated to primary team via phone     Patient seen and plan of care discussed with Dr. Pride.     Kevin Bell  Nephrology Fellow  [phone]    Interval History :   Nursing and provider notes from last 24 hours reviewed.    Roslyn GUY  Estela was seen and examined this afternoon . She had no overnight events. HD yesterday with low MAPs and no UF removed. She denies chest pain or SOB.     Review of Systems:   I reviewed the following systems:  GI: no loss of appetite. mild nausea, no vomiting, + diarrhea.   Neuro:  mild confusion  Constitutional:  no fever or chills  CV: no dyspnea or edema.  no chest pain.    Physical Exam:   No intake/output data recorded.   BP 99/51 (BP Location: Left arm)   Pulse 83   Temp 96  F (35.6  C) (Oral)   Resp 18   Wt 75.3 kg (166 lb 0.1 oz)   SpO2 99%   BMI 26.79 kg/m       GENERAL APPEARANCE: NAD  EYES:  + scleral icterus, pupils equal  HENT: mouth without ulcers or lesions  PULM: lungs clear to auscultation bilaterally, equal air movement, no clubbing  CV: regular rhythm, normal rate, no rub     -JVD no     -edema +1 in LE  GI: soft, Non tender, + distended, bowel sounds are normal  INTEGUMENT: no cyanosis, No rash  NEURO:  No asterixis   Access: TDC    Labs:   All labs reviewed by me  Electrolytes/Renal -   Recent Labs   Lab Test 09/12/19  0747 09/11/19  0637 09/10/19  0259  08/23/19  0730  08/12/19  0923 08/05/19  1101  07/08/19  1529   * 133 130*   < > 134   < > 131* 131*   < > 136   POTASSIUM 3.8 4.1 4.1   < > 3.0*   < > 3.9 3.9   < > 3.6   CHLORIDE 104 104 101   < > 102   < > 95 96   < > 98   CO2 15* 12* 12*   < > 23   < > 29 26   < > 30   BUN 93* 129* 122*   < > 33*   < > 28 22   < > 17   CR 4.51* 5.53* 4.98*   < > 1.26*   < > 1.38* 1.34*   < > 1.13*   GLC 93 98 95   < > 99   < > 98 134*   < > 105*   MEGGAN 9.0 9.2 9.3   < > 9.2   < > 9.6 9.3   < > 9.1   MAG  --   --   --   --   --   --  1.9 1.8  --  1.4*   PHOS  --   --   --   --  2.3*  --   --   --   --   --     < > = values in this interval not displayed.     CBC -   Recent Labs   Lab Test 09/12/19  0747 09/12/19  0014 09/11/19  0637  09/10/19  0259   WBC 8.2  --  8.2  --  8.6   HGB 7.7* 7.7* 7.6*   < > 7.2*   PLT 74*  --  91*  --  98*    < > =  values in this interval not displayed.     LFTs -   Recent Labs   Lab Test 09/12/19  0747 09/11/19  0637 09/10/19  0259   ALKPHOS 209* 192* 181*   BILITOTAL 15.9* 16.0* 16.0*   ALT 41 44 42   AST 76* 80* 76*   PROTTOTAL 5.4* 5.6* 5.5*   ALBUMIN 3.2* 3.4 3.6     Iron Panel -   Recent Labs   Lab Test 06/19/19  0933 03/25/19  1141 11/27/17  0900   IRON 26* 16* 26*   IRONSAT 9* 5* 7*   YOAN 24 23 20     Imaging:  All imaging studies reviewed by me.     Current Medications:    cefTRIAXone  1 g Intravenous Q24H     FLUoxetine  20 mg Oral Daily     folic acid  1 mg Oral Daily     gelatin absorbable  1 each Topical During Hemodialysis (from stock)     lactulose  30 mL Oral BID     zz extemporaneous template  600 mg Oral Daily     midodrine  5 mg Oral TID w/meals     multivitamin w/minerals  1 tablet Oral Daily     octreotide  100 mcg Subcutaneous TID     pantoprazole  40 mg Oral BID AC     rifaximin  550 mg Oral BID     sodium chloride (PF)  3 mL Intracatheter Q8H     sodium chloride (PF)  9 mL Intracatheter During Hemodialysis (from stock)     sodium chloride (PF)  9 mL Intracatheter During Hemodialysis (from stock)     zinc sulfate  220 mg Oral BID       Kevin Bell MD

## 2019-09-12 NOTE — PLAN OF CARE
A&Ox4 but forgetful. VSS on RA. No C/O pain or N/V. R and L PIV SL. R CVC - dressing CDI. Jaundice skin with skin tears on both forearms. Distended abdomen. Up A1 with walker to BSC. 1 watery, brown BMs this shift mixed with urine. On clear liquid diet with good appetite. Bed alarm on for forgetfulness.

## 2019-09-12 NOTE — PLAN OF CARE
/55 (BP Location: Left arm)   Pulse 88   Temp 96.8  F (36  C) (Oral)   Resp 16   Wt 75.3 kg (166 lb 0.1 oz)   SpO2 90%   BMI 26.79 kg/m      Time:  1950-4432     Reason for admission:  Cirrhosis, CT, Anemia, Syncopal Episode  Neuro:  Lethargic, arouses to voice.  A&Ox4, forgetful.  Slurred and slow speech, but logical.  No aphasia.  PERRLA.  Yellow sclera and missing teeth.  Behavior:  Calm and cooperative.  Unable to call appropriately.  Bed alarm in place.  Activity: Ax1 w/walker.  Vitals:  VSS on RA, except soft BPs (pt on midodrine).  Lines:  R PIV TKO w/ IV abx.  L PIV SL.  R CVC newly placed for HD, severe bleeding site this shift, provider notified and saw.  Dressing reinforced and changed x 4 this shift.    Cardiac:  Soft BPs on midodrine.    Respiratory:  Clear LS.  Sats in 90s on RA.  VAZQUEZ. Infrequent, productive cough.    GI/:  Round, distended abdomen w/ hypo BS.  Oliguric on HD.  Loose stools x 3 this shift, scheduled lactulose.  Skin:  Yellow.  Bruising and skin tears on RUE and LUE w/mepilex CDI.  Endo:  NA  Pain: Denies pain.  Diet:  CLD  Labs/Imaging:  Cr=5.53, Plts=91, Bili=16, INR=1.90, Hgb=7.7  Consults:  GI, Hepato, Renal     New changes this shift:  Admitted w/ syncopal episode, CT, anemia, cirrhosis.  EGD done w/ EV banding.  Octreotide gtt dc'd, now IV push.  PO protonix.  IV Rocephin.  6.3 L removed in bedside para.  Scheduled Rifaximin and Lactulose.  Low Na, diuretics stopped.  Creat trending up, UOP trending down.  CVC placed yesterday for HD, bleeding this shift, provider saw, dressed w/ quick clot and ABDs.  Hgb stable.  Oliguric on HD.  Loose stools x 3 this shift.  Denies pain.     Plan:  Possible HD run today.     Continue to monitor and follow POC.

## 2019-09-13 NOTE — PLAN OF CARE
Shift 5106-0713: Pt AOx4 but forgetful at times. VSS on RA. No c/o pain. One emesis episode this AM, brown and mucous. R and L PIV SL. R CVC- dressing CDI.  Juandice skin and sclera. Skin tears and bruising throughout all extremities.Wounds cares performed today per POC with Mepilex in place on forearms. Up w/ A1 w/ walker. Distended firm abdomen. Lactulose held this AM for too many stools per pt. One BM this shift. Advanced to dialysis diet, tolerating well. Bed alarm active and audible for forgetfulness. Dialysis scheduled for 1530 today. Showered with OT and walked with PT today. Care conference scheduled tomorrow 9/14 with family at 1300. Will continue to monitor and follow POC.

## 2019-09-13 NOTE — PLAN OF CARE
Discharge Planner OT 5A  Patient plan for discharge: Pt would like to be able to go home with sister.  Current status: Pt needs min assist to SBA with functional mobility and ADL's.  Barriers to return to prior living situation: Fatigue, need for assist, decreased endurance.  Recommendations for discharge: Possibly TCU  Rationale for recommendations: Pt continues to be below baseline level of function and will benefit from continued OT to maximize level of independence and safety with ADL's.        Entered by: Nichole Carmona 09/13/2019 12:10 PM

## 2019-09-13 NOTE — PLAN OF CARE
/62   Pulse 88   Temp 97.7  F (36.5  C) (Oral)   Resp 20   Wt 75.3 kg (166 lb 0.1 oz)   SpO2 98%   BMI 26.79 kg/m    Status: admitted for Hepatic encephalopathy and CT  VS: VSS on RA  Neuro: A/Ox4, forgetful. Slight tremors. Bed alarm on   Respiratory: Lung Sounds CTA, denies SOB  GI: Dialysis diet, Bmx1 over night  : voids spontaneously without difficulty. x1 over night  IV: PIV SL, R-CVC dressing CDI  Skin: Jaundiced, skin tears and bruising on BUE, protective mepilex in place. Sacral mepilex in place.  Pain: denies  Activity: A1 w/ GB+Walker  See flow sheets for further details and assessments.  Plan of Care: continue to monitor, notify MD of significant changes.

## 2019-09-13 NOTE — PROGRESS NOTES
CLINICAL NUTRITION SERVICES - REASSESSMENT NOTE     Nutrition Prescription    RECOMMENDATIONS FOR MDs/PROVIDERS TO ORDER:  If patient will need ongoing dialysis, recommend changing current MVI to renal MVI (Nephrocaps PO daily).     Malnutrition Status:    Severe malnutrition in the context of acute-on-chronic illness.      Recommendations already ordered by Registered Dietitian (RD):  Switch to Thera-Vit (w/o minerals given high T. Bili)   Boost Plus (vary flavors) @ 10 am   Boost Breeze (vary) @ 2 and 8 pm      Future/Additional Recommendations:  1. Check tolerance to variation of Boost supplements.   2. Monitor need to start calorie counts to obtain objective data regarding oral intake.      EVALUATION OF THE PROGRESS TOWARD GOALS   Diet: Dialysis + Boost Breeze BID between meals + PRN as patient desires  Prior Diet: NPO/CLD (9/7-pm through 9/11)     Intake: 50 - 75% of 3 meals ordered since admit per RN flowsheet. Per [facility] review (9/6 - 9/12), meal order average providing 725 kcal (11 kcal/kg) and 22 g PRO (0.3 g/kg). *Assuming 100% of meals consumed.      Information obtained from patient:     - Appetite is doing okay, slowly improving throughout LOS.   - Reports being dependent on Boost supplements when appetite is low.       NEW FINDINGS   Weight: dry wt of 67.4 kg (9/6). Currently, up 7.9 kg from dry wt, suspect fluid related, at least in part.       Labs: GFR: 13; BUN/Cr: 61/3.58 (H); T. Bili: 16 (H)       Meds: Folic acid (1 mg PO daily), lactulose BID, Zinc Sulfate (220 mg BID x 10 days)      GI: per I/Os, episode of emesis (x1) recorded on 9/9 and 9/13. Stooling 2 - 9x/day (on lactulose).      Skin: per WOCN note on 9/10:   Bilateral arm wound due to Skin Tear  Status: initial assessment      MALNUTRITION  % Intake: </= 50% for >/= 5 days (severe)  % Weight Loss: None noted  Subcutaneous Fat Loss: Facial region, Upper arm:, Lower arm:and Thoracic/intercostal: Severe  Muscle Loss: Temporal,   Facial & jaw region, Scapular bone, Thoracic region (clavicle, acromium bone, deltoid, trapezius, pectoral), Upper arm (bicep, tricep),  Lower arm  (forearm), Dorsal hand, Upper leg (quadricep, hamstring),  and Posterior calf: Severe   Fluid Accumulation/Edema: Does not meet criteria - trace  Malnutrition Diagnosis: Severe malnutrition in the context of acute-on-chronic illness.     Previous Goals   Patient to consume % of nutritionally adequate meal trays TID, or the equivalent with supplements/snacks.   Evaluation: Not met    Previous Nutrition Diagnosis  Inadequate oral intake related to decreased appetite and early satiety as evidenced by 10.5% weight loss in 6 months, patient consuming less than 75% of estimated needs for >1 month and physical signs of malnutrition     Evaluation: No change, updated below     CURRENT NUTRITION DIAGNOSIS  Inadequate oral intake related to inconsistent diet status as evidenced by NPO/CLD status (9/7 - 9/11), patient consuming 50 - 75% of 3 meals ordered since admit and per [facility] Touch review (9/6 - 9/12), meal order average providing 725 kcal (11 kcal/kg) and 22 g PRO (0.3 g/kg).       INTERVENTIONS  Implementation  Medical food supplement therapy - see above   Multivitamin/mineral supplement therapy - see above     Goals  Patient to consume % of nutritionally adequate meal trays TID, or the equivalent with supplements/snacks.    Monitoring/Evaluation  Progress toward goals will be monitored and evaluated per protocol.      Nereyda Grier RD, LD   5A [phone])/7B floor pager [phone]

## 2019-09-13 NOTE — PROGRESS NOTES
Nephrology Progress Note  09/13/2019         Assessment & Recommendations:   Roslyn Palmer is a 63 year old female with a PMHx significant for cirrhosis secondary to hepatitis C and EtOH dependecne, hypertension, depression, gout who was transferred from OSH on 9/5/2019 following a syncopal episode now with worsening renal function and decreasing urinary output.      Oliguric acute kidney injury  Baseline Cr ~1.3-1.6, and cody to 5 today. The etiology for her CT is multifactorial, including hypovolemia and HRS-like physiology. She may have progressed to an ATN-like process with her initial hypotension and hypoperfusion. She is not an ideal liver transplant candidate given her recent EtOH use (June) and frailty. We do not have confidence that her renal function will improve, and her overall 6 month mortality given her high MELD and poor renal function high. We have discussed the balance between quality of life on HD and mortality risk without HD, and the family have decided to start HD as a trial to see how Roslyn tolerates it.   - HD today for 3hrs, , . She complained of nausea overnight and we will given antiemetic prior to HD. Can give midodrine prior to her HD run.   - We plan to give her a break over the weekend and reevaluate her on Monday.   - Care conference tomorrow. She has stated that she would like to continue HD.      Electrolytes/Acid-Base status  Stable electrolytes. HCO3 18 and improving. Likley being mediated by her kidney disease and diarrhea. No lactic acid.     Anemia  Hgb 7.8, stable. Likely from chronic disease. Get iron panel.    Recommendations were communicated to primary team via phone     Patient seen and plan of care discussed with Dr. Pride.     Kevin Bell  Nephrology Fellow  [phone]    Interval History :   Nursing and provider notes from last 24 hours reviewed.    Roslyn Palmer was seen and examined this morning. SHe reports nausea and vomiting this morning.  She tolerated HD well yesterday, without hypotension. She denies chest pain or SOB.     Review of Systems:   I reviewed the following systems:  GI: no loss of appetite. + nausea, + vomiting, diarrhea.   Neuro:  mild confusion  Constitutional:  no fever or chills  CV: no dyspnea or edema.  no chest pain.    Physical Exam:   I/O last 3 completed shifts:  In: 240 [P.O.:240]  Out: 35 [Urine:35]   /68 (BP Location: Right arm)   Pulse 81   Temp 96.7  F (35.9  C) (Oral)   Resp 17   Wt 75.3 kg (166 lb 0.1 oz)   SpO2 98%   BMI 26.79 kg/m       GENERAL APPEARANCE: NAD  EYES:  + scleral icterus, pupils equal  HENT: mouth without ulcers or lesions  PULM: lungs clear to auscultation bilaterally, equal air movement, no clubbing  CV: regular rhythm, normal rate, no rub     -JVD no     -edema +1 in LE  GI: soft, Non tender, + distended, bowel sounds are normal  INTEGUMENT: no cyanosis, No rash  NEURO:  No asterixis   Access: TDC    Labs:   All labs reviewed by me  Electrolytes/Renal -   Recent Labs   Lab Test 09/13/19  0604 09/12/19  0747 09/11/19  0637  08/23/19  0730  08/12/19  0923 08/05/19  1101  07/08/19  1529    132* 133   < > 134   < > 131* 131*   < > 136   POTASSIUM 3.6 3.8 4.1   < > 3.0*   < > 3.9 3.9   < > 3.6   CHLORIDE 105 104 104   < > 102   < > 95 96   < > 98   CO2 18* 15* 12*   < > 23   < > 29 26   < > 30   BUN 61* 93* 129*   < > 33*   < > 28 22   < > 17   CR 3.58* 4.51* 5.53*   < > 1.26*   < > 1.38* 1.34*   < > 1.13*   GLC 94 93 98   < > 99   < > 98 134*   < > 105*   MEGGAN 8.7 9.0 9.2   < > 9.2   < > 9.6 9.3   < > 9.1   MAG  --   --   --   --   --   --  1.9 1.8  --  1.4*   PHOS  --   --   --   --  2.3*  --   --   --   --   --     < > = values in this interval not displayed.     CBC -   Recent Labs   Lab Test 09/13/19 0604 09/12/19  0747 09/12/19  0014 09/11/19  0637   WBC 8.0 8.2  --  8.2   HGB 7.8* 7.7* 7.7* 7.6*   PLT 76* 74*  --  91*     LFTs -   Recent Labs   Lab Test 09/13/19 0604  09/12/19  0747 09/11/19  0637   ALKPHOS 216* 209* 192*   BILITOTAL 16.0* 15.9* 16.0*   ALT 39 41 44   AST 73* 76* 80*   PROTTOTAL 5.3* 5.4* 5.6*   ALBUMIN 2.9* 3.2* 3.4     Iron Panel -   Recent Labs   Lab Test 06/19/19  0933 03/25/19  1141 11/27/17  0900   IRON 26* 16* 26*   IRONSAT 9* 5* 7*   YOAN 24 23 20     Imaging:  All imaging studies reviewed by me.     Current Medications:    sodium chloride 0.9%  250 mL Intravenous Once in dialysis     sodium chloride 0.9%  300 mL Hemodialysis Machine Once     cefTRIAXone  1 g Intravenous Q24H     epoetin michele (EPOGEN,PROCRIT) inj ESRD  4,000 Units Intravenous Once in dialysis     FLUoxetine  20 mg Oral Daily     folic acid  1 mg Oral Daily     gelatin absorbable  1 each Topical During Hemodialysis (from stock)     lactulose  30 mL Oral BID     zz extemporaneous template  600 mg Oral Daily     midodrine  5 mg Oral TID w/meals     multivitamin w/minerals  1 tablet Oral Daily     - MEDICATION INSTRUCTIONS -   Does not apply Once     octreotide  100 mcg Subcutaneous TID     pantoprazole  40 mg Oral BID AC     rifaximin  550 mg Oral BID     sodium chloride (PF)  3 mL Intracatheter Q8H     sodium chloride (PF)  9 mL Intracatheter During Hemodialysis (from stock)     sodium chloride (PF)  9 mL Intracatheter During Hemodialysis (from stock)     zinc sulfate  220 mg Oral BID       Kevin Bell MD

## 2019-09-13 NOTE — PLAN OF CARE
Pt is alert, orientated, forgetful, slight tremors, VSS on RA, denies pain, denies nausea, IV abx per PIV, R CVC dressing clean dry intact, no bleeding noted, jaundiced, skin tears on bruising on bilateral arms, up with A1 and walker, abd firm and distended, had 4 loose stools today, bed alarm on due to forgetfulness

## 2019-09-13 NOTE — PROGRESS NOTES
[facility], [place]    Progress Note - Rosa 1 Service        Date of Admission:  9/5/2019    Assessment & Plan   Roslyn Palmer is a 63 year old female with past medical history of decompensated alcoholic cirrhosis, chronic hepatitis C, and HTN who presents as a transfer from an OSH due to her recent admission here for HE, who presents this admission following a syncopal episode s/p paracentesis removing 6.3 L. Her hospital course was complicated by CT, massive hematemesis 9/7/19 s/p banding x6 of EV.    Changes today:   - HD per nephrology plan. Arranging outpatient HD.  - Continue Octreotide and Midodrine for HRS   - Will arrange care conference on Saturday 1pm     Prerenal CT secondary acute blood loss to HRS  Hypervolemic hyponatremia  Baseline ~1. Admission Cre 4.01. Minimal urine output. Diuretics discontinued last admission. Likely pre-renal in setting of slow GI variceal bleed (s/p banding) versus HRS. Completed albumin challenge 9/6-9/8 and has received multiple units additional colloid in setting of acute GIB. Cr peaked at 5.53.   - Renal consult; tunneled HD line 09/11. Started HD.   Plan discussed with patient and family, patient wants to give dialysis at least a 2 week trial then may decide if she wants to proceed with additional dialysis in the future.   - Octreotide 100 mcg subcutaneous TID  - Midodrine 5mg TID  - monitor I/O's, daily weights  - Trend BMP daily  - Quantiferon - negative  - HBV panel - non-immune    Massive UGIB 9/7/19  Hypotension related to hypovolemia 2/2 acute blood loss  Acute on chronic anemia secondary from blood loss  Hematemesis 500 mL 9/7 with hypotension and acute anemia with Hgb at 6.3 (from baseline ~10-11 g/dL), c/w likely variceal sources. Underwent EGD at bedside in the MICU, s/p banding x6 of esophageal varices. Transfused PRBC x4, Plasma x1, platelet x1 overnight. Has been vitally stable without further evidence of bleeding.  Hgb stable at 7.7 g/dL. Completed Octreotide gtt, IV PPI, 3 days of IV VitK. If she re-bleeds will need TIPS with IR.   - Switch to PO PPI  - Monitor CBC (transfuse if Hgb <7 g/dL, Plt <50, Fibrinogen <150)  - CTX 1g IV daily for 7 days (9/7-9/13) (SBP prophylaxis)    EtOH-induced cirrhosis c/b ascites, HE, and EV bleeding  Thrombocytopenia, coagulopathy secondary to cirrhosis  Hepatic encephalopathy, improved  Last seen in GI/hepatology on 08/12/19 and MELD was 23 that visit. Cirrhosis diagnosed 6 years ago when she had EV GIB. No EGD since then. Ascites started to become appreciable in 05/19. 6.350 L removed on paracentesis 9/7. MELD-Na score on this admission: 37 points. 65-66% estimated 90-day mortality. Workup for thrombosis and SBP negative. Per GI, would start to address goals of care given clinical trajectory as patient would not be considered for liver transplantation due to frailty and not having met minimum sobriety requirement.   Patient is now alert & oriented to self, place, and time. MELD = 38.  - Continue Rifaximin, Lactulose (BID w/ prns available to titrate to 3-4 BM's per day) and Zinc   - Holding Spironolactone and Torsemide (acute bleeding and CT)    Chronic hepatitis C, treatment naive  Per last GI/hepatology visit with Dr. Owen on 08/12/19, patient is GT 2b treatment naive. Will address HCV treatment in the future. Not urgent at this point.     Severe malnutrition in the context of acute on chronic illness  - Nutrition consult, apprec recs  - Boost Plus (vary flavors) TID between meals    MDD  - cont Fluoxetine      Diet: Dialysis Diet  Snacks/Supplements Adult: Boost Breeze; Between Meals  Snacks/Supplements Adult: Boost Plus; Between Meals    Fluids: no IVF  Lines: CVC for HD  DVT Prophylaxis: Mechanical   Cameron Catheter: not present  Code Status: Full Code      Disposition Plan   Expected discharge: Early next week, recommended to transitional care unit once hemoglobin stable,  outpatient HD plan established.  Entered: Ceferino Swenson MD 09/13/2019, 5:01 PM     The patient's care was discussed with the Attending Physician, Dr. Rodriguez.    MD Rosa Madden [address] [place] [address], [place]  Pager: [phone]  Please see sticky note for cross cover information  ______________________________________________________________________    Interval History   NAEON. Patient feels fine this morning.   States that the procedure and HD session yesterday went okay.   Denies shortness of breath. Denies chest pain. No abdominal pain. No bleeding.     Data reviewed today: I reviewed all medications, new labs and imaging results over the last 24 hours.    Lab Results   Component Value Date    WBC 8.0 09/13/2019    HGB 7.8 (L) 09/13/2019    HCT 22.9 (L) 09/13/2019    PLT 76 (L) 09/13/2019     09/13/2019    POTASSIUM 3.6 09/13/2019    CHLORIDE 105 09/13/2019    CO2 18 (L) 09/13/2019    BUN 61 (H) 09/13/2019    CR 3.58 (H) 09/13/2019    GLC 94 09/13/2019    SED 50 (H) 08/09/2018    AST 73 (H) 09/13/2019    ALT 39 09/13/2019    ALKPHOS 216 (H) 09/13/2019    BILITOTAL 16.0 (HH) 09/13/2019    WILLIS 30 09/08/2019    INR 1.95 (H) 09/13/2019       Physical Exam   Vital Signs: Temp: 96.5  F (35.8  C) Temp src: Oral BP: 109/61 Pulse: 81 Heart Rate: 78 Resp: 16 SpO2: 98 % O2 Device: None (Room air)    Weight: 166 lbs .1 oz    Constitutional: awake, alert, NAD  Eyes: Scleral icterus present, EOMI  ENT: Normocephalic, without obvious abnormality, atraumatic, poor dentition  Respiratory: On room air, breathing comfortably, CTA b/l  Cardiovascular: RRR, no m/r/g  GI: Distended. but non-tender. Bowel sounds present  Skin: Jaundice, spider angiomas, and palmar erythema present  Neurologic: Mild asterixis, A&Ox3. No focal neurological deficit.

## 2019-09-13 NOTE — PROGRESS NOTES
Care Coordinator - Discharge Planning    Admission Date/Time:  9/5/2019  Attending MD:  Sona Rodriguez MD     Data  Chart reviewed, discussed with interdisciplinary team.   Patient was admitted for: No diagnosis found.     Assessment   Full assessment completed in previous note     Pt status reviewed/discussed during care team rounds.  Provider team, GI, Nephrology will be meeting with pt and her family on Saturday 9/14.   Team anticipates LOS 3-5 days.  Current anticipated discharge plan is home with home health and new HD set up.    [phone].  [facility].  Tenative schedule would be MWF 3 p.m. Chair time.  [facility] is only a MW facility.  Will need to reconfirm confirmatoin of placement, accepting provider on Monday 9/16.       Met with pt.  Reviewed above information.  Pt notes no concerns regarding pending HD schedule as pt had originally requested a TTHSAT schedule.   Agreed to f/u with pt on Monday 9/16 regarding HD and anticipated discharge plan.    Paged Dr. Bell Nephrology Fellow with pending HD schedule.   Paged Dr. Swenson, Intern [facility] 1 with above information.     Coordination of Care and Referrals: Provided patient/family with options for Dialysis Services and Home Care.      Plan  Anticipated Discharge Date:  TBD  Anticipated Discharge Plan:  TBD    Brie Bahena, RN BSN, PHN RN Care Coordinator  Internal Medicine   [phone]  Pager: [phone]  Weekend RN Care Coordinator job code * * * [phone]  9/13/2019 11:25 AM

## 2019-09-14 NOTE — PROGRESS NOTES
Hepatology Progress Note    Date of Service: September 14, 2019     Labs: reviewed  MELD-Na score: 37 at 9/13/2019  6:04 AM  MELD score: 37 at 9/13/2019  6:04 AM  Calculated from:  Serum Creatinine: 3.58 mg/dL at 9/13/2019  6:04 AM  Serum Sodium: 136 mmol/L at 9/13/2019  6:04 AM  Total Bilirubin: 16.0 mg/dL at 9/13/2019  6:04 AM  INR(ratio): 1.95 at 9/13/2019  6:04 AM  Age: 63 years        ASSESSMENT/PLAN:   63-year-old female with past medical history of chronic hepatitis C and alcohol use disorder (with recent use) with resultant cirrhosis complicated by esophageal varices, and profound protein calorie malnutrition with resultant instability, hyponatremia, and multifactorial acute kidney injury now on hemodialysis.   -Status post variceal hemorrhage with banding on August 7    RECS:  -Have been asked to comment on transplant candidacy of the patient's given family meeting this afternoon and then unfortunately the hepatology service will not be able to make:    -The patient is noted to have decompensated cirrhosis secondary to combination of chronic hepatitis C (treatment naïve) and alcohol use disorder with fairly recent alcohol consumption.  She was reviewed in our transplant conference on Tuesday, September 10, and at that time we did not feel that it we could safely offer an expedited transplant evaluation given her significant alcohol consumption and profound debility  -From a transplant perspective, she is not a liver transplant candidate at this time.  In order to become a candidate she would need to have at least 6 months of documented sobriety, with chemical dependency evaluation and follow-up.  Additionally, and likely more importantly, she is going to need a dramatic improvement in her overall nutritional and health status.  The liver transplant group had significant concerns as to her frailty, and felt that this alone would preclude her from safely moving forward with transplant  -Okay to stop  octreotide 3 times daily as the patient is currently on dialysis    Thomas M. Leventhal, M.D.   of Medicine  Advanced & Transplant Hepatology  [facility]

## 2019-09-14 NOTE — PROGRESS NOTES
Physician Attestation   I, Sona Rodriguez MD, personally examined and evaluated this patient.  I discussed the patient with the resident/fellow and care team, and agree with the assessment and plan of care as documented in the note of 9/14/2019.      I personally reviewed vital signs, medications and labs.    Sona Rodriguez MD  Date of Service (when I saw the patient): 09/14/19     [facility], [place]    Progress Note - Maroon 1 Service        Date of Admission:  9/5/2019    Assessment & Plan   Roslyn Palmer is a 63 year old female with past medical history of decompensated alcoholic cirrhosis, chronic hepatitis C, and HTN who presents as a transfer from an OSH due to her recent admission here for HE, who presents this admission following a syncopal episode s/p paracentesis removing 6.3 L. Her hospital course was complicated by CT, massive hematemesis 9/7/19 s/p banding x6 of EV.    Changes today:   - HD per nephrology plan. Arranging outpatient HD.  - Continue Midodrine for HRS  - Discontinue Ceftriaxone  - Discontinue Octreotide (per GI recs)  - Care conference today 1pm     Prerenal CT secondary acute blood loss and HRS  Hypervolemic hyponatremia  Baseline ~1. Admission Cre 4.01. Minimal urine output. Diuretics discontinued last admission. Likely pre-renal in setting of slow GI variceal bleed (s/p banding) versus HRS. Completed albumin challenge 9/6-9/8 and has received multiple units additional colloid in setting of acute GIB. Cr peaked at 5.53.   Cr 2.7. Got negative 1L in last HD run.  - Renal consult; tunneled HD line 09/11. Started HD.   Plan discussed with patient and family, patient wants to give dialysis at least a 2 week trial then may decide if she wants to proceed with additional dialysis in the future.   - Octreotide 100 mcg subcutaneous TID  - Midodrine 5mg TID  - monitor I/O's, daily weights  - Trend BMP daily  - Quantiferon - negative  - HBV panel -  non-immune    Massive UGIB 9/7/19  Hypotension related to hypovolemia 2/2 acute blood loss  Acute on chronic anemia secondary from blood loss  Hematemesis 500 mL 9/7 with hypotension and acute anemia with Hgb at 6.3 (from baseline ~10-11 g/dL), c/w likely variceal sources. Underwent EGD at bedside in the MICU, s/p banding x6 of esophageal varices. Transfused PRBC x4, Plasma x1, platelet x1 overnight. Has been vitally stable without further evidence of bleeding. Hgb stable at 7.7 g/dL. Completed Octreotide gtt, IV PPI, 3 days of IV VitK. If she re-bleeds will need TIPS with IR.   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 Cirrhosis diagnosed 6 years ago when she had EV GIB. No EGD since then. Ascites started to become appreciable in 05/19. 6.350 L removed on paracentesis 9/7. MELD-Na score on this admission: 37 points. 65-66% estimated 90-day mortality. Workup for thrombosis and SBP negative. Per GI, would start to address goals of care given clinical trajectory as patient would not be considered for liver transplantation due to frailty and not having met minimum sobriety requirement.   Patient is now alert & oriented to self, place, and time. MELD = 37.  - Continue Rifaximin, Lactulose (BID w/ prns available to titrate to 3-4 BM's per day) and Zinc   - Holding Spironolactone and Torsemide (on HD)    Chronic hepatitis C, treatment naive  Per last GI/hepatology visit with Dr. Owen on 08/12/19, patient is GT 2b treatment naive. Will address HCV treatment in the future. Not urgent at this point.     Severe malnutrition in the context of acute on chronic illness  - Nutrition consult, apprec  recs  - Boost Plus (vary flavors) TID between meals    MDD  - cont Fluoxetine      Diet: Dialysis Diet  Snacks/Supplements Adult: Boost Breeze; Between Meals  Snacks/Supplements Adult: Boost Plus; Between Meals    Fluids: no IVF  Lines: CVC for HD  DVT Prophylaxis: Mechanical   Cameron Catheter: not present  Code Status: Full Code      Disposition Plan   Expected discharge: Early next week, recommended to transitional care unit once hemoglobin stable, outpatient HD plan established.  Entered: Ceferino Swenson MD 09/14/2019, 8:15 AM     The patient's care was discussed with the Attending Physician, Dr. Rodriguez.    MD Rosa Madden 1 Service  [facility], [place]  Pager: [phone]  Please see sticky note for cross cover information  ______________________________________________________________________    Interval History   NAEON. Patient feels fine this morning.   Had 1 episode of vomiting with small amount of non-bloody content.   Denies shortness of breath. Denies chest pain. No abdominal pain. No bleeding.     Data reviewed today: I reviewed all medications, new labs and imaging results over the last 24 hours.    Lab Results   Component Value Date    WBC 9.1 09/14/2019    HGB 7.6 (L) 09/14/2019    HCT 22.2 (L) 09/14/2019    PLT 73 (L) 09/14/2019     09/14/2019    POTASSIUM 3.5 09/14/2019    CHLORIDE 100 09/14/2019    CO2 22 09/14/2019    BUN 33 (H) 09/14/2019    CR 2.70 (H) 09/14/2019     (H) 09/14/2019    SED 50 (H) 08/09/2018    AST 70 (H) 09/14/2019    ALT 42 09/14/2019    ALKPHOS 226 (H) 09/14/2019    BILITOTAL 16.6 (HH) 09/14/2019    WILLIS 30 09/08/2019    INR 1.80 (H) 09/14/2019       Physical Exam   Vital Signs: Temp: 98.6  F (37  C) Temp src: Oral BP: 115/59 Pulse: 91 Heart Rate: 86 Resp: 16 SpO2: 95 % O2 Device: None (Room air)    Weight: 164 lbs 9.6 oz    Constitutional: awake, alert, NAD  Eyes: Scleral icterus present, EOMI  ENT: Normocephalic,  without obvious abnormality, atraumatic, poor dentition  Respiratory: On room air, breathing comfortably, CTA b/l  Cardiovascular: RRR, no m/r/g  GI: Distended. but non-tender. Bowel sounds present  Skin: Jaundice, spider angiomas, and palmar erythema present. Mild blood oozing per CVC site.  Neurologic: Mild asterixis, A&Ox3. No focal neurological deficit.

## 2019-09-14 NOTE — PLAN OF CARE
Shift [id number]: Pt AOx4 but forgetful at times. VSS on RA. No c/o pain. One emesis episode this AM after meds, brown and mucous. No Zofran needed for relief. R and L PIV SL. R CVC- dressing CDI. CVC was bleed at 0345. Juandice skin and sclera. Skin tears and bruising throughout all extremities. Mepilex in place on forearms CDI. Up w/ A1 w/ walker, generalized weakness. Edema present in BLE. José Manuel stockings to be placed. Distended, firm abdomen. Lactulose held this AM for too many stools per pt and adequate mental status. One BM this AM. Dialysis diet, tolerating well. Bed alarm discontinued, calls apporopriately.  Care conference performed today with family at 1300. Will continue to monitor and follow POC.

## 2019-09-14 NOTE — PLAN OF CARE
Pt admitted for cirrhosis, syncope after a paracentesis, and worsening renal function. Pt A&Ox4, up with assist of one. Pt had a three hour run of HD this evening. Pt declined her lactulose this evening. Pt received IV antibiotics. Pt had mepilex on her arm from skin tears. Pt had swollen legs. Pt able to make her needs known. Continue with plan of care.

## 2019-09-14 NOTE — PLAN OF CARE
Pt is alert and oriented. Up with assist of 1 and a walker, very weak. Denies pain. Bed alarm is on but pt generally calls appropriately. HD CVC bleeding this am. Dressing changed and quick clot added. Pt had 1 BM overnight. Voiding in bathroom.

## 2019-09-14 NOTE — PROGRESS NOTES
HEMODIALYSIS TREATMENT NOTE    Date: 9/13/2019  Time: 9:29 PM    Data:  Pre Wt: 74.7 kg (164 lb 10.9 oz)   Desired Wt: 73.7 kg   Post Wt: 73.7 kg  Weight change: - 1 kg  Ultrafiltration - Post Run Net Total Removed (mL): 1000 mL  Vascular Access Status: patent  Dialyzer Rinse: Streaked, Moderate  Total Blood Volume Processed: 49.3 Liters  Total Dialysis (Treatment) Time: 3 Hours    Lab:   No    Interventions/Assessment:  Stable HD tx via RCVC on K3 Ca2.25 dialysate bath with  per order. Keep MAP > 65. Midodrine given prior to arrival. Arterial pressure alarms when patient coughs. Epogen given IV. Pt ordered chicken broth and apple juice. Uneventful treatment. 1 kg removed and CVC saline locked with clear guard caps. Post /69 and report given to primary RN Yamila.       Plan:    Next HD tx per renal team.

## 2019-09-15 NOTE — PLAN OF CARE
Pt is alert and oriented, but forgetful. Up with assist of 1 to the bathroom for loose BM's. Bed alarm is on. Denies pain. José Manuel socks on.

## 2019-09-15 NOTE — PLAN OF CARE
/65 (BP Location: Right arm)   Pulse 90   Temp 96.8  F (36  C) (Oral)   Resp 16   Wt 74.7 kg (164 lb 9.6 oz)   SpO2 98%   BMI 26.57 kg/m      Shift: [id number]   Reason for Admission: syncopal episode s/p paracentesis and CT  VS: VSS on RA  Neuros: A/Ox4, able to make needs known.  GI/: No BMs reported this shift, oliguric  Diet: Dialysis diet  Drains/Lines: 2 PIV SL, R CVC SL (dialysis only)  Activity: SBA and pt up ad geneva in room  Pain/Nausea: Denies both  Skin: Jaundice. Dressings CDI on BUE and CVC site   Labs: Pending K+ recheck in AM  Plan of care: Plan for dialysis tomorrow with 1-2L UF. Will continue to monitor and follow POC.

## 2019-09-15 NOTE — PROGRESS NOTES
Nephrology attending    S: Doing well without fevers, chills, nausea, vomiting, dysuria. Making a small amount of urine. 4pt ROS negative.    O:   97.6   P90   R16   121/75   99% on RA  Gen - nad  HENT - neck supple  CV - rrr, no rub  Resp - cta bilat  Ext - 1+ edema    Labs  Cr 3.7 < 2.7    Medications reviewed    A/Rec: 62yo F with CT secondary to HRS/ATN.   CT/volume - tolerating dialysis. Plan to dialyze Monday with 1-2L UF.    Electrolytes - acceptable.    Anemia - Hgb 8, stable. Monitor for now. Please check ferritin and iron sat.    Musa Pride MD  [phone]

## 2019-09-15 NOTE — PLAN OF CARE
Shift [id number]: Pt AOx4 but forgetful at times. VSS on RA. No c/o pain. No nausea or vomiting today. R and L PIV SL. R CVC- dressing CDI. CVC was bleeding last night. Pressure dressing applied. Juandice skin and sclera. Skin tears and bruising throughout all extremities. Mepilex in place on forearms CDI to be changed tomorrow 9/16. Stand by assist. Independent in room. generalized weakness. Edema present in BLE. José Manuel stockings removed this AM. Lymphedema consult ordered. Distended, firm abdomen. Lactulose held this AM for too many stools during evening and adequate mental status. One BM this AM. Dialysis diet, tolerating well. Calls apporopriately. Will continue to monitor and follow POC.

## 2019-09-15 NOTE — PROGRESS NOTES
[facility], [place]    Progress Note - Rosa 1 Service        Date of Admission:  9/5/2019    Assessment & Plan   Roslyn Palmer is a 63 year old female with past medical history of decompensated alcoholic cirrhosis, chronic hepatitis C, and HTN who presents as a transfer from an OSH due to her recent admission here for HE, who presents this admission following a syncopal episode s/p paracentesis removing 6.3 L. Her hospital course was complicated by CT, massive hematemesis 9/7/19 s/p banding x6 of EV.    Changes today:   - HD per nephrology plan.  - Plan discharge in 1-2 day to TCU with outpatient HD.  - Care conference (9/14/19); Discussed with patient and family about natural progression of disease and prognosis. Patient states that she is not ready for hospice care and would like to continue on dialysis and treatment.    Prerenal CT secondary acute blood loss and HRS  Hypervolemic hyponatremia  Baseline ~1. Admission Cre 4.01. Minimal urine output. Diuretics discontinued last admission. Likely pre-renal in setting of slow GI variceal bleed (s/p banding) versus HRS. Completed albumin challenge 9/6-9/8 and has received multiple units additional colloid in setting of acute GIB. Cr peaked at 5.53.  - Renal consult; HD per nephrology plan  - Midodrine 5mg TID  - Discontinue Octreotide (as patient is on dialysis)  - monitor I/O's, daily weights  - Trend BMP daily    Massive UGIB 9/7/19  Hypotension related to hypovolemia 2/2 acute blood loss  Acute on chronic anemia secondary from blood loss  Hematemesis 500 mL 9/7 with hypotension and acute anemia with Hgb at 6.3 (from baseline ~10-11 g/dL), c/w likely variceal sources. Underwent EGD at bedside in the MICU, s/p banding x6 of esophageal varices. Transfused PRBC x4, Plasma x1, platelet x1 overnight. Has been vitally stable without further evidence of bleeding. Hgb stable at 7.7 g/dL. Completed Octreotide gtt, IV PPI, 3 days of IV  VitK. If she re-bleeds will need TIPS with IR.   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 Cirrhosis diagnosed 6 years ago when she had EV GIB. No EGD since then. Ascites started to become appreciable in 05/19. 6.350 L removed on paracentesis 9/7. MELD-Na score on this admission: 37 (65-66% estimated 90-day mortality). Workup for thrombosis and SBP negative. Per GI, would start to address goals of care given clinical trajectory as patient would not be considered for liver transplantation due to frailty and not having met minimum sobriety requirement.   Patient is now alert & oriented to self, place, and time. MELD = 38. Has some rising trend of ALP and bilirubin, discussed with GI, will continue to trend MELD labs.  - Continue Rifaximin, Lactulose (BID w/ prns available to titrate to 3-4 BM's per day) and Zinc   - Holding Spironolactone and Torsemide (on HD)    Chronic hepatitis C, treatment naive  Per last GI/hepatology visit with Dr. Owen on 08/12/19, patient is GT 2b treatment naive. Will address HCV treatment in the future. Not urgent at this point.     HBV non-immune  HBsAg, HBsAb are negative.  - Vaccination outpatient setting    Severe malnutrition in the context of acute on chronic illness  - Nutrition consult, apprec recs  - Boost Plus (vary flavors) TID between meals    MDD  - Continue Fluoxetine      Diet: Dialysis Diet  Snacks/Supplements Adult: Boost Breeze; Between Meals  Snacks/Supplements Adult: Boost Plus; Between Meals    Fluids: no IVF  Lines: CVC for HD  DVT Prophylaxis: Mechanical   Cameron Catheter: not present  Code Status: Full Code      Disposition Plan   Expected  discharge: in 1-2 days, recommended to transitional care unit once hemoglobin stable, outpatient HD plan established.  Entered: Ceferino Swenson MD 09/15/2019, 1:08 PM     The patient's care was discussed with the Attending Physician, Dr. Rodriguez.    MD Rosa Madden 1 Service  [facility], [place]  Pager: [phone]  Please see sticky note for cross cover information  ______________________________________________________________________    Interval History   NAEON.   Patient feels fine this morning and walks around the units.  Has some oozing at CVC site which is controlled with pressure dressing.  Has 5 BM yesterday. Feels that her abdomen is a bit more distended, discussed with patient and will continue to observe after HD.    Denies shortness of breath. Denies chest pain. No abdominal pain.    Data reviewed today: I reviewed all medications, new labs and imaging results over the last 24 hours.    Lab Results   Component Value Date    WBC 9.4 09/15/2019    HGB 8.0 (L) 09/15/2019    HCT 24.1 (L) 09/15/2019    PLT 80 (L) 09/15/2019     09/15/2019    POTASSIUM 3.3 (L) 09/15/2019    CHLORIDE 99 09/15/2019    CO2 20 09/15/2019    BUN 39 (H) 09/15/2019    CR 3.65 (H) 09/15/2019     (H) 09/15/2019    SED 50 (H) 08/09/2018    AST 71 (H) 09/15/2019    ALT 40 09/15/2019    ALKPHOS 236 (H) 09/15/2019    BILITOTAL 17.4 (HH) 09/15/2019    WILLIS 30 09/08/2019    INR 1.84 (H) 09/15/2019       Physical Exam   Vital Signs: Temp: 97.6  F (36.4  C) Temp src: Oral BP: 121/75 Pulse: 90 Heart Rate: 78 Resp: 16 SpO2: 99 % O2 Device: None (Room air)    Weight: 164 lbs 9.6 oz    Constitutional: awake, alert, NAD  Eyes: Scleral icterus present, EOMI  ENT: Normocephalic, without obvious abnormality, atraumatic, poor dentition  Respiratory: On room air, breathing comfortably, CTA b/l  Cardiovascular: RRR, no m/r/g  GI: Distended. but non-tender. Bowel sounds present  Skin: Jaundice,  spider angiomas, and palmar erythema present. Mild blood oozing per CVC site.  Neurologic: Mild asterixis, A&Ox3. No focal neurological deficit.

## 2019-09-15 NOTE — PLAN OF CARE
Pt admitted for cirrhosis, syncope after a paracentesis, and worsening renal function. Pt A&Ox4, up with stand by assist. Pt was tearful today after her care conference. However, pt remained positive. Pt a CVC placed 9/11 and had HD on 9/12 and 9/13. CVC catheter started to bleed, and a new pressure dressing was applied. Pt had mepilex on her arm from skin tears. Pt also had primapore on her backs for sores. Pt had two BMs. Pt had one emesis that was mostly mucous. Pt had swollen legs. Pt denied any pain. Pt able to make her needs known. Continue with plan of care.

## 2019-09-16 NOTE — PROGRESS NOTES
HEMODIALYSIS TREATMENT NOTE    Date: 9/16/2019  Time: 1145    Data:  Pre Wt: 75 kg  Desired Wt: 73 kg   Post Wt: 73.3 kg   Weight change: 1.7 kg  Ultrafiltration - Post Run Net Total Removed (mL): 1700 mL  Vascular Access Status: patent  Dialyzer Rinse: Clear(yellow)  Total Blood Volume Processed: 75 L  Total Dialysis (Treatment) Time: 3 Hours    Lab: No    Assessment/Interventions: 3 hour HD run via right internal jugular tunneled dialysis catheter.  Blood flow 350-400 mL/min.  UFG decreased 2/2 low BP. 1.7 L removed.       Plan:  Continue with plan of care.  Next run per Renal Team.

## 2019-09-16 NOTE — PLAN OF CARE
Pt alert and oriented, VSS on RA. Up with SBA in room and to bathroom, oliguric and 3 BMs overnight. Pt receiving scheduled lactulose. Right CVC in place for dialysis- pressure dressing in place WNL. Dialysis diet and tolerating well. Wound care on bilateral arms to be completed 9/16. Potassium replaced yesterday, recheck in AM. Possible lymph wrap placement today. Continue with POC.

## 2019-09-16 NOTE — PROGRESS NOTES
[facility], [place]    Progress Note - Maroon 1 Service        Date of Admission:  9/5/2019    Assessment & Plan   Roslyn Palmer is a 63 year old female with past medical history of decompensated alcoholic cirrhosis, chronic hepatitis C, and HTN who presents as a transfer from an OSH due to her recent admission here for HE, who presents this admission following a syncopal episode s/p paracentesis removing 6.3 L. Her hospital course was complicated by CT, massive hematemesis 9/7/19 s/p banding x6 of EV.    Changes today:   - HD per nephrology plan.  - Plan discharge in am to home; patient no longer wants to go to TCU and has home cares set up  - Care conference (9/14/19); Discussed with patient and family about natural progression of disease and prognosis. Patient states that she is not ready for hospice care and would like to continue on dialysis and treatment.    Prerenal CT secondary acute blood loss and HRS  Hypervolemic hyponatremia  Baseline ~1. Admission Cre 4.01. Minimal urine output. Diuretics discontinued last admission. Likely pre-renal in setting of slow GI variceal bleed (s/p banding) versus HRS. Completed albumin challenge 9/6-9/8 and has received multiple units additional colloid in setting of acute GIB. Cr peaked at 5.53.  - Renal consult; HD per nephrology plan  - Midodrine 5mg TID  - Discontinue Octreotide (as patient is on dialysis)  - monitor I/O's, daily weights  - Trend BMP daily    Massive UGIB 9/7/19  Hypotension related to hypovolemia 2/2 acute blood loss  Acute on chronic anemia secondary from blood loss  Hematemesis 500 mL 9/7 with hypotension and acute anemia with Hgb at 6.3 (from baseline ~10-11 g/dL), c/w likely variceal sources. Underwent EGD at bedside in the MICU, s/p banding x6 of esophageal varices. Transfused PRBC x4, Plasma x1, platelet x1 overnight. Has been vitally stable without further evidence of bleeding. Hgb stable at 7.7 g/dL.  Completed Octreotide gtt, IV PPI, 3 days of IV VitK. If she re-bleeds may need TIPS with IR.   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 Cirrhosis diagnosed 6 years ago when she had EV GIB. No EGD since then. Ascites started to become appreciable in 05/19. 6.350 L removed on paracentesis 9/7. MELD-Na score on this admission: 37 (65-66% estimated 90-day mortality). Workup for thrombosis and SBP negative. Per GI, would start to address goals of care given clinical trajectory as patient would not be considered for liver transplantation due to frailty and not having met minimum sobriety requirement.   Patient is now alert & oriented to self, place, and time. MELD = 38. Has some rising trend of ALP and bilirubin, discussed with GI, will continue to trend MELD labs.  - Continue Rifaximin, Lactulose (BID w/ prns available to titrate to 3-4 BM's per day) and Zinc   - Holding Spironolactone and Torsemide (on HD)    Chronic hepatitis C, treatment naive  Per last GI/hepatology visit with Dr. Owen on 08/12/19, patient is GT 2b treatment naive. Will address HCV treatment in the future. Not urgent at this point.     HBV non-immune  HBsAg, HBsAb are negative.  - Vaccination outpatient setting    Severe malnutrition in the context of acute on chronic illness  - Nutrition consult, apprec recs  - Boost Plus (vary flavors) TID between meals    MDD  - Continue Fluoxetine      Diet: Dialysis Diet  Snacks/Supplements Adult: Boost Breeze; Between Meals  Snacks/Supplements Adult: Boost Plus; Between Meals    Fluids: no IVF  Lines: CVC for HD  DVT Prophylaxis: Mechanical   Cameron Catheter: not present  Code  Status: Full Code      Disposition Plan   Expected discharge: in 1-2 days, recommended to transitional care unit once hemoglobin stable, outpatient HD plan established.  Entered: Elsy Malave MD 09/16/2019, 8:27 AM     The patient's care was discussed with the Attending Physician, Dr. Rodriguez.    Elsy Kemp[address], [place]  Pager: [phone]  Please see sticky note for cross cover information  ______________________________________________________________________    Interval History   NAEON. Feeling well this morning. Her step-mother was visiting today. She has been walking around the wards. She decided she doesn't want to go to the TCU anymore. She wants to get better at home and would rather be around her family. Her family is working on getting her a hospital bed and arranging some home care help for her. She is otherwise feeling well, has a good appetite.     Data reviewed today: I reviewed all medications, new labs and imaging results over the last 24 hours.    Lab Results   Component Value Date    WBC 9.0 09/16/2019    HGB 7.6 (L) 09/16/2019    HCT 22.9 (L) 09/16/2019    PLT 85 (L) 09/16/2019     09/16/2019    POTASSIUM 3.3 (L) 09/16/2019    CHLORIDE 101 09/16/2019    CO2 20 09/16/2019    BUN 44 (H) 09/16/2019    CR 4.45 (H) 09/16/2019     (H) 09/16/2019    SED 50 (H) 08/09/2018    AST 65 (H) 09/16/2019    ALT 37 09/16/2019    ALKPHOS 251 (H) 09/16/2019    BILITOTAL 16.8 (HH) 09/16/2019    WILLIS 30 09/08/2019    INR 1.83 (H) 09/16/2019       Physical Exam   Vital Signs: Temp: 98  F (36.7  C) Temp src: Oral BP: 110/65 Pulse: 85 Heart Rate: 70 Resp: 8 SpO2: 100 % O2 Device: None (Room air)    Weight: 165 lbs 4.8 oz    Constitutional: awake, alert, NAD  Eyes: Scleral icterus present, EOMI  ENT: Normocephalic, without obvious abnormality, atraumatic, poor dentition  Respiratory: On room air, breathing comfortably, CTA  b/l  Cardiovascular: RRR, no m/r/g  GI: Distended. but non-tender. Bowel sounds present  Skin: Jaundice, spider angiomas, and palmar erythema present. Mild blood oozing per CVC site.  Neurologic: Mild asterixis, A&Ox3. No focal neurological deficit.

## 2019-09-16 NOTE — PROGRESS NOTES
Nephrology Progress Note  09/16/2019         Assessment & Recommendations:   Roslyn Palmer is a 63 year old female with a PMHx significant for cirrhosis secondary to hepatitis C and EtOH dependecne, hypertension, depression, gout who was transferred from OSH on 9/5/2019 following a syncopal episode now with worsening renal function and decreasing urinary output.      Oliguric acute kidney injury  Baseline Cr ~1.3-1.6, and cody to 5 today. The etiology for her CT is multifactorial, including hypovolemia and HRS-like physiology. She may have progressed to an ATN-like process with her initial hypotension and hypoperfusion. She is not an ideal liver transplant candidate given her recent EtOH use (June) and frailty. We do not have confidence that her renal function will improve, and her overall 6 month mortality given her high MELD and poor renal function high. We have discussed the balance between quality of life on HD and mortality risk without HD, and the family have decided to start HD as a trial to see how Roslyn tolerates it.   - HD today for 3.5hrs via TDC, 2kg removal planned    Electrolytes/Acid-Base status  Stable electrolytes. HCO3 20 and improving.     Anemia  Hgb 7.6, stable. Likely from chronic disease. Get iron panel (ordered today)    Recommendations were communicated to primary team via phone     Patient seen and plan of care discussed with Dr. Brian Bell  Nephrology Fellow  [phone]    Interval History :   Nursing and provider notes from last 24 hours reviewed.    Roslyn Palmer was seen and examined this morning on dialysis. She had no overnight issues. She is tolerating HD well. She denies nausea or vomiting. She is making a small amount of urine. She denies fevers. Chills, rigors.      Review of Systems:   I reviewed the following systems:  GI: no loss of appetite. - nausea, - vomiting, + diarrhea.   Neuro:  mild confusion  Constitutional:  no fever or chills  CV: no  dyspnea or edema.  no chest pain.    Physical Exam:   I/O last 3 completed shifts:  In: 480 [P.O.:480]  Out: -    /73   Pulse 79   Temp 98  F (36.7  C) (Oral)   Resp 15   Wt 75 kg (165 lb 4.8 oz)   SpO2 97%   BMI 26.68 kg/m       GENERAL APPEARANCE: NAD  EYES:  + scleral icterus, pupils equal  HENT: mouth without ulcers or lesions  PULM: lungs clear to auscultation bilaterally, equal air movement, no clubbing  CV: regular rhythm, normal rate, no rub     -JVD no     -edema +1 in LE  GI: soft, Non tender, + distended, bowel sounds are normal  INTEGUMENT: no cyanosis, No rash  NEURO:  No asterixis   Access: TDC    Labs:   All labs reviewed by me  Electrolytes/Renal -   Recent Labs   Lab Test 09/16/19  0723 09/15/19  0653 09/14/19  0702  08/23/19  0730  08/12/19  0923 08/05/19  1101    134 134   < > 134   < > 131* 131*   POTASSIUM 3.3* 3.3* 3.5   < > 3.0*   < > 3.9 3.9   CHLORIDE 101 99 100   < > 102   < > 95 96   CO2 20 20 22   < > 23   < > 29 26   BUN 44* 39* 33*   < > 33*   < > 28 22   CR 4.45* 3.65* 2.70*   < > 1.26*   < > 1.38* 1.34*   * 115* 106*   < > 99   < > 98 134*   MEGGAN 8.6 8.8 8.6   < > 9.2   < > 9.6 9.3   MAG 2.1  --   --   --   --   --  1.9 1.8   PHOS  --   --   --   --  2.3*  --   --   --     < > = values in this interval not displayed.     CBC -   Recent Labs   Lab Test 09/16/19  0723 09/15/19  0653 09/14/19  0702   WBC 9.0 9.4 9.1   HGB 7.6* 8.0* 7.6*   PLT 85* 80* 73*     LFTs -   Recent Labs   Lab Test 09/16/19  0723 09/15/19  0653 09/14/19  0702   ALKPHOS 251* 236* 226*   BILITOTAL 16.8* 17.4* 16.6*   ALT 37 40 42   AST 65* 71* 70*   PROTTOTAL 5.3* 5.6* 5.3*   ALBUMIN 2.7* 2.8* 2.8*     Iron Panel -   Recent Labs   Lab Test 06/19/19  0933 03/25/19  1141 11/27/17  0900   IRON 26* 16* 26*   IRONSAT 9* 5* 7*   YOAN 24 23 20     Imaging:  All imaging studies reviewed by me.     Current Medications:    FLUoxetine  20 mg Oral Daily     folic acid  1 mg Oral Daily     lactulose  30  mL Oral BID     zz extemporaneous template  600 mg Oral Daily     midodrine  5 mg Oral TID w/meals     multivitamin, therapeutic  1 tablet Oral Daily     pantoprazole  40 mg Oral BID AC     potassium chloride  40 mEq Oral Once     rifaximin  550 mg Oral BID     sodium chloride (PF)  3 mL Intracatheter Q8H     sodium chloride (PF)  9 mL Intracatheter During Hemodialysis (from stock)     sodium chloride (PF)  9 mL Intracatheter During Hemodialysis (from stock)     zinc sulfate  220 mg Oral BID       Kevin Bell MD

## 2019-09-16 NOTE — PROGRESS NOTES
09/16/19 1400   General Information   Discipline OT   Onset of Edema   (acute on chronic )   Affected Body Part(s) Right LE;Left LE   Etiology Comments liver disease    Pain   Pain comments patient reports no pain in LEs at this time.   Edema Examination / Assessment   Skin Condition Pitting;Intact;Dryness   Skin Condition Comments Skin intact with discoloration scattered throughout. Deep 2+ pitting in distal portion of legs.    ROM   Range of Motion (WFL) no deficits were identified   Strength   Strength (WFL)   (generalized weakness throughout.)   Sensation   Sensation (WFL) no deficits were identified  (light touch intact.)   Assessment/Plan   Patient presents with Edema   Assessment Recommend use of LE comrpession wraps to mangae edema and protect skin integrity.   Assessment of Occupational Performance 1-3 Performance Deficits   Identified Performance Deficits Decreased functional mobility.   Clinical Decision Making (Complexity) Low complexity   Planned Edema Interventions Fit for compression garment;Gradient compression bandaging;Exercises;Precautions to prevent infection/exacerbation;Education   Treatment Frequency 5x/week   Treatment Duration 1-2 weeks   Patient, Family and/or Staff in agreement with plan of care. Yes   Risks and benefits of treatment have been explained. Yes   Total Evaluation Time   Total Evaluation Time (Minutes) 5

## 2019-09-16 NOTE — PLAN OF CARE
PT -   Discharge Planner PT   Patient plan for discharge: home  Current status: Pt completes sit<>stand transfer independently. She ambulates 300ft with close SBA without use of Ad. Pt unsteady with gait. Therapist educated pt on not ambulating on own without AD, as she was hoping to do, d/t safety concerns. Pt assisted in ascending/descending 6 stairs using single UE support and CGA, step to pattern. Pt participated in LE standing exercises and static/dynamic balance exercises during session.  Barriers to return to prior living situation: medical status, balance/safety  Recommendations for discharge: TCU  Rationale for recommendations: Pt would benefit from continued rehab to progress balance, strengthening, gait and safety with all functional mobility. Pt has preference for home, which therapist recommended HH PT for.  Entered by: Dang Holden 09/16/2019 3:54 PM

## 2019-09-16 NOTE — PROGRESS NOTES
Care Coordinator - Discharge Planning    Admission Date/Time:  9/5/2019  Attending MD:  Sona Rodriguez MD     Data  Chart reviewed, discussed with interdisciplinary team.   Patient was admitted for: No diagnosis found.     Assessment   Pt status reviewed/discussed during care team rounds.  Pt medically stable for discharge to TCU.  Need to confirm HD.    Spoke with [facility].  Financial Clearance has been confirmed. Unable to confirm start date or accepting provider as they are awaiting [facility] Medical Directors approval.   Will need to call back later this afternoon.       NICOLE working on TCU placement.  Reviewed with NICOLE Keyes.      1330:  Confirmed outpatient HD schedule:  [facility] [place]  [address] [address] [facility] [address]  [phone]  Fax [phone]  Monday, Wednesday, Friday 6:15 a.m. Chair time.  Dr. Wilson will be the accepting provider.  Will need to call the HD unit to confirm # for Dr. Wilson.    1350:  Notified by NICOLE that pt declining TCU plan.  Pt requesting to discharge home with home health care.  Per NICOLE pt has reviewed this plan with her sister.    Spoke with Dr. Malave regarding HD plan and pt plan for a home discharge.   Provider will f/u with pt.  Anticipate possible discharge later today or tomorrow with home health RN, PT, OT, HHA through [facility].       1400:  Spoke with Elías Graff regarding pending discharge. Accepting Nephrologist is Dr. Wilson [phone].  Paged Dr. Bell Nephrologist with above information.  Discharge orders updated.     1410:  Referral made to [facility] [phone], Fax [phone].  Initial clinical faxed.  Discharge orders updated.     1435:  Notified by Dr. Malave that pt will be discharged home tomorrow.   Updated [facility].    Coordination of Care and Referrals: Provided patient/family with options for Dialysis Services.       Plan  Anticipated Discharge Date:  TBD  Anticipated Discharge Plan:  JAMES Bahena, RN BSN, PHN RN Care Coordinator  Internal Medicine   [phone]  Pager: [phone]  [facility] RN Care Coordinator job code * * * [phone]  9/16/2019 10:14 AM

## 2019-09-16 NOTE — PROGRESS NOTES
Social Work Services Progress Note    Hospital Day: 11  Date of Initial Social Work Evaluation:  Not yet completed   Collaborated with:  Pt, medical team, care coordination     Data:  Pt is a 63 year old female admitted to [facility] on 9/5/2019 for cirrhosis. Prior to hospitalization, pt was at [facility].     PT/OT have recommended TCU upon discharge.     Intervention:  SW participated in interdisciplinary rounds this morning to assess for needs and discharge planning. Team expressed pt is medically ready for discharge once dialysis has been completed.    SW met w/ the pt at bedside to discuss PT/OT recommendations for TCU. Pt stated she does not want to go back to TCU and that she'd like to discharge home. Pt stated that her and her sister have been arranging her room to make it more accessible for her. She stated they have a hospital bed and a reclining chair now. Pt stated she has also been in contact w/ [facility] for home care nursing. SW expressed understanding and explained care coordination would be in touch w/ her.     SW also provided the pt with a copy of an advance directive. Pt wished to complete it with her sister. SW walked through the form w/ the pt to explain the process and that the next time she was in to see her doctor to bring it with her to be put in her chart. Pt expressed understanding.    NICOLE coordinated care w/ Brie in care coordination regarding pt's wishes for discharge.       Assessment:  Pt expressed understanding of NICOLE intervention    Plan:    Anticipated Disposition:  Home with services    Barriers to d/c plan:  None at this time     Follow Up:  SW will remain available and continue to follow, assess for needs, and assist in discharge planning.     LUZ Glasgow, LGSW   5B   Pager [phone]  Phone [phone]

## 2019-09-16 NOTE — PLAN OF CARE
VSS on RA. A/O x4. [place] score 0. 5 BM's today, met goal- RN to hold evening lactulose. Pt had dialysis run today. Worked with PT, up and walking around unit. Tolerating Dialysis diet. K was 3.3- 1 time dose of replacements given. Wound care due today- RN passed along to oncoming RN. R CVC dressing changed today, CDI. 2 PIV's SL. Will cont to monitor and follow POC.

## 2019-09-17 NOTE — PLAN OF CARE
PT -   Discharge Planner PT   Patient plan for discharge: home, pt is agreeable to continuing with HH PT  Current status: Pt ambulated 400ft with single point cane and SBA. Pt educated in sequencing with SPC on L side d/t R side weakness from previous hip dislocations. Pt participated in standing LE exercises focusing on muscles recruited with gait in order to facilitate gait pattern. Pt tolerated being up during entire session with minimal rest breaks. Discussed with pt recommendation for TCU, which she is not receptive to. However, pt is in agreement to  PT with discharge home.  Barriers to return to prior living situation: TCU  Recommendations for discharge: TCU  Rationale for recommendations: Pt demonstrates moderate tolerance for activity, however, would benefit from continued rehab to progress strengthening, balance, gait and activity tolerance prior to safe discharge home.  Entered by: Dang Holden 09/17/2019 3:55 PM

## 2019-09-17 NOTE — PROGRESS NOTES
Care Coordinator - Discharge Planning    Admission Date/Time:  9/5/2019  Attending MD:  Tony Churchill MD     Data  Chart reviewed, discussed with interdisciplinary team.   Patient was admitted for:   1. Hepatic encephalopathy (H)         Assessment   Full assessment completed in previous note     Pt status reviewed/discussed during care team rounds.   Pt now requesting to change HD units.  Provider team anticipates possible discharge tomorrow.    Met with pt.   Per discussion with pt she would prefer the [facility] location as it would be easier for her family to access.      Message left for Gracy Hyatt [facility] rep regarding pt request to change units.       Updated Joselin, [facility].    Coordination of Care and Referrals: Provided patient/family with options for Dialysis Services and Home Care.      Plan  Anticipated Discharge Date:  TBD  Anticipated Discharge Plan:  TBD    Brie Bahena RN BSN, PHN RN Care Coordinator  Internal Medicine   [phone]  Pager: [phone]  [facility] RN Care Coordinator job code * * * [phone]  9/17/2019 10:19 AM

## 2019-09-17 NOTE — PROGRESS NOTES
Nephrology Progress Note  09/17/2019         Assessment & Recommendations:   Roslyn Palmer is a 63 year old female with a PMHx significant for cirrhosis secondary to hepatitis C and EtOH dependecne, hypertension, depression, gout who was transferred from OSH on 9/5/2019 following a syncopal episode now with worsening renal function and decreasing urinary output.      Oliguric acute kidney injury  Baseline Cr ~1.3-1.6, and cody to 5 prior to the start of iHD. The etiology for her CT is multifactorial, including hypovolemia and HRS-like physiology. She may have progressed to an ATN-like process with her initial hypotension and hypoperfusion. She is not an ideal liver transplant candidate given her recent EtOH use (June) and frailty. We do not have confidence that her renal function will improve, and her overall 6 month mortality given her high MELD and poor renal function high. We have discussed the balance between quality of life on HD and mortality risk without HD, and the family have decided to start HD as a trial to see how Roslyn tolerates it.   - HD tolerated well yesterday for 3.5hrs, TDC access, no heparin, 1.7kg removed.   - Will plan for discharge today, next HD Wednesday. [facility] spot [facility]    Electrolytes/Acid-Base status  Stable electrolytes. HCO3 20 and improving.     Anemia  Hgb 8.1 stable. Likely from chronic disease. Get iron panel (ordered today)    H/o Gout  Decrease allopurinol to 100mg after HD sessions    Recommendations were communicated to primary team via phone     Patient seen and plan of care discussed with Dr. Brian Bell  Nephrology Fellow  [phone]    Interval History :   Nursing and provider notes from last 24 hours reviewed.    Roslyn Palmer was seen and examined this morning. She had no overnight events. She tolerated HD well yesterday. She is walking around the unit without difficulty. She denies nausea. Denies vomiting, and has no CP or  SOB.     Review of Systems:   I reviewed the following systems:  GI: no loss of appetite. - nausea, - vomiting, + diarrhea.   Neuro:  mild confusion  Constitutional:  no fever or chills  CV: no dyspnea or edema.  no chest pain.    Physical Exam:   I/O last 3 completed shifts:  In: 0   Out: 1700 [Other:1700]   /49 (BP Location: Right arm)   Pulse 76   Temp 98.2  F (36.8  C) (Oral)   Resp 16   Wt 75 kg (165 lb 4.8 oz)   SpO2 97%   BMI 26.68 kg/m       GENERAL APPEARANCE: NAD  EYES:  + scleral icterus, pupils equal  HENT: mouth without ulcers or lesions  PULM: lungs clear to auscultation bilaterally, equal air movement, no clubbing  CV: regular rhythm, normal rate, no rub     -JVD no     -edema +1 in LE  GI: soft, Non tender, + distended, bowel sounds are normal  INTEGUMENT: no cyanosis, No rash  NEURO:  No asterixis   Access: TDC    Labs:   All labs reviewed by me  Electrolytes/Renal -   Recent Labs   Lab Test 09/17/19  0629 09/16/19  0723 09/15/19  0653  08/23/19  0730  08/12/19  0923 08/05/19  1101    134 134   < > 134   < > 131* 131*   POTASSIUM 3.8 3.3* 3.3*   < > 3.0*   < > 3.9 3.9   CHLORIDE 101 101 99   < > 102   < > 95 96   CO2 20 20 20   < > 23   < > 29 26   BUN 26 44* 39*   < > 33*   < > 28 22   CR 3.21* 4.45* 3.65*   < > 1.26*   < > 1.38* 1.34*   GLC 92 109* 115*   < > 99   < > 98 134*   MEGGAN 8.9 8.6 8.8   < > 9.2   < > 9.6 9.3   MAG  --  2.1  --   --   --   --  1.9 1.8   PHOS  --   --   --   --  2.3*  --   --   --     < > = values in this interval not displayed.     CBC -   Recent Labs   Lab Test 09/17/19  0629 09/16/19  0723 09/15/19  0653   WBC 9.6 9.0 9.4   HGB 8.1* 7.6* 8.0*   PLT 81* 85* 80*     LFTs -   Recent Labs   Lab Test 09/17/19  0629 09/16/19  0723 09/15/19  0653   ALKPHOS 280* 251* 236*   BILITOTAL 17.8* 16.8* 17.4*   ALT 41 37 40   AST 65* 65* 71*   PROTTOTAL 6.0* 5.3* 5.6*   ALBUMIN 2.9* 2.7* 2.8*     Iron Panel -   Recent Labs   Lab Test 09/16/19  0723 06/19/19  0933  03/25/19  1141   IRON 47 26* 16*   IRONSAT 27 9* 5*   YOAN 211 24 23     Imaging:  All imaging studies reviewed by me.     Current Medications:    [START ON 9/18/2019] allopurinol  100 mg Oral Once per day on Mon Wed Fri     FLUoxetine  20 mg Oral Daily     folic acid  1 mg Oral Daily     lactulose  30 mL Oral BID     zz extemporaneous template  600 mg Oral Daily     midodrine  5 mg Oral TID w/meals     multivitamin, therapeutic  1 tablet Oral Daily     nadolol  40 mg Oral Daily     pantoprazole  40 mg Oral BID AC     rifaximin  550 mg Oral BID     sodium chloride (PF)  3 mL Intracatheter Q8H     zinc sulfate  220 mg Oral BID       Kevin Bell MD

## 2019-09-17 NOTE — PLAN OF CARE
Status: EtoH cirrhosis, hep C, CT, s/p esophageal varices banding   Vitals: VSS on RA  Neuros: A/Ox4, forgetful  IV: PIV SL, R CVC for HD  Diet: Renal diet  Bowel status: BS+, lactulose held on evenings  : Oliguric, on HD  Skin: Jaundice, fragile, skin tears on forearm covered with Mepilex, edematous legs wrapped and elevated  Pain: Generalized aches, denies intervention this shift   Activity: SBA/Independent with walker/cane  Plan: Continue to monitor and assess

## 2019-09-17 NOTE — PLAN OF CARE
Discharge Planner OT   Patient plan for discharge: home with home care  Current status: pt. Lindy/S with BADLs and functional mobility, ambulating with FWW throughout room,hallway. Pt. tolerated session well on RA; VSS throughout. Discussed with pt. having A at home for bathing, med/finance oversight. Pt. reporting family assisting with IADLs prn.  Barriers to return to prior living situation: medical readiness  Recommendations for discharge: home with home care  Rationale for recommendations: to max. safety/indep. with ADLs/mobility in home/community setting.       Entered by: Kathy Harrell 09/17/2019 8:49 AM

## 2019-09-17 NOTE — PLAN OF CARE
Status: Pt w// cirrhosis secondary to hepatitis C. Hx of EtOH dependence, gout, hypertension and depression.   VS: VSS.  Neuros: A/OX4. Intact. [place] score of 0.  GI: Dialysis diet. 5 BMs today so lactulose held at 1600.   : Not making much urine, pt on dialysis.   Respiratory: Diminished lung sounds, on RA.   Skin:  Jaundiced and fragile skin. Skin care completed on bilateral arms this shift, mepilex in place. Bruising throughout, edema in BLE (legs are wrapped).   IV:  2 PIV SL.   Activity: Up SBA in halls, up ind in room.    Pain: Reported some muscle spasms/pain in LE that improved with stretching. Tylenol given with some relief.   Plan of care:  Pt would like to talk to care coordinator tomorrow if possible. Continue to monitor and follow POC.

## 2019-09-18 NOTE — PLAN OF CARE
Edema 5A: Recommend continued use of LE compression wraps to further manage soft 2+ pitting edema distally. Skin intact with bruising throughout. Reapplication of GCB from MTPs to knee creases. Remove wraps if causing pain/numbness or become soiled.

## 2019-09-18 NOTE — PROGRESS NOTES
Nephrology Progress Note  09/18/2019         Assessment & Recommendations:   Roslyn Palmer is a 63 year old female with a PMHx significant for cirrhosis secondary to hepatitis C and EtOH dependecne, hypertension, depression, gout who was transferred from OSH on 9/5/2019 following a syncopal episode now with worsening renal function and decreasing urinary output.      Oliguric acute kidney injury  Baseline Cr ~1.3-1.6, and cody to 5 prior to the start of iHD. The etiology for her CT is multifactorial, including hypovolemia and HRS-like physiology. She may have progressed to an ATN-like process with her initial hypotension and hypoperfusion. She is not an ideal liver transplant candidate given her recent EtOH use (June) and frailty. We do not have confidence that her renal function will improve, and her overall 6 month mortality given her high MELD and poor renal function high. We have discussed the balance between quality of life on HD and mortality risk without HD, and the family have decided to start HD as a trial to see how Roslyn tolerates it.   - HD tolerated well today for 3.5hrs, TDC access, no heparin, 800cc removed.   - Will plan for discharge tomorrow to home. Next HD Saturday.     Electrolytes/Acid-Base status  Stable electrolytes. HCO3 20 and improving.   - Patient will be [facility] at [facility] under the care of Dr Oliver    Anemia of ESRD  Hgb 7.9 stable. Likely from chronic disease. Iron panel shows a replete state  - Would recommend EPO 4k with HD sessions    H/o Gout  Decreased allopurinol to 100mg after HD sessions    Recommendations were communicated to primary team via phone     Patient seen and plan of care discussed with Dr. Brian Bell  Nephrology Fellow  [phone]    Interval History :   Nursing and provider notes from last 24 hours reviewed.    Roslyn Palmer was seen and examined this morning. She had no overnight issues. She tolerated HD well today, 3.5hrs. She denies CP  or SOB. She has no new rashes. She is planning on discharging home Thursday.     Review of Systems:   I reviewed the following systems:  GI: no loss of appetite. - nausea, - vomiting, + diarrhea.   Neuro:  no confusion  Constitutional:  no fever or chills  CV: no dyspnea or edema.  no chest pain.    Physical Exam:   I/O last 3 completed shifts:  In: -   Out: 200 [Urine:200]   BP (!) 86/45   Pulse 93   Temp 98  F (36.7  C) (Oral)   Resp 16   Wt 71.4 kg (157 lb 6.5 oz)   SpO2 99%   BMI 25.41 kg/m       GENERAL APPEARANCE: NAD  EYES:  + scleral icterus, pupils equal  HENT: mouth without ulcers or lesions  PULM: lungs clear to auscultation bilaterally, equal air movement, no clubbing  CV: regular rhythm, normal rate, no rub     -JVD no     -edema +1 in LE  GI: soft, Non tender, + distended, bowel sounds are normal  INTEGUMENT: no cyanosis, No rash  NEURO:  No asterixis   Access: TDC    Labs:   All labs reviewed by me  Electrolytes/Renal -   Recent Labs   Lab Test 09/18/19 0615 09/17/19  0629 09/16/19  0723  08/23/19  0730  08/12/19  0923 08/05/19  1101    133 134   < > 134   < > 131* 131*   POTASSIUM 4.0 3.8 3.3*   < > 3.0*   < > 3.9 3.9   CHLORIDE 101 101 101   < > 102   < > 95 96   CO2 19* 20 20   < > 23   < > 29 26   BUN 32* 26 44*   < > 33*   < > 28 22   CR 4.04* 3.21* 4.45*   < > 1.26*   < > 1.38* 1.34*   GLC 87 92 109*   < > 99   < > 98 134*   MEGGAN 8.8 8.9 8.6   < > 9.2   < > 9.6 9.3   MAG  --   --  2.1  --   --   --  1.9 1.8   PHOS  --   --   --   --  2.3*  --   --   --     < > = values in this interval not displayed.     CBC -   Recent Labs   Lab Test 09/18/19 0615 09/18/19  0057 09/17/19  0629 09/16/19  0723   WBC 8.5  --  9.6 9.0   HGB 7.9* 7.4* 8.1* 7.6*   PLT 88*  --  81* 85*     LFTs -   Recent Labs   Lab Test 09/18/19 0615 09/17/19 0629 09/16/19  0723   ALKPHOS 260* 280* 251*   BILITOTAL 16.7* 17.8* 16.8*   ALT 38 41 37   AST 64* 65* 65*   PROTTOTAL 6.0* 6.0* 5.3*   ALBUMIN 3.1* 2.9* 2.7*      Iron Panel -   Recent Labs   Lab Test 09/16/19  0723 06/19/19  0933 03/25/19  1141   IRON 47 26* 16*   IRONSAT 27 9* 5*   YOAN 211 24 23     Imaging:  All imaging studies reviewed by me.     Current Medications:    allopurinol  100 mg Oral Once per day on Mon Wed Fri     ciprofloxacin  250 mg Oral Daily     FLUoxetine  20 mg Oral Daily     folic acid  1 mg Oral Daily     lactulose  30 mL Oral BID     zz extemporaneous template  600 mg Oral Daily     midodrine  10 mg Oral TID w/meals     multivitamin, therapeutic  1 tablet Oral Daily     pantoprazole  40 mg Oral BID AC     rifaximin  550 mg Oral BID     sodium chloride (PF)  3 mL Intracatheter Q8H     zinc sulfate  220 mg Oral BID       Kevin Bell MD

## 2019-09-18 NOTE — PROGRESS NOTES
HEMODIALYSIS TREATMENT NOTE    Date: 9/18/2019  Time: 2:37 PM    Data:  Pre Wt: 71.4 kg (157 lb 6.5 oz)   Desired Wt: 69.4 kg   Post Wt: 70.6 kg (161 lb 9.6 oz)  Weight change: 1.7 kg  Ultrafiltration - Post Run Net Total Removed (mL): 800 mL  Vascular Access Status: patent  Dialyzer Rinse: Clear(yellow)  Total Blood Volume Processed: 80 L Liters  Total Dialysis (Treatment) Time: 3.5 Hours    Lab:   no    Interventions:Assessments  Pt dialyzed today for 3.5hrs on a K-3 Ca-2.25 bath via RCVC. 400 Qb throughout. Only able to remove .5kgs due to hypotension. No HD meds given. See Epic for VS details. Report to PCN post run.     Plan:    Per renal team

## 2019-09-18 NOTE — PLAN OF CARE
Pt alert and oriented, VSS on RA- soft BP overnight. MD paged and aware, IV albumin ordered and given. Blood pressure recheck 91/45 and MD paged and aware. Going to continue to monitor. Dialysis diet and tolerating well. Up with SBA in room and to bathroom, steady on feet and no reports of dizziness. Right CVC WNL for dialysis, Right PIV and Left PIV saline locked. Mepilex in place on bilateral forearms. Para completed yesterday- primapore in place on site, right abdomen. Possible discharge today pending outpatient HD. Continue with POC. Weight checked this AM, dialysis orders placed.

## 2019-09-18 NOTE — PLAN OF CARE
VSS on RA ex soft BP's. A/O x4. Albumin given this AM- BP's still soft. SBA. Pt met BM goal- AM lactulose held. BP meds held this AM. Cipro rescheduled for after dialysis. 2 PIV's SL.Tolerating dialysis diet. Lymph wraps redone today by lymphedema. Pt went to dialysis around 12 PM. Pt off unit at time report was given to oncoming RN. Will cont to monitor and follow POC.

## 2019-09-18 NOTE — PROGRESS NOTES
Gastroenterology Inpatient Sign Off Note    63 year old female with HCV and AUD and resultant cirrhosis complicated by ascites, VH and HE, admitted with syncope after paracentesis, complicated by CT now on HD and VH hemorrhage s/p banding.  She has improved and will be discharging on HD.    Inpatient GI consults service will sign off. No further recommendations at this time. If primary team has addition questions, please page consult fellow listed in Amion.    Current GI Consult Staff: Dr. Leventhal      Follow up recommendations:   -- Will need long term antibiotic prophylaxis for SBP based on AASLD guidelines (ascitic fluid TP <1)  -- Continue EGD/EVL with beta-blockade for secondary prophylaxis of variceal hemorrhage   Next EGD in 4-6 weeks from last (10/6 - 10/20/2019)  -- Continue Xifaxan and Lactulose  -- Continue HD per Nephrology  -- Continue HCC surveillance (next 1/2020)  -- Not a transplant candidate; needs 6 months sobriety and chemical dependency follow up and she will need a dramatic overall improvement in nutritional and health status. Her overall frailty alone precludes her from moving forward safely with transplant evaluation.   -- Will continue to follow up in Hepatology clinic    Staff responsible for outpatient care: Dr. Leventhal Chimaobi Michael Anugwom, MD  GI fellow

## 2019-09-18 NOTE — TELEPHONE ENCOUNTER
Please attach med list.   Please add clinic info to bottom of form and fed tax ID #  Please fax forms

## 2019-09-18 NOTE — PROGRESS NOTES
Care Coordinator - Discharge Planning    Admission Date/Time:  9/5/2019  Attending MD:  Tony Churchill MD     Data  Chart reviewed, discussed with interdisciplinary team.   Patient was admitted for:   1. Hepatic encephalopathy (H)         Assessment   Full assessment completed in previous note     Pt status reviewed/discussed during care team rounds.  Pt cleared to discharge today pending confirmation of outpatient HD.      Confirmed with Olena outpatient HD schedule:    [facility]   [address]   [place] [address]  Phone: [phone]   Fax: [phone]  Tuesday, Thursday, Saturday 11:20 a.m. Chair time.  Accepting Nephrologist Dr. Oliver, [phone], pager  #[phone]    Reviewed above with provider team and patient. Team anticipates pt will be discharged home today.      Paged Dr. Bell, Nephrologist with above information.      Met with pt.  Reviewed above.  Pt awaiting final confirmation from provider team.  Per pt her sister will be able to transport her home.    Discharge orders reviewed/updated.     11:00 Per discussion with provider team they anticipate that the patient will be discharged tomorrow.  Will reconfirm in the morning.   Updated [facility].  Per discussion with Nereyda Bell will need to do the handoff to Dr. Oliver today.  Confirmed that pt can start her outpatient HD on Saturday 9/21.  Discharge orders updated. Paged Dr. Bell with Dr. Oliver's contact information.     11:10 Provider requesting assistance in securing PCP f/u for next week.  Agreed to f/u with pt regarding home care plan.  As previously noted [facility] will be providing home RN, PT, OT and HHA services.       Coordination of Care and Referrals: Provided patient/family with options for Dialysis Services and Home Care.      Plan  Anticipated Discharge Date:  9/18/19  Anticipated Discharge Plan:  Home    Brie Bahena RN BSN, PHN RN Care Coordinator  Internal  Medicine   [phone]  Pager: [phone]  Weekend RN Care Coordinator job code * * * [phone]  9/18/2019 10:48 AM

## 2019-09-18 NOTE — PROGRESS NOTES
[facility], [place]    Progress Note - Rosa 1 Service        Date of Admission:  9/5/2019    Assessment & Plan   Roslyn Palmer is a 63 year old female with past medical history of decompensated alcoholic cirrhosis, chronic hepatitis C, and HTN who presents as a transfer from an OSH due to her recent admission here for HE, who presents this admission following a syncopal episode s/p paracentesis removing 6.3 L. Her hospital course was complicated by CT, massive hematemesis 9/7/19 s/p banding x6 of EV.    Changes today:   She was hypotensive during the night at ~90/50 with symptom of dizziness. See discussion below.  - Albumin 25g + 50g was given  - Discontinue Nadolol  - Increase Midodrine to 10 mg TID  - Continue to monitor BP  - Plan discharge to home tomorrow; patient no longer wants to go to TCU and has home cares set up.     Care conference (9/14/19); Discussed with patient and family about natural progression of disease and prognosis. Patient states that she is not ready for hospice care and would like to continue on dialysis and treatment.    Hypotension related to intravascular volume depletion  Has hypotension at ~90/50 with associated dizziness. Given recent HD (with 1.7L UF) and paracentesis (with 3L release), this is most likely due to intravascular volume depletion. Also was started on Nadolol yesterday (EV bleeding prophylaxis). Was given 75g of Albumin today. Will monitor volume status and BP closely.  - Discontinue Nadolol  - Increase Midodrine to 10 mg TID  - Continue to monitor BP  - Orthostatic VS - no orthostatic hypotension    Prerenal CT secondary acute blood loss and HRS  Hypervolemic hyponatremia  Baseline ~1. Admission Cre 4.01. Minimal urine output. Diuretics discontinued last admission. Likely pre-renal in setting of slow GI variceal bleed (s/p banding) versus HRS. Completed albumin challenge 9/6-9/8 and has received multiple units additional colloid  in setting of acute GIB. Cr peaked at 5.53.  - Renal consult; HD per nephrology plan. Plan continue HD outpatient.  - Midodrine 10 mg TID  - Discontinue Octreotide (as patient is on dialysis)  - monitor I/O's, daily weights  - Trend BMP daily    Massive UGIB 9/7/19  Hypotension related to hypovolemia 2/2 acute blood loss  Acute on chronic anemia secondary from blood loss  Hematemesis 500 mL 9/7 with hypotension and acute anemia with Hgb at 6.3 (from baseline ~10-11 g/dL), c/w likely variceal sources. Underwent EGD at bedside in the MICU, s/p banding x6 of esophageal varices. Transfused PRBC x4, Plasma x1, platelet x1 overnight. Has been vitally stable without further evidence of bleeding. Hgb stable at 7.7 g/dL. Completed Octreotide gtt, IV PPI, 3 days of IV VitK. If she re-bleeds may need TIPS with IR.   - Switch to PO PPI  - Monitor CBC (transfuse if Hgb <7 g/dL, Plt <50, Fibrinogen <150)  - CTX 1g IV daily for 7 days (9/7-9/13) (SBP prophylaxis) (completed)  - Start Ciprofloxacin 500 mg/day for SBP prophylaxis (dose consulted with pharmacy)  - Discontinue Nadolol (symptomatic hypotension)    EtOH-induced cirrhosis c/b ascites, HE, and EV bleeding  Thrombocytopenia, coagulopathy secondary to cirrhosis  Hepatic encephalopathy, improved  Last seen in GI/hepatology on 08/12/19 and MELD was 23 that visit. Cirrhosis diagnosed 6 years ago when she had EV GIB. No EGD since then. Ascites started to become appreciable in 05/19. 6.350 L removed on paracentesis 9/7. MELD-Na score on this admission: 37 (65-66% estimated 90-day mortality). Workup for thrombosis and SBP negative. Per GI, would start to address goals of care given clinical trajectory as patient would not be considered for liver transplantation due to frailty and not having met minimum sobriety requirement.   Patient is now alert & oriented to self, place, and time. MELD = 38. Has some rising trend of ALP and bilirubin, discussed with GI, will continue to trend  MELD labs.  - Continue Rifaximin, Lactulose (BID w/ prns available to titrate to 3-4 BM's per day) and Zinc   - Holding Spironolactone and Torsemide (on HD)  - Therapeutic paracentesis prn    Chronic hepatitis C, treatment naive  Per last GI/hepatology visit with Dr. Owen on 08/12/19, patient is GT 2b treatment naive. Will address HCV treatment in the future. Not urgent at this point.     HBV non-immune  HBsAg, HBsAb are negative.  - Vaccination outpatient setting    Severe malnutrition in the context of acute on chronic illness  - Nutrition consult, apprec recs  - Boost Plus (vary flavors) TID between meals    MDD  - Continue Fluoxetine     Gout  No clinical arthritis.  - Restart Allopurinol 100 mg MWF after dialysis session (dose consulted with nephrology)     Diet: Dialysis Diet  Snacks/Supplements Adult: Boost Breeze; Between Meals  Snacks/Supplements Adult: Boost Plus; Between Meals    Fluids: no IVF  Lines: CVC for HD  DVT Prophylaxis: Mechanical   Cameron Catheter: not present  Code Status: Full Code      Disposition Plan   Expected discharge: tomorrow, recommended to home once hemoglobin stable, outpatient HD plan established.  Entered: Ceferino Swenson MD 09/18/2019, 1:49 PM     The patient's care was discussed with the Attending Physician, Dr. Tony Churchill.    Ceferino Swenson MD  [address], [place]  Pager: [phone]  Please see sticky note for cross cover information  ______________________________________________________________________    Interval History   Has hypotensive episode overnight which she states that she has intermittent dizziness.  Otherwise feeling well this morning.  Eager to go home today. Plan was explained to patient. Plan to discharge to home tomorrow if no acute event overnight.    Data reviewed today: I reviewed all medications, new labs and imaging results over the last 24 hours.    Lab Results   Component Value Date     WBC 8.5 09/18/2019    HGB 7.9 (L) 09/18/2019    HCT 23.9 (L) 09/18/2019    PLT 88 (L) 09/18/2019     09/18/2019    POTASSIUM 4.0 09/18/2019    CHLORIDE 101 09/18/2019    CO2 19 (L) 09/18/2019    BUN 32 (H) 09/18/2019    CR 4.04 (H) 09/18/2019    GLC 87 09/18/2019    SED 50 (H) 08/09/2018    AST 64 (H) 09/18/2019    ALT 38 09/18/2019    ALKPHOS 260 (H) 09/18/2019    BILITOTAL 16.7 (HH) 09/18/2019    WILLIS 30 09/08/2019    INR 1.81 (H) 09/18/2019       Physical Exam   Vital Signs: Temp: 98  F (36.7  C) Temp src: Oral BP: (!) 84/51   Heart Rate: 78 Resp: 16 SpO2: 98 % O2 Device: None (Room air)    Weight: 157 lbs 6.54 oz    Constitutional: awake, alert, NAD  Eyes: Scleral icterus present, EOMI  ENT: Normocephalic, without obvious abnormality, atraumatic, poor dentition  Respiratory: On room air, breathing comfortably, CTA b/l  Cardiovascular: RRR, no m/r/g  GI: Distended. but non-tender. Bowel sounds present  Skin: Jaundice, spider angiomas, and palmar erythema present. Mild blood oozing per CVC site.  Neurologic: No asterixis, A&Ox3. No focal neurological deficit.

## 2019-09-18 NOTE — PLAN OF CARE
Time: 7833-1186     Reason for admission/Dx:   Hepatorenal syndrome, CT  Cirrhosis (H)     [Vitals]: /58 (BP Location: Right arm)   Pulse 93   Temp 96  F (35.6  C) (Axillary)   Resp 16   Wt 70.3 kg (155 lb)   SpO2 98%   BMI 25.02 kg/m    [Labs/Lactic]:   Lactic Acid (mmol/L)   Date Value   08/22/2019 2.2 (H)   08/21/2019 2.3 (H)      Hemoglobin (g/dL)   Date Value   09/17/2019 8.1 (L)   09/16/2019 7.6 (L)      Creatinine (mg/dL)   Date Value   09/17/2019 3.21 (H)   09/16/2019 4.45 (H)      [BG]:   Glucose Values Bedside Glucose (mg/dl )  GLUCOSE   Latest Ref Rng & Units - 70 - 99 mg/dL   9/17/2019 -- 92   9/16/2019 -- 109(H)   Some recent data might be hidden      [Electrolytes]:      Potassium (mmol/L)   Date Value   09/17/2019 3.8      Magnesium (mg/dL)   Date Value   09/16/2019 2.1      Phosphorus (mg/dL)   Date Value   08/23/2019 2.3 (L)       [Imaging]: Abdominal and Renal US    [Cardiac]: WDL [Pain/PRN/s]: Headache, BLE cramping, Tylenol 1x. Pg'd team about PTA flexeril for cramping.     [Respiratory]: WDL [Lines]:   Peripheral IV 09/07/19 Right;Anterior Lower forearm (Active)   Site Assessment [facility] 9/17/2019 10:00 AM   Line Status Saline locked 9/17/2019 10:00 AM   Phlebitis Scale 0-->no symptoms 9/17/2019 10:00 AM   Infiltration Scale 0 9/17/2019 10:00 AM   Infiltration Site Treatment Method  None 9/17/2019 10:00 AM   Extravasation? No 9/17/2019 10:00 AM   Number of days: 10       Peripheral IV 09/10/19 Left;Posterior Lower forearm (Active)   Site Assessment [facility] 9/17/2019 10:00 AM   Line Status Saline locked 9/17/2019 10:00 AM   Phlebitis Scale 0-->no symptoms 9/17/2019 10:00 AM   Infiltration Scale 0 9/17/2019 10:00 AM   Infiltration Site Treatment Method  None 9/17/2019 10:00 AM   Extravasation? No 9/17/2019 10:00 AM   Number of days: 7       CVC Double Lumen 09/11/19 Right Internal jugular (Active)   Site Assessment WDL except;Drainage;Bleeding 9/17/2019 10:00 AM   Lumen Soln/Vol  "REFERENCE 1.6/1.6 9/13/2019  6:00 PM   Dressing Intervention New dressing 9/17/2019 10:19 AM   Dressing Change Due 09/24/19 9/17/2019 10:19 AM   CVC Comment CDI 9/16/2019  3:40 PM   Lumen A - Color RED 9/16/2019 11:45 AM   Lumen A - Status saline locked 9/17/2019 10:00 AM   Lumen A - Cap Change Due 09/20/19 9/13/2019  6:06 PM   Lumen B - Color BLUE 9/16/2019 11:45 AM   Lumen B - Status saline locked 9/17/2019 10:00 AM   Lumen B - Cap Change Due 09/20/19 9/13/2019  6:06 PM   Extravasation? No 9/16/2019 11:45 AM   Number of days: 6     [Infusions]: N/A   [Neuros]: AOx4, [place] scores: 0 consistently, no syncope    [Gi]:Orders Placed This Encounter      Dialysis Diet     -T [Activity]:  SBA; ambulates within room, with cane. PT consulted.    []: Oliguric; on HD, secondary to HRS-like physiology    [Skin/Wounds]: R-CVC dressing, with QuickClot; No bleeding appreciated; dressing CDI. Multiple mepilexs over BUE skin tears. R-paracentesis performed today, with CDI dressing. Lymphwraps on edematous BLEs x1 day. Skin otherwise jaundiced.     ? ?     Shift changes: No significant changes today. Pt VSS and AOx4.  At 2200, pt woke from \"nightmare\" and was temporarily disoriented for about \"3 minutes, when I then started crying, because I woke up and thought everyone had left me alone at the cabin\". Pt reoriented currently and acknowledges that is was just a \"bad dream\".  She also notes endorsing bilateral lower extremity \"cramps\" and requests PTA Flexeril, which I paged the team about.  Awaiting order, if appropriate.  Contacted and updated pts, niece (Marie), per request, who is an ICU nurse.      Plan:  Discharge pt to home tomorrow, pending acceptance for outpt dialysis @ [facility] location; also discharge pt with Quick Clot supplies for R-CVC.    "

## 2019-09-19 NOTE — PROGRESS NOTES
[facility], [place]    Sepsis Evaluation Progress Note    Date of Service: 09/18/2019    I was called to see Roslyn Palmer due to abnormal vital signs triggering the Sepsis SIRS screening alert. She is not known to have an infection.     Physical Exam    Vital Signs:  Temp: 98.6  F (37  C) Temp src: Oral BP: (!) 68/40 Pulse: 83 Heart Rate: 84 Resp: 15 SpO2: 98 % O2 Device: None (Room air)      Lab:  Lactic Acid   Date Value Ref Range Status   08/22/2019 2.2 (H) 0.7 - 2.0 mmol/L Final     Lactate for Sepsis Protocol   Date Value Ref Range Status   09/18/2019 3.3 (H) 0.7 - 2.0 mmol/L Final     Comment:     Significant value called to and read back by  GEORGE VERA 09/18/19 2158 BY MA         The patient is at baseline mental status.    The rest of their physical exam is significant for hematemesis and evidence of hypovolemia.    Assessment and Plan    The SIRS and exam findings are likely due to acute blood loss, there is no sign of sepsis at this time.    Disposition: The patient will be transferred to the ICU. Attending Physician, Guido, was notified.    Ru Holt MD

## 2019-09-19 NOTE — PLAN OF CARE
ICU End of Shift Summary. See flowsheets for vital signs and detailed assessment.    Changes this shift: Assumed cares from 6786-4303. Pt transferred from  for MICU service. Stable on vent at 40% FiO2.  -3 RASS score. Large watery maroon stool upon arrival, likely residual. Cameron removed d/t no urine output for several hours.     Plan: No NG or OG for 96 hrs post EGD. Continue to monitor for bleeding. Will continue to monitor and follow POC.

## 2019-09-19 NOTE — PLAN OF CARE
Discharge Planner OT   Patient plan for discharge: unstated  Current status: PROM only today, no command following despite increase in sensorimotor stimulation.   Barriers to return to prior living situation: medical condition, deconditioning.   Recommendations for discharge: TCU  Rationale for recommendations: pt below baseline in ADL I       Entered by: Stanley Hoang 09/19/2019 3:33 PM

## 2019-09-19 NOTE — PROCEDURES
Endoscopy performed 1am  Upon entry into oropharynx, copious bright red blood noted.  Bright red blood noted throughout esophagus, stomach, duodenum.   Fibrin clot with active bleeding noted at 29cm.  Previously banded varix with bands still in place and necrotic material with ulceration noted at 35cm from incisors.   GE junction noted at 37cm.   Liquid and clotted red blood noted in the gastric fundus and body, limiting visualization.   Severe portal hypertensive gastropathy.   Duodenum was erythematous and edematous with no overt signs of ulcers or bleeding.   We banded x 3, one at the previously banded area with ulceration and two times at area of fibrin plug.     No bleeding at end of procedure.     Recommendations:  --Continue IV PPI drip for 48 hours  --Continue IV Octreotide for 5 days  --Continue ceftriaxone IV x 5 days   --No NG or OG tube placement for 96 hours       Staffed with Dr. Leventhal.    Doris Pardo MD, [facility]  Gastroenterology Fellow, PGY4  Pager [phone]

## 2019-09-19 NOTE — ANESTHESIA PROCEDURE NOTES
ANESTHESIOLOGY RESIDENT/CRNA INTUBATION NOTE  Indication for intubation: altered level of consciousness, airway protection.  Provider Ordering Intubation: Ricardo Lora MD  History regarding the most recent potassium obtained: Yes  History regarding renal failure obtained: Yes  History of presence or absence of CVA/stroke was obtained: Yes  History of presence or absence of NM disorder obtained: Yes  Post Intubation:  ETT secured, Sedation to be ordered by primary/ICU team, Vent settings by primary/ICU team, No apparent complications, Primary/ICU team to review CXR and Report given to primary nurse and/or team  Anesthesia called to intubate for airway protection and altered LOC. Smooth IV induction, cords open and clear, ETT passed with ease. Positive ETCO2, positive BBS.  ICU team at bedside managing sedation and ordering CXR

## 2019-09-19 NOTE — PROGRESS NOTES
Date/Time of Note


Date/Time of Note


DATE: 10/16/17 


TIME: 17:20





OB - History


Hx of Present Pregnancy


Free Text/Dictation


40 years old female  now 9 weeks6/7 days admitted to [facility] with diagnosis of gestational diabetes 1 hour , an otologist 

recommended admission for workup and treatment of diabetes


Chief Complaint:  Elevated blood sugar with gestational diabetes


Estimated Due Date:  May 15, 2018


:  2


Para:  1


Prenatal Care:  Limited Care (Only first visit for prenatal care)


Ultrasounds:  No ultrasounds


Obstetrical Complications:  Gestational Diabetes


Medical Complications:  None





Past Family/Social History


*


Past Medical, Surgical, Family and Obstetric Histories reviewed from prenatal 

chart.


Rubella:  immune


RPR/VDRL:  Negative


GBS Status:  Unknown


HBsAG:  Negative





OB  Admission Exam


Vital Signs


Vital Signs





 Vital Signs








  Date Time  Temp Pulse Resp B/P Pulse Ox O2 Delivery O2 Flow Rate FiO2


 


10/16/17 15:00 98.0 80 20 98/48  Room Air  











Physical Exam


HEENT:  WNL


Heart:  Rhythm Normal


Lungs:  Clear, Equal


Abdomen:  WNL


Extremities:  Normal


Cervical Dilatation:  other (Not applicable)


Fetal Heart Rate:  140's


Last 72 hourBlood Glucose





Bedside Glucose - 72 Hours








Test


  10/16/17


13:57


 


Bedside Glucose


  158mg/dL


()








Last 72 hours Lab Results


 CBC & BMP


10/16/17 14:05











 Liver Function








Test


  10/16/17


14:05


 


Alanine Aminotransferase


(ALT/SGPT) 49  


 


 


Albumin 3.8  


 


Alkaline Phosphatase 66  


 


Aspartate Amino Transf


(AST/SGOT) 29  


 


 


Direct Bilirubin 0.00  


 


Total Protein 7.3  








 Hemoglobin A1C








Test


  10/16/17


14:05


 


Hemoglobin A1c 6.0  H











OB  Assessment/Plan


Reason for admission:  other (Workup and treatment of gestational diabetes)


Other plan:


This is a 40 years old  9 /12 weeks gestational diabetes with 1 hour gt 202

, admitted for workup and treatment.


Workup plan


CBC


Fasting, 2 hours postprandial


Hemoglobin A1c


Complete chem panel


24 hours urine collection for protein and creatinine clearance


Urinalysis/culture and sensitivity


Sliding scale


2000-calorie diet


Perinatology/diabetic educator consult











JANIE CALIX MD Oct 16, 2017 17:38 S: Throughout the day, she was hypotensive 80s/40s and was receiving albumin. Notified of hematemesis at 2100, ~600 ml. BP dropped to 70/40s.     O:   /48 (BP Location: Left arm)   Pulse 83   Temp 98.6  F (37  C) (Oral)   Resp 15   Wt 71.4 kg (157 lb 6.5 oz)   SpO2 98%   BMI 25.41 kg/m    General: awake and alert, anxious  HEENT: hematemesis, bleeding from mouth  CV: RRR, no murmur  Resp: breathing on RA    A/P  63 year old female with past medical history of decompensated alcoholic cirrhosis, chronic hepatitis C, and HTN who presents as a transfer from an OSH due to her recent admission here for HE, who presents this admission following a syncopal episode s/p paracentesis removing 6.3 L. Her hospital course was complicated by CT, massive hematemesis 9/7/19 s/p banding x6 of EV.    -placed second PIV  -obtained CBC, CMP, coags, and type and screen stat  -patient is already type and screened  -started PPI gtt and octreotide gtt  -consulted GI for concern for EV bleed  -albumin 25% 12.5 g  -start ceftriaxone   -patient was transferred to MICU for EGD and pressor support    Barbara Crandall MD  Internal Medicine-Pediatrics, PGY2  Pager: [phone]

## 2019-09-19 NOTE — PROGRESS NOTES
Care Coordinator - Discharge Planning    Admission Date/Time:  9/5/2019  Attending MD:  Tony Churchill MD     Data    Chart reviewed, discussed with interdisciplinary team.   Patient was admitted for:   1. Hepatic encephalopathy (H)         Assessment   Full assessment completed in previous note     Pt status reviewed/discussed during care team rounds.  Pt transferred to ICU secondary to sepsis, shock 2/2 EV bleed.   Gi, Surgery teams involved.       Updated  [facility].   Reviewed with Toni Barahona as patient previously scheduled to start new outpatient HD on Saturday 9/21.        Will continue to monitor.       Coordination of Care and Referrals: Provided patient/family with options for Dialysis Services and Home Care.      Plan  Anticipated Discharge Date:  TBD  Anticipated Discharge Plan:  TBD    Brie Bahena RN BSN, PHN RN Care Coordinator  Internal Medicine   [phone]  Pager: [phone]  Weekend RN Care Coordinator job code * * * [phone]  9/19/2019 10:45 AM

## 2019-09-19 NOTE — PROGRESS NOTES
Vascular Access Services Notes:    PICC insertion was placed on-hold until consent is obtained from the pt's sister (left a voice message).         CLEM LaoN, RN [facility]

## 2019-09-19 NOTE — PLAN OF CARE
Time: 1500 - 2300     Reason for admission/Dx:   Hepatorenal syndrome, CT  Cirrhosis (H)     [Vitals]: BP (!) 66/38 (BP Location: Left arm)   Pulse 84   Temp 99  F (37.2  C) (Oral)   Resp 15   Wt 71.4 kg (157 lb 6.5 oz)   SpO2 97%   BMI 25.41 kg/m    [Labs/Lactic]:   Lactic Acid (mmol/L)   Date Value   08/22/2019 2.2 (H)   08/21/2019 2.3 (H)      Hemoglobin (g/dL)   Date Value   09/18/2019 6.1 (LL)   09/18/2019 7.9 (L)      Creatinine (mg/dL)   Date Value   09/18/2019 2.37 (H)   09/18/2019 4.04 (H)      [BG]:   Glucose Values Bedside Glucose (mg/dl )  GLUCOSE   Latest Ref Rng & Units - 70 - 99 mg/dL   9/18/2019 -- 89   9/18/2019 -- 87   Some recent data might be hidden      [Electrolytes]:      Potassium (mmol/L)   Date Value   09/18/2019 4.0      Magnesium (mg/dL)   Date Value   09/16/2019 2.1      Phosphorus (mg/dL)   Date Value   08/23/2019 2.3 (L)       [Imaging]: Abdominal and Renal US    [Cardiac]: Hypotensive, otherwise WDL.  [Pain/PRN/s]: HA, Neck and upper back cramping, Flexeril, warm compress   [Respiratory]: WDL; RA.  [Lines]:   Peripheral IV 09/07/19 Right;Anterior Lower forearm (Active)   Site Assessment [facility] 9/18/2019  4:00 PM   Line Status Saline locked 9/18/2019  4:00 PM   Phlebitis Scale 0-->no symptoms 9/18/2019  4:00 PM   Infiltration Scale 0 9/18/2019  4:00 PM   Infiltration Site Treatment Method  None 9/18/2019  4:00 PM   Extravasation? No 9/18/2019  4:00 PM   Number of days: 11       Peripheral IV 09/10/19 Left;Posterior Lower forearm (Active)   Site Assessment [facility] 9/18/2019  4:00 PM   Line Status Saline locked 9/18/2019  4:00 PM   Phlebitis Scale 0-->no symptoms 9/18/2019  4:00 PM   Infiltration Scale 0 9/18/2019  4:00 PM   Infiltration Site Treatment Method  None 9/18/2019  4:00 PM   Extravasation? No 9/18/2019  4:00 PM   Number of days: 8       Peripheral IV 09/18/19 Right;Anterior;Medial Upper forearm (Active)   Number of days: 0       Peripheral IV 09/18/19 Left;Anterior Lower  forearm (Active)   Number of days: 0       CVC Double Lumen 09/11/19 Right Internal jugular (Active)   Site Assessment WDL 9/18/2019  4:00 PM   Lumen Soln/Vol REFERENCE 1.6/1.6 9/13/2019  6:00 PM   Dressing Intervention New dressing 9/17/2019 10:19 AM   Dressing Change Due 09/24/19 9/18/2019  3:30 PM   CVC Comment CDI 9/18/2019  3:30 PM   Lumen A - Color RED 9/18/2019  3:30 PM   Lumen A - Status saline locked;cap changed 9/18/2019  3:30 PM   Lumen A - Cap Change Due 09/25/19 9/18/2019  3:30 PM   Lumen B - Color BLUE 9/18/2019  3:30 PM   Lumen B - Status saline locked;cap changed 9/18/2019  3:30 PM   Lumen B - Cap Change Due 09/25/19 9/18/2019  3:30 PM   Extravasation? No 9/18/2019 12:15 AM   Number of days: 7     [Infusions]: Albumin, Octreotide, Protonix, NS bolus, PRBCs,    [Neuros]: Tremulous activity, in BUEs and body. Otherwise; AOx4, [place] scores: 0 consistently, no syncope    [Gi]:Orders Placed This Encounter      NPO for Medical/Clinical Reasons Except for: No Exceptions     Minimal PO fluid/food intake, only intake reported is apple pie, during afternoon.  21:00 2x episodes of hematemesis, approximately 600cc, with large clots. RRT called. 5+ BMs today.  [Activity]:  SBA to Assist 1x, with gaitbelt, given generalized weakness.    []: Oliguric; on HD, secondary to HRS -like physiology    [Skin/Wounds]: R-CVC dressing, with QuickClot; No bleeding appreciated; dressing CDI. Multiple mepilexs over BUE skin tears. R-paracentesis performed yesterday, dressing CDI dressing. Lymphwraps on edematous BLEs x2 day. Skin otherwise jaundiced.     ? ?     Shift changes: Pt was suppose to discharge to home today, as outpt dialysis pt to [place], [place], however did not, secondary to HOTN, since 9/18.  POC was for 1x more day of BP observation, and discharge 9/19, pending BP improvement. However HOTN persisted throughout shift. MD notified and aware, however no intervention was ordered at that time.  At  "approximately 21:00 however, it was evident pt developed sudden onset of hematemesis, while lying in bed and noted \"crawling to the bathroom\" to vomit.  RN of pt's neighbor observed pt throwing up blood and notified writer, when RRT was then immediately called. Upon initial assessment, pt was upright on toilet, actively vomiting blood into garbage and onto floor; noting large clots, approximately 10-20mL bright, red blood.  At this time, pt alert and AOx4 , HOTN notable for 87/44, with associated light-headedness and nausea. Subsequently, pt safely transferred back to bed, when BPs deteriorated more, reaching low of 67/32. Additionally, pt was provided another emesis bag, which was filled with subsequent 600cc blood/with clots.  Remarkable labs: Lactate 3.3 Hgb from 7.9 to 6.1, Plts 88 to 61, INR 2.4  Interventions administered during RRT: Vascular Access 4x PIVs, Albumin, Octreotide, Protonix, NS bolus, PRBCs, GI consult.      Plan:  Transfer to  @ approx 23:00, with belongings sent.     "

## 2019-09-19 NOTE — PROGRESS NOTES
Nephrology Progress Note  09/19/2019         Assessment & Recommendations:   Roslyn Palmer is a 63 year old female with a PMHx significant for cirrhosis secondary to hepatitis C and EtOH dependecne, hypertension, depression, gout who was transferred from OSH on 9/5/2019 following a syncopal episode now with worsening renal function and decreasing urinary output. She was started on HD 9/11     Oliguric acute kidney injury  Baseline Cr ~1.3-1.6, and cody to 5 prior to the start of iHD. The etiology for her CT is multifactorial, including hypovolemia and HRS-like physiology. She may have progressed to an ATN-like process with her initial hypotension and hypoperfusion. She is not an ideal liver transplant candidate given her recent EtOH use (June) and frailty. We do not have confidence that her renal function will improve, and her overall 6 month mortality given her high MELD and poor renal function high. We have discussed the balance between quality of life on HD and mortality risk without HD, and the family have decided to start HD as a trial to see how Roslyn tolerates it.   - HD tolerated well yesterday. Now she is intubated and in the ICU. There is no acute indication for hemodialysis today. We will continue to monitor her volume and electrolyte status, and consider HD tomorrow.   - Hold midodrine (ot taking oral meds at this time)    Electrolytes/Acid-Base status  Na 134, K 3.7, Cl 104.Stable electrolytes. HCO3 22 and improving.     Anemia of ESRD  Hgb 6.0, large hemoptysis/hemaemesis episode overnight, large maroon stool this morning. Hgb dropped from 7.9 to 6.0, she received 4U pRBC. EGD revealed bleeding varices, which were banded x 3. Gastroenterology quote a mortality rate as high as 55% after a second variceal bleed after failed initial treatment.   - Transfusion threshold of 7. On octreotide gtt, on ceftriaxone and PPI IV.    H/o Gout  Decreased allopurinol to 100mg after HD  sessions    Recommendations were communicated to primary team via phone     Patient seen and plan of care discussed with Dr. Brian Bell  Nephrology Fellow  [phone]    Interval History :   Nursing and provider notes from last 24 hours reviewed.    Roslyn Palmer was seen and examined this morning. She had a large EV bleed overnight and transferred to the MICU for hemorrhagic shock, on vasopressors and s/p EGD with 3x banding of varices. She is intubated and sedated.     Review of Systems:   I reviewed the following systems:  Could not complete ROS    Physical Exam:   I/O last 3 completed shifts:  In: 4603.63 [I.V.:2272.63; IV Piggyback:1000]  Out: 1600 [Urine:200; Emesis/NG output:600; Other:800]   BP 94/55   Pulse 77   Temp 98.1  F (36.7  C) (Axillary)   Resp 12   Wt 72.9 kg (160 lb 11.5 oz)   SpO2 95%   BMI 25.94 kg/m       GENERAL APPEARANCE: Intubated, sedated  EYES:  + scleral icterus, pupils equal  PULM: lungs clear to auscultation bilaterally, equal air movement, no clubbing  CV: regular rhythm, normal rate, no rub     -JVD no     -edema +1 in LE  GI: soft, Non tender, + distended, bowel sounds are normal  INTEGUMENT: no cyanosis, No rash  Access: TDC    Labs:   All labs reviewed by me  Electrolytes/Renal -   Recent Labs   Lab Test 09/19/19  0411 09/18/19  2142 09/18/19  0615  09/16/19  0723  08/23/19  0730  08/12/19  0923 08/05/19  1101    132* 133   < > 134   < > 134   < > 131* 131*   POTASSIUM 3.7 4.0 4.0   < > 3.3*   < > 3.0*   < > 3.9 3.9   CHLORIDE 104 101 101   < > 101   < > 102   < > 95 96   CO2 22 21 19*   < > 20   < > 23   < > 29 26   BUN 20 18 32*   < > 44*   < > 33*   < > 28 22   CR 2.54* 2.37* 4.04*   < > 4.45*   < > 1.26*   < > 1.38* 1.34*   GLC 97 89 87   < > 109*   < > 99   < > 98 134*   MEGGAN 7.4* 7.6* 8.8   < > 8.6   < > 9.2   < > 9.6 9.3   MAG  --   --   --   --  2.1  --   --   --  1.9 1.8   PHOS  --   --   --   --   --   --  2.3*  --   --   --     < > = values in  this interval not displayed.     CBC -   Recent Labs   Lab Test 09/19/19  0412 09/19/19  0022 09/18/19  2142 09/18/19  0615   WBC 8.0  --  9.2 8.5   HGB 7.8* 6.0* 6.1* 7.9*   PLT 83* 86* 61* 88*     LFTs -   Recent Labs   Lab Test 09/19/19  0411 09/18/19  2142 09/18/19  0615   ALKPHOS 150 192* 260*   BILITOTAL 11.6* 13.0* 16.7*   ALT 25 28 38   AST 44 50* 64*   PROTTOTAL 4.3* 4.8* 6.0*   ALBUMIN 2.6* 2.6* 3.1*     Iron Panel -   Recent Labs   Lab Test 09/16/19  0723 06/19/19  0933 03/25/19  1141   IRON 47 26* 16*   IRONSAT 27 9* 5*   YOAN 211 24 23     Imaging:  All imaging studies reviewed by me.     Current Medications:    cefTRIAXone  1 g Intravenous Q24H     sodium chloride (PF)  3 mL Intracatheter Q8H       fentaNYL Stopped (09/19/19 0805)     norepinephrine 0.05 mcg/kg/min (09/19/19 1300)     octreotide (sandoSTATIN) infusion ADULT 50 mcg/hr (09/19/19 1300)     pantoprazole (PROTONIX) infusion ADULT/PEDS GREATER than or EQUAL to 45 kg 8 mg/hr (09/19/19 1300)     Kevin Bell MD

## 2019-09-19 NOTE — PROGRESS NOTES
GI Brief Cross Cover note     64yo F w/ decompensated EtOH cirrhosis, chronic Hep C, transferred from OSH following a syncopal episode s/p large volume para (6.3L). Hospital course c/b CT requiring HD, massive hematemesis 9/7 s/p banding with eradication of Grade 2 EV.     Paged:   Pt developed two episodes of hematemesis  9pm -- 10ml blood with clots  10:30pm -- 600ml andressa blood      Afeb, BP 60's/40's (down from 80's/40's from prior hospital course), tachcyardic  Pt jaundiced, sclera icteric. Mentating well AOx4, responding to questions and commands appropriately. Tremulous hands and body. No asterixis. Distended abdomen, soft, nontender. +Lower extremity edema in wraps.     Labs reviewed and notable for  Downtrended TBili 16.7-->13, alk phos 260-->192, ALT 28--> 28, AST 64-->50  Lactate 3.3  Hgb 7.9--> 6.1   Platelets 88--> 61  INR 1.81--> 2.4  PTT 56  Fibrinogen 109    Plan:  Transfer to ICU  2 large bore PIV   IV PPI, IV octreotide, IV ceftriaxone  Lactated Ringers bolus until blood products available   2U pRBC's (Goal Hgb 7-8)  1U cryo  1U platelets  Intubation then EGD (consent obtained and in chart)    Please keep MN tube at bedside     Discussed with Dr. Leventhal Susan Lou, MD, [facility]  Gastroenterology Fellow, PGY4  Pager [phone]

## 2019-09-19 NOTE — PROGRESS NOTES
[facility]    Hepatology Follow-up    CC:      Syncope     Dx:      Decompensated alcoholic/HCV cirrhosis              Hemorrhagic shock   hematemesis secondary to esophageal varices              Syncope              Acute kidney injury, on HD             Hepatic encephalopathy    24 hour events:   Developed hematemesis, now status post 4 units of packed RBC, 2 units of cryoprecipitate, 1 unit of platelets.  Status post upper endoscopy with 3 bands placed.    Subjective:  Obtunded, intubated.  Initially on fentanyl and Versed, both of which have been stopped.    Medications  Current Facility-Administered Medications   Medication Dose Route Frequency     cefTRIAXone  1 g Intravenous Q24H     sodium chloride (PF)  3 mL Intracatheter Q8H       Review of systems  A 10-point review of systems was negative, unless stated above    Examination  BP (!) 74/41   Pulse 81   Temp 100.2  F (37.9  C) (Axillary)   Resp 14   Wt 72.9 kg (160 lb 11.5 oz)   SpO2 92%   BMI 25.94 kg/m      Intake/Output Summary (Last 24 hours) at 9/19/2019 1028  Last data filed at 9/19/2019 0900  Gross per 24 hour   Intake 4678.83 ml   Output 1700 ml   Net 2978.83 ml       General: Jaundiced, intubated  CVS: RRR  Resp: CTA  Abdomen: Soft, mildly distended  Extremities: Edema  Neuro: Obtunded     Laboratory  Lab Results   Component Value Date     09/19/2019    POTASSIUM 3.7 09/19/2019    CHLORIDE 104 09/19/2019    CO2 22 09/19/2019    BUN 20 09/19/2019    CR 2.54 09/19/2019       Lab Results   Component Value Date    BILITOTAL 11.6 09/19/2019    ALT 25 09/19/2019    AST 44 09/19/2019    ALKPHOS 150 09/19/2019       Lab Results   Component Value Date    WBC 8.0 09/19/2019    HGB 7.8 09/19/2019    MCV 92 09/19/2019    PLT 83 09/19/2019       Lab Results   Component Value Date    INR 2.08 09/19/2019       MELD-Na score: 34 at 9/19/2019  4:11 AM  MELD score: 33 at 9/19/2019  4:11 AM  Calculated from:  Serum Creatinine:  2.54 mg/dL at 9/19/2019  4:11 AM  Serum Sodium: 134 mmol/L at 9/19/2019  4:11 AM  Total Bilirubin: 11.6 mg/dL at 9/19/2019  4:11 AM  INR(ratio): 2.08 at 9/19/2019  4:11 AM  Age: 63 years      Assessment  63 year old female with HCV and AUD and resultant cirrhosis complicated by ascites, VH and HE, admitted with syncope after paracentesis, complicated by CT now on HD and VH hemorrhage s/p banding. She did well and was planned for discharge.  Overnight, she developed hematemesis, and acute blood loss anemia necessitating urgent upper endoscopy which was notable for actively bleeding varices.  She is now status post banding x3.  She is critically ill, and is quite obtunded.  She is intubated as well.   Prognosis is guarded, as mortality following a second variceal hemorrhage in cirrhotics after failed initial treatment can be as high as 55%.  MELD-Na 34, CTP class C    Recommendations  -- Continue close monitoring  -- Ensure two large bore IVs at all times  -- Hgb transfusion threshold of 7  -- Continue IV Octreotide gtt   Use for 72 hours after banding   After completion, start beta-blockers and titrate to HR of 55-60 if BP allows  -- Continue IV ceftriaxone  -- Continue PPI BID 40 mg IV  -- Given recent banding, will hold off on OG tube placement   When able, please give lactulose and titrate to 3 BMs per day   Give Rifaximin 550 mg BID when able  -- Use Lactulose enemas  -- HD, midodrine per nephrology      Patient seen and discussed with attending physician, Dr. Veda Mathis MD  PGY 5  Hepatology      Attestation:  This patient has been seen and evaluated by me, Christy Mccollum.  Discussed with the house staff team or resident(s) and agree with the findings and plan in this note.

## 2019-09-19 NOTE — PROGRESS NOTES
MICU Staff:     I examined the patient at the bedside in the MICU on 2019.   I managed the patient at the bedside in the ICU for their critical illness.  I reviewed the patient's medical records, progress notes, and imaging studies. I agree with the exam findings as documented in the ICU team progress note  on 2019. The patient is a 63 year old woman admitted to the MICU for active GI bleed and need for intubation and emergency endoscopy to control hemorrhage.  The patient has a history of  decompensated cirrhosis recent banding of esophogeal varices and a recent  paracentesis. GI medicine was called to the bedside and the endoscopy team was activated.  The patient had the following critical labs.  Hgb of 6.1, lactic acid 3.3, INR 2.40, PTT 56, and fibrinogen 109.  The patient was hypotensive at the time of admission to the MICU.    PAST MEDICAL HISTORY:  Past Medical History:   Diagnosis Date     Cirrhosis (H)      Depression      DJD (degenerative joint disease)      Essential hypertension, malignant      ETOH abuse      Gout      Hepatitis C         PAST SURGICAL HISTORY:  Past Surgical History:   Procedure Laterality Date     COLONOSCOPY N/A 2018    Procedure: COMBINED COLONOSCOPY, SINGLE OR MULTIPLE BIOPSY/POLYPECTOMY BY BIOPSY;;  Surgeon: Musa Diaz MD;  Location: [facility] OR     COLONOSCOPY WITH CO2 INSUFFLATION N/A 2018    Procedure: COLONOSCOPY WITH CO2 INSUFFLATION;  Colonoscopy, Egd;  Surgeon: Musa Diaz MD;  Location:  OR     COMBINED ESOPHAGOSCOPY, GASTROSCOPY, DUODENOSCOPY (EGD) WITH CO2 INSUFFLATION N/A 2018    Procedure: COMBINED ESOPHAGOSCOPY, GASTROSCOPY, DUODENOSCOPY (EGD) WITH CO2 INSUFFLATION;  Combined, Upper GI bleed Alcoholic liver disease (H) Anemia, unspecified type, Ref By Celestino, BMI 27.55, [phone];  Surgeon: Musa Diaz MD;  Location:  OR     GYN SURGERY           HC ESOPHAGOSCOPY W BAND LIGATION  ESOPHAGEAL VARICIES       IR CVC TUNNEL PLACEMENT > 5 YRS OF AGE  9/11/2019     ORTHOPEDIC SURGERY  2006,2010    left and right hip replacement        MEDICATIONS:  Current Facility-Administered Medications   Medication     acetaminophen (TYLENOL) tablet 650 mg     allopurinol (ZYLOPRIM) tablet 100 mg     camphor-menthol (DERMASARRA) lotion     cefTRIAXone (ROCEPHIN) 1 g vial to attach to  mL bag for ADULTS or NS 50 mL bag for PEDS     cyclobenzaprine (FLEXERIL) tablet 10 mg     glucose gel 15-30 g    Or     dextrose 50 % injection 25-50 mL    Or     glucagon injection 1 mg     fentaNYL (PF) (SUBLIMAZE) injection  mcg     fentaNYL (SUBLIMAZE) infusion     FLUoxetine (PROzac) capsule 20 mg     folic acid (FOLVITE) tablet 1 mg     lactulose (CHRONULAC) solution 30 mL     lidocaine (LMX4) cream     lidocaine 1 % 0.1-1 mL     magnesium oxide (MAG-OX) 200 mg, magnesium oxide (MAG-OX) 400 mg     melatonin tablet 1 mg     midazolam (VERSED) drip - ADULT 100 mg/100 mL in NS PRE-MIX     midazolam (VERSED) injection 1-3 mg     midodrine (PROAMATINE) tablet 10 mg     multivitamin, therapeutic (THERA-VIT) tablet 1 tablet     naloxone (NARCAN) injection 0.1-0.4 mg     norepinephrine (LEVOPHED) 16 mg in  mL infusion     octreotide (sandoSTATIN) 1,250 mcg in sodium chloride 0.9 % 250 mL     ondansetron (ZOFRAN-ODT) ODT tab 4 mg    Or     ondansetron (ZOFRAN) injection 4 mg     pantoprazole (PROTONIX) 80 mg in sodium chloride 0.9 % 100 mL infusion     prochlorperazine (COMPAZINE) injection 5 mg     rifaximin (XIFAXAN) tablet 550 mg     sodium chloride (PF) 0.9% PF flush 3 mL     sodium chloride (PF) 0.9% PF flush 3 mL     sucralfate (CARAFATE) suspension 1 g     SUMAtriptan (IMITREX) tablet 100 mg     zinc sulfate (ZINCATE) capsule 220 mg        ALLERGIES:  Allergies   Allergen Reactions     Sulfa Drugs         SOCIAL HISTORY:  Social History     Socioeconomic History     Marital status:      Spouse name:  None     Number of children: None     Years of education: None     Highest education level: None   Occupational History     None   Social Needs     Financial resource strain: None     Food insecurity:     Worry: None     Inability: None     Transportation needs:     Medical: None     Non-medical: None   Tobacco Use     Smoking status: Current Every Day Smoker     Packs/day: 0.20     Types: Cigarettes     Smokeless tobacco: Never Used     Tobacco comment: down to 4 ciggarettes per day   Substance and Sexual Activity     Alcohol use: Not Currently     Comment: quit july 5     Drug use: No     Comment: 1980's     Sexual activity: Not Currently     Partners: Male   Lifestyle     Physical activity:     Days per week: None     Minutes per session: None     Stress: None   Relationships     Social connections:     Talks on phone: None     Gets together: None     Attends Hindu service: None     Active member of club or organization: None     Attends meetings of clubs or organizations: None     Relationship status: None     Intimate partner violence:     Fear of current or ex partner: None     Emotionally abused: None     Physically abused: None     Forced sexual activity: None   Other Topics Concern     Parent/sibling w/ CABG, MI or angioplasty before 65F 55M? No   Social History Narrative     None       FAMILY HISTORY:  Family History   Problem Relation Age of Onset     Breast Cancer Mother      Cancer Maternal Grandmother      Cerebrovascular Disease Maternal Grandmother         stroke     Cancer Maternal Grandfather         pancreatic cancer     Cancer Paternal Grandmother      Cancer Paternal Grandfather         brain cancer     Cirrhosis Brother      Liver Cancer Brother      Arthritis Sister         PHYSICAL EXAM:  Vital Signs: /61   Pulse 84   Temp 99  F (37.2  C) (Axillary)   Resp 14   Wt 72.9 kg (160 lb 11.5 oz)   SpO2 99%   BMI 25.94 kg/m       GEN: The patient was rapidly intubated and sedated.        HEENT: orally intubated.       Chest: Course BS bilaterally     Cor: RRR     ABD: distended and soft.  Paracentesis site with dry dressing in the right low abdomen.  No masses.     Ext: Warm and well perfused.      Neuro: Intubated and sedated.       A/P: The patient is a  63 year old woman admitted to the MICU for active GI bleed and need for intubation and emergency endoscopy to control hemorrhage.  The patient has a history of  decompensated cirrhosis recent banding of esophogeal varices and a recent  paracentesis. GI medicine was called to the bedside and the endoscopy team was activated.  The patient had the following critical labs.  Hgb of 6.1, lactic acid 3.3, INR 2.40, PTT 56, and fibrinogen 109.  The patient was hypotensive on admission to the MICU..      Neuro: The patient is intubated and sedated; for the patient's hepatic encephalopathy will continue Rifaximin, Lactulose       Cardiac: hemorrhagic shock due to active GI bleed from esophogeal varix.   Blood transfusion in the ICU, will actively warm the patient and follow serial Hgb.  Aggressive transfusion support in the ICU.       Pulm:  No acute issues       GI: hepatic cirrhosis and active  variceal bleed.  The patient has a history of prior banding  x6 of esophageal varices on 9/7.  GI service has banded the bleeding varix in the MICU on 18 Sept 2019.       :  Prerenal CT secondary acute hemorrhage.      HEME:  Acute blood loss anemia and coagulopathy associated with hepatic failure.  Thrombocytopenia, coagulopathy secondary to cirrhosis  Has received vit K this admission..  Will continue to follow Hgb and coagulation parameter and to provide transfusion support.       ENDO:  No acute issues       ID:  Ceftriaxone  per protocol for active GI bleed in patient with hepatic cirrhosis.     I personally examined and evaluated the patient today in the ICU. The patient is critically ill on 18 September 2019.  All of the ICU patient care orders and  treatments were placed at my direction. I personally managed the patient's ICU supportive measures that were required as the patient critical illness creates a continuous threat for loss of life for the patient.  Key critical care decisions were made by me today to maintain life saving effective ICU supportive care.  I spent more than 45 minutes on 18 September 2019 providing critical care services at the bedside, and on the critical care unit, evaluating the patient, directing care and reviewing the patient's laboratory values and the patient's radiologic imaging reports associated with the patient's critical illness.     Ricardo Lora M.D., Ph.D.

## 2019-09-19 NOTE — PROGRESS NOTES
[facility], [facility] Intensive Care Progress Note    Date of Service (when I saw the patient): 09/19/2019     Assessment & Plan   Roslyn Palmer is a 63 year old female with past medical history of decompensated cirrhosis (2/2 alcohol and hepatitis C), chronic hepatitis C, HTN, and recent EV bleed s/p banding x6, transferred to MICU for hemorrhagic shock 2/2 EV bleed now s/p banding x 3 with GI. Hemodynamically stable and off pressors, off sedation with ongoing AMS.    CHANGES TODAY:  - off fentanyl/versed  - no enteral access; holding all meds  - PS trial as able pending mental status  - continue PPI gtt, octreotide gtt, ceftriaxone  - trend labs q8h  - will consider restarting norepi if pressures remain low     PLAN:  ===NEURO===  # Sedation  - off fentanyl/versed     # Hx of hepatic encephalopathy, improved  Hospitalized 8/21 for HE likely due to decreased intake of lactulose in the days prior to admission. Ascitic fluid analysis was negative for SBP, no concern for GI bleed. Mental status rapidly improved after lactulose and rifaximin. Her diuretics were also discontinued on discharge as she receives weekly paracentesis. Now on 9/18 patient is alert & oriented to self, place, and time.   - Continue Rifaximin, Lactulose (BID w/ prns available to titrate to 3-4 BM's per day) and Zinc - holding in the setting of no enteral access     ===CARDIOVASCULAR===  # Hemorrhagic shock  Acutely hypotensive (68/40) 2/2 GI bleed and recent HD (with 1.7L UF on 9/16) and paracentesis (with 3L release on 9/17). Likely intravascular volume depletion. Was given 75g of Albumin 9/18. Transfused 4 units of blood, 1 unit of platelets, 10 units of cryo during acute bleeding. Pressures drifting down on 9/19 in am, so given another concentrated albumin bolus.   - MAP goal > 65, currently off levophed, will reassess need for ongoing pressors pending off sedation     ===PULMONARY===  #  Intubated for procedure (9/18), AMS  - PS trials  - eval for extubation pending mental status as able    Ventilation Mode: CMV/AC  (Continuous Mandatory Ventilation/ Assist Control)  FiO2 (%): (S) 30 %  Rate Set (breaths/minute): 141 breaths/min  Tidal Volume Set (mL): 500 mL  PEEP (cm H2O): 5 cmH2O  Oxygen Concentration (%): 30 %  Resp: 14      ===GASTROINTESTINAL===  # Variceal bleeding s/p banding  Previous hematemesis of 500 mL on 9/7 2/2 variceal sources. S/p banding x6 of esophageal varices on 9/7. Received PRBC x4, Plasma x1, platelet x1 at that time. Hgb stabilized to 7.7 g/dL. Was on CTX 1g IV daily for 7 days (9/7-9/13) (SBP prophylaxis). Completed Octreotide gtt, IV PPI, 3 days of IV VitK. Ciprofloxacin 500 mg/day was started 9/18 for SBP prophylaxis; discontinued. Hgb dropped to 6.1 from 7.9 on 9/18.    - continue octreotide x 5 days (stop 9/22 evening)  - continue PPI gtt x 48h (stop 9/21 evening)  - ceftriaxone x 5 days (stop 9/22)     # EtOH-induced cirrhosis c/b ascites, HE, and EV bleeding  Cirrhosis diagnosed 6 years ago when she had EV GIB. EGD on 9/5 showed variceal bleeding and portal hypertensive gastropathy. Ascites started to become appreciable in 05/19. 6.350 L removed on paracentesis 9/7. Workup for thrombosis and SBP negative. Per GI, would start to address goals of care given clinical trajectory as patient would not be considered for liver transplantation due to frailty and not having met minimum sobriety requirement. MELD = 38. Has some rising trend of ALP and bilirubin. Last paracentesis was 9/17; 3L removed.  - Continue Rifaximin, Lactulose (BID w/ prns available to titrate to 3-4 BM's per day) and Zinc - holding given lack of enteral access  - Holding Spironolactone and Torsemide (on HD)  - Therapeutic paracentesis prn  - Hepatology following, appreciate recs     # Chronic hepatitis C, treatment naive  Per last GI/hepatology visit with Dr. Owen on 08/12/19, patient is GT 2b  treatment naive. Will address HCV treatment in the future. Not urgent at this point.     HBV non-immune  HBsAg, HBsAb are negative.  - Vaccination outpatient setting     # Nutrition:   - NPO    MELD-Na score: 34 at 9/19/2019  4:11 AM  MELD score: 33 at 9/19/2019  4:11 AM  Calculated from:  Serum Creatinine: 2.54 mg/dL at 9/19/2019  4:11 AM  Serum Sodium: 134 mmol/L at 9/19/2019  4:11 AM  Total Bilirubin: 11.6 mg/dL at 9/19/2019  4:11 AM  INR(ratio): 2.08 at 9/19/2019  4:11 AM  Age: 63 years       ===RENAL===  # Prerenal CT secondary acute blood loss and HRS  # Hypervolemic hyponatremia  Baseline ~1. Admission Cre 4.01. Minimal urine output. Diuretics discontinued last admission. Likely pre-renal in setting of slow GI variceal bleed (s/p banding) versus HRS. Albumin challenge completed 9/6-9/8 and has received multiple units additional colloid in setting of acute GIB. Cr peaked at 5.53. Last HD was 9/18; 0.8 L removed.  - HD per nephrology plan.   - monitor I/O's, daily weights  - Trend BMP daily     ===HEME/ONC===  # Acute on chronic anemia secondary from blood loss  2/2 GI bleed. Varices banded x2 on 9/19/19. Baseline hgb 10-11.   - addressed above  - continue q8h trends     # Thrombocytopenia, coagulopathy secondary to cirrhosis  Has received vit K this admission. Transfused PRBC x4, Plasma x2, platelet x1 on 9/17 overnight. Most recent labs show INR 2.08, PTT 56, and fibrinogen 172.   - trend q8h     ===ENDOCRINE===  No acute issues     ===INFECTIOUS DISEASE===  No acute issue     # Antimicrobials:  - Ceftriaxone ppx (9/18-*)     ===SKIN/MSK===  # Gout  No clinical arthritis.  - Restart Allopurinol 100 mg MWF after dialysis session (dose consulted with nephrology) - holding without enteral access     Prophylaxis:  DVT: SCD   GI: PPI  Family:  Plan to update later, will discuss goals of care  Disposition: Critically Ill    Restraints: Restraints for medical healing needed: YES    I have seen and discussed this  patient with my attending, Dr. Whalen.    Madhuri Wolfe MD  Internal Medicine/Pediatrics PGY1  [facility]  Pager: [phone]    =======================================================================    Interval History   Transferred last night. Nursing notes reviewed. Scoped and banded x 3, one of which was in the location she was banded before. Has had ongoing maroon stool in the morning shift. Unresponsive after being off sedation for 30 minutes.    Physical Exam   Temp: 100.2  F (37.9  C) Temp src: Axillary Temp  Min: 97.6  F (36.4  C)  Max: 100.2  F (37.9  C) BP: (!) 160/70 Pulse: 99 Heart Rate: 98 Resp: 14 SpO2: 99 % O2 Device: Mechanical Ventilator    Vitals:    09/18/19 0900 09/18/19 1148 09/19/19 0400   Weight: 71.3 kg (157 lb 1.6 oz) 71.4 kg (157 lb 6.5 oz) 72.9 kg (160 lb 11.5 oz)     I/O last 3 completed shifts:  In: 4603.63 [I.V.:2272.63; IV Piggyback:1000]  Out: 1600 [Urine:200; Emesis/NG output:600; Other:800]    GEN: intubated and sedated, not responding to painful stimuli  EYES: Icteric sclera, sluggish pupils  CV: RRR, III/VI flow murmur  PULM: CTAB, mechanical breath sounds, symmetric chest rise  GI: normal bowel sounds, non-tender, no rebound tenderness or guarding, no masses; distended, but soft  : deferred  EXTREMITIES: warm and well perfused, lower extremities wrapped in ace wraps  NEURO: sedated, not responding to stimuli  SKIN: jaundiced, bruising on bilateral upper extremities     Medications     fentaNYL 50 mcg/hr (09/19/19 0743)     midazolam Stopped (09/19/19 0800)     norepinephrine Stopped (09/19/19 0104)     octreotide (sandoSTATIN) infusion ADULT 50 mcg/hr (09/19/19 0800)     pantoprazole (PROTONIX) infusion ADULT/PEDS GREATER than or EQUAL to 45 kg 8 mg/hr (09/19/19 0810)       allopurinol  100 mg Oral Once per day on Mon Wed Fri     cefTRIAXone  1 g Intravenous Q24H     FLUoxetine  20 mg Oral Daily     folic acid  1 mg Oral Daily     lactulose  30 mL  Oral BID     zz extemporaneous template  600 mg Oral Daily     midodrine  10 mg Oral TID w/meals     multivitamin, therapeutic  1 tablet Oral Daily     rifaximin  550 mg Oral BID     sodium chloride (PF)  3 mL Intracatheter Q8H     zinc sulfate  220 mg Oral BID       Data   Recent Labs   Lab 09/19/19  0412 09/19/19  0411 09/19/19  0022 09/18/19  2142 09/18/19  0615   WBC 8.0  --   --  9.2 8.5   HGB 7.8*  --  6.0* 6.1* 7.9*   MCV 92  --   --  103* 98   PLT 83*  --  86* 61* 88*   INR  --  2.08* 2.46* 2.40* 1.81*   NA  --  134  --  132* 133   POTASSIUM  --  3.7  --  4.0 4.0   CHLORIDE  --  104  --  101 101   CO2  --  22  --  21 19*   BUN  --  20  --  18 32*   CR  --  2.54*  --  2.37* 4.04*   ANIONGAP  --  9  --  10 12   MEGGAN  --  7.4*  --  7.6* 8.8   GLC  --  97  --  89 87   ALBUMIN  --  2.6*  --  2.6* 3.1*   PROTTOTAL  --  4.3*  --  4.8* 6.0*   BILITOTAL  --  11.6*  --  13.0* 16.7*   ALKPHOS  --  150  --  192* 260*   ALT  --  25  --  28 38   AST  --  44  --  50* 64*     Recent Results (from the past 24 hour(s))   XR Chest Port 1 View    Narrative    XR CHEST PORT 1 VW  9/18/2019 11:59 PM      HISTORY: ett placement    COMPARISON: 8/21/2019    FINDINGS: Right IJ central venous line tip is at the atrial caval  junction. No pleural effusion or pneumothorax. Endotracheal tube tip  is at the midthoracic trachea. Pulmonary vascular congestion.      Impression    IMPRESSION:  1. Endotracheal tube tip is at the midthoracic trachea.    2. Probable mild pulmonary edema.    I have personally reviewed the examination and initial interpretation  and I agree with the findings.    OLEG PHILLIPS MD

## 2019-09-19 NOTE — PROGRESS NOTES
[facility] ER  CRITICAL CARE STAFF NOTE    Ms. Marquita Palmer is a 64yo female with a history of decompensated cirrhosis 2/2 ETOH + chronic HCV, complicated by HE, esophageal varices, ascites, and recent CT and initation of hemodialysis who was admitted to [facility] on 9/6/2019 in transfer from the [facility] ED due to syncope following a large volume paracentesis.  Her hospitalization has been complicated by a massive GI bleed on 9/7 requiring MICU admission and EGD with banding of 6 varices and need for hemodialysis.  Admitted overnight with repeat massive GI bleeding due to actively bleeding varix identified on EGD overnight.      Ongoing melena (likely old blood), soft BPs, off sedation but not awake.      Acute Critical Care Issues:     1. Acute blood loss anemia/hemorrhagic shock due to variceal bleeding s/p banding x3  2. Toxic-Metabolic encephalopathy, multifactorial  3. Inability to protect airway  4. Decompensated ETOH/HCV cirrohsis, not a liver transplant candidate due to frailty + lack of 6 month sobriety  5. CT, hemodialysis initiated 9/11    Plan today:   - NPO x96 hours  - continue BID PPI  - unable to extubate due to mental status  - PICC line placement due to soft BPs   - trending labs      The patient was seen and examined with the resident/fellow physician.  We have discussed the patient in detail and I agree with the findings, assessment, and plan as documented when this note was cosigned on this day. The plan was formulated in conjunction with pharmacy, ICU nurses, and respiratory therapist. I have evaluated all laboratory values and imaging studies for the past 24 hours. I have reviewed all the consults that have been ordered and are active for this patient.      Critical Care Time: 30 min.  I spent this time (excluding procedures) personally providing and directing critical care services at the bedside and on the critical care unit.      Megan Whalen MD    of Medicine  Department of Pulmonary, Allergy, Critical Care and Sleep Medicine   [facility]  Pager: [phone]

## 2019-09-19 NOTE — TELEPHONE ENCOUNTER
amand  RECORDS RECEIVED FROM: 2-3 week hospital follow up //    DATE RECEIVED: 10.02.2019   NOTES STATUS DETAILS   OFFICE NOTE from referring provider N/A    OFFICE NOTES from other specialists Internal 08.12.2019 08.01.2019 07.08.2019  More in Epic   DISCHARGE SUMMARY from hospital Internal 09.05.2019 08.21.2019   MEDICATION LIST Internal  Care Everywhere    LIVER BIOSPY (IF APPLICABLE)      PATHOLOGY REPORTS  N/A    IMAGING     ENDOSCOPY (IF AVAILABLE) Internal 09.05.2019   COLONOSCOPY (IF AVAILABLE) Internal 07.30.2018   ULTRASOUND LIVER Internal 09.08.2019 09.06.2019 07.06.2019   CT OF ABDOMEN N/A    MRI OF LIVER N/A    FIBROSCAN, US ELASTOGRAPHY, FIBROSIS SCAN, MR ELASTOGRAPHY N/A    LABS     HEPATIC PANEL (LIVER PANEL) In process 09.18.2019   BASIC METABOLIC PANEL Internal 09.08.2019   COMPLETE METABOLIC PANEL N/A    COMPLETE BLOOD COUNT (CBC) Internal 09.19.2019   INTERNATIONAL NORMALIZED RATIO (INR) Internal 09.19.2019   HEPATITIS C ANTIBODY N/A    HEPATITIS C VIRAL LOAD/PCR N/A    HEPATITIS C GENOTYPE N/A    HEPATITIS B SURFACE ANTIGEN Internal 09.11.2019   HEPATITIS B SURFACE ANTIBODY Internal 09.11.2019   HEPATITIS B DNA QUANT LEVEL N/A    HEPATITIS B CORE ANTIBODY Internal 09.11.2019

## 2019-09-19 NOTE — H&P
[facility]      MICU History and Physicial  Roslyn Palmer MRN: [id number]  1955  Date of Admission:9/5/2019  Primary care provider: Monica Tirado      Assessment and Plan:     Roslyn Palmer is a 63 year old female with past medical history of decompensated cirrhosis (2/2 alcohol and hepatitis C), chronic hepatitis C, HTN, and recent EV bleed s/p banding x6, transferred to MICU for hemorrhagic shock 2/2 EV bleed    PLAN:  ===NEURO===  # Sedation: versed and fentanyl for EGD procedure    # Hx of hepatic encephalopathy, improved  Hospitalized 8/21 for HE likely due to decreased intake of lactulose in the days prior to admission. Ascitic fluid analysis was negative for SBP, no concern for GI bleed. Mental status rapidly improved after lactulose and rifaximin. Her diuretics were also discontinued on discharge as she receives weekly paracentesis. Now on 9/18 patient is alert & oriented to self, place, and time.   - Continue Rifaximin, Lactulose (BID w/ prns available to titrate to 3-4 BM's per day) and Zinc     ===CARDIOVASCULAR===  #shock, hemorrhagic  Acutely hypotensive (68/40) 2/2 GI bleed and recent HD (with 1.7L UF on 9/16) and paracentesis (with 3L release on 9/17). Likely intravascular volume depletion. Was given 75g of Albumin 9/18.  - 2 U pRBC  - 1 U platelets  - 5 U cryo  - 1 L of LR and 1 L NS  - Levophed  - Midodrine to 10 mg TID  - Continue to monitor BP    ===PULMONARY===  No acute issues    Intubated for EGD    ===GASTROINTESTINAL===  # Esophageal varices bleed  Previous hematemesis of 500 mL on 9/7 2/2 variceal sources. S/p banding x6 of esophageal varices on 9/7. Received PRBC x4, Plasma x1, platelet x1 at that time. Hgb stabilized to 7.7 g/dL. Was on CTX 1g IV daily for 7 days (9/7-9/13) (SBP prophylaxis). Completed Octreotide gtt, IV PPI, 3 days of IV VitK. Ciprofloxacin 500 mg/day was started 9/18 for SBP prophylaxis; discontinued. Hgb dropped to 6.1 from 7.9  on 9/18.    - 2 U pRBC  - 1 U platelets  - 5 U cryo  - 1 L of LR and 1 L NS  - GI to do bedside EGD tonight 9/18  - Protonix  - Octreotide      # EtOH-induced cirrhosis c/b ascites, HE, and EV bleeding  Cirrhosis diagnosed 6 years ago when she had EV GIB. EGD on 9/5 showed variceal bleeding and portal hypertensive gastropathy. Ascites started to become appreciable in 05/19. 6.350 L removed on paracentesis 9/7. Workup for thrombosis and SBP negative. Per GI, would start to address goals of care given clinical trajectory as patient would not be considered for liver transplantation due to frailty and not having met minimum sobriety requirement. MELD = 38. Has some rising trend of ALP and bilirubin. Last paracentesis was 9/17; 3L removed.  - Continue Rifaximin, Lactulose (BID w/ prns available to titrate to 3-4 BM's per day) and Zinc   - Holding Spironolactone and Torsemide (on HD)  - Therapeutic paracentesis prn     # Chronic hepatitis C, treatment naive  Per last GI/hepatology visit with Dr. Owen on 08/12/19, patient is GT 2b treatment naive. Will address HCV treatment in the future. Not urgent at this point.     HBV non-immune  HBsAg, HBsAb are negative.  - Vaccination outpatient setting    # Nutrition:   - NPO    ===RENAL===  # Prerenal CT secondary acute blood loss and HRS  # Hypervolemic hyponatremia  Baseline ~1. Admission Cre 4.01. Minimal urine output. Diuretics discontinued last admission. Likely pre-renal in setting of slow GI variceal bleed (s/p banding) versus HRS. Albumin challenge completed 9/6-9/8 and has received multiple units additional colloid in setting of acute GIB. Cr peaked at 5.53. Last HD was 9/16; 1.7 L removed.  - HD per nephrology plan.   - monitor I/O's, daily weights  - Trend BMP daily    ===HEME/ONC===  # Acute on chronic anemia secondary from blood loss  2/2 GI bleed. Varices banded x2 on 9/19/19. Baseline hgb 10-11.   - 2 U pRBC  - 1 U platelets  - 5 U cryo  - Diagnostic and  interventional EGD    # Thrombocytopenia, coagulopathy secondary to cirrhosis  Has received vit K this admission. Transfused PRBC x4, Plasma x1, platelet x1 on 9/17 overnight. Most recent labs show INR 2.40, PTT 56, and fibrinogen 109.   - 2U pRBC  - 1U platelets  - 5U cryo    ===ENDOCRINE===  No acute issues    ===INFECTIOUS DISEASE===  No acute issue    # Antimicrobials:  - Ceftriaxone for EV bleed    ===SKIN/MSK===  # Gout  No clinical arthritis.  - Restart Allopurinol 100 mg MWF after dialysis session (dose consulted with nephrology)    Prophylaxis:  DVT: SCD   GI: PPI  Family:  Plan to update later  Disposition: Critically Ill    Code Status: FULL    Prabhakar Yip MS4  Pager [phone]    Patient was seen and discussed with attending physician Dr. Lora, who agrees with above assessment and plan.         Chief Complaint:   Hypotension and acute anemia 2/2 GI bleed.          History of Present Illness:   Marquita Palmer is a 64 yo female presenting to the ICU after she was found to have hgb of 6.1 and LA of 3.3. She was given products and crystalloids. She was intubated and sedated with versed for the EGD procedure. EGD was performed which confirmed suspected variceal bleed and banding x2 was performed.           Review of Systems:      Unable to perform due to patient condition.          Past Medical History:   Medical History reviewed.   Past Medical History:   Diagnosis Date     Cirrhosis (H)      Depression      DJD (degenerative joint disease)      Essential hypertension, malignant      ETOH abuse      Gout      Hepatitis C              Past Surgical History:   Surgical History reviewed.   Past Surgical History:   Procedure Laterality Date     COLONOSCOPY N/A 7/30/2018    Procedure: COMBINED COLONOSCOPY, SINGLE OR MULTIPLE BIOPSY/POLYPECTOMY BY BIOPSY;;  Surgeon: Musa Diaz MD;  Location: [facility] OR     COLONOSCOPY WITH CO2 INSUFFLATION N/A 7/30/2018    Procedure: COLONOSCOPY WITH CO2 INSUFFLATION;  Colonoscopy,  Egd;  Surgeon: Musa Diaz MD;  Location: [facility] OR     COMBINED ESOPHAGOSCOPY, GASTROSCOPY, DUODENOSCOPY (EGD) WITH CO2 INSUFFLATION N/A 2018    Procedure: COMBINED ESOPHAGOSCOPY, GASTROSCOPY, DUODENOSCOPY (EGD) WITH CO2 INSUFFLATION;  Combined, Upper GI bleed Alcoholic liver disease (H) Anemia, unspecified type, Ref By Celestino, BMI 27.55, [phone];  Surgeon: Musa Diaz MD;  Location: [facility] OR     GYN SURGERY           HC ESOPHAGOSCOPY W BAND LIGATION ESOPHAGEAL VARICIES       IR CVC TUNNEL PLACEMENT > 5 YRS OF AGE  2019     ORTHOPEDIC SURGERY  ,    left and right hip replacement             Social History:   Social History reviewed.  Social History     Tobacco Use     Smoking status: Current Every Day Smoker     Packs/day: 0.20     Types: Cigarettes     Smokeless tobacco: Never Used     Tobacco comment: down to 4 ciggarettes per day   Substance Use Topics     Alcohol use: Not Currently     Comment: quit              Family History:   Family History reviewed.   Family History   Problem Relation Age of Onset     Breast Cancer Mother      Cancer Maternal Grandmother      Cerebrovascular Disease Maternal Grandmother         stroke     Cancer Maternal Grandfather         pancreatic cancer     Cancer Paternal Grandmother      Cancer Paternal Grandfather         brain cancer     Cirrhosis Brother      Liver Cancer Brother      Arthritis Sister              Allergies:     Allergies   Allergen Reactions     Sulfa Drugs              Medications:   Medications Reviewed.   Current Facility-Administered Medications   Medication     acetaminophen (TYLENOL) tablet 650 mg     allopurinol (ZYLOPRIM) tablet 100 mg     camphor-menthol (DERMASARRA) lotion     cefTRIAXone (ROCEPHIN) 1 g vial to attach to  mL bag for ADULTS or NS 50 mL bag for PEDS     cyclobenzaprine (FLEXERIL) tablet 10 mg     glucose gel 15-30 g    Or     dextrose 50 % injection 25-50 mL    Or     glucagon  injection 1 mg     fentaNYL (PF) (SUBLIMAZE) injection  mcg     fentaNYL (SUBLIMAZE) infusion     FLUoxetine (PROzac) capsule 20 mg     folic acid (FOLVITE) tablet 1 mg     lactulose (CHRONULAC) solution 30 mL     lidocaine (LMX4) cream     lidocaine 1 % 0.1-1 mL     magnesium oxide (MAG-OX) 200 mg, magnesium oxide (MAG-OX) 400 mg     melatonin tablet 1 mg     midazolam (VERSED) drip - ADULT 100 mg/100 mL in NS PRE-MIX     midazolam (VERSED) injection 1-3 mg     midodrine (PROAMATINE) tablet 10 mg     multivitamin, therapeutic (THERA-VIT) tablet 1 tablet     naloxone (NARCAN) injection 0.1-0.4 mg     norepinephrine (LEVOPHED) 16 mg in  mL infusion     octreotide (sandoSTATIN) 1,250 mcg in sodium chloride 0.9 % 250 mL     ondansetron (ZOFRAN-ODT) ODT tab 4 mg    Or     ondansetron (ZOFRAN) injection 4 mg     pantoprazole (PROTONIX) 80 mg in sodium chloride 0.9 % 100 mL infusion     prochlorperazine (COMPAZINE) injection 5 mg     rifaximin (XIFAXAN) tablet 550 mg     sodium chloride (PF) 0.9% PF flush 3 mL     sodium chloride (PF) 0.9% PF flush 3 mL     sucralfate (CARAFATE) suspension 1 g     SUMAtriptan (IMITREX) tablet 100 mg     zinc sulfate (ZINCATE) capsule 220 mg             Physical Exam:   Vitals were reviewed.  Blood pressure 130/58, pulse 84, temperature 99.5  F (37.5  C), temperature source Axillary, resp. rate 14, weight 71.4 kg (157 lb 6.5 oz), SpO2 100 %, not currently breastfeeding.    General: sedated, icteric  Skin: Jaundiced, no rash, no ecchymoses  HEENT: Constricted pupils to 2 mm  CV: Systolic murmur. RRR, normal S1S2, no clicks or rubs  Resp: Clear to auscultation bilaterally, no wheezes, rhonchi  Abd: Moderately distended due to ascites. Soft, non-tender, BS+, no masses appreciated  Extremities: warm and well perfused. B/l legs wrapped for swelling.  Neuro: No lateralizing symptoms or focal neurologic deficits         Data:   I/O last 3 completed shifts:  In: 0   Out: 1600  [Urine:200; Emesis/NG output:600; Other:800]    ROUTINE LABS (Last four results)  CMP  Recent Labs   Lab 09/18/19 2142 09/18/19 0615 09/17/19 0629 09/16/19  0723   * 133 133 134   POTASSIUM 4.0 4.0 3.8 3.3*   CHLORIDE 101 101 101 101   CO2 21 19* 20 20   ANIONGAP 10 12 12 12   GLC 89 87 92 109*   BUN 18 32* 26 44*   CR 2.37* 4.04* 3.21* 4.45*   GFRESTIMATED 21* 11* 15* 10*   GFRESTBLACK 24* 13* 17* 11*   MEGGAN 7.6* 8.8 8.9 8.6   MAG  --   --   --  2.1   PROTTOTAL 4.8* 6.0* 6.0* 5.3*   ALBUMIN 2.6* 3.1* 2.9* 2.7*   BILITOTAL 13.0* 16.7* 17.8* 16.8*   ALKPHOS 192* 260* 280* 251*   AST 50* 64* 65* 65*   ALT 28 38 41 37     CBC  Recent Labs   Lab 09/19/19 0022 09/18/19 2142 09/18/19 0615 09/18/19 0057 09/17/19 0629 09/16/19  0723   WBC  --  9.2 8.5  --  9.6 9.0   RBC  --  1.87* 2.44*  --  2.50* 2.35*   HGB 6.0* 6.1* 7.9* 7.4* 8.1* 7.6*   HCT  --  19.3* 23.9*  --  24.5* 22.9*   MCV  --  103* 98  --  98 97   MCH  --  32.6 32.4  --  32.4 32.3   MCHC  --  31.6 33.1  --  33.1 33.2   RDW  --  24.5* 23.8*  --  24.1* 23.6*   PLT 86* 61* 88*  --  81* 85*     INR  Recent Labs   Lab 09/19/19  0022 09/18/19 2142 09/18/19 0615 09/17/19 0629   INR 2.46* 2.40* 1.81* 1.77*     Arterial Blood Gas  Recent Labs   Lab 09/19/19  0025   PH 7.44   PCO2 36   PO2 208*   HCO3 24   O2PER 50.0

## 2019-09-19 NOTE — PROGRESS NOTES
Pt transferred to  at 2330 w/ GI bleeding and hypotension. Upon arrival pt was lightheaded and hypotensive. Pt was intubated for airway protection in anticipation of EGD and sedated on versed/fentanyl gtts. Upon intubation pt dropped MAP into the 40s and norepi was started. Pt responded well to fluid resuscitation and in total received 4 units PRBCs, 2 cryo, 1 platelet, 1L LR bolus. EGD completed at bedside, 3 areas banded. No further bleeding. Norepi weaned off post procedure. Pt transferred to  at 0600. Report given to Hannah TERAN. Sister My updated by phone. Continue to monitor, plan to wean sedation and extubate as able.    Admitted/transferred from: 5A  Reason for admission/transfer: hematemesis/hypotension  Patient status upon admission/transfer: awake/talking, hypotensive BP 70/40  Interventions: EGD  Plan: EGD  2 RN skin assessment: completed by Carlin Valentine and Dontae Mckinney  Result of skin assessment and interventions/actions: Jaundiced, bruises throughout, frail skin w/ multiple skin tears, legs wrapped, edematous, open wound on coccyx already documented in flowsheets.   Height, weight, drug calc weight: done  Patient belongings: in room  MDRO education (if applicable):  N/A

## 2019-09-19 NOTE — PROVIDER NOTIFICATION
09/18/19 2100   Call Information   Date of Call 09/18/19   Time of Call 2054   Name of person requesting the team Dano   Title of person requesting team RN   RRT Arrival time 2100   Time RRT ended 2315   Reason for call   Type of RRT Adult   Primary reason for call Uncontrolled excessive bleeding   Was patient transferred from the ED, ICU, or PACU within last 24 hours prior to RRT call? No   SBAR   Situation Large amount bloody emesis, hypotension   Background Cirrhosis (2/2 alcohol and hepatitis C), chronic hepatitis C and HTN who presents as a transfer from an OSH due to her recent admission here for HE, who presents this admission following a syncopal episode s/p paracentesis yesterday removing 6.3 L   Notable History/Conditions Organ failure   Assessment Hypotensive 70's systolic maps 50's, having bloody emesis, c/o some lightheadedness. HR 80's, denies SOB on RA.    Interventions Labs;IV fluids;Other (describe)  (Blood)   Patient Outcome   Patient Outcome Transferred to  (4E)   RRT Team   Date Attending Physician notified 09/18/19   Time Attending Physician notified 2100   Physician(s) Ru Jauregui RN Alis Espinoza   RT Sloan Mcclain   Post RRT Intervention Assessment   Post RRT Assessment Transferred to ICU

## 2019-09-19 NOTE — PROGRESS NOTES
CLINICAL NUTRITION SERVICES - REASSESSMENT NOTE     Nutrition Prescription    RECOMMENDATIONS FOR MDs/PROVIDERS TO ORDER:  Initiate PN and lipids as pt is to be NPO without enteral access for at least 96 hrs per GI's plan. Recommend 1080 mL of 250 g dextrose, 120 g AA and daily IV lipids to provide 1830 kcal (27 kcal/kg), 1.8 g PRO/kg, a GIR of 2.59 mg/kg/min and 27% of kcal would be from fat. Dosing wt 67 kg.     Palliative care team consult; pt has declined TCU and hospice, but is not a transplant candidate (last EtOH beverage in June).    Malnutrition Status:    Severe malnutrition in the context of chronic illness.     Recommendations already ordered by Registered Dietitian (RD):  Discontinued oral supplement orders since pt is intubated.    Future/Additional Recommendations:  If pt is able to have a FT safely placed, rec a fiber-free, semi-elemental TF regimen given recent GIB. Rec Peptamen 1.5 @ 25 mL./hr and adv by 10 mL q4h until @ goal rate of 55 ml/hr. TF at goal volume would provide 1980 kcals (30 kcal/kg/day), 90 g PRO (1.3 g/kg/day), 1016 mL H2O, 248 g CHO and no Fiber daily.       EVALUATION OF THE PROGRESS TOWARD GOALS   Diet: NPO, BID Boost supplements  Nutrition Support: none  Intake: Pt was eating % of meals per nursing documentation prior to hematemesis 9/18 leading to intubation 9/19.     NEW FINDINGS   Pt intubated yesterday and had EGD done early this AM. Note GI does not want pt to have enteral access for 96 hrs.   Pt is getting a PICC line placed, so only visual inspection was done for nutrition assessment.  Weight is down 2.4% over the past week (could be a difference in unit scales, too).    MALNUTRITION  % Intake: Decreased intake does not meet criteria  % Weight Loss: > 2% in 1 week  Subcutaneous Fat Loss: Facial region, Upper arm:, Lower arm: Severe  Muscle Loss: Temporal,  Facial & jaw region, Thoracic region (clavicle, acromium bone, deltoid, trapezius, pectoral), Upper arm  (bicep, tricep),  Lower arm (forearm), Dorsal hand, Upper leg (quadricep, hamstring),  and Posterior calf: Severe   Fluid Accumulation/Edema: Moderate- LE edema  Malnutrition Diagnosis: Severe malnutrition in the context of chronic illness.     Previous Goals   Patient to consume % of nutritionally adequate meal trays TID, or the equivalent with supplements/snacks.  Evaluation: Met    Previous Nutrition Diagnosis  Inadequate oral intake  Evaluation: No longer applicable, nutrition diagnosis changed below    CURRENT NUTRITION DIAGNOSIS  Inadequate protein-energy intake related to GI status inhibiting ability to take PO or initiate TF as evidenced by pt without enteral access, NPO on vent x 1 day, weight down 2.4% over the past 7 days, pt with fat and muscle wasting plus lymphedema in LE which is c/w severe malnutrition.      INTERVENTIONS  Implementation  None today.    Goals  Total avg nutritional intake to meet a minimum of 25 kcal/kg and 1.2 g PRO/kg daily (per dosing wt 67 kg).    Monitoring/Evaluation  Progress toward goals will be monitored and evaluated per protocol.    Miranda Cruz, MARLENE, LD  ([facility] dietitian, [phone]- [phone])

## 2019-09-19 NOTE — PROVIDER NOTIFICATION
09/19/19 1205   PICC Triple Lumen 09/19/19 Right Brachial vein lateral Access. PICC is OK to use.   Placement Date/Time: 09/19/19 1205   Catheter Brand: Realeyes  Size (Fr): 6 Fr  Lot #: [id number]  Full barrier precautions done: Yes, hand hygiene, sterile gown, sterile gloves, mask, cap, full body drape, chlorhexidine scrub  Consent Signed: Yes  Time Ou...   Site Assessment WDL   External Cath Length (cm) 1 cm   Extremity Circumference (cm) 22.5 cm   Dressing Intervention Chlorhexidine patch;Transparent;Securing device;New dressing   Dressing Change Due 09/26/19   PICC Comment PICC insertion done   Lumen A - Color GRAY   Lumen A - Status blood return noted;saline locked   Lumen A - Cap Change Due 09/23/19   Lumen B - Color PURPLE   Lumen B - Status blood return noted;saline locked   Lumen B - Cap Change Due 09/23/19   Lumen C - Color RED   Lumen C - Status blood return noted;saline locked   Lumen C - Cap Change Due 09/23/19   Extravasation? No   Line Necessity Yes, meets criteria

## 2019-09-19 NOTE — OR NURSING
EGD done at bedside. ICU RN monitored patient throughout. 3 bands placed, pt tolerated well. Continue to monitor.    Shelley Fisher RN

## 2019-09-20 NOTE — PROGRESS NOTES
[facility]  CRITICAL CARE STAFF NOTE    Ms. Marquita Palmer is a 62yo female with a history of decompensated cirrhosis 2/2 ETOH + chronic HCV, complicated by HE, esophageal varices, ascites, and recent CT and initation of hemodialysis who was admitted to [facility] on 9/6/2019 in transfer from the [facility] ED due to syncope following a large volume paracentesis.  Her hospitalization has been complicated by a massive GI bleed on 9/7 requiring MICU admission and EGD with banding of 6 varices and need for hemodialysis.  Admitted overnight with repeat massive GI bleeding due to actively bleeding varix identified on EGD early AM on 9/19.    Hgb has been stable since EGD with ongoing melena.      This AM, still requiring small amount of levophed to maintain MAP of >65.  Wakes up to voice but quickly falls back asleep.  Apneic with pressure support trial this morning.     Acute Critical Care Issues:     1. Acute blood loss anemia/hemorrhagic shock due to variceal bleeding s/p banding x3  2. Toxic-Metabolic encephalopathy, multifactorial  3. Inability to protect airway  4. Decompensated ETOH/HCV cirrohsis, not a liver transplant candidate due to frailty + lack of 6 month sobriety  5. CT, hemodialysis initiated 9/11  6. Severe protein calorie malnutrition    Plan today:   - PPI, NPO status per GI.  Start TPN  - lactulose enemas  - reduce MAP goal to 60 given hemodynamics of cirrhosis + renal failure  - need to begin goals of care discussions given significantly increased risk of mortality (per MELD of 36, this is 81% in the next 90 days, excluding her second severe variceal bleed in 2 weeks)      The patient was seen and examined with the resident/fellow physician.  We have discussed the patient in detail and I agree with the findings, assessment, and plan as documented when this note was cosigned on this day. The plan was formulated in conjunction with pharmacy, ICU nurses, and respiratory therapist. I have  evaluated all laboratory values and imaging studies for the past 24 hours. I have reviewed all the consults that have been ordered and are active for this patient.      Critical Care Time: 30 min.  I spent this time (excluding procedures) personally providing and directing critical care services at the bedside and on the critical care unit.      Megan Whalen MD   of Medicine  Department of Pulmonary, Allergy, Critical Care and Sleep Medicine   [facility]  Pager: [phone]

## 2019-09-20 NOTE — PROGRESS NOTES
Nephrology Progress Note  09/20/2019         Assessment & Recommendations:   Roslyn Palmer is a 63 year old female with a PMHx significant for cirrhosis secondary to hepatitis C and EtOH dependecne, hypertension, depression, gout who was transferred from OSH on 9/5/2019 following a syncopal episode now with worsening renal function and decreasing urinary output. She was started on HD 9/11     Anuric acute kidney injury  Baseline Cr ~1.3-1.6, and cody to 5 prior to the start of iHD. The etiology for her CT is multifactorial, including hypovolemia and HRS-like physiology. She may have progressed to an ATN-like process with her initial hypotension and hypoperfusion. She is not an ideal liver transplant candidate given her recent EtOH use (June) and frailty. We do not have confidence that her renal function will improve, and her overall 6 month mortality given her high MELD and poor renal function high. We have discussed the balance between quality of life on HD and mortality risk without HD, and the family have decided to start HD as a trial to see how Roslyn tolerates it.   - Stool output is large and she is able to maintain a fluid balance between intake and output. She has no acute indications for hemodialysis at this time. Her BP are <90, and CRRT would be a modality to help with her clearance. We will reassess this afternoon and see if a CRRT machine is available. In the interim, we will plan for iHD on Saturday.     Electrolytes/Acid-Base status  Na 139, K 3.8, Cl 108. Stable electrolytes. HCO3 23 and improving.     Anemia of ESRD  Hgb 7.5, large hemoptysis/hemaemesis episode on 9/19 and hgb dropped from 7.9 to 6.0, she received 4U pRBC. EGD revealed bleeding varices, which were banded x 3.  Gastroenterology quote a mortality rate as high as 55% after a second variceal bleed after failed initial treatment.   - Transfusion threshold of 7. On octreotide gtt, on ceftriaxone and PPI IV.    Recommendations  were communicated to primary team via phone     Patient seen and plan of care discussed with Dr. Brian Bell  Nephrology Fellow  [phone]    Interval History :   Nursing and provider notes from last 24 hours reviewed.    Roslyn Palmer was seen and examined this morning. She remains intubated and minimally verbal/interactive. BP low, on levophed. She had no changes to her ventilator settings. She had maroon/dark red stools overnight.     Review of Systems:   I reviewed the following systems:  Could not complete ROS    Physical Exam:   I/O last 3 completed shifts:  In: 913.78 [I.V.:863.78]  Out: 1500 [Stool:1500]   BP 99/59   Pulse 84   Temp 96.4  F (35.8  C) (Axillary)   Resp 11   Wt 71.5 kg (157 lb 10.1 oz)   SpO2 97%   BMI 25.44 kg/m       GENERAL APPEARANCE: Intubated, sedated  EYES:  + scleral icterus, pupils equal  PULM: lungs clear to auscultation bilaterally, equal air movement, no clubbing  CV: regular rhythm, normal rate, no rub     -JVD no     -edema +1 in LE  GI: soft, Non tender, + distended, bowel sounds are normal  INTEGUMENT: no cyanosis, No rash  Access: TDC    Labs:   All labs reviewed by me  Electrolytes/Renal -   Recent Labs   Lab Test 09/20/19  1108 09/20/19  0327 09/19/19  0411 09/18/19  2142  09/16/19  0723  08/23/19  0730  08/12/19  0923   NA  --  139 134 132*   < > 134   < > 134   < > 131*   POTASSIUM  --  3.8 3.7 4.0   < > 3.3*   < > 3.0*   < > 3.9   CHLORIDE  --  108 104 101   < > 101   < > 102   < > 95   CO2  --  23 22 21   < > 20   < > 23   < > 29   BUN  --  36* 20 18   < > 44*   < > 33*   < > 28   CR  --  3.57* 2.54* 2.37*   < > 4.45*   < > 1.26*   < > 1.38*   GLC  --  97 97 89   < > 109*   < > 99   < > 98   MEGGAN  --  8.5 7.4* 7.6*   < > 8.6   < > 9.2   < > 9.6   MAG 1.6  --   --   --   --  2.1  --   --   --  1.9   PHOS 5.2*  --   --   --   --   --   --  2.3*  --   --     < > = values in this interval not displayed.     CBC -   Recent Labs   Lab Test 09/20/19  6073  09/20/19 0327 09/19/19 2028   WBC 9.1 9.1 9.8   HGB 7.5* 7.6* 7.9*   PLT 95* 90* 94*     LFTs -   Recent Labs   Lab Test 09/20/19  0327 09/19/19  0411 09/18/19  2142   ALKPHOS 156* 150 192*   BILITOTAL 13.7* 11.6* 13.0*   ALT 26 25 28   AST 52* 44 50*   PROTTOTAL 4.8* 4.3* 4.8*   ALBUMIN 2.8* 2.6* 2.6*     Iron Panel -   Recent Labs   Lab Test 09/16/19  0723 06/19/19  0933 03/25/19  1141   IRON 47 26* 16*   IRONSAT 27 9* 5*   YOAN 211 24 23     Imaging:  All imaging studies reviewed by me.     Current Medications:    cefTRIAXone  1 g Intravenous Q24H     lactulose  200 g Rectal Q4H     pantoprazole (PROTONIX) IV  40 mg Intravenous BID     sodium chloride (PF)  3 mL Intracatheter Q8H       dexmedetomidine Stopped (09/20/19 1127)     fentaNYL Stopped (09/19/19 0805)     norepinephrine 0.03 mcg/kg/min (09/20/19 1300)     octreotide (sandoSTATIN) infusion ADULT 50 mcg/hr (09/20/19 1300)     Kevin Bell MD

## 2019-09-20 NOTE — PLAN OF CARE
OT: OT performed PROM 9/19, no need for additional PROM today. OT will reschedule for 9/22/19 as appropriate

## 2019-09-20 NOTE — PLAN OF CARE
ICU End of Shift Summary. See flowsheets for vital signs and detailed assessment.    Changes this shift: MAP goal > 60; titrate Levophed. Agitated, started Precedex to increase comfort, safety mittens on. NPO, feeding tube considerations for 96 hrs post  (Sun 9/22). No HD today per nephrology. Labs checked, stable, to start TPN/lipids this evening. Protonix drip stopped, BID ordered. Lactulose enema orders, agitated with insertion; had to change rectal tube x1 due to pt resisting. Discussed rechecking ammonia with MD, reports no need at this time. MD briefly discussed with sister about care conference in near future.    Plan:  Feeding tube considerations for 96 hrs post  (Sun 9/22). Possible HD for tomorrow. Start TPN/lipids this evening. Monitor for bleed. Care conference with family in near future.       Problem: Adult Inpatient Plan of Care  Goal: Optimal Comfort and Wellbeing  9/20/2019 1838 by Leticia Brizuela, JEFFRY  Outcome: No Change  9/20/2019 0505 by Meredith Posadas, RN  Outcome: Improving    Problem: Renal Function Impairment (Acute Kidney Injury/Impairment)  Goal: Effective Renal Function  9/20/2019 1838 by Leticia Brizuela, JEFFRY  Outcome: No Change  9/20/2019 0505 by Meredith Posadas, RN  Outcome: Improving

## 2019-09-20 NOTE — PLAN OF CARE
Neuro: Pt arouses to voice but does not follow commands. Afebrile.    Cardiac: SR. HR 60s-80s. Levophed titrated to keep MAPs>65.    Respiratory: On CMV (Peep 5, , RR 12) at 30%. Lung sounds clear/coarse and diminished. Scant amount of clear secretions presnet in ETT. Small amount of blood-tinged secretions present in mouth.    GI/: Pt remains anuric. Rectal tube remains in place with watery, black stools present.     Lines: R PICCx2. PIVx4. Subclavian HD line.    Pt's sister, Nickie, called RN in evening and morning for updates.  Continue to monitor and update MD as needed. Continue plan of care.       Problem: Adult Inpatient Plan of Care  Goal: Plan of Care Review  9/20/2019 0505 by Meredith Posadas, RN  Outcome: Improving

## 2019-09-20 NOTE — PROGRESS NOTES
[facility], [place]    Hepatology Follow-up    CC:      Syncope     Dx:      Decompensated alcoholic/HCV cirrhosis              Hemorrhagic shock                 hematemesis secondary to esophageal varices              Syncope              Acute kidney injury, on HD             Hepatic encephalopathy    24 hour events:   Improved neuro status  Stable Hgb    Subjective:  No new issues, wakes up to voice     Medications  Current Facility-Administered Medications   Medication Dose Route Frequency     cefTRIAXone  1 g Intravenous Q24H     lactulose  200 g Rectal Q4H     lipids 4 oil  250 mL Intravenous QPM     pantoprazole (PROTONIX) IV  40 mg Intravenous BID     sodium chloride (PF)  3 mL Intracatheter Q8H       Review of systems  A 10-point review of systems was negative, unless stated above    Examination  BP (!) 81/49   Pulse 84   Temp 96.4  F (35.8  C) (Axillary)   Resp 12   Wt 71.5 kg (157 lb 10.1 oz)   SpO2 99%   BMI 25.44 kg/m      Intake/Output Summary (Last 24 hours) at 9/20/2019 1501  Last data filed at 9/20/2019 1500  Gross per 24 hour   Intake 826.63 ml   Output 1150 ml   Net -323.37 ml       General: jaundiced  CVS: RRR  Resp: CTA  Abdomen: soft  Extremities:no edema  Neuro: Opens eyes to voice, no purposeful movements    Laboratory  Lab Results   Component Value Date     09/20/2019    POTASSIUM 3.7 09/20/2019    CHLORIDE 108 09/20/2019    CO2 23 09/20/2019    BUN 36 09/20/2019    CR 3.57 09/20/2019       Lab Results   Component Value Date    BILITOTAL 13.7 09/20/2019    ALT 26 09/20/2019    AST 52 09/20/2019    ALKPHOS 156 09/20/2019       Lab Results   Component Value Date    WBC 9.1 09/20/2019    HGB 7.5 09/20/2019    MCV 93 09/20/2019    PLT 95 09/20/2019       Lab Results   Component Value Date    INR 2.04 09/20/2019       MELD-Na score: 36 at 9/20/2019  3:27 AM  MELD score: 36 at 9/20/2019  3:27 AM  Calculated from:  Serum Creatinine: 3.57 mg/dL at 9/20/2019  3:27  AM  Serum Sodium: 139 mmol/L (Rounded to 137 mmol/L) at 9/20/2019  3:27 AM  Total Bilirubin: 13.7 mg/dL at 9/20/2019  3:27 AM  INR(ratio): 2.04 at 9/20/2019  3:27 AM  Age: 63 years        Assessment  63 year old female with HCV and AUD and resultant cirrhosis complicated by ascites, VH and HE, admitted with syncope after paracentesis, complicated by CT now on HD and VH hemorrhage s/p banding. She did well and was planned for discharge.  She did have another episode of hematemesis/acute blood loss anemia necessitating urgent EGD which showed actively bleeding varices s/p 3 bands. She remains critically ill, and intubated but her mental statis has improved.   As previously documented, her prognosis is guarded, as mortality following a second variceal hemorrhage in cirrhotics after failed initial treatment can be as high as 55%.  MELD-Na 36, CTP class C     Recommendations  -- Continue close monitoring  -- Ensure two large bore IVs at all times  -- Hgb transfusion threshold of 7  -- Continue IV Octreotide gtt                 Can use for 5 days                 After completion, start beta-blockers and titrate to HR of 55-60 if BP allows  -- Continue IV ceftriaxone  -- Continue PPI BID 40 mg IV  -- Given recent banding, will hold off on OG tube placement for 96 hours                 When able, please give lactulose and titrate to 3 BMs per day                 Give Rifaximin 550 mg BID when able  -- Can given Zinc (220mg BID) through TPN  -- Use Lactulose enemas  -- HD, midodrine per nephrology  -- Continue Goals of care discussion    Patient seen and discussed with attending physician, Dr. Veda Mathis MD  PGY 5  Hepatology      Attestation:  This patient has been seen and evaluated by me, Christy Mccollum.  Discussed with the house staff team or resident(s) and agree with the findings and plan in this note.

## 2019-09-20 NOTE — PLAN OF CARE
ICU End of Shift Summary. See flowsheets for vital signs and detailed assessment.    Changes this shift: Patient remains intubated on minimal vent settings. Versed and fentanyl gtts off at 0800, RASS remains -4, localizes to pain. Levophed gtt for MAP>65, see MAR for titrations. 25 g albumin given. R triple lumen PICC placed for pressor use. NSR rate 70s. Over 1L of dark maroon/black liquid BM, rectal tube placed per order. Hgb stable on recheck. Protonix and octreotide gtts remain. No urine output, HD patient. Skin is yellow and bruised, small open wound on coccyx noted.     Plan: Continue plan of care. Monitor neuro status.

## 2019-09-20 NOTE — PLAN OF CARE
PT 4C: Cancel- Patient transferred to MICU overnight, intubated with no command follow, OT is follow for ROM and mobility progression. Will notify PT to initiate as appropriate.

## 2019-09-20 NOTE — PROGRESS NOTES
CLINICAL NUTRITION SERVICES - BRIEF NOTE   (See RD note on 9/19 for full assessment)     Reason for RD note: Provider order for Nutrition/Pharmacy to start and manage TPN. Anticipate pt will have no enteral access for ~5 days per MICU rounds per GI team request. GOC discussions to be held.    New Findings:  Nutrition support: Plan to start TPN today.    Renal: On iHD (anuric).     GI: Up to 1.3 L stool + 11 BMs yesterday per RN flowsheets.    Labs (9/20 unless otherwise noted): Na 138 (WNL), K 2.8 (WNL), BUN 36 (WNL), Cr 3.57 (H), total bili 13.7 (H), alk phos 156 (H), ALT 26 (WNL), AST 52 (H), ammonia 69 (H) on 9/19 - No baseline TG, K, Phos -->Ordered by RD     Meds: Lactulose (on hold), norepi gtt    Interventions:  1. Sent TPN change order:  --Use dosing weight 67 kg  --Begin TPN via CENTRAL LINE, goal volume restricted per PharmD with initial 140g Dex daily (476 kcal, GIR 1.5), 120 g AA daily (480 kcal), and 250 ml 20% SMOF IV lipids daily.  Micro/Rx: Infuvite + additional Zn per GI team request (hold MTE-5 given cirrhosis)  --ONLY once pt receives ~100% of initial continuous PN volume with K+/Mg++/Phos WNL, advance PN dex by ~40 g every 1-2 days (pending lytes/Glu and Pharm D/RD discretion) to goal of 250 g Dex (850 kcal) to increase provisions to 1830 kcals (27 kcal/kg/day), 1.8 g PRO/kg/day, GIR 2.6 with 27% kcals from Fat.   -->Note: Prefer SMOF lipid given cirrhosis and direct bili 12.5 (H) when last checked on 9/6/19    2. Baseline TG level (none since 2017), K+, and Phos    Future/Additional Recommendations:  -Monitor advancement to goal dextrose/lytes/LFTs/renal labs/BG  -Monitor ability to obtain enteral access and start TF once OK per GI --> wean TPN as able    Nutrition will continue to follow per protocol.    Trish Zavala RD, LD  Pager: [phone]

## 2019-09-20 NOTE — PROGRESS NOTES
[facility] Intensive Care Progress Note    Date of Service (when I saw the patient): 09/20/2019     Assessment & Plan   Roslyn Palmer is a 63 year old female with past medical history of decompensated cirrhosis (2/2 alcohol and hepatitis C), chronic hepatitis C, HTN, and recent EV bleed s/p banding x6, transferred to MICU for hemorrhagic shock 2/2 EV bleed now s/p banding x 3 with GI. Hemodynamically stable and off pressors, off sedation with ongoing AMS.    CHANGES TODAY:  - MAP goal changed to 60  - lactate stable at 2.0  - lactulose enemas   - start TPN for nutrition given no enteral access  - will try pressure support trials if she tolerates them  - precedex drip for comfort  - PPI drip from continuous to bid  - discussion with sister (decision maker) and changed to DNR, has not decided on DNI status     PLAN:  ===NEURO===  # Sedation  - off fentanyl/versed  - precedex     # Hx of hepatic encephalopathy, improved  Hospitalized 8/21 for HE likely due to decreased intake of lactulose in the days prior to admission. Ascitic fluid analysis was negative for SBP, no concern for GI bleed. Mental status rapidly improved after lactulose and rifaximin. Her diuretics were also discontinued on discharge as she receives weekly paracentesis. Now on 9/18 patient is alert & oriented to self, place, and time. On 9/20, more alert with eye open. Able to follow simple commands.   - Continue Rifaximin, Lactulose (BID w/ prns available to titrate to 3-4 BM's per day) and Zinc - holding in the setting of no enteral access  - lactulose enemas     ===CARDIOVASCULAR===  # Hemorrhagic shock  Acutely hypotensive (68/40) 2/2 GI bleed and recent HD (with 1.7L UF on 9/16) and paracentesis (with 3L release on 9/17). Likely intravascular volume depletion. Was given 75g of Albumin 9/18. Transfused 4 units of blood, 1 unit of platelets, 10 units of cryo during acute bleeding. MAPs  <65 starting 9/19, so levophed was restarted. After decreasing MAP goal to 60, there was no increase in lactate.   - MAP goal > 60, intermittently requiring levophed     ===PULMONARY===  # Intubated for procedure (9/18), AMS  Mental status improving 9/20, but failing PS trials.   - PS trials  - eval for extubation pending mental status as able    Ventilation Mode: CMV/AC  (Continuous Mandatory Ventilation/ Assist Control)  FiO2 (%): 30 %  Rate Set (breaths/minute): 12 breaths/min  Tidal Volume Set (mL): 500 mL  PEEP (cm H2O): 5 cmH2O  Oxygen Concentration (%): 30 %  Resp: 12      ===GASTROINTESTINAL===  # Variceal bleeding s/p banding  Previous hematemesis of 500 mL on 9/7 2/2 variceal sources. S/p banding x6 of esophageal varices on 9/7. Received PRBC x4, Plasma x1, platelet x1 at that time. Hgb stabilized to 7.7 g/dL. Was on CTX 1g IV daily for 7 days (9/7-9/13) (SBP prophylaxis). Completed Octreotide gtt, IV PPI, 3 days of IV VitK. Ciprofloxacin 500 mg/day was started 9/18 for SBP prophylaxis; discontinued. Hgb dropped to 6.1 from 7.9 on 9/18.    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 EGD on 9/5 showed variceal bleeding and portal hypertensive gastropathy. Ascites started to become appreciable in 05/19. 6.350 L removed on paracentesis 9/7. Workup for thrombosis and SBP negative. Per GI, would start to address goals of care given clinical trajectory as patient would not be considered for liver transplantation due to frailty and not having met minimum sobriety requirement. Given this refractory GIB, her chance of long term survival is quite low. Has some rising trend of ALP and bilirubin. Last paracentesis was 9/17; 3L  removed.  - Continue Rifaximin, Lactulose (BID w/ prns available to titrate to 3-4 BM's per day) and Zinc - holding given lack of enteral access  - lactulose enemas  - Holding Spironolactone and Torsemide (on HD)  - Therapeutic paracentesis prn  - Hepatology following, appreciate recs  - after conversation with pt's sister, she is now DNR; family is very understanding of the poor prognosis     # Chronic hepatitis C, treatment naive  Per last GI/hepatology visit with Dr. Owen on 08/12/19, patient is GT 2b treatment naive. Will address HCV treatment in the future. Not urgent at this point.     HBV non-immune  HBsAg, HBsAb are negative.  - Vaccination outpatient setting     # Nutrition:   - TPN starting 9/20    MELD-Na score: 36 at 9/20/2019  3:27 AM  MELD score: 36 at 9/20/2019  3:27 AM  Calculated from:  Serum Creatinine: 3.57 mg/dL at 9/20/2019  3:27 AM  Serum Sodium: 139 mmol/L (Rounded to 137 mmol/L) at 9/20/2019  3:27 AM  Total Bilirubin: 13.7 mg/dL at 9/20/2019  3:27 AM  INR(ratio): 2.04 at 9/20/2019  3:27 AM  Age: 63 years       ===RENAL===  # Prerenal CT secondary acute blood loss and HRS  # Hypervolemic hyponatremia  Baseline ~1. Admission Cre 4.01. Minimal urine output. Diuretics discontinued last admission. Likely pre-renal in setting of slow GI variceal bleed (s/p banding) versus HRS. Albumin challenge completed 9/6-9/8 and has received multiple units additional colloid in setting of acute GIB. Cr peaked at 5.53. Last HD was 9/18; 0.8 L removed.  - HD per nephrology plan.    - next HD likely 9/21  - monitor I/O's, daily weights  - Trend BMP daily     ===HEME/ONC===  # Acute on chronic anemia secondary from blood loss  2/2 GI bleed. Varices banded x2 on 9/19/19. Baseline hgb 10-11.   - addressed above  - continue q8h trends     # Thrombocytopenia, coagulopathy secondary to cirrhosis  Has received vit K this admission. Transfused PRBC x4, Plasma x2, platelet x1 on 9/17 overnight. Most recent labs show  INR 2.08, PTT 56, and fibrinogen 172.   - trend q8h     ===ENDOCRINE===  No acute issues     ===INFECTIOUS DISEASE===  No acute issues.     # Antimicrobials:  - Ceftriaxone ppx (9/18-*)     ===SKIN/MSK===  # Gout  No clinical arthritis.  - Restart Allopurinol 100 mg MWF after dialysis session (dose consulted with nephrology) - holding without enteral access     Prophylaxis:  DVT: SCD   GI: PPI  Family:  Plan to update later, will discuss goals of care  Disposition: Critically Ill    Restraints: Restraints for medical healing needed: YES    I have seen and discussed this patient with my attending, Dr. Whalen.    Madhuri Wolfe MD  Internal Medicine/Pediatrics PGY1  [facility]  Pager: [phone]    =======================================================================    Interval History   No acute events overnight. More awake this am with eyes open. Following simple commands. Not tolerating PS trials due to apnea. Sister is at bedside and expressed that Marquita would want to be DNR. Would like to give her 24h to wake up more before changing her to DNI because she is not sure what Marquita would want. Her goal is to be able to get Marquita home for a couple of days before she dies, but she is very aware of the poor prognosis.     Physical Exam   Temp: 96.6  F (35.9  C) Temp src: Oral Temp  Min: 96.6  F (35.9  C)  Max: 97.7  F (36.5  C) BP: (!) 88/53 Pulse: 84 Heart Rate: 70 Resp: 12 SpO2: 100 % O2 Device: Mechanical Ventilator    Vitals:    09/18/19 1148 09/19/19 0400 09/20/19 0515   Weight: 71.4 kg (157 lb 6.5 oz) 72.9 kg (160 lb 11.5 oz) 71.5 kg (157 lb 10.1 oz)     I/O last 3 completed shifts:  In: 913.78 [I.V.:863.78]  Out: 1500 [Stool:1500]    GEN: intubated and opens eyes to voice; follows simple commands  EYES: Icteric sclera, sluggish pupils, eyes open   CV: RRR, III/VI flow murmur  PULM: CTAB, mechanical breath sounds, symmetric chest rise, ETT in place  GI: normal bowel sounds,  non-tender, no rebound tenderness or guarding, no masses; distended, but soft  : deferred  EXTREMITIES: warm and well perfused, lower extremities wrapped in ace wraps  NEURO: awake to verbal stimuli, moving extremities spontaneously   SKIN: jaundiced, bruising on bilateral upper extremities     Medications     dexmedetomidine Stopped (09/20/19 1127)     fentaNYL Stopped (09/19/19 0805)     norepinephrine 0.03 mcg/kg/min (09/20/19 1100)     octreotide (sandoSTATIN) infusion ADULT 50 mcg/hr (09/20/19 1100)       cefTRIAXone  1 g Intravenous Q24H     lactulose  200 g Rectal Q4H     pantoprazole (PROTONIX) IV  40 mg Intravenous BID     sodium chloride (PF)  3 mL Intracatheter Q8H       Data   Recent Labs   Lab 09/20/19  0327 09/19/19 2028 09/19/19  1349  09/19/19  0411 09/19/19  0022 09/18/19  2142   WBC 9.1 9.8 9.5   < >  --   --  9.2   HGB 7.6* 7.9* 8.4*   < >  --  6.0* 6.1*   MCV 94 94 93   < >  --   --  103*   PLT 90* 94* 101*   < >  --  86* 61*   INR 2.04*  --   --   --  2.08* 2.46* 2.40*     --   --   --  134  --  132*   POTASSIUM 3.8  --   --   --  3.7  --  4.0   CHLORIDE 108  --   --   --  104  --  101   CO2 23  --   --   --  22  --  21   BUN 36*  --   --   --  20  --  18   CR 3.57*  --   --   --  2.54*  --  2.37*   ANIONGAP 9  --   --   --  9  --  10   MEGGAN 8.5  --   --   --  7.4*  --  7.6*   GLC 97  --   --   --  97  --  89   ALBUMIN 2.8*  --   --   --  2.6*  --  2.6*   PROTTOTAL 4.8*  --   --   --  4.3*  --  4.8*   BILITOTAL 13.7*  --   --   --  11.6*  --  13.0*   ALKPHOS 156*  --   --   --  150  --  192*   ALT 26  --   --   --  25  --  28   AST 52*  --   --   --  44  --  50*    < > = values in this interval not displayed.     Recent Results (from the past 24 hour(s))   XR Chest Port 1 View    Narrative    Exam: XR CHEST PORT 1 VW, 9/19/2019 2:06 PM    Indication: PICC placement    Comparison: 9/18/2019     Findings:   Single view chest. Endotracheal tube projects over the midthoracic  trachea. Stable  right central line. New right upper approach PICC tip  at the atriocaval junction. Low lung volumes. Trachea is unremarkable,  heart size is enlarged. Stable mixed interstitial and airspace  opacities. No acute osseous abnormality.      Impression    IMPRESSION:  1. Right upper approach PICC tip at the atriocaval junction.  2. Otherwise stable chest.    I have personally reviewed the examination and initial interpretation  and I agree with the findings.    DONALD RICHARDSON, DO

## 2019-09-20 NOTE — PLAN OF CARE
Edema 4C: HOLD. Per discussion with RN, wraps have been removed. Pt is now intubated and not following commands, is not appropriate for GCB. Will reinitiate as able.

## 2019-09-21 NOTE — PLAN OF CARE
ICU End of Shift Summary. See flowsheets for vital signs and detailed assessment.    Changes this shift: Pt labile, varies from lethargic/calm to restless/agitated sitting straight up in the bed. Attempted to hit RN and pull at ETT during these episodes. Attempted to decreased Precedex, did not tolerate. Precedex currently at 0.5 mcg/kg/min. SR. Levophed at 0.03 mcg/kg/min. Pt's BP sensitive to minute changes to levophed. Temp at 0430 94.2 F, however unsure of accuracy due to pt flailing arms and moving head, multiple warm blankets applied and temp in room increased. Recheck at 0530 for both axillary and oral was 94.3F. Bear hugger in place. Vent settings remained unchanged. NPO. TPN + Lipids started. 150 ml stool output for shift. Possible pressure injury to coccyx- MICU notified WOC order placed. K 3.1 this AM - MICU notified. H/H stable.     Plan:  Ween Levophed and Precedex as tolerated, Continue lactulose enemas

## 2019-09-21 NOTE — PROGRESS NOTES
Nephrology Progress Note  09/21/2019         Assessment & Recommendations:   Roslyn Palmer is a 63 year old female with a PMHx significant for cirrhosis secondary to hepatitis C and EtOH dependecne, hypertension, depression, gout who was transferred from OSH on 9/5/2019 following a syncopal episode now with worsening renal function and decreasing urinary output. She was started on HD 9/11     Anuric acute kidney injury  Baseline Cr ~1.3-1.6, and cody to 5 prior to the start of iHD. The etiology for her CT is multifactorial, including hypovolemia and HRS-like physiology. She may have progressed to an ATN-like process with her initial hypotension and hypoperfusion. She is not an ideal liver transplant candidate given her recent EtOH use (June) and frailty. We do not have confidence that her renal function will improve, and her overall 6 month mortality given her high MELD and poor renal function high.    - Ms Palmer has no acute indications for hemodialysis at this time. She is quite  Hypotensive today and would not likely tolerate intermittent hemodialysis.  Unless there is a chance for significant improvement in her overall status or a chance for liver transplantation, I am afraid that pursuing renal replacement therapy will not be realistic or suitable option          Electrolytes/Acid-Base status  Na 139, K 3.8, Cl 108. Stable electrolytes. HCO3 23 and improving.     Anemia of ESRD  Hgb 7.5, large hemoptysis/hemaemesis episode on 9/19 and hgb dropped from 7.9 to 6.0, she received 4U pRBC. EGD revealed bleeding varices, which were banded x 3.  Gastroenterology quote a mortality rate as high as 55% after a second variceal bleed after failed initial treatment.   - Transfusion threshold of 7. On octreotide gtt, on ceftriaxone and PPI IV.    Recommendations were communicated to primary team via this note  Dwayne Paulson MD  Division of Renal Disease and Hypertension  Pager: [phone]  September 21,  2019  9:41 AM    Interval History :   Nursing and provider notes from last 24 hours reviewed.    Roslyn Palmer was seen and examined this morning. She remains intubated and minimally verbal/interactive. BP low, on levophed. She had no changes to her ventilator settings. She had maroon/dark red stools overnight.     Review of Systems:   I reviewed the following systems:  Could not complete ROS    Physical Exam:   I/O last 3 completed shifts:  In: 1465.34 [I.V.:890.57]  Out: 775 [Stool:775]   BP (!) 85/50   Pulse 84   Temp 96.8  F (36  C) (Axillary)   Resp 15   Wt 72.9 kg (160 lb 11.5 oz)   SpO2 98%   BMI 25.94 kg/m       GENERAL APPEARANCE: Intubated, sedated.  unresponsive  PULM: lungs clear to auscultation bilaterally, equal air movement, no clubbing  CV: regular rhythm, normal rate, no rub     -JVD no     -edema trace dependent  GI: soft, Non tender, + distended,   INTEGUMENT: no cyanosis,  Diffuse telangiectasias  Access: TDC    Labs:   All labs reviewed by me  Electrolytes/Renal -   Recent Labs   Lab Test 09/21/19  0428 09/20/19  1108 09/20/19  0327 09/19/19  0411  09/16/19  0723  08/23/19  0730     --  139 134   < > 134   < > 134   POTASSIUM 3.1* 3.7 3.8 3.7   < > 3.3*   < > 3.0*   CHLORIDE 110*  --  108 104   < > 101   < > 102   CO2 21  --  23 22   < > 20   < > 23   BUN 54*  --  36* 20   < > 44*   < > 33*   CR 4.22*  --  3.57* 2.54*   < > 4.45*   < > 1.26*   *  --  97 97   < > 109*   < > 99   MEGGAN 8.5  --  8.5 7.4*   < > 8.6   < > 9.2   MAG 2.0 1.6  --   --   --  2.1  --   --    PHOS 4.5 5.2*  --   --   --   --   --  2.3*    < > = values in this interval not displayed.     CBC -   Recent Labs   Lab Test 09/21/19  0428 09/20/19  2145 09/20/19  1108   WBC 6.6 9.1 9.1   HGB 7.7* 7.9* 7.5*   PLT 83* 104* 95*     LFTs -   Recent Labs   Lab Test 09/21/19 0428 09/20/19  0327 09/19/19  0411   ALKPHOS 147 156* 150   BILITOTAL 12.7* 13.7* 11.6*   ALT 28 26 25   AST 58* 52* 44   PROTTOTAL 5.0*  4.8* 4.3*   ALBUMIN 2.7* 2.8* 2.6*     Iron Panel -   Recent Labs   Lab Test 09/16/19  0723 06/19/19  0933 03/25/19  1141   IRON 47 26* 16*   IRONSAT 27 9* 5*   YOAN 211 24 23     Current Medications:    sodium chloride 0.9%  250 mL Intravenous Once in dialysis     sodium chloride 0.9%  300 mL Hemodialysis Machine Once     cefTRIAXone  1 g Intravenous Q24H     gelatin absorbable  1 each Topical During Hemodialysis (from stock)     lactulose  200 g Rectal Q8H     lipids 4 oil  250 mL Intravenous QPM     - MEDICATION INSTRUCTIONS -   Does not apply Once     pantoprazole (PROTONIX) IV  40 mg Intravenous BID     sodium chloride (PF)  3 mL Intracatheter Q8H       dexmedetomidine 0.5 mcg/kg/hr (09/21/19 0900)     IV fluid REPLACEMENT ONLY       norepinephrine 0.08 mcg/kg/min (09/21/19 0940)     octreotide (sandoSTATIN) infusion ADULT 50 mcg/hr (09/21/19 0900)     parenteral nutrition - ADULT compounded formula       parenteral nutrition - ADULT compounded formula 45 mL/hr at 09/21/19 0800     Dwayne Paulson MD

## 2019-09-21 NOTE — PHARMACY-ADMISSION MEDICATION HISTORY
Admission Medication History     Due to patient current clinical status, unable to complete medication history at this time.  Patient will likely either go to comfort cares or discharge on hospice.    Ying Cummings, PharmD  [facility] Pharmacist  [phone]

## 2019-09-21 NOTE — PROGRESS NOTES
[facility]    Hepatology Follow-up    CC:      Syncope     Dx:      Decompensated alcoholic/HCV cirrhosis              Hemorrhagic shock                 hematemesis secondary to esophageal varices              Syncope              Acute kidney injury, on HD             Hepatic encephalopathy    24 hour events:   On pressure support, remains on levophed  Stable Hgb    Subjective:  No new issues, agitated at times     Medications  Current Facility-Administered Medications   Medication Dose Route Frequency     cefTRIAXone  1 g Intravenous Q24H     lactulose  200 g Rectal Q8H     lipids 4 oil  250 mL Intravenous QPM     pantoprazole (PROTONIX) IV  40 mg Intravenous BID     sodium chloride (PF)  3 mL Intracatheter Q8H       Review of systems  A 10-point review of systems could not be completed because of mental status     Examination  BP (!) 79/47   Pulse 84   Temp 96.8  F (36  C) (Axillary)   Resp 16   Wt 72.9 kg (160 lb 11.5 oz)   SpO2 96%   BMI 25.94 kg/m      Intake/Output Summary (Last 24 hours) at 9/20/2019 1501  Last data filed at 9/20/2019 1500  Gross per 24 hour   Intake 826.63 ml   Output 1150 ml   Net -323.37 ml       General: jaundiced  CVS: RRR  Resp: CTA  Abdomen: soft  Extremities:no edema  Neuro: Opens eyes to voice, no purposeful movements    Laboratory  Lab Results   Component Value Date     09/20/2019    POTASSIUM 3.7 09/20/2019    CHLORIDE 108 09/20/2019    CO2 23 09/20/2019    BUN 36 09/20/2019    CR 3.57 09/20/2019       Lab Results   Component Value Date    BILITOTAL 13.7 09/20/2019    ALT 26 09/20/2019    AST 52 09/20/2019    ALKPHOS 156 09/20/2019       Lab Results   Component Value Date    WBC 9.1 09/20/2019    HGB 7.5 09/20/2019    MCV 93 09/20/2019    PLT 95 09/20/2019       Lab Results   Component Value Date    INR 2.04 09/20/2019       MELD-Na score: 36 at 9/21/2019  4:28 AM  MELD score: 36 at 9/21/2019  4:28 AM  Calculated from:  Serum Creatinine:  4.22 mg/dL (Rounded to 4 mg/dL) at 9/21/2019  4:28 AM  Serum Sodium: 142 mmol/L (Rounded to 137 mmol/L) at 9/21/2019  4:28 AM  Total Bilirubin: 12.7 mg/dL at 9/21/2019  4:28 AM  INR(ratio): 1.86 at 9/21/2019  4:28 AM  Age: 63 years        Assessment  63 year old female with HCV and AUD and resultant cirrhosis complicated by ascites, VH and HE, admitted with syncope after paracentesis, complicated by CT now on HD and VH hemorrhage s/p banding. She did well and was planned for discharge.  She did have another episode of hematemesis/acute blood loss anemia necessitating urgent EGD which showed actively bleeding varices s/p 3 bands. She remains critically ill, and intubated but her mental statis has improved.   As previously documented, her prognosis is guarded, as mortality following a second variceal hemorrhage in cirrhotics after failed initial treatment can be as high as 55%.  MELD-Na 36, CTP class C     Recommendations  -- Continue close monitoring  -- Ensure two large bore IVs at all times  -- Hgb transfusion threshold of 7  -- Continue IV Octreotide gtt                 Can use for 5 days                 After completion, start beta-blockers and titrate to HR of 55-60 if BP allows  -- Continue IV ceftriaxone  -- Continue PPI BID 40 mg IV  -- Given recent banding, will hold off on OG tube placement for 96 hours from procedure time                 Continue with lactulose eema and titrate to 3 BMs per day                 Rifaximin 550 mg BID when able  -- Can given Zinc (220mg BID) through TPN  -- Use Lactulose enemas  -- HD, midodrine per nephrology  -- Continue Goals of care discussion    Patient seen and discussed with attending physician, Dr. Veda Jon MD  PGY 5  Hepatology      Attestation:  This patient has been seen and evaluated by me, Christy Mccollum.  Discussed with the house staff team or resident(s) and agree with the findings and plan in this note.

## 2019-09-21 NOTE — PROGRESS NOTES
[facility] Intensive Care Progress Note    Date of Service (when I saw the patient): 09/21/2019     Assessment & Plan   Roslyn Palmer is a 63 year old female with past medical history of decompensated cirrhosis (2/2 alcohol and hepatitis C), chronic hepatitis C, HTN, and recent EV bleed s/p banding x6, transferred to MICU for hemorrhagic shock 2/2 EV bleed now s/p banding x 3 with GI. Hemodynamically stable and off pressors, off sedation with ongoing AMS.    Now intermittently requiring Levophed. Stable from a bleeding perspective but still not ready for extubation.    CHANGES TODAY:  - MAP goal to 55, lactate recheck appropriate at 2.0  - PS trials tolerated well     PLAN:  ===NEURO===  # Sedation  - off fentanyl/versed since 9/20  - precedex     # Hx of hepatic encephalopathy, improved  Hospitalized 8/21 for HE likely due to decreased intake of lactulose in the days prior to admission. Ascitic fluid analysis was negative for SBP, no concern for GI bleed. Mental status rapidly improved after lactulose and rifaximin. Her diuretics were also discontinued on discharge as she receives weekly paracentesis. Now on 9/18 patient is alert & oriented to self, place, and time. On 9/20, more alert with eye open. Able to follow simple commands.   - Continue Rifaximin, Lactulose (BID w/ prns available to titrate to 3-4 BM's per day) and Zinc - holding in the setting of no enteral access  - lactulose enemas     ===CARDIOVASCULAR===  # Hemorrhagic shock  Acutely hypotensive (68/40) 2/2 GI bleed and recent HD (with 1.7L UF on 9/16) and paracentesis (with 3L release on 9/17). Likely intravascular volume depletion. Was given 75g of Albumin 9/18. Transfused 4 units of blood, 1 unit of platelets, 10 units of cryo during acute bleeding. MAPs <65 starting 9/19, so levophed was restarted. After decreasing MAP goal to 60, there was no increase in lactate.   - MAP goal > 55,  intermittently requiring levophed  - lactate recheck appropriate at 2.0     ===PULMONARY===  # Intubated for procedure (9/18), AMS  Mental status improving 9/20, but failing PS trials.   - PS trials  - eval for extubation pending mental status as able    Ventilation Mode: CMV/AC  (Continuous Mandatory Ventilation/ Assist Control)  FiO2 (%): 30 %  Rate Set (breaths/minute): 12 breaths/min  Tidal Volume Set (mL): 500 mL  PEEP (cm H2O): 5 cmH2O  Oxygen Concentration (%): 30 %  Resp: 16      ===GASTROINTESTINAL===  # Variceal bleeding s/p banding  Previous hematemesis of 500 mL on 9/7 2/2 variceal sources. S/p banding x6 of esophageal varices on 9/7. Received PRBC x4, Plasma x1, platelet x1 at that time. Hgb stabilized to 7.7 g/dL. Was on CTX 1g IV daily for 7 days (9/7-9/13) (SBP prophylaxis). Completed Octreotide gtt, IV PPI, 3 days of IV VitK. Ciprofloxacin 500 mg/day was started 9/18 for SBP prophylaxis; discontinued. Hgb dropped to 6.1 from 7.9 on 9/18.    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 EGD on 9/5 showed variceal bleeding and portal hypertensive gastropathy. Ascites started to become appreciable in 05/19. 6.350 L removed on paracentesis 9/7. Workup for thrombosis and SBP negative. Per GI, would start to address goals of care given clinical trajectory as patient would not be considered for liver transplantation due to frailty and not having met minimum sobriety requirement. Given this refractory GIB, her chance of long term survival is quite low. Has some rising trend of ALP and bilirubin. Last paracentesis was 9/17; 3L removed.  - Continue Rifaximin, Lactulose (BID w/ prns available to titrate to  3-4 BM's per day) and Zinc - holding given lack of enteral access  - lactulose enemas q8h  - Holding Spironolactone and Torsemide (on HD)  - Therapeutic paracentesis prn  - Hepatology following, appreciate recs  - after conversation with pt's sister, she is now DNR; family is very understanding of the poor prognosis     # Chronic hepatitis C, treatment naive  Per last GI/hepatology visit with Dr. Owen on 08/12/19, patient is GT 2b treatment naive. Will address HCV treatment in the future. Not urgent at this point.     HBV non-immune  HBsAg, HBsAb are negative.  - Vaccination outpatient setting     # Nutrition:   - TPN started 9/20    MELD-Na score: 36 at 9/21/2019  4:28 AM  MELD score: 36 at 9/21/2019  4:28 AM  Calculated from:  Serum Creatinine: 4.22 mg/dL (Rounded to 4 mg/dL) at 9/21/2019  4:28 AM  Serum Sodium: 142 mmol/L (Rounded to 137 mmol/L) at 9/21/2019  4:28 AM  Total Bilirubin: 12.7 mg/dL at 9/21/2019  4:28 AM  INR(ratio): 1.86 at 9/21/2019  4:28 AM  Age: 63 years       ===RENAL===  # Prerenal CT secondary acute blood loss and HRS  # Hypervolemic hyponatremia  Baseline ~1. Admission Cre 4.01. Minimal urine output. Diuretics discontinued last admission. Likely pre-renal in setting of slow GI variceal bleed (s/p banding) versus HRS. Albumin challenge completed 9/6-9/8 and has received multiple units additional colloid in setting of acute GIB. Cr peaked at 5.53. Last HD was 9/18; 0.8 L removed.  - HD per nephrology plan   - no acute indication at this time and will likely not be able to tolerate iHD  - monitor I/O's, daily weights  - Trend BMP daily     ===HEME/ONC===  # Acute on chronic anemia secondary from blood loss  2/2 GI bleed. Varices banded x2 on 9/19/19. Baseline hgb 10-11.   - addressed above  - continue q8h trends     # Thrombocytopenia, coagulopathy secondary to cirrhosis  Has received vit K this admission. Transfused PRBC x4, Plasma x2, platelet x1 on 9/17 overnight. Labs from 9/19 show INR  2.08, PTT 56, and fibrinogen 172.   - trend q12h     ===ENDOCRINE===  No acute issues     ===INFECTIOUS DISEASE===  No acute issues.     # Antimicrobials:  - Ceftriaxone ppx (9/18-*)     ===SKIN/MSK===  # Gout  No clinical arthritis.  - Restart Allopurinol 100 mg MWF after dialysis session (dose consulted with nephrology) - holding without enteral access     Prophylaxis:  DVT: SCD   GI: PPI  Family:  Plan to update later, will discuss goals of care  Disposition: Critically Ill    Restraints: Restraints for medical healing needed: YES    I have seen and discussed this patient with my attending, Dr. Whalen.    Madhuri Wolfe MD  Internal Medicine/Pediatrics PGY1  [facility]  Pager: [phone]    =======================================================================    Interval History   No acute events overnight. Wound care consult ordered and potassium ordered for overnight. Intermittent levophed requirement still. PS trials yesterday were unsuccessful, but tolerating today. No HD today.     Physical Exam   Temp: 96.8  F (36  C) Temp src: Axillary Temp  Min: 94.3  F (34.6  C)  Max: 97.5  F (36.4  C) BP: (!) 75/52   Heart Rate: 75 Resp: 16 SpO2: 97 % O2 Device: Mechanical Ventilator    Vitals:    09/19/19 0400 09/20/19 0515 09/21/19 0600   Weight: 72.9 kg (160 lb 11.5 oz) 71.5 kg (157 lb 10.1 oz) 72.9 kg (160 lb 11.5 oz)     I/O last 3 completed shifts:  In: 1465.34 [I.V.:890.57]  Out: 775 [Stool:775]    GEN: intubated and opens eyes to voice and touch; follows simple commands intermittently  EYES: Icteric sclera, sluggish pupils, eyes open   CV: RRR, III/VI flow murmur  PULM: CTAB, mechanical breath sounds, symmetric chest rise, ETT in place  GI: normal bowel sounds, non-tender, no rebound tenderness or guarding, no masses; distended, but soft  : deferred  EXTREMITIES: warm and well perfused, lower extremities wrapped in ace wraps  NEURO: awake to tactile stimuli, moving extremities  spontaneously   SKIN: jaundiced, bruising on bilateral upper and lower extremities     Medications     dexmedetomidine 0.5 mcg/kg/hr (09/21/19 0800)     IV fluid REPLACEMENT ONLY       fentaNYL Stopped (09/19/19 0805)     norepinephrine 0.05 mcg/kg/min (09/21/19 0815)     octreotide (sandoSTATIN) infusion ADULT 50 mcg/hr (09/21/19 0800)     parenteral nutrition - ADULT compounded formula 45 mL/hr at 09/20/19 2114       sodium chloride 0.9%  250 mL Intravenous Once in dialysis     sodium chloride 0.9%  300 mL Hemodialysis Machine Once     cefTRIAXone  1 g Intravenous Q24H     gelatin absorbable  1 each Topical During Hemodialysis (from stock)     lactulose  200 g Rectal Q4H     lipids 4 oil  250 mL Intravenous QPM     - MEDICATION INSTRUCTIONS -   Does not apply Once     pantoprazole (PROTONIX) IV  40 mg Intravenous BID     potassium chloride  20 mEq Intravenous Once     sodium chloride (PF)  3 mL Intracatheter Q8H       Data   Recent Labs   Lab 09/21/19  0428 09/20/19  2145 09/20/19  1108 09/20/19  0327  09/19/19  0411   WBC 6.6 9.1 9.1 9.1   < >  --    HGB 7.7* 7.9* 7.5* 7.6*   < >  --    MCV 96 94 93 94   < >  --    PLT 83* 104* 95* 90*   < >  --    INR 1.86*  --   --  2.04*  --  2.08*     --   --  139  --  134   POTASSIUM 3.1*  --  3.7 3.8  --  3.7   CHLORIDE 110*  --   --  108  --  104   CO2 21  --   --  23  --  22   BUN 54*  --   --  36*  --  20   CR 4.22*  --   --  3.57*  --  2.54*   ANIONGAP 12  --   --  9  --  9   MEGGAN 8.5  --   --  8.5  --  7.4*   *  --   --  97  --  97   ALBUMIN 2.7*  --   --  2.8*  --  2.6*   PROTTOTAL 5.0*  --   --  4.8*  --  4.3*   BILITOTAL 12.7*  --   --  13.7*  --  11.6*   ALKPHOS 147  --   --  156*  --  150   ALT 28  --   --  26  --  25   AST 58*  --   --  52*  --  44    < > = values in this interval not displayed.     No results found for this or any previous visit (from the past 24 hour(s)).

## 2019-09-21 NOTE — PLAN OF CARE
ICU End of Shift Summary. See flowsheets for vital signs and detailed assessment.    Changes this shift: tolerated PST 7/5 x2. Precedex titrated between 0.3-0.5; restless/agitated/thrashing when awake and cannot be redirected/is not following commands. Noted new onset twitching in extremities, MD assessed with no interventions. Levo titrated for new MAP goal >55, pt very sensitive to pressor titration. Lactulose enemas decreased to q8 hours, minimal stool response. No HD due to low BP. K replaced, recheck 3.6.    Plan: continue goals of care discussion; Precedex for comfort but need neuro exams to determine if waking up; notify MICU with concerns/changes. Family hopes to wake patient and extubate to bring home on hospice.

## 2019-09-22 NOTE — PROGRESS NOTES
[facility]  CRITICAL CARE STAFF NOTE    Ms. Marquita Palmer is a 62yo female with a history of decompensated cirrhosis 2/2 ETOH + chronic HCV, complicated by HE, esophageal varices, ascites, and recent CT and initation of hemodialysis who was admitted to [facility] on 9/6/2019 in transfer from the [facility] ED due to syncope following a large volume paracentesis.  Her hospitalization has been complicated by a massive GI bleed on 9/7 requiring MICU admission and EGD with banding of 6 varices and need for hemodialysis.  Admitted overnight with repeat massive GI bleeding due to actively bleeding varix identified on EGD early AM on 9/19.    Hgb has been stable since EGD with ongoing melena.      More awake this morning, extubated following a reasonably successful pressure support trial.  Mental status waxing and waning.  Sister confirms DNI.  Had some initial discussions about logistics and possibility of heading home on hospice.  Still requiring norepinephrine to maintain MAP of 55.     Acute Critical Care Issues:     1. Acute blood loss anemia/hemorrhagic shock due to variceal bleeding s/p banding x3  2. Toxic-Metabolic encephalopathy, multifactorial, improving and extubated  3. Decompensated ETOH/HCV cirrohsis, not a liver transplant candidate due to frailty + lack of 6 month sobriety  4. CT, hemodialysis initiated 9/11  5. Severe protein calorie malnutrition on TPN    Plan today:   - extubated today  - continue NE as need for MAP <55  - swallow eval with speech now that pt is 96h s/p variceal banding--> ideally would start midodrine to facilitate coming off of norepi  - hold HD for today    The patient was seen and examined with the resident/fellow physician.  We have discussed the patient in detail and I agree with the findings, assessment, and plan as documented when this note was cosigned on this day. The plan was formulated in conjunction with pharmacy, ICU nurses, and respiratory therapist. I have  evaluated all laboratory values and imaging studies for the past 24 hours. I have reviewed all the consults that have been ordered and are active for this patient.      Critical Care Time: 30 min.  I spent this time (excluding procedures) personally providing and directing critical care services at the bedside and on the critical care unit.      Megan Whalen MD   of Medicine  Department of Pulmonary, Allergy, Critical Care and Sleep Medicine   [facility]  Pager: [phone]

## 2019-09-22 NOTE — PLAN OF CARE
ICU End of Shift Summary. See flowsheets for vital signs and detailed assessment.    Changes this shift: Extubated to nasal cannula at 0755. Precedex off, disoriented to time and situation. Mental status waxes and wanes with bouts of restlessness/agitation. Temp down to 91.7 tympanic, MICU MD aware; patient intermittently refuses Napoleon Hugger and warm blankets, attempting to warm the room. Levo titrated for MAP>55, will start PO midodrine when appropriate. D5 started for sodium control. Clear liquid diet okay per GI, speech eval ordered, awaiting bedside swallow until patient is more consistently awake. Albumin challenge initiated.     Plan: goals of care conversation with Marquita and family when more awake, hope is to go home on hospice. Will continue to warm patient, monitor neuro status, and trend Na. Bedside swallow/speech consult when appropriate. Notify MICU team with concerns/changes.

## 2019-09-22 NOTE — PLAN OF CARE
Changes this shift: Extubated this AM. Slow, slurred speech at times. Oriented to place and person. Temp increased to 94 tympanic with the help of a raquel hugger. Team aware. Levo currently off, but titrating on and off as needed to keep MAPs>55. Sodium rechecked awaiting results. Clear liquids okay per GI and post speech consult (Pt passed swallow eval by speech.). started for sodium control. Clear liquid diet okay per GI, speech eval ordered, awaiting bedside swallow until patient is more consistently awake. Albumin challenge given.      Plan: Hope is to go home on hospice. Will continue warming pt and monitoring neuro status. Trending sodium. Will start pt. On midodrine to help with BP. Notify MICU team with concerns/changes.

## 2019-09-22 NOTE — PROGRESS NOTES
Pt became severely agitated after a two person turn. Pt suddenly reached for ETT and had a full  on ETT despite having bilateral mittens on (pt had flipped right mitten around). Refused to let go of ETT, NST had to remove pt's hand from ETT. Bilateral wrist restraints applied. ETT remains at 22 at the [facility], MICU immediately notified of the events. CXR ordered.

## 2019-09-22 NOTE — PLAN OF CARE
"ICU End of Shift Summary. See flowsheets for vital signs and detailed assessment.    Changes this shift: Pt extremely labile throughout shift. Had frequent episodes of severe agitation where pt would attempt to hit staff and pull at ETT Attempted numerous measures to keep patient safe. However, pt eventually had to be placed in han wrist restraints due to her severe agitation and numerous attempts to pull at ETT. Precedex increased to 1.2 mcg/kg/hr.  Pt continued to be restless, zyprexa ordered and given with no effect. Repeatedly pointed to ETT multiple times and appeared to be saying \"get it out\".   Levophed weened off at 0430.   PS trial started at 0639 per MICU, Precedex weened down. Tolerating well. Pt continues to be restless.   1150 stool output yesterday.     Plan: Turn Precedex off, possible extubation    "

## 2019-09-22 NOTE — PROGRESS NOTES
Nephrology Progress Note  09/22/2019         Assessment & Recommendations:   Roslyn Palmer is a 63 year old female with a PMHx significant for cirrhosis secondary to hepatitis C and EtOH dependecne, hypertension, depression, gout who was transferred from OSH on 9/5/2019 following a syncopal episode now with worsening renal function and decreasing urinary output. She was started on HD 9/11     Anuric acute kidney injury  Baseline Cr ~1.3-1.6, and cody to 5 prior to the start of iHD. The etiology for her CT is multifactorial, including hypovolemia and HRS-like physiology. She may have progressed to an ATN-like process with her initial hypotension and hypoperfusion. She is not an ideal liver transplant candidate given her recent EtOH use (June) and frailty. We do not have confidence that her renal function will improve, and her overall 6 month mortality given her high MELD and poor renal function high.    - Ms Palmer has no acute indications for hemodialysis at this time. She  remains Hypotensive today and would not likely tolerate intermittent hemodialysis.  Unless there is a chance for significant improvement in her overall status or a chance for liver transplantation, I am afraid that pursuing renal replacement therapy will not be realistic or suitable option        Recommendations were communicated to primary team via this note  Dwayne Paulson MD  Division of Renal Disease and Hypertension  Pager: [phone]   September 22, 2019  8:10 AM    Interval History :   Nursing and provider notes from last 24 hours reviewed.    Roslyn Palmer was seen and examined this morning. She remains intubated.  Per nursing, she has been agitated over night, and is agitated this am.    Plan for extubation this am.   BP low although she has been weaned off levophed.   No recorded Urine output    Review of Systems:   I reviewed the following systems:  Could not complete ROS    Physical Exam:   I/O last 3 completed  shifts:  In: 2527.33 [I.V.:1163.76]  Out: 1200 [Stool:1200]   BP (!) 75/54 (BP Location: Left arm)   Pulse 84   Temp 94.6  F (34.8  C) (Oral)   Resp 13   Wt 72.9 kg (160 lb 11.5 oz)   SpO2 97%   BMI 25.94 kg/m       GENERAL APPEARANCE: Intubated, agitated, thrashing in bed.  jaundiced  PULM: lungs clear to auscultation bilaterally, equal air movement,  CV: regular rhythm, normal rate, no rub     -JVD no     -edema trace dependent  GI: soft, Non tender, + distended,   INTEGUMENT: no cyanosis,  Diffuse telangiectasias  Access: TDC    Labs:   All labs reviewed by me  Electrolytes/Renal -   Recent Labs   Lab Test 09/22/19 0427 09/21/19  1603 09/21/19  0428 09/20/19  1108   * 145* 142  --    POTASSIUM 3.6 3.6 3.1* 3.7   CHLORIDE 116* 113* 110*  --    CO2 18* 22 21  --    BUN 80* 69* 54*  --    CR 4.85* 4.68* 4.22*  --    * 133* 177*  --    MEGGAN 8.2* 8.2* 8.5  --    MAG 2.0  --  2.0 1.6   PHOS 4.6*  --  4.5 5.2*     CBC -   Recent Labs   Lab Test 09/22/19  0427 09/21/19  1603 09/21/19  1109   WBC 6.5 8.2 8.3   HGB 7.3* 7.4* 7.4*   PLT 68* 82* 75*     LFTs -   Recent Labs   Lab Test 09/22/19  0427 09/21/19  0428 09/20/19  0327   ALKPHOS 132 147 156*   BILITOTAL 8.8* 12.7* 13.7*   ALT 26 28 26   AST 53* 58* 52*   PROTTOTAL 4.8* 5.0* 4.8*   ALBUMIN 2.4* 2.7* 2.8*     Iron Panel -   Recent Labs   Lab Test 09/16/19  0723 06/19/19  0933 03/25/19  1141   IRON 47 26* 16*   IRONSAT 27 9* 5*   YOAN 211 24 23     Current Medications:    cefTRIAXone  1 g Intravenous Q24H     lactulose  200 g Rectal Q8H     lipids 4 oil  250 mL Intravenous QPM     pantoprazole (PROTONIX) IV  40 mg Intravenous BID     sodium chloride (PF)  3 mL Intracatheter Q8H       dexmedetomidine Stopped (09/22/19 0738)     IV fluid REPLACEMENT ONLY       D5W       norepinephrine Stopped (09/22/19 0437)     octreotide (sandoSTATIN) infusion ADULT 50 mcg/hr (09/22/19 0800)     parenteral nutrition - ADULT compounded formula 45 mL/hr at 09/21/19  2022     Dwayne Paulson MD

## 2019-09-22 NOTE — PROGRESS NOTES
[facility]    Hepatology Follow-up    CC:      Syncope     Dx:      Decompensated alcoholic/HCV cirrhosis              Hemorrhagic shock                 hematemesis secondary to esophageal varices              Syncope              Acute kidney injury, on HD             Hepatic encephalopathy    24 hour events:   Extubated this morning.   Stable Hgb. Remains anuric with worsening renal function     Subjective:  Extubated to NC, awake.    Medications  Current Facility-Administered Medications   Medication Dose Route Frequency     albumin human  100 g Intravenous Daily     cefTRIAXone  1 g Intravenous Q24H     lactulose  200 g Rectal Q8H     lipids 4 oil  250 mL Intravenous QPM     midodrine  20 mg Oral TID     pantoprazole (PROTONIX) IV  40 mg Intravenous BID     sodium chloride (PF)  3 mL Intracatheter Q8H       Review of systems  A 10-point review of systems could not be completed because of mental status     Examination  BP (!) 80/47   Pulse 84   Temp 93.2  F (34  C) (Tympanic)   Resp 14   Wt 72.9 kg (160 lb 11.5 oz)   SpO2 96%   BMI 25.94 kg/m      Intake/Output Summary (Last 24 hours) at 9/20/2019 1501  Last data filed at 9/20/2019 1500  Gross per 24 hour   Intake 826.63 ml   Output 1150 ml   Net -323.37 ml       General: jaundiced  CVS: RRR  Resp: CTA  Abdomen: soft  Extremities:no edema  Neuro: Non-focal. No asterexis    Laboratory  Lab Results   Component Value Date     09/20/2019    POTASSIUM 3.7 09/20/2019    CHLORIDE 108 09/20/2019    CO2 23 09/20/2019    BUN 36 09/20/2019    CR 3.57 09/20/2019       Lab Results   Component Value Date    BILITOTAL 13.7 09/20/2019    ALT 26 09/20/2019    AST 52 09/20/2019    ALKPHOS 156 09/20/2019       Lab Results   Component Value Date    WBC 9.1 09/20/2019    HGB 7.5 09/20/2019    MCV 93 09/20/2019    PLT 95 09/20/2019       Lab Results   Component Value Date    INR 2.04 09/20/2019       MELD-Na score: 35 at 9/22/2019 10:54 AM  MELD  score: 35 at 9/22/2019  4:27 AM  Calculated from:  Serum Creatinine: 4.85 mg/dL (Rounded to 4 mg/dL) at 9/22/2019  4:27 AM  Serum Sodium: 147 mmol/L (Rounded to 137 mmol/L) at 9/22/2019 10:54 AM  Total Bilirubin: 8.8 mg/dL at 9/22/2019  4:27 AM  INR(ratio): 1.89 at 9/22/2019  4:27 AM  Age: 63 years        Assessment  63 year old female with HCV and AUD and resultant cirrhosis complicated by ascites, VH and HE, admitted with syncope after paracentesis, complicated by CT now on HD and VH hemorrhage s/p banding. She did well and was planned for discharge.  She did have another episode of hematemesis/acute blood loss anemia necessitating urgent EGD which showed actively bleeding varices s/p 3 bands. She remains critically ill, and intubated but her mental statis has improved.   As previously documented, her prognosis is guarded, as mortality following a second variceal hemorrhage in cirrhotics after failed initial treatment can be as high as 55%. Extubated today. Noted discussion with family about home hospice.   MELD-Na 35, CTP class C. Worsening anuric CT likely ATN from recent bleeding and hypotension. Appreciate nephrology recommendations     Recommendations  -- Continue close monitoring  -- Ensure two large bore IVs at all times  -- Hgb transfusion threshold of 7  -- OK to start PO, clear liquids today  -- Replace 1.5 Lfree water deficit with  100 ml D5% continuous  - Start 25% albumin and continue for 3 days  -- Continue IV Octreotide gtt                 Can use for total 5 days                 After completion, start beta-blockers and titrate to HR of 55-60 if BP allows  -- Continue IV ceftriaxone  -- Continue PPI BID 40 mg IV  -- Given recent banding, will hold off on OG tube placement for 96 hours from procedure time                 Continue with lactulose eema and titrate to 3 BMs per day                 Rifaximin 550 mg BID when able  -- Can given Zinc (220mg BID) through TPN  -- Use Lactulose enema, switch  to PO when able to tolerate  -- HD, midodrine per nephrology  -- Continue Goals of care discussion    Patient seen and discussed with attending physician, Dr. Veda Jon MD  PGY 5  Hepatology    Attestation:  This patient has been seen and evaluated by me, Christy Mccollum.  Discussed with the house staff team or resident(s) and agree with the findings and plan in this note.

## 2019-09-22 NOTE — PROGRESS NOTES
[facility] Intensive Care Progress Note    Date of Service (when I saw the patient): 09/22/2019     Assessment & Plan   Roslyn Palmer is a 63 year old female with past medical history of decompensated cirrhosis (2/2 alcohol and hepatitis C), chronic hepatitis C, HTN, and recent EV bleed s/p banding x6, transferred to MICU for hemorrhagic shock 2/2 EV bleed now s/p banding x 3 with GI. Hemodynamically stable and off pressors, off sedation with ongoing AMS.    Now intermittently requiring Levophed. Extubated 9/22.    CHANGES TODAY:  - extubated today  - free water for goal sodium 140 at 50/hr  - discontinue precedex  - discuss with GI ability to take PO, will start lactulose and oral meds like midodrine  - will touch base with Social Work about home hospice      PLAN:  ===NEURO===  # Sedation  - off fentanyl/versed since 9/20  - off precedex 9/22     # Hx of hepatic encephalopathy, improved  Hospitalized 8/21 for HE likely due to decreased intake of lactulose in the days prior to admission. Ascitic fluid analysis was negative for SBP, no concern for GI bleed. Mental status rapidly improved after lactulose and rifaximin. Her diuretics were also discontinued on discharge as she receives weekly paracentesis. Now on 9/18 patient is alert & oriented to self, place, and time. On 9/20, more alert with eye open. Able to follow simple commands.   - Continue Rifaximin, Lactulose (BID w/ prns available to titrate to 3-4 BM's per day) and Zinc - holding in the setting of no enteral access  - lactulose enemas - may switch to lactulose PO later today pending enteral use     ===CARDIOVASCULAR===  # Hemorrhagic shock  Acutely hypotensive (68/40) 2/2 GI bleed and recent HD (with 1.7L UF on 9/16) and paracentesis (with 3L release on 9/17). Likely intravascular volume depletion. Was given 75g of Albumin 9/18. Transfused 4 units of blood, 1 unit of platelets, 10 units of  cryo during acute bleeding. MAPs <65 starting 9/19, so levophed was restarted. After decreasing MAP goal to 60, there was no increase in lactate.   - MAP goal > 55, intermittently requiring levophed  - lactate recheck appropriate at 2.0  - will assess ability to do midodrine once able to take PO     ===PULMONARY===  # Intubated for procedure (9/18), AMS  Mental status improving 9/20, but failing PS trials.   - Extubated 9/22 to LFNC    ===GASTROINTESTINAL===  # Variceal bleeding s/p banding  Previous hematemesis of 500 mL on 9/7 2/2 variceal sources. S/p banding x6 of esophageal varices on 9/7. Received PRBC x4, Plasma x1, platelet x1 at that time. Hgb stabilized to 7.7 g/dL. Was on CTX 1g IV daily for 7 days (9/7-9/13) (SBP prophylaxis). Completed Octreotide gtt, IV PPI, 3 days of IV VitK. Ciprofloxacin 500 mg/day was started 9/18 for SBP prophylaxis; discontinued. Hgb dropped to 6.1 from 7.9 on 9/18.    - continue octreotide x 5 days (stop 9/24 evening)   - per GI, if concerned for volume overload, ok to stop after 72h  - switch to IV PPI bid from gtt (9/20)  - ceftriaxone x 5 days (stop 9/24)  - ok to place NG 96h post-procedure (9/23 at 1am)     # EtOH-induced cirrhosis c/b ascites, HE, and EV bleeding  Cirrhosis diagnosed 6 years ago when she had EV GIB. EGD on 9/5 showed variceal bleeding and portal hypertensive gastropathy. Ascites started to become appreciable in 05/19. 6.350 L removed on paracentesis 9/7. Workup for thrombosis and SBP negative. Per GI, would start to address goals of care given clinical trajectory as patient would not be considered for liver transplantation due to frailty and not having met minimum sobriety requirement. Given this refractory GIB, her chance of long term survival is quite low. Has some rising trend of ALP and bilirubin. Last paracentesis was 9/17; 3L removed.  - Continue Rifaximin, Lactulose (BID w/ prns available to titrate to 3-4 BM's per day) and Zinc - holding given lack  of enteral access  - lactulose enemas q8h  - Holding Spironolactone and Torsemide (on HD)  - Therapeutic paracentesis prn  - Hepatology following, appreciate recs  - after conversation with pt's sister, she is now DNR; family is very understanding of the poor prognosis     # Chronic hepatitis C, treatment naive  Per last GI/hepatology visit with Dr. Owen on 08/12/19, patient is GT 2b treatment naive. Will address HCV treatment in the future. Not urgent at this point.     HBV non-immune  HBsAg, HBsAb are negative.  - Vaccination outpatient setting     # Nutrition:   - TPN started 9/20  - will switch to PO diet once ok'd by GI    MELD-Na score: 35 at 9/22/2019  4:27 AM  MELD score: 35 at 9/22/2019  4:27 AM  Calculated from:  Serum Creatinine: 4.85 mg/dL (Rounded to 4 mg/dL) at 9/22/2019  4:27 AM  Serum Sodium: 146 mmol/L (Rounded to 137 mmol/L) at 9/22/2019  4:27 AM  Total Bilirubin: 8.8 mg/dL at 9/22/2019  4:27 AM  INR(ratio): 1.89 at 9/22/2019  4:27 AM  Age: 63 years       ===RENAL===  # Prerenal CT secondary acute blood loss and HRS  # Hypervolemic hyponatremia  Baseline ~1. Admission Cre 4.01. Minimal urine output. Diuretics discontinued last admission. Likely pre-renal in setting of slow GI variceal bleed (s/p banding) versus HRS. Albumin challenge completed 9/6-9/8 and has received multiple units additional colloid in setting of acute GIB. Cr peaked at 5.53. Last HD was 9/18; 0.8 L removed.  - HD per nephrology plan   - no acute indication at this time and will likely not be able to tolerate iHD  - monitor I/O's, daily weights  - Trend BMP daily     ===HEME/ONC===  # Acute on chronic anemia secondary from blood loss  2/2 GI bleed. Varices banded x2 on 9/19/19. Baseline hgb 10-11.   - addressed above  - q12h CBC     # Thrombocytopenia, coagulopathy secondary to cirrhosis  Has received vit K this admission. Transfused PRBC x4, Plasma x2, platelet x1 on 9/17 overnight. Labs from 9/19 show INR 2.08, PTT 56, and  fibrinogen 172.   - trend q12h     ===ENDOCRINE===  No acute issues     ===INFECTIOUS DISEASE===  No acute issues.     # Antimicrobials:  - Ceftriaxone ppx (9/18-*)     ===SKIN/MSK===  # Gout  No clinical arthritis.  - Restart Allopurinol 100 mg MWF after dialysis session (dose consulted with nephrology) - holding without enteral access     Prophylaxis:  DVT: SCD   GI: PPI  Family:  Updated at bedside, would like to pursue home hospice once more stable  Disposition: Critically Ill    Restraints: Restraints for medical healing needed: YES    I have seen and discussed this patient with my attending, Dr. Whalen.    Madhuri Wolfe MD  Internal Medicine/Pediatrics PGY1  [facility]  Pager: [phone]    =======================================================================    Interval History   No acute events overnight. Intermittently agitated due to discomfort from ETT. Extubated around 7:30am this morning.     Physical Exam   Temp: 94.6  F (34.8  C) Temp src: Oral Temp  Min: 94.4  F (34.7  C)  Max: 98.8  F (37.1  C) BP: (!) 62/43   Heart Rate: 60 Resp: 25 SpO2: 98 % O2 Device: Nasal cannula Oxygen Delivery: 4 LPM  Vitals:    09/19/19 0400 09/20/19 0515 09/21/19 0600   Weight: 72.9 kg (160 lb 11.5 oz) 71.5 kg (157 lb 10.1 oz) 72.9 kg (160 lb 11.5 oz)     I/O last 3 completed shifts:  In: 2527.33 [I.V.:1163.76]  Out: 1200 [Stool:1200]    GEN: resting in bed; arouses to voice; confused but follows commands  EYES: Icteric sclera, eyes open   CV: RRR, III/VI flow murmur  PULM: CTAB, symmetric chest rise, NC in place  GI: normal bowel sounds, non-tender, no rebound tenderness or guarding, no masses; distended, but soft  EXTREMITIES: warm and well perfused, lower extremities wrapped in ace wraps  NEURO: awakes to voice, moving extremities spontaneously   SKIN: jaundiced, bruising on bilateral upper and lower extremities     Medications     dexmedetomidine Stopped (09/22/19 2138)     IV fluid  REPLACEMENT ONLY       D5W       norepinephrine 0.07 mcg/kg/min (09/22/19 0852)     octreotide (sandoSTATIN) infusion ADULT 50 mcg/hr (09/22/19 0900)     parenteral nutrition - ADULT compounded formula 45 mL/hr at 09/21/19 2022       cefTRIAXone  1 g Intravenous Q24H     lactulose  200 g Rectal Q8H     lipids 4 oil  250 mL Intravenous QPM     pantoprazole (PROTONIX) IV  40 mg Intravenous BID     sodium chloride (PF)  3 mL Intracatheter Q8H       Data   Recent Labs   Lab 09/22/19  0427 09/21/19  1603 09/21/19  1109 09/21/19  0428  09/20/19  0327   WBC 6.5 8.2 8.3 6.6   < > 9.1   HGB 7.3* 7.4* 7.4* 7.7*   < > 7.6*   MCV 98 97 97 96   < > 94   PLT 68* 82* 75* 83*   < > 90*   INR 1.89*  --   --  1.86*  --  2.04*   * 145*  --  142  --  139   POTASSIUM 3.6 3.6  --  3.1*   < > 3.8   CHLORIDE 116* 113*  --  110*  --  108   CO2 18* 22  --  21  --  23   BUN 80* 69*  --  54*  --  36*   CR 4.85* 4.68*  --  4.22*  --  3.57*   ANIONGAP 12 10  --  12  --  9   MEGGAN 8.2* 8.2*  --  8.5  --  8.5   * 133*  --  177*  --  97   ALBUMIN 2.4*  --   --  2.7*  --  2.8*   PROTTOTAL 4.8*  --   --  5.0*  --  4.8*   BILITOTAL 8.8*  --   --  12.7*  --  13.7*   ALKPHOS 132  --   --  147  --  156*   ALT 26  --   --  28  --  26   AST 53*  --   --  58*  --  52*    < > = values in this interval not displayed.     Recent Results (from the past 24 hour(s))   XR Chest Port 1 View    Narrative    XR CHEST PORT 1 VW  9/21/2019 10:35 PM      HISTORY: ett, almost self extubated    COMPARISON: 9/19/2018    FINDINGS: Right IJ central venous line tip is at the lower SVC. Right  PICC is in the superior right atrium. Endotracheal tube tip is in the  midthoracic trachea. Increased prominent interstitial markings and  bibasilar streaky opacities. Heart size is normal. Small bilateral  pleural effusions. No pneumothorax.      Impression    IMPRESSION:   1. ET tube tip projects over the mid trachea.  2. Pulmonary vascular congestion with small bilateral  pleural  effusions. Bibasilar atelectasis versus consolidation.    I have personally reviewed the examination and initial interpretation  and I agree with the findings.    ROGELIO DELATORRE MD

## 2019-09-22 NOTE — PROGRESS NOTES
09/22/19 1546   General Information   Onset Date 09/05/19   Start of Care Date 09/22/19   Referring Physician Madhuri Wolfe MD   Patient Profile Review/OT: Additional Occupational Profile Info See Profile for full history and prior level of function   Patient/Family Goals Statement None stated- per chart and RN, pt will be d/c-ing home on hospice   Swallowing Evaluation Bedside swallow evaluation   Behaviorial Observations Confused;Confabulatory   Mode of current nutrition Oral diet   Type of oral diet Thin liquid  (Clears)   Respiratory Status O2 Supply   Type of O2 supply Nasal cannula   Comments SLP: Pt is a 63 year old female with past medical history of decompensated cirrhosis (2/2 alcohol and hepatitis C), chronic hepatitis C, HTN, and recent EV bleed s/p banding x6, transferred to MICU for hemorrhagic shock 2/2 EV bleed now s/p banding x 3 with GI. Hemodynamically stable and off pressors, off sedation with ongoing AMS. Per chart, plan will be for pt to d/c home on hospice, swallow eval ordered to assess ability to take pills. Completed per MD order.   Clinical Swallow Evaluation   Oral Musculature generally intact   Structural Abnormalities none present   Dentition   (multiple missing teeth)   Mucosal Quality dry   Mandibular Strength and Mobility intact   Oral Labial Strength and Mobility WFL   Lingual Strength and Mobility WFL   Buccal Strength and Mobility intact   Laryngeal Function Cough;Throat clear;Swallow;Voicing initiated   Oral Musculature Comments Raspy, rough vocal quality s/p extubation. AMS, confused and confabulatory   Swallow Eval   Feeding Assistance frequent cues/help required   Clinical Swallow Eval: Thin Liquid Texture Trial   Mode of Presentation, Thin Liquids cup;straw;self-fed   Volume of Liquid or Food Presented 4oz thin juice   Oral Phase of Swallow WFL   Pharyngeal Phase of Swallow intact   Diagnostic Statement WFL, no overt s/sx of aspiration   Esophageal Phase of  Swallow   Patient reports or presents with symptoms of esophageal dysphagia Yes   Esophageal comments EV bleeding s/p banding   Swallow Eval: Clinical Impressions   Skilled Criteria for Therapy Intervention Does not meet criteria for skilled intervention   Functional Assessment Scale (FAS) 7  (from oropharyngeal perspective; likely esophageal dysphagia)   Treatment Diagnosis Functional oropharyngeal swallow mechanism for thin liquids   Diet texture recommendations Clear liquid;Thin liquids  (MD to advance solids as medically appropriate)   Recommended Feeding/Eating Techniques small sips/bites;maintain upright posture during/after eating for 30 mins;alternate between small bites and sips of food/liquid  (upright for all PO, slow rate)   Anticipated Discharge Disposition   (Home on hospice)   Risks and Benefits of Treatment have been explained. Yes   Patient, family and/or staff in agreement with Plan of Care Yes   Clinical Impression Comments SLP: Clinical swallow eval completed per MD order; per GI, pt only clear for up to clear liquids at this time. Pt presents with functional oropharyngeal swallow mechanism with likely esophageal dysphagia in setting of EV s/p banding. Oral mech exam remarkable for multiple missing teeth, dry mucosa, raspy/rough vocal quality. Assessed with thin liquids. Oral phase WFL. Pharyngeal phase WFL, no overt s/sx of aspiration. From SLP perspective, pt clear for thin liquids; defer to MD for diet advancement. If pt is having difficulty taking pills, recommend crushing and administering in puree consistency. Ensure pt is fully alert and upright for all PO, taking small bites/sips at slow rate. No additional SLP services indicated, as pt is planning to discharge home on hospice.   Total Evaluation Time   Total Evaluation Time (Minutes) 13

## 2019-09-22 NOTE — PLAN OF CARE
Discharge Planner SLP   Patient plan for discharge: Home hospice  Current status: Clinical swallow eval completed per MD order; per GI, pt only clear for up to clear liquids at this time. Pt presents with functional oropharyngeal swallow mechanism with likely esophageal dysphagia in setting of EV s/p banding. Oral mech exam remarkable for multiple missing teeth, dry mucosa, raspy/rough vocal quality. Assessed with thin liquids. Oral phase WFL. Pharyngeal phase WFL, no overt s/sx of aspiration. From SLP perspective, pt clear for thin liquids; defer to MD for diet advancement. If pt is having difficulty taking pills, recommend crushing and administering in puree consistency. Ensure pt is fully alert and upright for all PO, taking small bites/sips at slow rate. No additional SLP services indicated, as pt is planning to discharge home on hospice.  Barriers to return to prior living situation: None from ST perspective  Recommendations for discharge: Defer to MD  Rationale for recommendations: Functional oropharyngeal swallow mechanism       Entered by: Izabel Luther 09/22/2019 3:56 PM

## 2019-09-22 NOTE — PROGRESS NOTES
[facility] [place]  Procedure Note          Extubation:       Roslyn Palmer  MRN# [id number]   September 22, 2019, 10:09 AM         Patient extubated at: September 22, 2019,07:55 AM   Supplemental Oxygen: Via nasal cannula at 4 liters per minute   Cough: The cough is strong   Secretion Mode: Able to clear   Secretion Amount: Small amount, thin and clear in color   Respiratory Exam:: Breath sounds: equal and clear     Location: all lobes   Skin Exam:: Patient color: natural   Patient Status: Currently appears comfortable   Arterial Blood Gasses: pH Arterial (pH)   Date Value   09/19/2019 7.49 (H)     pO2 Arterial (mm Hg)   Date Value   09/19/2019 114 (H)     pCO2 Arterial (mm Hg)   Date Value   09/19/2019 31 (L)     Bicarbonate Arterial (mmol/L)   Date Value   09/19/2019 24            Recorded by Maria De Jesus Lucas

## 2019-09-23 NOTE — PLAN OF CARE
PT 4C / Discharge Planner PT   Patient plan for discharge: Patient plans to discharge home with hospice tomorrow 9/24  Current status: Patient requires mod Ax2 for supine>sit, max Ax2-3 for stand-pivot transfer bed>chair, very difficult for patient to take steps and ultimately required 3rd person to assist in order to safely position in chair, at this time recommend lift for all transfers. Patient is to be discharged home with hospice care tomorrow. Per chart review, patient's sister works during the day but plans to have someone with patient at all times. At this time I do not feel patient could safely perform transfers at home without use of lift equipment as right now she is requiring heavy Ax2-3 for pivot transfer. If this would not be able to be arranged, patient would require at least 2 caregivers to complete basic transfers at home, or understand that patient will likely be unable to get out of bed safely once home. Given late time of day PT saw patient and family not present will need to further discuss with family and medical team tomorrow before discharge home tomorrow.  Barriers to return to prior living situation: level of assist  Recommendations for discharge: plans to discharge home with hospice cares, see above for mobility recommendations at home  Rationale for recommendations: see above       Entered by: Jose Del Angel 09/23/2019 6:05 PM

## 2019-09-23 NOTE — PROGRESS NOTES
[facility]  Met with pt and family to discuss plans for hospice.  Pt to be discharged home tomorrow morning and has agreed to have [facility] follow with hospice services.  Equipment of a bed with 1/2 rails, OBT, Commode, Walker with wheels, W/C  And O2 was ordered to be delivered this afternoon to home. Pt needs to have all of her lines removed prior to her discharge in the am.  Medications are being filled at the discharge pharmacy and should be ready in the am and sent home with her.  Pt brother will transport her home.  Pt and family verbalized understanding that completion of the admission visit is scheduled for  Tomorrow at 1 pm at her home.   The POLST documentation  Is completed.     The Hospice consents are signed by her brother per her request.    Pt has the 24 hour phone number for  Hospice for any questions or concerns.     Marion Valle RN Liaison   [facility]

## 2019-09-23 NOTE — CONSULTS
Patient is on IR schedule 09/24/2019 for a Double lumen large bore tunneled central line removal.   Labs ordered  for procedure, INR must be corrected to 1.5 or better   No NPO required.   Consent will be done prior to procedure.   PT to be discharge home on hospice line no longer needed    Please contact the IR charge RN at [phone] for estimated time of procedure.     Discussed with  Dr. Fabio Muller IR RPA  [phone] Call pager  [phone] pager

## 2019-09-23 NOTE — PROGRESS NOTES
[facility] Nurse Inpatient Pressure Injury Assessment   Reason for consultation: Evaluate and treat coccyx      ASSESSMENT  Pressure Injury: on coccyx , present on admission ,   Pressure Injury is Stage 2   Contributing factor of the pressure injury: pressure, immobility and moisture  Status: initial assessment  Recommend provider order: None     TREATMENT PLAN  coccyx wound: Every 3 days     Cleanse the area with NS and pat dry.    Apply No sting film barrier to periwound skin.    Cover wound with Mepilex.     Change dressing Q 3 days.    Turn and reposition Q 2hrs.    Ensure pt has Kirt-cushion while sitting up in the chair.    FYI- If pt has constant incontinent loose stools needing dressing changes Q shift please discontinue the Mepilex dressing and apply criticaid barrier paste BID and PRN.  Orders Written  WO Nurse follow-up plan:weekly  Nursing to notify the Provider(s) and re-consult the WO Nurse if wound(s) deteriorates or new skin concern.    Patient History  According to provider note(s):  Roslyn Palmer is a 63 year old female with past medical history of decompensated cirrhosis (2/2 alcohol and hepatitis C), chronic hepatitis C, HTN, and recent EV bleed s/p banding x6, transferred to MICU for hemorrhagic shock 2/2 EV bleed now s/p banding x 3 with GI. Hemodynamically stable, extubated, and off pressors. Transitioning to home with hospice care.     Objective Data  Containment of urine/stool: Internal fecal management    Current Diet/ Nutrition:  Orders Placed This Encounter      Clear Liquid Diet      Output:   I/O last 3 completed shifts:  In: 4227.42 [P.O.:360; I.V.:1863.36]  Out: 740 [Urine:40; Stool:700]    Risk Assessment:   Sensory Perception: 3-->slightly limited  Moisture: 3-->occasionally moist  Activity: 2-->chairfast  Mobility: 3-->slightly limited  Nutrition: 2-->probably inadequate  Friction and Shear: 2-->potential problem  Leonard Score: 15      Labs:   Recent Labs   Lab 09/23/19  9109    ALBUMIN 3.4   HGB 7.5*   INR 2.25*   WBC 7.7       Physical Exam  Skin inspection: focused coccyx, sacrum, and BL buttocks    Wound Location:  coccyx  Date of last Photo Not taken  Wound History: Per pt she had been sitting in a chair prior to coming to the hospital  Measurements (length x width x depth, in cm) 0.3 cm x 0.2 cm  x  0.1 cm   Wound Base:  100 % dermis  Tunneling N/A  Undermining N/A  Palpation of the wound bed: normal   Periwound skin: intact  Color: normal and consistent with surrounding tissue  Temperature: normal   Drainage:, none  Description of drainage: none  Odor: none  Pain: during dressing change,     Interventions  Current support surface: Standard  Atmos Air mattress  Current off-loading measures: Foam padding and Pillows under calves  Repositioning aid: Pillows  Visual inspection of wound(s) completed   Wound Care: was done per plan of care.  Supplies: floor stock, discussed with RN, discussed with family and discussed with patient  Educated provided: importance of repositioning, plan of care and wound progress  Education provided to: patient  and nurse  Discussed importance of:repositioning every 2 hours, off-loading pressure to wound, dressing change frequency, off-loading mattress and moisture management  Discussed plan of care with Patient, Family and Nurse    Patrizia Arthur RN

## 2019-09-23 NOTE — PROGRESS NOTES
[facility]  CRITICAL CARE STAFF NOTE    Acute Issues     1. Hemorrhagic shock and ATN 2/2 esophageal varices in setting of cirrhosis. Bleeding and shock has resolved and currently on midodrine. She and her family wishes for comfort care approach. We are arranging home hospice with social work and case management. Ok to transfer out of ICU.     The patient was seen and examined with the resident/fellow physician.  We have discussed the patient in detail and I agree with the findings, assessment, and plan as documented when this note was cosigned on this day. The plan was formulated in conjunction with pharmacy, ICU nurses, and respiratory therapist. I have evaluated all laboratory values and imaging studies for the past 24 hours. I have reviewed all the consults that have been ordered and are active for this patient.      Critical Care Time: 30 min.  I spent this time (excluding procedures) personally providing and directing critical care services at the bedside and on the critical care unit.      Wade Shaw MD   of Medicine  Interventional Pulmonary  Department of Pulmonary, Allergy, Critical Care and Sleep Medicine   [facility]  Pager: [phone]       [facility] Intensive Care Progress Note    Date of Service (when I saw the patient): 09/23/2019     Assessment & Plan   Roslyn Palmer is a 63 year old female with past medical history of decompensated cirrhosis (2/2 alcohol and hepatitis C), chronic hepatitis C, HTN, and recent EV bleed s/p banding x6, transferred to MICU for hemorrhagic shock 2/2 EV bleed now s/p banding x 3 with GI. Hemodynamically stable, extubated, and off pressors. Transitioning to home with hospice care.     CHANGES TODAY:  - spoke with Social Work about arranging home hospice  - transfer to General Medicine until able to go home on hospice     PLAN:  ===NEURO===  #  Sedation  - off fentanyl/versed since 9/20  - off precedex 9/22     # Hx of hepatic encephalopathy, improved  Hospitalized 8/21 for HE likely due to decreased intake of lactulose in the days prior to admission. Ascitic fluid analysis was negative for SBP, no concern for GI bleed. Mental status rapidly improved after lactulose and rifaximin. Her diuretics were also discontinued on discharge as she receives weekly paracentesis. Now on 9/18 patient is alert & oriented to self, place, and time. On 9/20, more alert with eye open. Able to follow simple commands.   - Continue Rifaximin, Lactulose (BID w/ prns available to titrate to 3-4 BM's per day) and Zinc      ===CARDIOVASCULAR===  # Hemorrhagic shock  Acutely hypotensive (68/40) 2/2 GI bleed and recent HD (with 1.7L UF on 9/16) and paracentesis (with 3L release on 9/17). Likely intravascular volume depletion. Was given 75g of Albumin 9/18. Transfused 4 units of blood, 1 unit of platelets, 10 units of cryo during acute bleeding. MAPs <65 starting 9/19, so levophed was restarted. After decreasing MAP goal to 60, there was no increase in lactate.   - MAP goal > 55  - midodrine 20mg tid, off IV pressors     ===PULMONARY===  # Intubated for procedure (9/18), AMS  Mental status improving 9/20, but failing PS trials. Extubated 9/22 to [facility] with no respiratory issues.     ===GASTROINTESTINAL===  # Variceal bleeding s/p banding  Previous hematemesis of 500 mL on 9/7 2/2 variceal sources. S/p banding x6 of esophageal varices on 9/7. Received PRBC x4, Plasma x1, platelet x1 at that time. Hgb stabilized to 7.7 g/dL. Was on CTX 1g IV daily for 7 days (9/7-9/13) (SBP prophylaxis). Completed Octreotide gtt, IV PPI, 3 days of IV VitK. Ciprofloxacin 500 mg/day was started 9/18 for SBP prophylaxis; discontinued. Hgb dropped to 6.1 from 7.9 on 9/18.    - continue octreotide x 5 days (stop 9/24 evening)   - per GI, if concerned for volume overload, ok to stop after 72h   - consider  transitioning to oral BB  - switch to IV PPI bid from gtt (9/20)  - ceftriaxone x 5 days (stop 9/24)  - tolerating PO clear liquid diet     # EtOH-induced cirrhosis c/b ascites, HE, and EV bleeding  Cirrhosis diagnosed 6 years ago when she had EV GIB. EGD on 9/5 showed variceal bleeding and portal hypertensive gastropathy. Ascites started to become appreciable in 05/19. 6.350 L removed on paracentesis 9/7. Workup for thrombosis and SBP negative. Per GI, would start to address goals of care given clinical trajectory as patient would not be considered for liver transplantation due to frailty and not having met minimum sobriety requirement. Given this refractory GIB, her chance of long term survival is quite low. Has some rising trend of ALP and bilirubin. Last paracentesis was 9/17; 3L removed.  - Continue Rifaximin, Lactulose (BID w/ prns available to titrate to 3-4 BM's per day) and Zinc   - Holding Spironolactone and Torsemide (on HD)  - Therapeutic paracentesis prn   - per GI, can consider placing palliative pleurx catheter   - Hepatology following, appreciate recs  - pt is now DNR/DNI and prefers to go home with hospice care     # Chronic hepatitis C, treatment naive  Per last GI/hepatology visit with Dr. Owen on 08/12/19, patient is GT 2b treatment naive. Will address HCV treatment in the future. Not urgent at this point.     HBV non-immune  HBsAg, HBsAb are negative.  - Vaccination outpatient setting     # Nutrition:   - TPN 9/20-9/23  - tolerating clear liquid diet    MELD-Na score: 37 at 9/23/2019  5:15 AM  MELD score: 37 at 9/23/2019  5:15 AM  Calculated from:  Serum Creatinine: 5.12 mg/dL (Rounded to 4 mg/dL) at 9/23/2019  5:15 AM  Serum Sodium: 143 mmol/L (Rounded to 137 mmol/L) at 9/23/2019  5:15 AM  Total Bilirubin: 7.6 mg/dL at 9/23/2019  5:15 AM  INR(ratio): 2.25 at 9/23/2019  5:15 AM  Age: 63 years       ===RENAL===  # Prerenal CT secondary acute blood loss and HRS  # Hypervolemic  hyponatremia  Baseline ~1. Admission Cre 4.01. Minimal urine output. Diuretics discontinued last admission. Likely pre-renal in setting of slow GI variceal bleed (s/p banding) versus HRS. Albumin challenge completed 9/6-9/8 and has received multiple units additional colloid in setting of acute GIB. Cr peaked at 5.53. Last HD was 9/18; 0.8 L removed.  - HD per nephrology plan   - will no longer receive dialysis given transition to home hospice   - monitor I/O's, daily weights  - Trend BMP daily     ===HEME/ONC===  # Acute on chronic anemia secondary from blood loss  2/2 GI bleed. Varices banded x2 on 9/19/19. Baseline hgb 10-11.   - addressed above  - q12h CBC     # Thrombocytopenia, coagulopathy secondary to cirrhosis  Has received vit K this admission. Transfused PRBC x4, Plasma x2, platelet x1 on 9/17 overnight. Labs from 9/19 show INR 2.08, PTT 56, and fibrinogen 172.   - trend q12h     ===ENDOCRINE===  No acute issues     ===INFECTIOUS DISEASE===  No acute issues.     # Antimicrobials:  - Ceftriaxone ppx (9/18-*)     ===SKIN/MSK===  # Gout  No clinical arthritis.  - holding Allopurinol 100 mg MWF after dialysis session (dose consulted with nephrology)    - can consider restarting      Prophylaxis:  DVT: SCD   GI: PPI  Family:  Updated at bedside  Disposition: Transfer to General Medicine     Restraints: Restraints for medical healing needed: YES    I have seen and discussed this patient with my attending, Dr. Whalen.    Madhuri Wolfe MD  Internal Medicine/Pediatrics PGY1  [facility]  Pager: [phone]    =======================================================================    Interval History   Overnight, Hgb drop to 5.8, responded to pRBC transfusion. No overt signs of bleeding or melena. States she is ready to go home with hospice care. Understands she is facing the end of her life.     Physical Exam   Temp: 98.3  F (36.8  C) Temp src: Oral Temp  Min: 94  F (34.4  C)  Max: 98.8  F  (37.1  C) BP: 96/67   Heart Rate: 86 Resp: 20 SpO2: 96 % O2 Device: Nasal cannula Oxygen Delivery: 2 LPM  Vitals:    09/19/19 0400 09/20/19 0515 09/21/19 0600   Weight: 72.9 kg (160 lb 11.5 oz) 71.5 kg (157 lb 10.1 oz) 72.9 kg (160 lb 11.5 oz)     I/O last 3 completed shifts:  In: 4227.05 [P.O.:360; I.V.:1862.99]  Out: 740 [Urine:40; Stool:700]    GEN: resting in bed; awake and alert  EYES: Icteric sclera, eyes open   CV: RRR, II/VI flow murmur  PULM: CTAB, symmetric chest rise, NC in place  GI: normal bowel sounds, non-tender, no rebound tenderness or guarding, no masses; distended, but soft  EXTREMITIES: warm and well perfused, lower extremities wrapped in ace wraps  NEURO: awake and alert, moving extremities spontaneously   SKIN: jaundiced, bruising on bilateral upper and lower extremities     Medications     IV fluid REPLACEMENT ONLY       octreotide (sandoSTATIN) infusion ADULT 50 mcg/hr (09/23/19 1200)     parenteral nutrition - ADULT compounded formula 45 mL/hr at 09/22/19 2047       albumin human  100 g Intravenous Daily     FLUoxetine  20 mg Oral Daily     folic acid  1 mg Oral Daily     lactulose  20 g Oral or Feeding Tube TID     midodrine  20 mg Oral TID     multivitamin, therapeutic  1 tablet Oral Daily     pantoprazole (PROTONIX) IV  40 mg Intravenous BID     rifaximin  550 mg Oral BID     sodium chloride (PF)  3 mL Intracatheter Q8H     zinc sulfate  220 mg Oral Daily       Data   Recent Labs   Lab 09/23/19  0515 09/22/19  2205 09/22/19  1643 09/22/19  1515  09/22/19  0427  09/21/19  0428   WBC 7.7  --   --  6.0  --  6.5   < > 6.6   HGB 7.5* 7.2* 6.0* 5.8*  --  7.3*   < > 7.7*   MCV 97  --   --  101*  --  98   < > 96   PLT 53*  --   --  50*  --  68*   < > 83*   INR 2.25*  --   --   --   --  1.89*  --  1.86*    143  --  141   < > 146*   < > 142   POTASSIUM 4.0 3.8  --   --   --  3.6   < > 3.1*   CHLORIDE 113* 113*  --   --   --  116*   < > 110*   CO2 18* 19*  --   --   --  18*   < > 21   BUN 96*  88*  --   --   --  80*   < > 54*   CR 5.12* 5.06*  --   --   --  4.85*   < > 4.22*   ANIONGAP 12 10  --   --   --  12   < > 12   MEGGAN 8.4* 8.3*  --   --   --  8.2*   < > 8.5   GLC 84 114*  --   --   --  134*   < > 177*   ALBUMIN 3.4  --   --   --   --  2.4*  --  2.7*   PROTTOTAL 5.6*  --   --   --   --  4.8*  --  5.0*   BILITOTAL 7.6*  --   --   --   --  8.8*  --  12.7*   ALKPHOS 108  --   --   --   --  132  --  147   ALT 25  --   --   --   --  26  --  28   AST 49*  --   --   --   --  53*  --  58*    < > = values in this interval not displayed.     No results found for this or any previous visit (from the past 24 hour(s)).

## 2019-09-23 NOTE — PROGRESS NOTES
Social Work Services Progress Note    Hospital Day: 19  Collaborated with:  Patient, pt's sister My, CICI RETANA,  Hospice liaison Marion    Data:  Pt is a 63-year-old female admitted to [facility] 9/5/19. Received update that pt interested in going home with hospice services.     Intervention:  Met with pt and pt's sister My at bedside. Pt confirms that she is interested in returning home with hospice care. She feels it is 'her time' and she wants to spend time with her dog and family at home. Pt's sister My involved and supported. Pt lives with My. My works during the day but is planning on finding family/friends to come in shifts to be with pt. CICI RETANA arrived during conversation. Discussed that pt may need pleurx placed prior to discharge d/t ascites. Pt's sister wondering if pt will continue dialysis; explained that pts on hospice typically do not continue dialysis. Provided choice of hospice agencies. Pt chooses [facility] for continuity of care. Notified hospice liaison Marion.    Assessment:  Planning for discharge home with hospice    Plan:    Anticipated Disposition:  Home with services    Barriers to d/c plan:  Discharge plan; need for pleurx placement?    Follow Up:  SW to follow for discharge planning    LUZ Sandoval, LGSW  5th Floor   Pager [phone]  Phone [phone]

## 2019-09-23 NOTE — PROGRESS NOTES
Care Coordinator - Discharge Planning    Admission Date/Time:  9/5/2019  Attending MD:  Tony Churchill MD     Data  Chart reviewed, discussed with interdisciplinary team.   Patient was admitted for:   1. Hepatic encephalopathy (H)         Assessment   Pt transferred back to [place] [id number] care team.  Pt has transitioned to comfort care and will discharge home on hospice care.  Referral to [facility] canceled.   Updated [facility] that pt will no long require outpatient HD.  Please refer to SW notes for additional information on pt plan of care.     Discharge orders updated.      Plan  Anticipated Discharge Date:  9/24/19  Anticipated Discharge Plan:  Home on hospice    Brie Bahena RN BSN, PHN RN Care Coordinator  Internal Medicine   [phone]  Pager: [phone]  [facility] RN Care Coordinator job code * * * [phone]  9/23/2019 11:48 AM

## 2019-09-23 NOTE — PROGRESS NOTES
[facility]    Hepatology Follow-up    CC:      Syncope     Dx:      Decompensated alcoholic/HCV cirrhosis              Hemorrhagic shock                 hematemesis secondary to esophageal varices              Syncope              Acute kidney injury, on HD             Hepatic encephalopathy    24 hour events:   Improving mental status, no other evidence of ongoing GI bleed.    Subjective:  Significantly improved mental status today, having good conversation with me, and his Sister Nickie.    She is currently in discussions about hospice.  Patient denies fevers, sweats or chills.    Medications  Current Facility-Administered Medications   Medication Dose Route Frequency     albumin human  100 g Intravenous Daily     FLUoxetine  20 mg Oral Daily     folic acid  1 mg Oral Daily     lactulose  20 g Oral or Feeding Tube TID     lipids 4 oil  250 mL Intravenous QPM     midodrine  20 mg Oral TID     multivitamin, therapeutic  1 tablet Oral Daily     pantoprazole (PROTONIX) IV  40 mg Intravenous BID     rifaximin  550 mg Oral BID     sodium chloride (PF)  3 mL Intracatheter Q8H     zinc sulfate  220 mg Oral Daily       Review of systems  A 10-point review of systems was negative, unless stated above    Examination  BP 94/66   Pulse 84   Temp 98.1  F (36.7  C) (Oral)   Resp 18   Wt 72.9 kg (160 lb 11.5 oz)   SpO2 98%   BMI 25.94 kg/m      Intake/Output Summary (Last 24 hours) at 9/23/2019 0730  Last data filed at 9/23/2019 0700  Gross per 24 hour   Intake 4158.98 ml   Output 740 ml   Net 3418.98 ml       General: Jaundiced  CVS: RRR  Resp: CTA  Abdomen: Soft, nontender  Extremities: No edema  Neuro: Alert and oriented    Laboratory  Lab Results   Component Value Date     09/23/2019    POTASSIUM 4.0 09/23/2019    CHLORIDE 113 09/23/2019    CO2 18 09/23/2019    BUN 96 09/23/2019    CR 5.12 09/23/2019       Lab Results   Component Value Date    BILITOTAL 7.6 09/23/2019    ALT 25  09/23/2019    AST 49 09/23/2019    ALKPHOS 108 09/23/2019       Lab Results   Component Value Date    WBC 7.7 09/23/2019    HGB 7.5 09/23/2019    MCV 97 09/23/2019    PLT 53 09/23/2019       Lab Results   Component Value Date    INR 2.25 09/23/2019       MELD-Na score: 37 at 9/23/2019  5:15 AM  MELD score: 37 at 9/23/2019  5:15 AM  Calculated from:  Serum Creatinine: 5.12 mg/dL (Rounded to 4 mg/dL) at 9/23/2019  5:15 AM  Serum Sodium: 143 mmol/L (Rounded to 137 mmol/L) at 9/23/2019  5:15 AM  Total Bilirubin: 7.6 mg/dL at 9/23/2019  5:15 AM  INR(ratio): 2.25 at 9/23/2019  5:15 AM  Age: 63 years      Assessment   61-year-old female with medical history significant for hepatitis C infection and alcohol use disorder, with resultant cirrhosis complicated by ascites, variceal hemorrhage and hepatic encephalopathy, who was admitted after syncope following paracentesis, now complicated by acute kidney injury on hemodialysis, and 2 episodes of variceal hemorrhage with banding.  Her worsened acute kidney injury is most likely due to recent bleeding, and hypotension.  Following her second variceal hemorrhage, she has improved from a neurological standpoint, and without any ongoing evidence of GI bleed.  As previously documented, her mortality is high as 55% following a second variceal hemorrhage after failed treatment.     MELD-Na 37, CTP class C.  On discussion with patient and her sister, My, she is interested in going home today on hospice.      Recommendations  -- If agreeable with patient, it may be reasonable to place a PleureX catheter to facilitate removal of ascites for symptom control.   -- If tolerating clear liquids, ADAT   - Please provide Ensure TID  -- Okay to stop IV Octreotide gtt   -- Continue lactulose PO, PO Zinc 220mg BID and Rifaximin 550 mg BID for comfort   Help patient with more lucid conversations with family    Hepatology will sign off today as she is going on Hospice.     Patient seen and  discussed with attending physician, Dr. Veda Mathis MD  PGY 5  Hepatology      Attestation:  This patient has been seen and evaluated by me, Christy Mccollum.  Discussed with the house staff team or resident(s) and agree with the findings and plan in this note.

## 2019-09-23 NOTE — PLAN OF CARE
Edema 4C: per chart review, patient has transitioned to hospice cares. Will completed lymphedema orders.

## 2019-09-23 NOTE — PROGRESS NOTES
[facility] utilizes an encounter to take the place of a direct phone call to your office. Please take a moment to review the below request. Please reply or route message to author of this encounter.  Message will act as a verbal OK of orders requested below. Thank you.    Family requesting hospice admission with [facility]. We have assess set up Tues 9/24 at 1pm . Will you continue to follow as attending for hospice? If so hospice is requesting orders as below.     OK to admit to hospice. OK for hospice orders. OK for current meds and treatments.  MSW eval and treat and admit to hospice by hospice consult    If admitted admission nurse will contact you for on going hospice orders.     Any questions or concerns please contact me. Thank you for your care of this patient,    Cinthia Gifford RN PHN PN   [facility]  Hospice Day Triage RN/Referral Navigator  [phone]  [email or web address]

## 2019-09-24 NOTE — PLAN OF CARE
Marquita is excited to go home today. FFP infused, will draw INR at 0600 as ordered. Continues to have loose dark stool per rectal tube.

## 2019-09-24 NOTE — PROGRESS NOTES
Pt on [facility] discharging to home hospice.  Discharge medications picked up by Pt's sister.  Pt's brother and sister are bringing her home in their vehicle.  Pt very excited to leave and go home.  Received a second unit of plasma prior to tunneled line removal in IR this morning.

## 2019-09-24 NOTE — PROGRESS NOTES
Patient Name: Roslyn Palmer  Medical Record Number: [id number]  Today's Date: 9/24/2019    Procedure: Tunnel line removal   Proceduralist:     Procedure start time: 1100  Procedure end time: 1110    Report given to: 5C RN      Pt arrived to IR room 6 from . Consent obtained/ reviewed. Pt denies any questions or concerns regarding procedure. Pt positioned supine on stretcher and monitored per protocol. Pt tolerated procedure without any noted complications. Manual pressure held for 10 min site CDI HOB at 90  Pt transferred back to .

## 2019-09-24 NOTE — PLAN OF CARE
Discharge Planner PT   PT 5C  Patient plan for discharge: Home with hospice  Current status: Family members present for instruction in transfer technique for discharge home. Pt completed standing pivot transfer bed to [facility] with moderate assist of 1 plus SBA of 1. Pt demonstrated good LE control during transfer and was able to take several short steps to complete the pivot. Moderate assist of 2 needed to transfer sit to supine. Family members verbalized understanding of transfer technique. Recommend 2 person assist for transfers at home. Pt has w/c at home; support system in place plus hospice for pt at home.  Barriers to return to prior living situation: Medical condition; assist with transfers, decreased ambulatory ability.  Recommendations for discharge: Home with hospice; 2-person assist for transfers.  Rationale for recommendations: Pt has low functional endurance, significantly decreased ambulatory and functional ability, and is at high risk for falls.      Physical Therapy Discharge Summary    Reason for therapy discharge:    Discharged to home with hospice    Progress towards therapy goal(s). See goals on Care Plan in [facility] electronic health record for goal details.    Barriers to Achieving Goals: discharged from facility; decline in functional ability.    Therapy recommendation(s):    No further therapy is recommended.           Entered by: Vipin Gabriel 09/24/2019 10:22 AM

## 2019-09-24 NOTE — PROGRESS NOTES
Physician Attestation   I, Tony Churchill MD, saw this patient with the resident and agree with the resident/fellow's findings and plan of care as documented in the note.  Please refer to resident note for details     I personally reviewed vital signs, medications, labs and imaging.    Hess findings:   Roslyn Palmer is a 63 year old female with past medical history of decompensated cirrhosis (2/2 alcohol and hepatitis C), chronic hepatitis C, HTN, and recent EV bleed s/p banding x6, transferred to MICU for hemorrhagic shock 2/2 EV bleed now s/p banding x 3 with GI. Hemodynamically stable, extubated, and off pressors. Transitioning to home with hospice care.       -patient eager to discharge tomorrow with home hospice  -discontinue octreotide drip, TPN , lab draws , cardiac monitoring overnight   -discussed with IR regarding pulling out tunneled catheter tomorrow -FFP at 4AM; repeat INR; prepare another unit in case if she needs to get INR down to 1.5 and below ; vitamin K one dose today -on schedule for that   -patient declines pleur ex catheter, TPN; also discussed about blood transfusion while inpatient given hemoglobin of 6.9- given her  ATN and oliguria, also overall net positive status and no change in hemodynamics or symptoms -given risk of volume overload with blood transfusion; pt also declined transfusion for now unless she is symptomatic and will hold off on that.   -plan to discharge tomorrow with home hospice       Tony Churchill MD  Date of Service (when I saw the patient): 09/23/19

## 2019-09-24 NOTE — PROGRESS NOTES
Pt accepted in transfer from ICU, settled for the night. Will proceed with plan to send home on hospice tomorrow.

## 2019-09-24 NOTE — PROCEDURES
[facility], [place]    Procedure: IR Procedure Note  Date/Time: 9/24/2019 11:08 AM  Performed by: Dayo Mccarthy MD  Authorized by: Dayo Mccarthy MD     UNIVERSAL PROTOCOL   Site Marked: Yes  Prior Images Obtained and Reviewed:  Yes  Required items: Required blood products, implants, devices and special equipment available    Patient identity confirmed:  Verbally with patient  Patient was reevaluated immediately before administering moderate or deep sedation or anesthesia  Confirmation Checklist:  Patient's identity using two indicators, relevant allergies, procedure was appropriate and matched the consent or emergent situation and correct equipment/implants were available  Time out: Immediately prior to the procedure a time out was called    Preparation: Patient was prepped and draped in usual sterile fashion    ESBL (mL):  5     ANESTHESIA    Anesthesia: Local infiltration  Local Anesthetic:  Lidocaine 1% without epinephrine  Anesthetic Total (mL):  8      SEDATION    Patient Sedated: No    See dictated procedure note for full details.  Findings: Right tunneled dialysis catheter removed completely.    Specimens: none    Complications: None    Condition: Stable    PROCEDURE   Patient Tolerance:  Patient tolerated the procedure well with no immediate complications    Time of Sedation in Minutes by Physician:  0

## 2019-09-24 NOTE — PROGRESS NOTES
Social Work Services Discharge Note      Patient Name:  Roslyn Palmer     Anticipated Discharge Date: 9/24/2019    Discharge Disposition:   Other:  Home w/ hospice    Following MD:  Sydni Pathak MD      Pre-Admission Screening (PAS) online form has been completed.  The Level of Care (LOC) is:  Determined  Confirmation Code is:  Not needed   Patient/caregiver informed of referral to [facility] for Pre-Admission Screening for skilled nursing facility (SNF) placement and to expect a phone call post discharge from SNF.     Additional Services/Equipment Arranged:  Pt's brother will be transporting the pt home.  Hospice to meet w/ the family in the afternoon today to arrange hospice services.      Patient / Family response to discharge plan:  in agreement     Persons notified of above discharge plan:  MD, bedside RN, pt,  hospice liaison (Marion Valle)    Staff Discharge Instructions:  Please print a packet and send with patient.     CTS Handoff completed:  YES    Medicare Notice of Rights provided to the patient/family:  YES    Pt is a 63 year old female admitted to [facility] on 9/5/2019 w/ cirrhosis. Pt will be discharging today to home via transport by pt's brother w/ admission to  Hospice at 1pm. Pt to likely discharge in the morning today.    LUZ Glasgow, SW   5B   Pager [phone]  Phone [phone]

## 2019-09-24 NOTE — PLAN OF CARE
Occupational Therapy Discharge Summary    Reason for therapy discharge:    Discharged to home with hospice     Progress towards therapy goal(s). See goals on Care Plan in [facility] electronic health record for goal details.  Goals partially met.  Barriers to achieving goals:   discharge from facility.    Therapy recommendation(s):    No further therapy is recommended.

## 2019-09-25 NOTE — TELEPHONE ENCOUNTER
Forms from [facility]- Orders placed on Dr. Celestino damon for completion.    Nereyda Corea

## 2019-09-25 NOTE — DISCHARGE SUMMARY
[facility] [place]  Discharge Summary - Medicine & Pediatrics       Date of Admission:  9/5/2019  Date of Discharge:  9/24/2019  1:07 PM  Discharging Provider: Dr. Pathak   Discharge Service: Rosa 1    Discharge Diagnoses   Hemorrhagic shock secondary to esophageal bleed   Hepatorenal syndrome requiring hemodialysis   Stage II pressure injury of coccyx, present on admission   Severe protein calorie malnutrition    Chronic issues:   Decompensated cirrhosis secondary to EtOH use and Hepatitis C   Chronic Hepatitis C  HTN  Depression    Follow-ups Needed After Discharge   No follow-up appointments or laboratory tests needed, as the patient is discharging to hospice.     Unresulted Labs Ordered in the Past 30 Days of this Admission     No orders found from 8/6/2019 to 9/6/2019.        Discharge Disposition   Discharged to home with home hospice   Condition at discharge: Terminal    Hospital Course   Roslyn Palmer was admitted on 9/5/2019 for orthostatic hypotension following large volume paracentesis whose course was complicated by development of hemorrhagic shock secondary to bleeding esophageal varices. Due to her terminal illness, she and her family that she would be discharged home with hospice services.     Hemorrhagic shock secondary to esophageal varices  After admission, the patient developed hematemesis on 9/7. She underwent EGD with banding of esophageal varices on 9/7. The patient's hemoglobin precipitously dropped on 9/18, and she was transferred to the MICU.  She was found to be in hemorrhagic shock secondary to esophageal varices. She had repeat EGD on 9/19 with additional variceal banding. She was treated with IV PPI, IV Octreotide and IV Ceftriaxone. After discussion with the critical care team, the patient and her family decided on pursuing a comfort care approach, and she was discharged home with home hospice. GI recommended Pleur-X catheter placement for symptoms  secondary to ascites, however, the patient did not want this intervention performed.   - Liquid morphine, ativan, and zofran prescribed   - Continue lactulose, rifaximin, and zinc   - Continue pantoprazole and ciprofloxacin   - Continue midodrine for blood pressure support     Hepatorenal syndrome requiring hemodialysis  The patient developed acute kidney injury during hospital admission. Nephrology was consulted and thought that patient's acute kidney injury was secondary to hepatorenal syndrome. The patient was unresponsive to albumin administration. A tunneled-dialysis line was placed by interventional radiology on 9/11. Upon discharge to hospice, the patient elected to have dialysis line removed. She had it removed by IR on day of discharge, and she will no longer receive hemodialysis.     Stage II pressure injury of coccyx, present on admission  Evaluated and treated by wound nurse staff during hospital admission.   - Patient discharged with rectal tube due to skin breakdown from diarrhea     Severe protein calorie malnutrition  The patient was evaluated by nutrition services during admission. TPN was started during hospital stay, but the patient elected to stop TPN upon discharge from the hospital.     HTN  - Prior to admission hypertensives held upon discharge due to persistent hypotension     Depression  - Continue Fluoxetine     Consultations This Hospital Stay   WOUND OSTOMY CONTINENCE NURSE  IP CONSULT  INTERNAL MEDICINE PROCEDURE TEAM ADULT IP CONSULT [facility]  GI HEPATOLOGY ADULT IP CONSULT  PHYSICAL THERAPY ADULT IP CONSULT  OCCUPATIONAL THERAPY ADULT IP CONSULT  VASCULAR ACCESS CARE ADULT IP CONSULT  VASCULAR ACCESS CARE ADULT IP CONSULT  ANESTHESIOLOGY IP CONSULT  NEPHROLOGY GENERAL ADULT IP CONSULT  INTERVENTIONAL RADIOLOGY ADULT/PEDS IP CONSULT  WOUND OSTOMY CONTINENCE NURSE  IP CONSULT  VASCULAR ACCESS CARE ADULT IP CONSULT  LYMPHEDEMA THERAPY IP CONSULT  INTERNAL MEDICINE  PROCEDURE TEAM ADULT IP CONSULT [facility]  VASCULAR ACCESS CARE ADULT IP CONSULT  VASCULAR ACCESS CARE ADULT IP CONSULT  VASCULAR ACCESS ADULT IP CONSULT  WOUND OSTOMY CONTINENCE NURSE  IP CONSULT  PHARMACY/NUTRITION TO START AND MANAGE TPN  PHARMACY IP CONSULT  WOUND OSTOMY CONTINENCE NURSE  IP CONSULT  MEDICATION HISTORY IP PHARMACY CONSULT  SWALLOW EVAL SPEECH PATH AT BEDSIDE IP CONSULT  INTERVENTIONAL RADIOLOGY ADULT/PEDS IP CONSULT  PHARMACY TO DOSE PHYTONADIONE    Code Status   DNR/DNI       The patient was discussed with DO Rosa Samaniego 1 Service  [facility], [place]  Pager: [phone]  ______________________________________________________________________    Physical Exam   Vital Signs:                    Weight: 160 lbs 11.45 oz  General: Sitting up in wheelchair in no acute distress  Skin: Jaundiced  Respiratory: Not using any accessory muscles of respiration   Neuro: Alert and oriented x 3       Primary Care Physician   Monica Tirado    Discharge Orders      Medication Therapy Management Referral      Home care nursing referral      MD face to face encounter    Documentation of Face to Face and Certification for Home Health Services    I certify that patient: Roslyn Palmer is under my care and that I, or a nurse practitioner or physician's assistant working with me, had a face-to-face encounter that meets the physician face-to-face encounter requirements with this patient on: September 16, 2019.    This encounter with the patient was in whole, or in part, for the following medical condition, which is the primary reason for home health care:decompensated alcoholic cirrhosis, chronic hepatitis C, and HTN   I certify that, based on my findings, the following services are medically necessary home health services: Home Hospice care team .    My clinical findings support the need for the above services because:  End of life  cares/support/management.     Further, I certify that my clinical findings support that this patient is homebound (i.e. absences from home require considerable and taxing effort and are for medical reasons or [facility] services or infrequently or of short duration when for other reasons) because: Requires assistance of another person or specialized equipment to access medical services because patient: Requires supervision of another for safe transfer...    Based on the above findings. I certify that this patient is confined to the home and needs intermittent skilled nursing care, physical therapy and/or speech therapy.  The patient is under my care, and I have initiated the establishment of the plan of care.  This patient will be followed by a physician who will periodically review the plan of care.  Physician/Provider to provide follow up care: Monica Tirado    Attending hospital physician (the Medicare certified [place] provider): Sona Rodriguez MD  Physician Signature: See electronic signature associated with these discharge orders.  Date: 9/16/2019     Activity    Your activity upon discharge: activity as tolerated     Discharge Instructions    You are being discharged home with home hospice. The hospice care team is available 24 hours a day, 7 days a week. If you need additional support or help with symptom management, please call hospice services.     - You have Morphine available as needed for pain or air hunger  - You have Lorazepam available as needed for anxiety or nausea     Reason for your hospital stay    You were admitted for esophageal varices. The bleeding was able to be controlled with banding of your varices. You will be discharged home with home hospice.     [facility] Follow-up and recommended labs and tests    No follow-up needed as patient is discharging home on home hospice.     DNR/DNI     Diet    Follow this diet upon discharge: Regular       Significant Results and  Procedures   Most Recent 3 CBC's:  Recent Labs   Lab Test 09/23/19  1617 09/23/19  0515 09/22/19  2205  09/22/19  1515   WBC 8.1 7.7  --   --  6.0   HGB 6.9* 7.5* 7.2*   < > 5.8*   MCV 96 97  --   --  101*   PLT 49* 53*  --   --  50*    < > = values in this interval not displayed.     Most Recent 3 BMP's:  Recent Labs   Lab Test 09/23/19  1617 09/23/19  1213 09/23/19  0515 09/22/19 2205 09/22/19 0427    141 143 143   < > 146*   POTASSIUM  --   --  4.0 3.8  --  3.6   CHLORIDE  --   --  113* 113*  --  116*   CO2  --   --  18* 19*  --  18*   BUN  --   --  96* 88*  --  80*   CR  --   --  5.12* 5.06*  --  4.85*   ANIONGAP  --   --  12 10  --  12   MEGGAN  --   --  8.4* 8.3*  --  8.2*   GLC  --   --  84 114*  --  134*    < > = values in this interval not displayed.     Most Recent 2 LFT's:  Recent Labs   Lab Test 09/23/19 0515 09/22/19 0427   AST 49* 53*   ALT 25 26   ALKPHOS 108 132   BILITOTAL 7.6* 8.8*     Most Recent 3 INR's:  Recent Labs   Lab Test 09/24/19  0553 09/23/19 0515 09/22/19 0427   INR 2.20* 2.25* 1.89*       Discharge Medications   Discharge Medication List as of 9/24/2019 12:09 PM      START taking these medications    Details   ciprofloxacin (CIPRO) 500 MG tablet Take 1 tablet (500 mg) by mouth every 24 hours, Disp-30 tablet, R-0, Local Print      LORazepam (ATIVAN) 2 MG/ML (HIGH CONC) solution Take 0.5 mLs (1 mg) by mouth every 2 hours as needed for anxiety, Disp-360 mL, R-1, Local Print      midodrine (PROAMATINE) 10 MG tablet Take 2 tablets (20 mg) by mouth 3 times daily, Disp-90 tablet, R-0, Local Print      morphine 10 MG/5ML solution Take 2.5 mLs (5 mg) by mouth every 2 hours as needed for pain (Air hunger), Disp-40 mL, R-0, Local Print         CONTINUE these medications which have CHANGED    Details   ondansetron (ZOFRAN-ODT) 4 MG ODT tab Take 1 tablet (4 mg) by mouth every 6 hours as needed for nausea or vomiting, Disp-120 tablet, R-0, Local Print      pantoprazole (PROTONIX) 40 MG EC  tablet Take 1 tablet (40 mg) by mouth 2 times daily, Disp-90 tablet, R-0, Local Print         CONTINUE these medications which have NOT CHANGED    Details   cyclobenzaprine (FLEXERIL) 10 MG tablet Take 10 mg by mouth 3 times daily as needed for muscle spasms, Historical      FLUoxetine (PROZAC) 20 MG capsule Take 20 mg by mouth daily, Historical      lactulose 20 GM/30ML SOLN Take 30 mLs by mouth 4 times daily Titrate to 3-4 bowel movements per day, Transitional      rifaximin (XIFAXAN) 550 MG TABS tablet Take 1 tablet (550 mg) by mouth 2 times daily, Disp-60 tablet, R-11, E-Prescribe      sucralfate (CARAFATE) 1 GM/10ML suspension Take 1 g by mouth 4 times daily as needed , Historical      SUMAtriptan (IMITREX) 100 MG tablet Take 100 mg by mouth at onset of headache for migraine, Historical      zinc sulfate (ZINCATE) 220 (50 Zn) MG capsule Take 1 capsule (220 mg) by mouth 2 times daily, Disp-60 capsule, R-3, E-Prescribe         STOP taking these medications       allopurinol (ZYLOPRIM) 100 MG tablet Comments:   Reason for Stopping:         cephALEXin (KEFLEX) 500 MG capsule Comments:   Reason for Stopping:         Ferrous Sulfate 324 (65 Fe) MG TBEC Comments:   Reason for Stopping:         folic acid (FOLVITE) 1 MG tablet Comments:   Reason for Stopping:         magnesium oxide 200 MG TABS Comments:   Reason for Stopping:         nadolol (CORGARD) 40 MG tablet Comments:   Reason for Stopping:         triamcinolone (KENALOG) 0.1 % external cream Comments:   Reason for Stopping:             Allergies   Allergies   Allergen Reactions     Sulfa Drugs

## 2019-09-28 NOTE — PROGRESS NOTES
Status Update:  Client passed away at home with family on 9/28/19 at 8am.   If you have any questions please call [facility] at   Prerna Hurtado RN

## 2019-09-29 ENCOUNTER — HEALTH MAINTENANCE LETTER (OUTPATIENT)
Age: 64
End: 2019-09-29

## 2019-09-30 ENCOUNTER — TELEPHONE (OUTPATIENT)
Dept: FAMILY MEDICINE | Facility: CLINIC | Age: 64
End: 2019-09-30

## 2019-09-30 ENCOUNTER — MEDICAL CORRESPONDENCE (OUTPATIENT)
Dept: HEALTH INFORMATION MANAGEMENT | Facility: CLINIC | Age: 64
End: 2019-09-30

## 2019-09-30 NOTE — TELEPHONE ENCOUNTER
I believe this form was already completed and faxed 19 along with records. (see scanned form on 19). Duplicate request.   Please contact them to update them patient  and refax previous records if needed.     sebna contact # on form is [phone]. form is in my top basket if needed. Thanks!

## 2019-09-30 NOTE — TELEPHONE ENCOUNTER
Forms from [facility]-Plan of care placed on Dr. Celestino damon for completion.    Nereyda Corea

## 2019-10-02 ENCOUNTER — TELEPHONE (OUTPATIENT)
Dept: FAMILY MEDICINE | Facility: CLINIC | Age: 64
End: 2019-10-02

## 2019-10-02 ENCOUNTER — PRE VISIT (OUTPATIENT)
Dept: GASTROENTEROLOGY | Facility: CLINIC | Age: 64
End: 2019-10-02

## 2019-10-02 NOTE — TELEPHONE ENCOUNTER
Fairly certain another staff person took this document and faxed. I don't have on my desk any longer. Home care will refax orders if not received

## 2019-10-02 NOTE — TELEPHONE ENCOUNTER
Reason for Call:  Other     Detailed comments: [employer] states the death certificate has not been taken care of by Dr Tirado, which she should have received an e-mail to fill it out online.    Phone Number Patient can be reached at: Other phone number:  [phone]    Best Time: any    Can we leave a detailed message on this number? YES    Call taken on 10/2/2019 at 11:48 AM by Tarah Richmond

## 2019-10-14 ENCOUNTER — TELEPHONE (OUTPATIENT)
Dept: FAMILY MEDICINE | Facility: CLINIC | Age: 64
End: 2019-10-14

## 2019-10-14 ENCOUNTER — MEDICAL CORRESPONDENCE (OUTPATIENT)
Dept: HEALTH INFORMATION MANAGEMENT | Facility: CLINIC | Age: 64
End: 2019-10-14

## 2019-10-24 ENCOUNTER — TELEPHONE (OUTPATIENT)
Dept: FAMILY MEDICINE | Facility: CLINIC | Age: 64
End: 2019-10-24

## 2019-10-24 ENCOUNTER — MEDICAL CORRESPONDENCE (OUTPATIENT)
Dept: HEALTH INFORMATION MANAGEMENT | Facility: CLINIC | Age: 64
End: 2019-10-24

## 2019-10-24 NOTE — TELEPHONE ENCOUNTER
Forms from [facility] placed on Dr. Celestino damon for completion.    Nereyda Corea

## 2019-10-31 ENCOUNTER — TELEPHONE (OUTPATIENT)
Dept: FAMILY MEDICINE | Facility: CLINIC | Age: 64
End: 2019-10-31

## 2019-10-31 ENCOUNTER — MEDICAL CORRESPONDENCE (OUTPATIENT)
Dept: HEALTH INFORMATION MANAGEMENT | Facility: CLINIC | Age: 64
End: 2019-10-31

## 2019-11-05 ENCOUNTER — TELEPHONE (OUTPATIENT)
Dept: FAMILY MEDICINE | Facility: CLINIC | Age: 64
End: 2019-11-05

## 2023-01-26 NOTE — CONSULTS
[facility]  Consult Note - Hospitalist Service, Gold 6     Date of Admission:  9/5/2019  Consult Requested by: Elsy Malave  Reason for Consult: Ascites    Consult and Procedure Service - Procedure Note    Attending: Jorge Engel MD  Resident: CABRERA  Procedure: Therapeutic paracentesis  Indication: increasing ascites  Risk Assessment: patient examined,  allergy and lab reviewed  Pre-procedure diagnosis: Ascites  Post-procedure diagnosis: Ascites  Estimated blood loss: 0 ml    The risks and benefits of the procedure were explained to the patient who expressed understanding and opted to proceed.  Consent was obtained and placed in the chart.  A time out was performed.  An area of ascites was located and marked using ultrasound guidance in the  Right lower quadrant; the area was prepped and draped in the usual sterile fashion.  10 ml of 1% lidocaine was instilled and ascites located.  The 5 F paracentesis catheter and needle were inserted under real-time guidance until ascites obtained then the needle removed and the catheter advanced.  The apparatus was connected to vacuum bottles and a total of 3000 ml of  blanka colored fluid removed.   A specimen was not sent for analysis. The catheter was withdrawn and the area dressed.  Patient tolerated the procedure well without  immediate complications.  Please contact the Consult and Procedure Service if any complications or concerns arise.     Jorge Engel MD    DOS:  09/17/19    Jorge Engel MD  [facility]  Pager: [phone]  Please see sticky note for cross cover information  ______________________________________________________________________  Images saved for the different sites examined with US   Pt transferred from SICU to 3EF bed  via wheelchair. All belongings documented on and sent with patient, skin assessment performed with Michelle DAVE.

## 2023-05-13 NOTE — PROGRESS NOTES
Admission/Transfer from:   2 RN skin assessment completed. Yes  Significant findings include: Skin tears on bilateral forearms. Blanchable redness on bilateral heels. Blanchable redness on coccyx  WOC Nurse Consult Ordered? Yes (paged MD about WOC consult)     steve

## 2023-10-27 NOTE — PROGRESS NOTES
[facility], [place]    Progress Note - Rosa 1 Service        Date of Admission:  9/5/2019    Assessment & Plan   Roslyn Palmer is a 63 year old female with past medical history of decompensated alcoholic cirrhosis, chronic hepatitis C, and HTN who presents as a transfer from an OSH due to her recent admission here for HE, who presents this admission following a syncopal episode s/p paracentesis removing 6.3 L. Her hospital course was complicated by CT, massive hematemesis 9/7/19 s/p banding x6 of EV.    Changes today:   - Plan discharge to home; patient no longer wants to go to TCU and has home cares set up. Awaiting HD placement (she would like to switch her HD place).  - Restart Nadolol 40 mg/day  - Start Ciprofloxacin 500 mg/day for SBP prophylaxis (dose consulted with pharmacy)  - Restart Allopurinol 100 mg MWF after dialysis session (dose consulted with nephrology)  - Therapeutic paracentesis (ordered)    Care conference (9/14/19); Discussed with patient and family about natural progression of disease and prognosis. Patient states that she is not ready for hospice care and would like to continue on dialysis and treatment.    Prerenal CT secondary acute blood loss and HRS  Hypervolemic hyponatremia  Baseline ~1. Admission Cre 4.01. Minimal urine output. Diuretics discontinued last admission. Likely pre-renal in setting of slow GI variceal bleed (s/p banding) versus HRS. Completed albumin challenge 9/6-9/8 and has received multiple units additional colloid in setting of acute GIB. Cr peaked at 5.53.  - Renal consult; HD per nephrology plan. Plan continue HD outpatient.  - Midodrine 5mg TID  - Discontinue Octreotide (as patient is on dialysis)  - monitor I/O's, daily weights  - Trend BMP daily    Massive UGIB 9/7/19  Hypotension related to hypovolemia 2/2 acute blood loss  Acute on chronic anemia secondary from blood loss  Hematemesis 500 mL 9/7 with hypotension and acute  PT WITH LEGO UP LEFT NOSTRIL. PARENTS ABLE TO SEE IT BUT CANNOT GET IT OUT. NO DISTRESS NOTED. anemia with Hgb at 6.3 (from baseline ~10-11 g/dL), c/w likely variceal sources. Underwent EGD at bedside in the MICU, s/p banding x6 of esophageal varices. Transfused PRBC x4, Plasma x1, platelet x1 overnight. Has been vitally stable without further evidence of bleeding. Hgb stable at 7.7 g/dL. Completed Octreotide gtt, IV PPI, 3 days of IV VitK. If she re-bleeds may need TIPS with IR.   - Switch to PO PPI  - Monitor CBC (transfuse if Hgb <7 g/dL, Plt <50, Fibrinogen <150)  - CTX 1g IV daily for 7 days (9/7-9/13) (SBP prophylaxis) (completed)  - Start Ciprofloxacin 500 mg/day for SBP prophylaxis (dose consulted with pharmacy)  - Restart Nadolol 40 mg/day    EtOH-induced cirrhosis c/b ascites, HE, and EV bleeding  Thrombocytopenia, coagulopathy secondary to cirrhosis  Hepatic encephalopathy, improved  Last seen in GI/hepatology on 08/12/19 and MELD was 23 that visit. Cirrhosis diagnosed 6 years ago when she had EV GIB. No EGD since then. Ascites started to become appreciable in 05/19. 6.350 L removed on paracentesis 9/7. MELD-Na score on this admission: 37 (65-66% estimated 90-day mortality). Workup for thrombosis and SBP negative. Per GI, would start to address goals of care given clinical trajectory as patient would not be considered for liver transplantation due to frailty and not having met minimum sobriety requirement.   Patient is now alert & oriented to self, place, and time. MELD = 38. Has some rising trend of ALP and bilirubin, discussed with GI, will continue to trend MELD labs.  - Continue Rifaximin, Lactulose (BID w/ prns available to titrate to 3-4 BM's per day) and Zinc   - Holding Spironolactone and Torsemide (on HD)  - Therapeutic paracentesis prn    Chronic hepatitis C, treatment naive  Per last GI/hepatology visit with Dr. Owen on 08/12/19, patient is GT 2b treatment naive. Will address HCV treatment in the future. Not urgent at this point.     HBV non-immune  HBsAg, HBsAb are negative.  -  Vaccination outpatient setting    Severe malnutrition in the context of acute on chronic illness  - Nutrition consult, apprec recs  - Boost Plus (vary flavors) TID between meals    MDD  - Continue Fluoxetine     Gout  No clinical arthritis.  - Restart Allopurinol 100 mg MWF after dialysis session (dose consulted with nephrology)     Diet: Dialysis Diet  Snacks/Supplements Adult: Boost Breeze; Between Meals  Snacks/Supplements Adult: Boost Plus; Between Meals    Fluids: no IVF  Lines: CVC for HD  DVT Prophylaxis: Mechanical   Cameron Catheter: not present  Code Status: Full Code      Disposition Plan   Expected discharge: in 1-2 days, recommended to home once hemoglobin stable, outpatient HD plan established.  Entered: Ceferino Swenson MD 09/17/2019, 3:23 PM     The patient's care was discussed with the Attending Physician, Dr. Tony Churchill.    MD Viridiana [facility] Service  [facility], [place]  Pager: [phone]  Please see sticky note for cross cover information  ______________________________________________________________________    Interval History   NAEON. Feeling well this morning. She has been walking around the wards.   She decided she doesn't want to go to the TCU anymore. She wants to get better at home and would rather be around her family. Her family is working on getting her a hospital bed and arranging some home care help for her.   She is otherwise feeling well, has a good appetite.  Today she raises concerns about how to continue her care at home (especially her CVC, blood check, and leg swelling issues). Also want to switch HD place. Asks if she can get paracentesis as now her stomach feels more full.    Data reviewed today: I reviewed all medications, new labs and imaging results over the last 24 hours.    Lab Results   Component Value Date    WBC 9.6 09/17/2019    HGB 8.1 (L) 09/17/2019    HCT 24.5 (L) 09/17/2019    PLT 81 (L) 09/17/2019      09/17/2019    POTASSIUM 3.8 09/17/2019    CHLORIDE 101 09/17/2019    CO2 20 09/17/2019    BUN 26 09/17/2019    CR 3.21 (H) 09/17/2019    GLC 92 09/17/2019    SED 50 (H) 08/09/2018    AST 65 (H) 09/17/2019    ALT 41 09/17/2019    ALKPHOS 280 (H) 09/17/2019    BILITOTAL 17.8 (HH) 09/17/2019    WILLIS 30 09/08/2019    INR 1.77 (H) 09/17/2019       Physical Exam   Vital Signs: Temp: 96  F (35.6  C) Temp src: Axillary BP: 112/58 Pulse: 93 Heart Rate: 83 Resp: 16 SpO2: 98 % O2 Device: None (Room air)    Weight: 165 lbs 4.8 oz    Constitutional: awake, alert, NAD  Eyes: Scleral icterus present, EOMI  ENT: Normocephalic, without obvious abnormality, atraumatic, poor dentition  Respiratory: On room air, breathing comfortably, CTA b/l  Cardiovascular: RRR, no m/r/g  GI: Distended. but non-tender. Bowel sounds present  Skin: Jaundice, spider angiomas, and palmar erythema present. Mild blood oozing per CVC site.  Neurologic: No asterixis, A&Ox3. No focal neurological deficit.
